# Patient Record
Sex: FEMALE | Race: BLACK OR AFRICAN AMERICAN | NOT HISPANIC OR LATINO | Employment: OTHER | URBAN - METROPOLITAN AREA
[De-identification: names, ages, dates, MRNs, and addresses within clinical notes are randomized per-mention and may not be internally consistent; named-entity substitution may affect disease eponyms.]

---

## 2017-07-10 ENCOUNTER — OFFICE VISIT (OUTPATIENT)
Dept: OBSTETRICS AND GYNECOLOGY | Facility: CLINIC | Age: 77
End: 2017-07-10
Payer: MEDICARE

## 2017-07-10 ENCOUNTER — TELEPHONE (OUTPATIENT)
Dept: OBSTETRICS AND GYNECOLOGY | Facility: CLINIC | Age: 77
End: 2017-07-10

## 2017-07-10 VITALS
DIASTOLIC BLOOD PRESSURE: 66 MMHG | BODY MASS INDEX: 23.91 KG/M2 | WEIGHT: 134.94 LBS | SYSTOLIC BLOOD PRESSURE: 120 MMHG | HEIGHT: 63 IN

## 2017-07-10 DIAGNOSIS — Z01.419 WELL WOMAN EXAM WITH ROUTINE GYNECOLOGICAL EXAM: Primary | ICD-10-CM

## 2017-07-10 DIAGNOSIS — Z78.0 MENOPAUSE: ICD-10-CM

## 2017-07-10 PROCEDURE — G0101 CA SCREEN;PELVIC/BREAST EXAM: HCPCS | Mod: S$GLB,,, | Performed by: OBSTETRICS & GYNECOLOGY

## 2017-07-10 PROCEDURE — 99999 PR PBB SHADOW E&M-EST. PATIENT-LVL III: CPT | Mod: PBBFAC,,, | Performed by: OBSTETRICS & GYNECOLOGY

## 2017-07-10 PROCEDURE — 88175 CYTOPATH C/V AUTO FLUID REDO: CPT

## 2017-07-10 RX ORDER — NAPROXEN SODIUM 220 MG/1
81 TABLET, FILM COATED ORAL DAILY
COMMUNITY

## 2017-07-10 RX ORDER — BENAZEPRIL HYDROCHLORIDE 5 MG/1
5 TABLET ORAL DAILY
COMMUNITY
End: 2019-12-30

## 2017-07-10 NOTE — TELEPHONE ENCOUNTER
----- Message from Aubree Lange sent at 7/7/2017  4:17 PM CDT -----  Contact: Self [phone]  Patient is calling to get Mammo orders sent to [phone]

## 2017-07-10 NOTE — PROGRESS NOTES
Subjective:       Patient ID: Alana Beth is a 76 y.o. female.    Chief Complaint: Well Woman (no complaints)    No HRT; having cardiology work-up. May have blockage and need shunt.  Mammo at [facility]    HPI  Review of Systems   Gastrointestinal: Negative for abdominal distention, abdominal pain, constipation and nausea.   Genitourinary: Negative for dyspareunia, dysuria, genital sores, pelvic pain, vaginal bleeding and vaginal discharge.       Objective:      Physical Exam   Constitutional: She appears well-developed and well-nourished.   Pulmonary/Chest: Right breast exhibits no mass, no nipple discharge, no skin change and no tenderness. Left breast exhibits no mass, no nipple discharge, no skin change and no tenderness.   Abdominal: Soft. Bowel sounds are normal. She exhibits no distension and no mass. There is no tenderness. There is no rebound and no guarding. Hernia confirmed negative in the right inguinal area and confirmed negative in the left inguinal area.   Genitourinary: Rectum normal, vagina normal and uterus normal. No breast tenderness or discharge. There is no lesion on the right labia. There is no lesion on the left labia. Uterus is not fixed and not tender. Cervix exhibits no motion tenderness, no discharge and no friability. Right adnexum displays no mass, no tenderness and no fullness. Left adnexum displays no mass, no tenderness and no fullness. No tenderness in the vagina. No vaginal discharge found.   Lymphadenopathy:        Right: No inguinal adenopathy present.        Left: No inguinal adenopathy present.       Assessment:       1. Well woman exam with routine gynecological exam    2. Menopause        Plan:         annual gyn visit; no HRT; pap and mammogram.

## 2017-07-19 ENCOUNTER — TELEPHONE (OUTPATIENT)
Dept: OBSTETRICS AND GYNECOLOGY | Facility: CLINIC | Age: 77
End: 2017-07-19

## 2017-07-26 ENCOUNTER — TELEPHONE (OUTPATIENT)
Dept: OBSTETRICS AND GYNECOLOGY | Facility: CLINIC | Age: 77
End: 2017-07-26

## 2017-07-26 NOTE — TELEPHONE ENCOUNTER
----- Message from Radha Nhi sent at 7/26/2017 12:17 PM CDT -----  Contact: self, [phone]  Patient called in requesting to speak with you regarding her test results done on 7/10/17.  Please advise.

## 2017-07-27 NOTE — TELEPHONE ENCOUNTER
Notified patient of results. States that she had something similar a few times before years ago. Patient is aware of follow up recommendations. Order will be faxed so that patient can schedule appointment. All questions answered and patient verbalized understanding.

## 2017-07-27 NOTE — TELEPHONE ENCOUNTER
"Had a small area that was called "indeterminate"---needs to go back for U/S---ok to send order to [facility]  "

## 2018-07-27 ENCOUNTER — TELEPHONE (OUTPATIENT)
Dept: OBSTETRICS AND GYNECOLOGY | Facility: CLINIC | Age: 78
End: 2018-07-27

## 2018-07-27 NOTE — TELEPHONE ENCOUNTER
----- Message from Klever Wong sent at 7/27/2018 10:10 AM CDT -----  Contact: self/[phone]  Patient would like orders put in the system for her Mammogram.      Please call and advise when done.

## 2018-08-06 ENCOUNTER — OFFICE VISIT (OUTPATIENT)
Dept: OBSTETRICS AND GYNECOLOGY | Facility: CLINIC | Age: 78
End: 2018-08-06
Payer: MEDICARE

## 2018-08-06 VITALS
BODY MASS INDEX: 23.63 KG/M2 | SYSTOLIC BLOOD PRESSURE: 126 MMHG | WEIGHT: 133.38 LBS | HEIGHT: 63 IN | DIASTOLIC BLOOD PRESSURE: 60 MMHG

## 2018-08-06 DIAGNOSIS — Z78.0 MENOPAUSE: ICD-10-CM

## 2018-08-06 DIAGNOSIS — Z01.419 WELL WOMAN EXAM WITH ROUTINE GYNECOLOGICAL EXAM: Primary | ICD-10-CM

## 2018-08-06 PROCEDURE — G0101 CA SCREEN;PELVIC/BREAST EXAM: HCPCS | Mod: S$GLB,,, | Performed by: OBSTETRICS & GYNECOLOGY

## 2018-08-06 PROCEDURE — 99999 PR PBB SHADOW E&M-EST. PATIENT-LVL III: CPT | Mod: PBBFAC,,, | Performed by: OBSTETRICS & GYNECOLOGY

## 2018-08-06 PROCEDURE — 88175 CYTOPATH C/V AUTO FLUID REDO: CPT

## 2018-08-06 RX ORDER — IRBESARTAN 75 MG/1
150 TABLET ORAL NIGHTLY
COMMUNITY
End: 2020-01-06

## 2018-08-06 RX ORDER — FERROUS SULFATE 325(65) MG
325 TABLET, DELAYED RELEASE (ENTERIC COATED) ORAL
COMMUNITY
End: 2020-02-13

## 2018-08-06 RX ORDER — CHLORTHALIDONE 25 MG/1
25 TABLET ORAL DAILY
COMMUNITY
End: 2019-12-30

## 2018-08-06 RX ORDER — CARVEDILOL 6.25 MG/1
6.25 TABLET ORAL 2 TIMES DAILY WITH MEALS
COMMUNITY
End: 2020-11-11 | Stop reason: SDUPTHER

## 2018-08-06 NOTE — PROGRESS NOTES
Subjective:       Patient ID: Alana Beth is a 77 y.o. female.    Chief Complaint: Well Woman (no complaints)    No HRT; getting mammo at [facility] today---will try to add BMD  Doing well generally    HPI  Review of Systems   Gastrointestinal: Negative for abdominal distention, abdominal pain, constipation and nausea.   Genitourinary: Negative for dyspareunia, dysuria, genital sores, pelvic pain, vaginal bleeding and vaginal discharge.       Objective:      Physical Exam   Constitutional: She appears well-developed and well-nourished.   Pulmonary/Chest: Right breast exhibits no mass, no nipple discharge, no skin change and no tenderness. Left breast exhibits no mass, no nipple discharge, no skin change and no tenderness.   Abdominal: Soft. Bowel sounds are normal. She exhibits no distension and no mass. There is no tenderness. There is no rebound and no guarding. Hernia confirmed negative in the right inguinal area and confirmed negative in the left inguinal area.   Genitourinary: Rectum normal, vagina normal and uterus normal. No breast tenderness or discharge. There is no lesion on the right labia. There is no lesion on the left labia. Uterus is not fixed and not tender. Cervix exhibits no motion tenderness, no discharge and no friability. Right adnexum displays no mass, no tenderness and no fullness. Left adnexum displays no mass, no tenderness and no fullness. No tenderness in the vagina. No vaginal discharge found.   Lymphadenopathy:        Right: No inguinal adenopathy present.        Left: No inguinal adenopathy present.     low E2 effects  Assessment:       1. Well woman exam with routine gynecological exam    2. Menopause        Plan:         annual gyn visit; pap; mammogram and BMD; no HRT

## 2018-08-10 ENCOUNTER — TELEPHONE (OUTPATIENT)
Dept: OBSTETRICS AND GYNECOLOGY | Facility: CLINIC | Age: 78
End: 2018-08-10

## 2018-09-28 RX ORDER — AMOXICILLIN AND CLAVULANATE POTASSIUM 875; 125 MG/1; MG/1
1 TABLET, FILM COATED ORAL 2 TIMES DAILY
Qty: 20 TABLET | Refills: 0 | Status: SHIPPED | OUTPATIENT
Start: 2018-09-28 | End: 2019-12-30

## 2018-12-24 ENCOUNTER — TELEPHONE (OUTPATIENT)
Dept: OBSTETRICS AND GYNECOLOGY | Facility: CLINIC | Age: 78
End: 2018-12-24

## 2018-12-24 NOTE — TELEPHONE ENCOUNTER
----- Message from Radha Hankins sent at 12/24/2018  9:36 AM CST -----  Contact: self, [phone] (H)  Patient states she's experiencing numbness inside of her right leg. States she's already seen her cardiologist but wants to know if this could be Gyn related. Please advise.

## 2018-12-24 NOTE — TELEPHONE ENCOUNTER
Patient has complaints of numbness on inside of right leg. States that she noticed it on 12/06. She spoke with internist and was told that it could be nerve damage from poor circulation. Patient had questions in regards to if this could be gyn related. No complaints of pelvic or abdominal pain. No shortness of breathe or chest pain. Patient is scheduled for MRI on 01/07 with her internist. Notified patient that from symptoms described it does not sound gyn related. Informed patient to keep scheduled appt with internist for further testing and instruction. All questions answered and patient verbalized understanding.

## 2019-07-03 ENCOUNTER — TELEPHONE (OUTPATIENT)
Dept: OBSTETRICS AND GYNECOLOGY | Facility: CLINIC | Age: 79
End: 2019-07-03

## 2019-07-03 NOTE — TELEPHONE ENCOUNTER
----- Message from Corinne Shell sent at 7/3/2019  2:27 PM CDT -----  No. [phone]     Patient needs a mammo order for [facility] in [place].

## 2019-09-06 ENCOUNTER — TELEPHONE (OUTPATIENT)
Dept: OBSTETRICS AND GYNECOLOGY | Facility: CLINIC | Age: 79
End: 2019-09-06

## 2019-09-06 NOTE — TELEPHONE ENCOUNTER
----- Message from Madeleine Meyer MA sent at 9/6/2019 10:53 AM CDT -----  Contact: self / [phone]      ----- Message -----  From: Amira Ybarra  Sent: 9/6/2019  10:49 AM  To: Jose TSANG Staff    Patient is requesting orders for a mammogram. Please advise.

## 2019-09-30 ENCOUNTER — TELEPHONE (OUTPATIENT)
Dept: OBSTETRICS AND GYNECOLOGY | Facility: CLINIC | Age: 79
End: 2019-09-30

## 2019-10-07 ENCOUNTER — OFFICE VISIT (OUTPATIENT)
Dept: OBSTETRICS AND GYNECOLOGY | Facility: CLINIC | Age: 79
End: 2019-10-07
Payer: MEDICARE

## 2019-10-07 VITALS
DIASTOLIC BLOOD PRESSURE: 68 MMHG | HEIGHT: 63 IN | SYSTOLIC BLOOD PRESSURE: 148 MMHG | WEIGHT: 141.38 LBS | BODY MASS INDEX: 25.05 KG/M2

## 2019-10-07 DIAGNOSIS — Z01.419 WELL WOMAN EXAM WITH ROUTINE GYNECOLOGICAL EXAM: ICD-10-CM

## 2019-10-07 DIAGNOSIS — Z12.4 ROUTINE PAPANICOLAOU SMEAR: Primary | ICD-10-CM

## 2019-10-07 PROCEDURE — 99999 PR PBB SHADOW E&M-EST. PATIENT-LVL III: CPT | Mod: PBBFAC,,, | Performed by: OBSTETRICS & GYNECOLOGY

## 2019-10-07 PROCEDURE — G0101 CA SCREEN;PELVIC/BREAST EXAM: HCPCS | Mod: S$GLB,,, | Performed by: OBSTETRICS & GYNECOLOGY

## 2019-10-07 PROCEDURE — 88175 CYTOPATH C/V AUTO FLUID REDO: CPT

## 2019-10-07 PROCEDURE — 99999 PR PBB SHADOW E&M-EST. PATIENT-LVL III: ICD-10-PCS | Mod: PBBFAC,,, | Performed by: OBSTETRICS & GYNECOLOGY

## 2019-10-07 PROCEDURE — G0101 PR CA SCREEN;PELVIC/BREAST EXAM: ICD-10-PCS | Mod: S$GLB,,, | Performed by: OBSTETRICS & GYNECOLOGY

## 2019-10-07 NOTE — PROGRESS NOTES
"HPI:   79 y.o.   OB History        2    Para        Term                AB   2    Living           SAB   2    TAB        Ectopic        Multiple        Live Births                  No LMP recorded. Patient is postmenopausal.    Patient is  here for her annual gynecologic exam.  She has no complaints.     ROS:  GENERAL: No fever, chills, fatigability or weight loss.  SKIN: No rashes, itching or changes in color or texture of skin.  HEAD: No headaches or recent head trauma.  EYES: Visual acuity fine. No photophobia, ocular pain or diplopia.  EARS: Denies ear pain, discharge or vertigo.  NOSE: No loss of smell, no epistaxis or postnasal drip.  MOUTH & THROAT: No hoarseness or change in voice. No excessive gum bleeding.  NODES: Denies swollen glands.  CHEST: Denies HUSTON, cyanosis, wheezing, cough and sputum production.  CARDIOVASCULAR: Denies chest pain, PND, orthopnea or reduced exercise tolerance.  ABDOMEN: Appetite fine. No weight loss. Denies diarrhea, abdominal pain, hematemesis or blood in stool.  URINARY: No flank pain, dysuria or hematuria.  PERIPHERAL VASCULAR: No claudication or cyanosis.  MUSCULOSKELETAL: No joint stiffness or swelling. Denies back pain.  NEUROLOGIC: No history of seizures, paralysis, alteration of gait or coordination.    PE:   BP (!) 148/68   Ht 5' 3" (1.6 m)   Wt 64.1 kg (141 lb 6.4 oz)   BMI 25.05 kg/m²   APPEARANCE: Well nourished, well developed, in no acute distress.  NECK: Neck symmetric without masses or thyromegaly.  BREASTS: Symmetrical, no skin changes or visible lesions. No palpable masses, nipple discharge or adenopathy bilaterally.  ABDOMEN: Flat. Soft. No tenderness or masses. No hepatosplenomegaly. No hernias. No CVA tenderness.  VULVA: No lesions. Normal female genitalia.  URETHRAL MEATUS: Normal size and location, no lesions, no prolapse.  URETHRA: No masses, tenderness, prolapse or scarring.  VAGINA: Moist and well rugated, no discharge, no significant " cystocele or rectocele.  CERVIX: No lesions and discharge. PAP done.  UTERUS: Normal size, regular shape, mobile, non-tender, bladder base nontender.  ADNEXA: No masses, tenderness or CDS nodularity.  ANUS PERINEUM: Normal.    PROCEDURES:  Pap smear    Assessment:  Normal Gynecologic Exam    Plan:  Mammogram and Colonoscopy if indicated by current recommendations.  Return to clinic in one year or for any problems or complaints.  No gyn co  Neg hx

## 2019-10-15 ENCOUNTER — TELEPHONE (OUTPATIENT)
Dept: OBSTETRICS AND GYNECOLOGY | Facility: CLINIC | Age: 79
End: 2019-10-15

## 2019-10-15 NOTE — TELEPHONE ENCOUNTER
----- Message from Michael A. Wiedemann, MD sent at 10/15/2019  8:05 AM CDT -----  Notify the patient that her pap was normal

## 2019-12-23 ENCOUNTER — TELEPHONE (OUTPATIENT)
Dept: OBSTETRICS AND GYNECOLOGY | Facility: CLINIC | Age: 79
End: 2019-12-23

## 2019-12-23 NOTE — TELEPHONE ENCOUNTER
----- Message from Em Oliveira sent at 12/23/2019  3:05 PM CST -----  Contact: Pt  Patient requesting a call back in regards to getting sooner appt    Patient states that she's having sticking and itching in the VAG...,  area    Please call and advise    Phone [phone]

## 2019-12-23 NOTE — TELEPHONE ENCOUNTER
Pt state she is having some itching and feel like something is sticking her. Pt state she would just wait until the 16th to see you about it. I offer pt medication but she said she will just wait because she do not know what it is.

## 2019-12-30 ENCOUNTER — OFFICE VISIT (OUTPATIENT)
Dept: PRIMARY CARE CLINIC | Facility: CLINIC | Age: 79
End: 2019-12-30
Payer: MEDICARE

## 2019-12-30 VITALS
RESPIRATION RATE: 16 BRPM | DIASTOLIC BLOOD PRESSURE: 84 MMHG | WEIGHT: 137 LBS | HEIGHT: 63 IN | BODY MASS INDEX: 24.27 KG/M2 | TEMPERATURE: 98 F | OXYGEN SATURATION: 100 % | SYSTOLIC BLOOD PRESSURE: 180 MMHG | HEART RATE: 84 BPM

## 2019-12-30 DIAGNOSIS — Z23 NEED FOR IMMUNIZATION AGAINST INFLUENZA: Primary | ICD-10-CM

## 2019-12-30 DIAGNOSIS — I73.9 PVD (PERIPHERAL VASCULAR DISEASE): ICD-10-CM

## 2019-12-30 DIAGNOSIS — I10 ESSENTIAL HYPERTENSION: ICD-10-CM

## 2019-12-30 DIAGNOSIS — Z23 INFLUENZA VACCINE NEEDED: ICD-10-CM

## 2019-12-30 DIAGNOSIS — Z86.718 HX OF BLOOD CLOTS: ICD-10-CM

## 2019-12-30 DIAGNOSIS — F41.9 ANXIETY: ICD-10-CM

## 2019-12-30 PROCEDURE — 1159F PR MEDICATION LIST DOCUMENTED IN MEDICAL RECORD: ICD-10-PCS | Mod: S$GLB,,, | Performed by: FAMILY MEDICINE

## 2019-12-30 PROCEDURE — 1126F AMNT PAIN NOTED NONE PRSNT: CPT | Mod: S$GLB,,, | Performed by: FAMILY MEDICINE

## 2019-12-30 PROCEDURE — 1101F PR PT FALLS ASSESS DOC 0-1 FALLS W/OUT INJ PAST YR: ICD-10-PCS | Mod: CPTII,S$GLB,, | Performed by: FAMILY MEDICINE

## 2019-12-30 PROCEDURE — 99204 PR OFFICE/OUTPT VISIT, NEW, LEVL IV, 45-59 MIN: ICD-10-PCS | Mod: 25,S$GLB,, | Performed by: FAMILY MEDICINE

## 2019-12-30 PROCEDURE — 99999 PR PBB SHADOW E&M-EST. PATIENT-LVL IV: CPT | Mod: PBBFAC,,, | Performed by: FAMILY MEDICINE

## 2019-12-30 PROCEDURE — G0008 FLU VACCINE - HIGH DOSE (65+) PRESERVATIVE FREE IM: ICD-10-PCS | Mod: S$GLB,,, | Performed by: FAMILY MEDICINE

## 2019-12-30 PROCEDURE — 1126F PR PAIN SEVERITY QUANTIFIED, NO PAIN PRESENT: ICD-10-PCS | Mod: S$GLB,,, | Performed by: FAMILY MEDICINE

## 2019-12-30 PROCEDURE — 90662 FLU VACCINE - HIGH DOSE (65+) PRESERVATIVE FREE IM: ICD-10-PCS | Mod: S$GLB,,, | Performed by: FAMILY MEDICINE

## 2019-12-30 PROCEDURE — G0008 ADMIN INFLUENZA VIRUS VAC: HCPCS | Mod: S$GLB,,, | Performed by: FAMILY MEDICINE

## 2019-12-30 PROCEDURE — 1101F PT FALLS ASSESS-DOCD LE1/YR: CPT | Mod: CPTII,S$GLB,, | Performed by: FAMILY MEDICINE

## 2019-12-30 PROCEDURE — 99204 OFFICE O/P NEW MOD 45 MIN: CPT | Mod: 25,S$GLB,, | Performed by: FAMILY MEDICINE

## 2019-12-30 PROCEDURE — 99999 PR PBB SHADOW E&M-EST. PATIENT-LVL IV: ICD-10-PCS | Mod: PBBFAC,,, | Performed by: FAMILY MEDICINE

## 2019-12-30 PROCEDURE — 1159F MED LIST DOCD IN RCRD: CPT | Mod: S$GLB,,, | Performed by: FAMILY MEDICINE

## 2019-12-30 PROCEDURE — 90662 IIV NO PRSV INCREASED AG IM: CPT | Mod: S$GLB,,, | Performed by: FAMILY MEDICINE

## 2019-12-30 RX ORDER — GUAIFENESIN 600 MG/1
600 TABLET, EXTENDED RELEASE ORAL 2 TIMES DAILY
COMMUNITY
End: 2020-01-13

## 2019-12-30 RX ORDER — AMLODIPINE BESYLATE 5 MG/1
5 TABLET ORAL DAILY
Qty: 30 TABLET | Refills: 1 | Status: SHIPPED | OUTPATIENT
Start: 2019-12-30 | End: 2020-01-06

## 2019-12-30 NOTE — Clinical Note
December 30, 2019      No Recipients           [facility]  [address], [facility] [address]  [facility] [address]  Phone: [phone]  Fax: [phone]          Patient: Alana Beth   MR Number: [id number]   YOB: 1940   Date of Visit: 12/30/2019       Dear :    Thank you for referring Alana Beth to me for evaluation. Attached you will find relevant portions of my assessment and plan of care.    If you have questions, please do not hesitate to call me. I look forward to following Alana Beth along with you.    Sincerely,    Derrick Salvador MD    Enclosure  CC:  No Recipients    If you would like to receive this communication electronically, please contact [email or web address] or [phone] to request more information on [employer] Link access.    For providers and/or their staff who would like to refer a patient to [facility], please contact us through our one-stop-shop provider referral line, [facility], at [phone].    If you feel you have received this communication in error or would no longer like to receive these types of communications, please e-mail [email or web address]

## 2019-12-30 NOTE — PROGRESS NOTES
Patient identified by name and date of birth. Allergies reviewed, immunization administered by aseptic technique, tolerated well by pt.

## 2019-12-30 NOTE — PROGRESS NOTES
"Subjective:       Patient ID: Alana Beth is a 79 y.o. female.    Chief Complaint: Establish Care (c/o elevated blood pressure and thinks it may be from her medication)    79 yr old with hx of HTN, palpitations, PVD, blood clot here to establish care. Previously seen by Dr. Tang at [facility] and frustrated by the bp meds she was given which did not make her feel well.    Not feeling a hundred percent lately. Complains of stomach gas and uncontrolled bp. Typically in the am her SBP is in 160s. Denies SOB, CP, HA, or vision changes.   States takes her bp med every evening. Takes the coreg in the am as well.       Review of Systems   Constitutional: Negative for activity change, chills and fever.   HENT: Negative for dental problem.    Eyes: Negative for visual disturbance.   Respiratory: Negative for cough, chest tightness, shortness of breath and wheezing.    Cardiovascular: Negative for chest pain, palpitations and leg swelling.   Gastrointestinal: Negative for abdominal distention, abdominal pain, constipation, diarrhea, nausea and vomiting.   Skin: Negative for rash.   Neurological: Negative for dizziness and weakness.   Hematological: Does not bruise/bleed easily.       Objective:      Vitals:    12/30/19 1304 12/30/19 1414   BP: (!) 142/82 (!) 180/84   BP Location: Left arm Left arm   Patient Position: Sitting    BP Method: Medium (Manual) Medium (Manual)   Pulse: 84    Resp: 16    Temp: 98.3 °F (36.8 °C)    SpO2: 100%    Weight: 62.1 kg (137 lb)    Height: 5' 3" (1.6 m)      Physical Exam   Constitutional: She appears well-developed and well-nourished.   HENT:   Head: Normocephalic and atraumatic.   Nose: Nose normal.   Mouth/Throat: Oropharynx is clear and moist.   Eyes: Conjunctivae and EOM are normal.   Cardiovascular: Normal rate, regular rhythm and normal heart sounds.   Pulmonary/Chest: Effort normal and breath sounds normal. No respiratory distress. She has no wheezes. She has no rales. "   Abdominal: Soft. She exhibits no distension. There is no tenderness.   Lymphadenopathy:     She has no cervical adenopathy.   Neurological: She is alert. No cranial nerve deficit.   Psychiatric: She has a normal mood and affect.   Nursing note and vitals reviewed.          No results found for: NA, K, CL, CO2, BUN, CREATININE, GLUCOSE, ANIONGAP  No results found for: HGBA1C  No results found for: BNP, BNPTRIAGEBLO    No results found for: WBC, HGB, HCT, PLT, GRAN  No results found for: CHOL, HDL, LDLCALC, TRIG       Current Outpatient Medications:     amLODIPine (NORVASC) 5 MG tablet, Take 1 tablet (5 mg total) by mouth once daily., Disp: 30 tablet, Rfl: 1    aspirin 81 MG Chew, Take 81 mg by mouth once daily., Disp: , Rfl:     carvedilol (COREG) 6.25 MG tablet, Take 6.25 mg by mouth 2 (two) times daily with meals., Disp: , Rfl:     clorazepate (TRANXENE) 7.5 MG Tab, Take 7.5 mg by mouth once daily.  , Disp: , Rfl:     ferrous sulfate 325 (65 FE) MG EC tablet, Take 325 mg by mouth 3 (three) times daily with meals., Disp: , Rfl:     irbesartan (AVAPRO) 75 MG tablet, Take 75 mg by mouth every evening., Disp: , Rfl:         Assessment:       1. Essential hypertension    2. PVD (peripheral vascular disease)    3. Anxiety    4. Hx of blood clots           Plan:       Essential hypertension  -     amLODIPine (NORVASC) 5 MG tablet; Take 1 tablet (5 mg total) by mouth once daily.  Dispense: 30 tablet; Refill: 1  Dangerously high today 180/84 on repeat both arms. Getting med records as poor historian but has tried chlorthalidone in the past with bad but unclear side effects. Continue irbesartan 150 mg, coreg 6.25 bid and trial of norvasc 5 mg. F/u in the next couple of days for nursing BP check. To document at home and keep log as well.     PVD (peripheral vascular disease)  Has seen cardiology annually. Getting med records.    Anxiety  Not a problem currently. Has been taking clorazepate 3 mg for decades. Goal to  ween off in time. Would replace with SSRI if warranted.    Hx of blood clots  -Has been prescribed aspirin years ago. Requesting med records.

## 2020-01-03 ENCOUNTER — CLINICAL SUPPORT (OUTPATIENT)
Dept: PRIMARY CARE CLINIC | Facility: CLINIC | Age: 80
End: 2020-01-03
Payer: MEDICARE

## 2020-01-03 VITALS — SYSTOLIC BLOOD PRESSURE: 126 MMHG | HEART RATE: 85 BPM | DIASTOLIC BLOOD PRESSURE: 70 MMHG

## 2020-01-03 PROCEDURE — 99999 PR PBB SHADOW E&M-EST. PATIENT-LVL III: ICD-10-PCS | Mod: PBBFAC,,,

## 2020-01-03 PROCEDURE — 99999 PR PBB SHADOW E&M-EST. PATIENT-LVL III: CPT | Mod: PBBFAC,,,

## 2020-01-03 NOTE — PROGRESS NOTES
Patient identified by name and date of birth. States here for a blood pressure check.Vitals obtained and charted, tolerated well by pt. Pt instructed to continue medications as ordered and to keep f/u appt. Pt verbalizes understanding.

## 2020-01-06 ENCOUNTER — OFFICE VISIT (OUTPATIENT)
Dept: PRIMARY CARE CLINIC | Facility: CLINIC | Age: 80
End: 2020-01-06
Payer: MEDICARE

## 2020-01-06 ENCOUNTER — CLINICAL SUPPORT (OUTPATIENT)
Dept: PRIMARY CARE CLINIC | Facility: CLINIC | Age: 80
End: 2020-01-06
Payer: MEDICARE

## 2020-01-06 VITALS
RESPIRATION RATE: 16 BRPM | TEMPERATURE: 99 F | DIASTOLIC BLOOD PRESSURE: 78 MMHG | HEART RATE: 85 BPM | OXYGEN SATURATION: 97 % | BODY MASS INDEX: 23.74 KG/M2 | HEIGHT: 63 IN | SYSTOLIC BLOOD PRESSURE: 148 MMHG | WEIGHT: 134 LBS

## 2020-01-06 DIAGNOSIS — R53.1 WEAKNESS: ICD-10-CM

## 2020-01-06 DIAGNOSIS — I10 ESSENTIAL HYPERTENSION: Primary | ICD-10-CM

## 2020-01-06 DIAGNOSIS — F41.9 ANXIETY: ICD-10-CM

## 2020-01-06 DIAGNOSIS — R35.89 POLYURIA: ICD-10-CM

## 2020-01-06 LAB
BILIRUB SERPL-MCNC: 0 MG/DL
BILIRUB UR QL STRIP: NEGATIVE
BLOOD URINE, POC: NORMAL
CLARITY UR: CLEAR
COLOR UR: YELLOW
COLOR, POC UA: YELLOW
GLUCOSE UR QL STRIP: NEGATIVE
GLUCOSE UR QL STRIP: NORMAL
HGB UR QL STRIP: ABNORMAL
KETONES UR QL STRIP: 0
KETONES UR QL STRIP: NEGATIVE
LEUKOCYTE ESTERASE UR QL STRIP: ABNORMAL
LEUKOCYTE ESTERASE URINE, POC: NORMAL
MICROSCOPIC COMMENT: NORMAL
NITRITE UR QL STRIP: NEGATIVE
NITRITE, POC UA: 0
PH UR STRIP: 7 [PH] (ref 5–8)
PH, POC UA: 7
PROT UR QL STRIP: NEGATIVE
PROTEIN, POC: 0
RBC #/AREA URNS HPF: 2 /HPF (ref 0–4)
SP GR UR STRIP: <=1.005 (ref 1–1.03)
SPECIFIC GRAVITY, POC UA: 1
SQUAMOUS #/AREA URNS HPF: 1 /HPF
URN SPEC COLLECT METH UR: ABNORMAL
UROBILINOGEN UR STRIP-ACNC: NEGATIVE EU/DL
UROBILINOGEN, POC UA: NORMAL
WBC #/AREA URNS HPF: 2 /HPF (ref 0–5)

## 2020-01-06 PROCEDURE — 99999 PR PBB SHADOW E&M-EST. PATIENT-LVL IV: CPT | Mod: PBBFAC,,, | Performed by: FAMILY MEDICINE

## 2020-01-06 PROCEDURE — 1159F MED LIST DOCD IN RCRD: CPT | Mod: S$GLB,,, | Performed by: FAMILY MEDICINE

## 2020-01-06 PROCEDURE — 81001 POCT URINALYSIS, DIPSTICK OR TABLET REAGENT, AUTOMATED, WITH MICROSCOP: ICD-10-PCS | Mod: S$GLB,,, | Performed by: FAMILY MEDICINE

## 2020-01-06 PROCEDURE — 81001 URINALYSIS AUTO W/SCOPE: CPT | Mod: S$GLB,,, | Performed by: FAMILY MEDICINE

## 2020-01-06 PROCEDURE — 3077F SYST BP >= 140 MM HG: CPT | Mod: CPTII,S$GLB,, | Performed by: FAMILY MEDICINE

## 2020-01-06 PROCEDURE — 1101F PR PT FALLS ASSESS DOC 0-1 FALLS W/OUT INJ PAST YR: ICD-10-PCS | Mod: CPTII,S$GLB,, | Performed by: FAMILY MEDICINE

## 2020-01-06 PROCEDURE — 1126F PR PAIN SEVERITY QUANTIFIED, NO PAIN PRESENT: ICD-10-PCS | Mod: S$GLB,,, | Performed by: FAMILY MEDICINE

## 2020-01-06 PROCEDURE — 1159F PR MEDICATION LIST DOCUMENTED IN MEDICAL RECORD: ICD-10-PCS | Mod: S$GLB,,, | Performed by: FAMILY MEDICINE

## 2020-01-06 PROCEDURE — 3078F PR MOST RECENT DIASTOLIC BLOOD PRESSURE < 80 MM HG: ICD-10-PCS | Mod: CPTII,S$GLB,, | Performed by: FAMILY MEDICINE

## 2020-01-06 PROCEDURE — 3077F PR MOST RECENT SYSTOLIC BLOOD PRESSURE >= 140 MM HG: ICD-10-PCS | Mod: CPTII,S$GLB,, | Performed by: FAMILY MEDICINE

## 2020-01-06 PROCEDURE — 3078F DIAST BP <80 MM HG: CPT | Mod: CPTII,S$GLB,, | Performed by: FAMILY MEDICINE

## 2020-01-06 PROCEDURE — 1101F PT FALLS ASSESS-DOCD LE1/YR: CPT | Mod: CPTII,S$GLB,, | Performed by: FAMILY MEDICINE

## 2020-01-06 PROCEDURE — 81001 URINALYSIS AUTO W/SCOPE: CPT

## 2020-01-06 PROCEDURE — 1126F AMNT PAIN NOTED NONE PRSNT: CPT | Mod: S$GLB,,, | Performed by: FAMILY MEDICINE

## 2020-01-06 PROCEDURE — 99214 OFFICE O/P EST MOD 30 MIN: CPT | Mod: 25,S$GLB,, | Performed by: FAMILY MEDICINE

## 2020-01-06 PROCEDURE — 99999 PR PBB SHADOW E&M-EST. PATIENT-LVL IV: ICD-10-PCS | Mod: PBBFAC,,, | Performed by: FAMILY MEDICINE

## 2020-01-06 PROCEDURE — 99214 PR OFFICE/OUTPT VISIT, EST, LEVL IV, 30-39 MIN: ICD-10-PCS | Mod: 25,S$GLB,, | Performed by: FAMILY MEDICINE

## 2020-01-06 RX ORDER — SERTRALINE HYDROCHLORIDE 25 MG/1
25 TABLET, FILM COATED ORAL DAILY
Qty: 30 TABLET | Refills: 11 | Status: SHIPPED | OUTPATIENT
Start: 2020-01-06 | End: 2020-01-07

## 2020-01-06 RX ORDER — AMLODIPINE BESYLATE 10 MG/1
10 TABLET ORAL DAILY
Qty: 30 TABLET | Refills: 2 | Status: SHIPPED | OUTPATIENT
Start: 2020-01-06 | End: 2020-06-05 | Stop reason: SDUPTHER

## 2020-01-06 NOTE — PROGRESS NOTES
"Subjective:       Patient ID: Alana Beth is a 79 y.o. female.    Chief Complaint: Follow-up (stopped irbesartan, could not sleep on med) and Hypertension (still having elevated BP, went to ER Saturday, was feeling weak)    Here for follow up from ER evaluated for nausea on 1/4/2019. Attributed her symptoms to irbesartan and stopped taking it. Continues to take norvasc 5mg and coreg 6.25 bid.  Feeling better now but still feeing her heart race and some stomach discomfort with poor appetite.   Stopped taking her tranzene for anxiety abruptly last week.     Review of Systems   Constitutional: Negative for activity change, chills and fever.   Respiratory: Positive for shortness of breath. Negative for chest tightness and wheezing.    Cardiovascular: Negative for chest pain, palpitations and leg swelling.   Gastrointestinal: Negative for abdominal distention, abdominal pain, blood in stool, constipation, diarrhea, nausea and vomiting.   Endocrine: Positive for polyuria.   Genitourinary: Negative for difficulty urinating and dysuria.   Neurological: Negative for weakness.   Psychiatric/Behavioral: Positive for agitation. The patient is nervous/anxious.        Objective:      Vitals:    01/06/20 1130   BP: (!) 148/78   BP Location: Right arm   Patient Position: Sitting   BP Method: Medium (Manual)   Pulse: 85   Resp: 16   Temp: 99.1 °F (37.3 °C)   TempSrc: Oral   SpO2: 97%   Weight: 60.8 kg (134 lb)   Height: 5' 3" (1.6 m)     Physical Exam   Constitutional: She appears well-developed and well-nourished.   HENT:   Head: Normocephalic and atraumatic.   Nose: Nose normal.   Eyes: Conjunctivae and EOM are normal.   Cardiovascular: Normal rate, regular rhythm and normal heart sounds.   Pulmonary/Chest: Effort normal and breath sounds normal. No respiratory distress. She has no wheezes. She has no rales.   Abdominal: Soft. She exhibits no distension. There is no tenderness.   Lymphadenopathy:     She has no cervical " adenopathy.   Neurological: She is alert. No cranial nerve deficit.   Psychiatric: She has a normal mood and affect.   Nursing note and vitals reviewed.          Lab Results   Component Value Date     01/04/2020    K 3.5 01/04/2020     (L) 01/04/2020    CO2 26 01/04/2020    BUN 14 01/04/2020    CREATININE 0.9 01/04/2020    ANIONGAP 12 01/04/2020     No results found for: HGBA1C  No results found for: BNP, BNPTRIAGEBLO    Lab Results   Component Value Date    WBC 3.70 (L) 01/04/2020    HGB 12.2 01/04/2020    HCT 37.3 01/04/2020     01/04/2020    GRAN 2.0 01/04/2020    GRAN 55.6 01/04/2020     No results found for: CHOL, HDL, LDLCALC, TRIG       Current Outpatient Medications:     aspirin 81 MG Chew, Take 81 mg by mouth once daily., Disp: , Rfl:     carvedilol (COREG) 6.25 MG tablet, Take 6.25 mg by mouth 2 (two) times daily with meals., Disp: , Rfl:     amLODIPine (NORVASC) 10 MG tablet, Take 1 tablet (10 mg total) by mouth once daily., Disp: 30 tablet, Rfl: 2    ferrous sulfate 325 (65 FE) MG EC tablet, Take 325 mg by mouth 3 (three) times daily with meals., Disp: , Rfl:     guaiFENesin (MUCINEX) 600 mg 12 hr tablet, Take 600 mg by mouth 2 (two) times daily., Disp: , Rfl:     ondansetron (ZOFRAN-ODT) 4 MG TbDL, Take 1 tablet (4 mg total) by mouth every 6 (six) hours as needed. (Patient not taking: Reported on 1/6/2020), Disp: 15 tablet, Rfl: 0    sertraline (ZOLOFT) 25 MG tablet, Take 1 tablet (25 mg total) by mouth once daily., Disp: 30 tablet, Rfl: 11        Assessment:       1. Essential hypertension    2. Anxiety    3. Weakness           Plan:       Essential hypertension  -     amLODIPine (NORVASC) 10 MG tablet; Take 1 tablet (10 mg total) by mouth once daily.  Dispense: 30 tablet; Refill: 2  Elevated bp on repeat. 186/88. Denies SOB, CP, HA. Stopped taking irbesartan 150 mg due to feeling it caused her nausea. Increasing norvasc from 5 mg to 10 mg.    Continue coreg 6.25 mg bid.    Nursing visit in 1-2 days.     Anxiety  -     sertraline (ZOLOFT) 25 MG tablet; Take 1 tablet (25 mg total) by mouth once daily.  Dispense: 30 tablet; Refill: 11  Stopped taking tranxene abruptly which may be causing her new onset of nausea, dyspepsia symptoms. Trial of setraline 25 mg.f/u in 2 weeks     Weakness  -     POCT URINE DIPSTICK WITH MICROSCOPE, AUTOMATED  Also some polyuria. Pt is poor historian. Urine dip positive for leukocytes only. Formal urinalysis ordered.

## 2020-01-07 ENCOUNTER — CLINICAL SUPPORT (OUTPATIENT)
Dept: PRIMARY CARE CLINIC | Facility: CLINIC | Age: 80
End: 2020-01-07
Payer: MEDICARE

## 2020-01-07 ENCOUNTER — TELEPHONE (OUTPATIENT)
Dept: PRIMARY CARE CLINIC | Facility: CLINIC | Age: 80
End: 2020-01-07

## 2020-01-07 VITALS — OXYGEN SATURATION: 100 % | HEART RATE: 105 BPM | DIASTOLIC BLOOD PRESSURE: 82 MMHG | SYSTOLIC BLOOD PRESSURE: 170 MMHG

## 2020-01-07 DIAGNOSIS — I10 ESSENTIAL HYPERTENSION: Primary | ICD-10-CM

## 2020-01-07 PROCEDURE — 99999 PR PBB SHADOW E&M-EST. PATIENT-LVL II: ICD-10-PCS | Mod: PBBFAC,,,

## 2020-01-07 PROCEDURE — 99999 PR PBB SHADOW E&M-EST. PATIENT-LVL II: CPT | Mod: PBBFAC,,,

## 2020-01-07 NOTE — PROGRESS NOTES
Patient identified by name and date of birth. States here for blood pressure reading but is having difficulty ambulating due to weakness. Complaints of weakness, feeling faint and nausea; feeling really bad. MD in to evaluate pt. Pt to be sent to ED.

## 2020-01-07 NOTE — TELEPHONE ENCOUNTER
----- Message from Raul Parikh sent at 1/7/2020  7:30 AM CST -----  Contact: Pt  Pt would like to be called back asap regarding nausea and discomfort from medication sertraline (ZOLOFT) 25 MG tablet.    Pt can be reached at [phone].    Thanks

## 2020-01-07 NOTE — TELEPHONE ENCOUNTER
Per Dr Salvador, pt needs to take zofrzn and continue medications as prescribed, if still nauseated after a few days pt can stop zoloft. Phoned pt to instruct, recording states call cannot be completed at this time, to try again later.Unable to speak with pt or leave msg.

## 2020-01-08 ENCOUNTER — TELEPHONE (OUTPATIENT)
Dept: PRIMARY CARE CLINIC | Facility: CLINIC | Age: 80
End: 2020-01-08

## 2020-01-08 NOTE — TELEPHONE ENCOUNTER
Called pt to see how she is doing since ER discharge. Feeling much better and denying problems with her nerves off tranzene. Nausea much improved. She has stopped taking her coreg for reasons that are unclear. She is to continue taking this as her bp remains elevated. She is to RTC asap. Voices understanding.

## 2020-01-13 ENCOUNTER — TELEPHONE (OUTPATIENT)
Dept: PRIMARY CARE CLINIC | Facility: CLINIC | Age: 80
End: 2020-01-13

## 2020-01-13 ENCOUNTER — OFFICE VISIT (OUTPATIENT)
Dept: PRIMARY CARE CLINIC | Facility: CLINIC | Age: 80
End: 2020-01-13
Payer: MEDICARE

## 2020-01-13 VITALS
OXYGEN SATURATION: 96 % | BODY MASS INDEX: 23.48 KG/M2 | TEMPERATURE: 98 F | WEIGHT: 132.5 LBS | HEART RATE: 94 BPM | DIASTOLIC BLOOD PRESSURE: 76 MMHG | SYSTOLIC BLOOD PRESSURE: 126 MMHG | RESPIRATION RATE: 16 BRPM | HEIGHT: 63 IN

## 2020-01-13 DIAGNOSIS — R00.2 PALPITATIONS: Primary | ICD-10-CM

## 2020-01-13 DIAGNOSIS — I73.9 PVD (PERIPHERAL VASCULAR DISEASE): ICD-10-CM

## 2020-01-13 DIAGNOSIS — T78.40XD SENSITIVITY TO MEDICATION, SUBSEQUENT ENCOUNTER: ICD-10-CM

## 2020-01-13 DIAGNOSIS — I10 ESSENTIAL HYPERTENSION: ICD-10-CM

## 2020-01-13 PROBLEM — T78.40XA SENSITIVITY TO MEDICATION: Status: ACTIVE | Noted: 2020-01-13

## 2020-01-13 PROCEDURE — 99999 PR PBB SHADOW E&M-EST. PATIENT-LVL III: ICD-10-PCS | Mod: PBBFAC,,, | Performed by: FAMILY MEDICINE

## 2020-01-13 PROCEDURE — 99214 PR OFFICE/OUTPT VISIT, EST, LEVL IV, 30-39 MIN: ICD-10-PCS | Mod: S$GLB,,, | Performed by: FAMILY MEDICINE

## 2020-01-13 PROCEDURE — 1101F PR PT FALLS ASSESS DOC 0-1 FALLS W/OUT INJ PAST YR: ICD-10-PCS | Mod: CPTII,S$GLB,, | Performed by: FAMILY MEDICINE

## 2020-01-13 PROCEDURE — 1126F AMNT PAIN NOTED NONE PRSNT: CPT | Mod: S$GLB,,, | Performed by: FAMILY MEDICINE

## 2020-01-13 PROCEDURE — 3074F SYST BP LT 130 MM HG: CPT | Mod: CPTII,S$GLB,, | Performed by: FAMILY MEDICINE

## 2020-01-13 PROCEDURE — 3078F DIAST BP <80 MM HG: CPT | Mod: CPTII,S$GLB,, | Performed by: FAMILY MEDICINE

## 2020-01-13 PROCEDURE — 3074F PR MOST RECENT SYSTOLIC BLOOD PRESSURE < 130 MM HG: ICD-10-PCS | Mod: CPTII,S$GLB,, | Performed by: FAMILY MEDICINE

## 2020-01-13 PROCEDURE — 99999 PR PBB SHADOW E&M-EST. PATIENT-LVL III: CPT | Mod: PBBFAC,,, | Performed by: FAMILY MEDICINE

## 2020-01-13 PROCEDURE — 1159F PR MEDICATION LIST DOCUMENTED IN MEDICAL RECORD: ICD-10-PCS | Mod: S$GLB,,, | Performed by: FAMILY MEDICINE

## 2020-01-13 PROCEDURE — 99214 OFFICE O/P EST MOD 30 MIN: CPT | Mod: S$GLB,,, | Performed by: FAMILY MEDICINE

## 2020-01-13 PROCEDURE — 1101F PT FALLS ASSESS-DOCD LE1/YR: CPT | Mod: CPTII,S$GLB,, | Performed by: FAMILY MEDICINE

## 2020-01-13 PROCEDURE — 3078F PR MOST RECENT DIASTOLIC BLOOD PRESSURE < 80 MM HG: ICD-10-PCS | Mod: CPTII,S$GLB,, | Performed by: FAMILY MEDICINE

## 2020-01-13 PROCEDURE — 1159F MED LIST DOCD IN RCRD: CPT | Mod: S$GLB,,, | Performed by: FAMILY MEDICINE

## 2020-01-13 PROCEDURE — 1126F PR PAIN SEVERITY QUANTIFIED, NO PAIN PRESENT: ICD-10-PCS | Mod: S$GLB,,, | Performed by: FAMILY MEDICINE

## 2020-01-13 NOTE — TELEPHONE ENCOUNTER
Spoke with pt, had question on release of record. Pt states she called dr Tang' ofc to request records get sent to  Dr Salvador, instructed pt, her ROR was faxed and hopefully we will receive her chart soon.

## 2020-01-13 NOTE — TELEPHONE ENCOUNTER
----- Message from Jennyfer Woo sent at 1/13/2020  1:12 PM CST -----  Contact: Patient [phone]  Patient is requesting a call about her medical records.    Please call and advise.    Thank You

## 2020-01-13 NOTE — PROGRESS NOTES
"Subjective:       Patient ID: Alana Beth is a 79 y.o. female.    Chief Complaint: Follow-up (used to be on Inderal and had no problems with it and was changed to tenoretic states ysed to take these for palpitations)    Here today for follow up from ER. Nausea is resolved which she blames on sertraline. She has since stopped taking this. She is taking Tranzene again due to nerves and palpitations. Still not feeling "quite right" which she blames for her palpitations. Has been on propranolol and atenolol which she found helpful but changed to carvedilol by old PCP Dr. Tang. States this was helpful for a while but problem has continued. Scheduled to see cardiology this week at [facility].           Review of Systems   Constitutional: Negative for activity change, chills and fever.   Respiratory: Negative for cough, chest tightness, shortness of breath and wheezing.    Cardiovascular: Negative for chest pain, palpitations and leg swelling.   Gastrointestinal: Negative for abdominal distention, abdominal pain, constipation, diarrhea, nausea and vomiting.   Skin: Negative for rash.   Psychiatric/Behavioral: Negative for agitation.       Objective:      Vitals:    01/13/20 1128   BP: 126/76   BP Location: Left arm   Patient Position: Sitting   BP Method: Medium (Manual)   Pulse: 94   Resp: 16   Temp: 98 °F (36.7 °C)   TempSrc: Oral   SpO2: 96%   Weight: 60.1 kg (132 lb 7.9 oz)   Height: 5' 3" (1.6 m)     Physical Exam   Constitutional: She appears well-developed and well-nourished.   HENT:   Head: Normocephalic and atraumatic.   Nose: Nose normal.   Eyes: Conjunctivae and EOM are normal.   Cardiovascular: Normal rate, regular rhythm and normal heart sounds.   Pulmonary/Chest: Effort normal and breath sounds normal. No respiratory distress. She has no wheezes. She has no rales.   Abdominal: Soft. She exhibits no distension. There is no tenderness.   Neurological: She is alert. No cranial nerve deficit.   Psychiatric: " She has a normal mood and affect.   Nursing note and vitals reviewed.          Lab Results   Component Value Date     (L) 01/07/2020    K 3.2 (L) 01/07/2020    CL 97 (L) 01/07/2020    CO2 24 01/07/2020    BUN 9 01/07/2020    CREATININE 0.9 01/07/2020    ANIONGAP 14 01/07/2020     No results found for: HGBA1C  No results found for: BNP, BNPTRIAGEBLO    Lab Results   Component Value Date    WBC 4.40 01/07/2020    HGB 12.1 01/07/2020    HCT 37.9 01/07/2020     01/07/2020    GRAN 3.0 01/07/2020    GRAN 68.3 01/07/2020     No results found for: CHOL, HDL, LDLCALC, TRIG       Current Outpatient Medications:     amLODIPine (NORVASC) 10 MG tablet, Take 1 tablet (10 mg total) by mouth once daily., Disp: 30 tablet, Rfl: 2    aspirin 81 MG Chew, Take 81 mg by mouth once daily., Disp: , Rfl:     carvedilol (COREG) 6.25 MG tablet, Take 6.25 mg by mouth 2 (two) times daily with meals., Disp: , Rfl:     clorazepate (TRANXENE T-TAB) 7.5 MG Tab, Take 1 tablet (7.5 mg total) by mouth 2 (two) times daily., Disp: 60 tablet, Rfl: 0    ferrous sulfate 325 (65 FE) MG EC tablet, Take 325 mg by mouth 3 (three) times daily with meals., Disp: , Rfl:         Assessment:       1. Palpitations    2. PVD (peripheral vascular disease)    3. Sensitivity to medication, subsequent encounter    4. Essential hypertension           Plan:       Palpitations  - Chronic off and on. No SOB, syncope/presyncope. Sees cardiology at [facility] with whom she is seeing later this week. No med changes today.Unclear palpitations related to structural heart problems vs nerves. She has since resumed there clorazepate which she finds helpful after abruptly dced a few weeks ago.    PVD (peripheral vascular disease)  Asymptomatic. Per records     Sensitivity to medication, subsequent encounter  -Multiple adverse reactions to medications. Most recently zoloft. Unclear if reaction due to zoloft or abrupt withdrawal of benzo creating sig nausea.     Essential  hypertension  Well controlled today. Continue coreg 6.25 bid and norvasc 10 mg.     No records from prior PCP. Requesting again today.

## 2020-01-14 ENCOUNTER — PATIENT OUTREACH (OUTPATIENT)
Dept: ADMINISTRATIVE | Facility: OTHER | Age: 80
End: 2020-01-14

## 2020-01-16 ENCOUNTER — OFFICE VISIT (OUTPATIENT)
Dept: OBSTETRICS AND GYNECOLOGY | Facility: CLINIC | Age: 80
End: 2020-01-16
Payer: MEDICARE

## 2020-01-16 VITALS
DIASTOLIC BLOOD PRESSURE: 68 MMHG | HEIGHT: 63 IN | BODY MASS INDEX: 51.91 KG/M2 | SYSTOLIC BLOOD PRESSURE: 110 MMHG | WEIGHT: 293 LBS

## 2020-01-16 DIAGNOSIS — N76.2 ACUTE VULVITIS: Primary | ICD-10-CM

## 2020-01-16 PROCEDURE — 99999 PR PBB SHADOW E&M-EST. PATIENT-LVL II: ICD-10-PCS | Mod: PBBFAC,,, | Performed by: OBSTETRICS & GYNECOLOGY

## 2020-01-16 PROCEDURE — 3078F PR MOST RECENT DIASTOLIC BLOOD PRESSURE < 80 MM HG: ICD-10-PCS | Mod: CPTII,S$GLB,, | Performed by: OBSTETRICS & GYNECOLOGY

## 2020-01-16 PROCEDURE — 99213 OFFICE O/P EST LOW 20 MIN: CPT | Mod: S$GLB,,, | Performed by: OBSTETRICS & GYNECOLOGY

## 2020-01-16 PROCEDURE — 99999 PR PBB SHADOW E&M-EST. PATIENT-LVL II: CPT | Mod: PBBFAC,,, | Performed by: OBSTETRICS & GYNECOLOGY

## 2020-01-16 PROCEDURE — 3074F PR MOST RECENT SYSTOLIC BLOOD PRESSURE < 130 MM HG: ICD-10-PCS | Mod: CPTII,S$GLB,, | Performed by: OBSTETRICS & GYNECOLOGY

## 2020-01-16 PROCEDURE — 99213 PR OFFICE/OUTPT VISIT, EST, LEVL III, 20-29 MIN: ICD-10-PCS | Mod: S$GLB,,, | Performed by: OBSTETRICS & GYNECOLOGY

## 2020-01-16 PROCEDURE — 1101F PT FALLS ASSESS-DOCD LE1/YR: CPT | Mod: CPTII,S$GLB,, | Performed by: OBSTETRICS & GYNECOLOGY

## 2020-01-16 PROCEDURE — 3074F SYST BP LT 130 MM HG: CPT | Mod: CPTII,S$GLB,, | Performed by: OBSTETRICS & GYNECOLOGY

## 2020-01-16 PROCEDURE — 3078F DIAST BP <80 MM HG: CPT | Mod: CPTII,S$GLB,, | Performed by: OBSTETRICS & GYNECOLOGY

## 2020-01-16 PROCEDURE — 1159F MED LIST DOCD IN RCRD: CPT | Mod: S$GLB,,, | Performed by: OBSTETRICS & GYNECOLOGY

## 2020-01-16 PROCEDURE — 1159F PR MEDICATION LIST DOCUMENTED IN MEDICAL RECORD: ICD-10-PCS | Mod: S$GLB,,, | Performed by: OBSTETRICS & GYNECOLOGY

## 2020-01-16 PROCEDURE — 1101F PR PT FALLS ASSESS DOC 0-1 FALLS W/OUT INJ PAST YR: ICD-10-PCS | Mod: CPTII,S$GLB,, | Performed by: OBSTETRICS & GYNECOLOGY

## 2020-01-16 RX ORDER — CLOTRIMAZOLE AND BETAMETHASONE DIPROPIONATE 10; .64 MG/G; MG/G
CREAM TOPICAL 2 TIMES DAILY
Qty: 15 G | Refills: 2 | Status: SHIPPED | OUTPATIENT
Start: 2020-01-16 | End: 2020-02-06

## 2020-01-16 NOTE — PROGRESS NOTES
"79 y.o.   OB History        2    Para        Term                AB   2    Living           SAB   2    TAB        Ectopic        Multiple        Live Births                   Comlaining of:vuvlar irritatoin  fels like needle stick  No blood        ROS:  GENERAL: No fever, chills, fatigability or weight loss.  SKIN: No rashes, itching or changes in color or texture of skin.  HEAD: No headaches or recent head trauma.  EYES: Visual acuity fine. No photophobia, ocular pain or diplopia.  EARS: Denies ear pain, discharge or vertigo.  NOSE: No loss of smell, no epistaxis or postnasal drip.  MOUTH & THROAT: No hoarseness or change in voice. No excessive gum bleeding.  NODES: Denies swollen glands.  CHEST: Denies HUSTON, cyanosis, wheezing, cough and sputum production.  CARDIOVASCULAR: Denies chest pain, PND, orthopnea or reduced exercise tolerance.  ABDOMEN: Appetite fine. No weight loss. Denies diarrhea, abdominal pain, hematemesis or blood in stool.  URINARY: No flank pain, dysuria or hematuria.  PERIPHERAL VASCULAR: No claudication or cyanosis.  MUSCULOSKELETAL: No joint stiffness or swelling. Denies back pain.  NEUROLOGIC: No history of seizures, paralysis, alteration of gait or coordination      PE: /68   Ht 5' 3" (1.6 m)   Wt 133.4 kg (294 lb 1.5 oz)   BMI 52.10 kg/m²    abd soft  vuvla neg  No lesions    A ? Yeast, took ab's  Plan, emprici lotrisone  Bid          "

## 2020-01-22 NOTE — PROGRESS NOTES
Patient, Alana Beth (MRN #[id number]), presented with a recorded BMI of 52.1 kg/m^2 consistent with the definition of morbid obesity (ICD-10 E66.01). The patient's morbid obesity was monitored, evaluated, addressed and/or treated. This addendum to the medical record is made on 01/22/2020.

## 2020-02-06 ENCOUNTER — OFFICE VISIT (OUTPATIENT)
Dept: PRIMARY CARE CLINIC | Facility: CLINIC | Age: 80
End: 2020-02-06
Payer: MEDICARE

## 2020-02-06 VITALS
DIASTOLIC BLOOD PRESSURE: 62 MMHG | HEART RATE: 100 BPM | RESPIRATION RATE: 16 BRPM | WEIGHT: 131.38 LBS | TEMPERATURE: 98 F | OXYGEN SATURATION: 97 % | SYSTOLIC BLOOD PRESSURE: 124 MMHG | BODY MASS INDEX: 23.28 KG/M2 | HEIGHT: 63 IN

## 2020-02-06 DIAGNOSIS — G47.00 INSOMNIA, UNSPECIFIED TYPE: ICD-10-CM

## 2020-02-06 DIAGNOSIS — R00.2 PALPITATIONS: ICD-10-CM

## 2020-02-06 DIAGNOSIS — R06.02 SOB (SHORTNESS OF BREATH): ICD-10-CM

## 2020-02-06 DIAGNOSIS — J06.9 UPPER RESPIRATORY TRACT INFECTION, UNSPECIFIED TYPE: ICD-10-CM

## 2020-02-06 DIAGNOSIS — Z09 HOSPITAL DISCHARGE FOLLOW-UP: Primary | ICD-10-CM

## 2020-02-06 PROCEDURE — 3074F SYST BP LT 130 MM HG: CPT | Mod: CPTII,S$GLB,, | Performed by: FAMILY MEDICINE

## 2020-02-06 PROCEDURE — 99214 PR OFFICE/OUTPT VISIT, EST, LEVL IV, 30-39 MIN: ICD-10-PCS | Mod: S$GLB,,, | Performed by: FAMILY MEDICINE

## 2020-02-06 PROCEDURE — 3074F PR MOST RECENT SYSTOLIC BLOOD PRESSURE < 130 MM HG: ICD-10-PCS | Mod: CPTII,S$GLB,, | Performed by: FAMILY MEDICINE

## 2020-02-06 PROCEDURE — 1126F AMNT PAIN NOTED NONE PRSNT: CPT | Mod: S$GLB,,, | Performed by: FAMILY MEDICINE

## 2020-02-06 PROCEDURE — 1101F PT FALLS ASSESS-DOCD LE1/YR: CPT | Mod: CPTII,S$GLB,, | Performed by: FAMILY MEDICINE

## 2020-02-06 PROCEDURE — 1101F PR PT FALLS ASSESS DOC 0-1 FALLS W/OUT INJ PAST YR: ICD-10-PCS | Mod: CPTII,S$GLB,, | Performed by: FAMILY MEDICINE

## 2020-02-06 PROCEDURE — 99999 PR PBB SHADOW E&M-EST. PATIENT-LVL IV: CPT | Mod: PBBFAC,,, | Performed by: FAMILY MEDICINE

## 2020-02-06 PROCEDURE — 99999 PR PBB SHADOW E&M-EST. PATIENT-LVL IV: ICD-10-PCS | Mod: PBBFAC,,, | Performed by: FAMILY MEDICINE

## 2020-02-06 PROCEDURE — 1159F MED LIST DOCD IN RCRD: CPT | Mod: S$GLB,,, | Performed by: FAMILY MEDICINE

## 2020-02-06 PROCEDURE — 1126F PR PAIN SEVERITY QUANTIFIED, NO PAIN PRESENT: ICD-10-PCS | Mod: S$GLB,,, | Performed by: FAMILY MEDICINE

## 2020-02-06 PROCEDURE — 3078F PR MOST RECENT DIASTOLIC BLOOD PRESSURE < 80 MM HG: ICD-10-PCS | Mod: CPTII,S$GLB,, | Performed by: FAMILY MEDICINE

## 2020-02-06 PROCEDURE — 99214 OFFICE O/P EST MOD 30 MIN: CPT | Mod: S$GLB,,, | Performed by: FAMILY MEDICINE

## 2020-02-06 PROCEDURE — 1159F PR MEDICATION LIST DOCUMENTED IN MEDICAL RECORD: ICD-10-PCS | Mod: S$GLB,,, | Performed by: FAMILY MEDICINE

## 2020-02-06 PROCEDURE — 3078F DIAST BP <80 MM HG: CPT | Mod: CPTII,S$GLB,, | Performed by: FAMILY MEDICINE

## 2020-02-06 RX ORDER — LEVOCETIRIZINE DIHYDROCHLORIDE 5 MG/1
5 TABLET, FILM COATED ORAL NIGHTLY
COMMUNITY
End: 2020-02-13

## 2020-02-06 RX ORDER — MONTELUKAST SODIUM 10 MG/1
10 TABLET ORAL NIGHTLY
COMMUNITY
End: 2020-02-13

## 2020-02-06 RX ORDER — CEFDINIR 300 MG/1
300 CAPSULE ORAL 2 TIMES DAILY
COMMUNITY
End: 2020-02-13

## 2020-02-06 RX ORDER — CLINDAMYCIN HYDROCHLORIDE 300 MG/1
300 CAPSULE ORAL 3 TIMES DAILY
COMMUNITY
End: 2020-02-13

## 2020-02-06 RX ORDER — METHYLPREDNISOLONE 4 MG/1
4 TABLET ORAL
COMMUNITY
End: 2020-02-13

## 2020-02-06 RX ORDER — FLUTICASONE PROPIONATE 50 MCG
1 SPRAY, SUSPENSION (ML) NASAL DAILY
COMMUNITY
End: 2020-10-22

## 2020-02-06 NOTE — PROGRESS NOTES
Subjective:       Patient ID: Alana Beth is a 79 y.o. female.    Chief Complaint: Insomnia (states not sleeping since Saturday night)    Pt here with ER follow up and confusing story of symptoms. Pt with low medical literacy and poor historian. Writer with difficulty getting onset of symptoms.   1. Went to ER 3 times and an urgent care center between 2/1/2020 and now. Feels that her sinus problems are getting better since prescribed cefdiner, clindamycin, and medrol dose pack by urgent care center.   2. Had been having symptoms of congestion and post nasal drip for at least a week now which is keeping her up at night mostly for the past week and denies this was a problem beforehand.   3. Complains of periodic SOB. No wheezing or coughing off and on. Initially calling this palpitations but then stating her palpitations had resolved since continuing her coreg which she stopped taking. This SOB lasts about a minute and goes away. Briefly she thought about going to the hospital. Feeling well now.         Review of Systems   Constitutional: Negative for activity change, chills and fever.   HENT: Positive for congestion, ear pain and postnasal drip. Negative for ear discharge and sore throat.    Respiratory: Negative for cough, chest tightness, shortness of breath and wheezing.    Cardiovascular: Negative for chest pain, palpitations and leg swelling.   Gastrointestinal: Negative for abdominal distention, abdominal pain, constipation, diarrhea, nausea and vomiting.   Genitourinary: Negative for difficulty urinating and dysuria.   Skin: Negative for rash.   Neurological: Negative for weakness.   Hematological: Does not bruise/bleed easily.   Psychiatric/Behavioral: Negative for agitation. The patient is not nervous/anxious.        Objective:      Vitals:    02/06/20 1516   BP: 124/62   BP Location: Right arm   Patient Position: Sitting   BP Method: Medium (Manual)   Pulse: 100   Resp: 16   Temp: 97.9 °F (36.6 °C)  "  TempSrc: Oral   SpO2: 97%   Weight: 59.6 kg (131 lb 6.3 oz)   Height: 5' 3" (1.6 m)     Physical Exam   Constitutional: She appears well-developed and well-nourished.   HENT:   Head: Normocephalic and atraumatic.   Nose: Nose normal.   Mouth/Throat: Oropharynx is clear and moist.   Eyes: Conjunctivae and EOM are normal.   Cardiovascular: Normal rate, regular rhythm and normal heart sounds.   Pulmonary/Chest: Effort normal and breath sounds normal. No respiratory distress. She has no wheezes. She has no rales.   Abdominal: Soft. She exhibits no distension. There is no tenderness.   Lymphadenopathy:     She has no cervical adenopathy.   Neurological: She is alert. No cranial nerve deficit.   Psychiatric: She has a normal mood and affect.   Nursing note and vitals reviewed.          Lab Results   Component Value Date     (L) 01/07/2020    K 3.2 (L) 01/07/2020    CL 97 (L) 01/07/2020    CO2 24 01/07/2020    BUN 9 01/07/2020    CREATININE 0.9 01/07/2020    ANIONGAP 14 01/07/2020     No results found for: HGBA1C  No results found for: BNP, BNPTRIAGEBLO    Lab Results   Component Value Date    WBC 4.40 01/07/2020    HGB 12.1 01/07/2020    HCT 37.9 01/07/2020     01/07/2020    GRAN 3.0 01/07/2020    GRAN 68.3 01/07/2020     No results found for: CHOL, HDL, LDLCALC, TRIG       Current Outpatient Medications:     amLODIPine (NORVASC) 10 MG tablet, Take 1 tablet (10 mg total) by mouth once daily., Disp: 30 tablet, Rfl: 2    aspirin 81 MG Chew, Take 81 mg by mouth once daily., Disp: , Rfl:     carvedilol (COREG) 6.25 MG tablet, Take 6.25 mg by mouth 2 (two) times daily with meals., Disp: , Rfl:     cefdinir (OMNICEF) 300 MG capsule, Take 300 mg by mouth 2 (two) times daily., Disp: , Rfl:     clindamycin (CLEOCIN) 300 MG capsule, Take 300 mg by mouth 3 (three) times daily., Disp: , Rfl:     ferrous sulfate 325 (65 FE) MG EC tablet, Take 325 mg by mouth 3 (three) times daily with meals., Disp: , Rfl:     " fluticasone propionate (FLONASE) 50 mcg/actuation nasal spray, 1 spray by Each Nostril route once daily., Disp: , Rfl:     levocetirizine (XYZAL) 5 MG tablet, Take 5 mg by mouth every evening., Disp: , Rfl:     methylPREDNISolone (MEDROL DOSEPACK) 4 mg tablet, Take 4 mg by mouth. use as directed, Disp: , Rfl:     montelukast (SINGULAIR) 10 mg tablet, Take 10 mg by mouth every evening., Disp: , Rfl:     sodium bicarb-sodium chloride (NEILMED PEDIAT SINUS RINSE REF) Pack, by sinus irrigation route., Disp: , Rfl:         Assessment:       1. Hospital discharge follow-up    2. Upper respiratory tract infection, unspecified type    3. Palpitations    4. SOB (shortness of breath)    5. Insomnia, unspecified type           Plan:       Post hospital follow up:  Multiple ER visits for palpitations and sinus issues. Palpitations resolved post continuation of coreg. She was confused and thought she was told to stop taking this. Her history remains and problem and seeing multiple doctors prescribing different meds. Asked that she reach out to me first for non life threatening issues for better care.    Upper respiratory tract infection, unspecified type  Congestion, and sore throat suspect viral URI with normal exam. She is continuing a script for cefdinir and clindamycin however from urgent care which she should finish. Unclear why these meds were started. Encouraged warm fluids, rest. RTC if does not improve in the next week or worsens.      Palpitations  Resolved post coreg continuation. Long medication review and those meds she should and should not take. Problem with multiple providers and confusing hx.    SOB (shortness of breath)  Periodic. Unchanged EKG. Suspect Anxiety as these short episodes were not present when she used to take Tranzene- not taking presently.    Insomnia:  New over the past week. Noted that she has also been prescribed a medrol dose pack which she should stop taking. She attributes this problem  to her sinus problems which are improving. Aside from melatonin nightly not recommending any further sleep aids at the present with her constellation of symptoms and new polypharmacy. She is in agreement.

## 2020-02-13 ENCOUNTER — TELEPHONE (OUTPATIENT)
Dept: OTOLARYNGOLOGY | Facility: CLINIC | Age: 80
End: 2020-02-13

## 2020-02-13 ENCOUNTER — OFFICE VISIT (OUTPATIENT)
Dept: PRIMARY CARE CLINIC | Facility: CLINIC | Age: 80
End: 2020-02-13
Payer: MEDICARE

## 2020-02-13 VITALS
HEIGHT: 63 IN | WEIGHT: 128.88 LBS | TEMPERATURE: 98 F | OXYGEN SATURATION: 99 % | DIASTOLIC BLOOD PRESSURE: 74 MMHG | SYSTOLIC BLOOD PRESSURE: 118 MMHG | BODY MASS INDEX: 22.84 KG/M2 | RESPIRATION RATE: 16 BRPM | HEART RATE: 102 BPM

## 2020-02-13 DIAGNOSIS — J34.89 SINUS DRAINAGE: Primary | ICD-10-CM

## 2020-02-13 PROCEDURE — 1101F PR PT FALLS ASSESS DOC 0-1 FALLS W/OUT INJ PAST YR: ICD-10-PCS | Mod: CPTII,S$GLB,, | Performed by: FAMILY MEDICINE

## 2020-02-13 PROCEDURE — 3078F PR MOST RECENT DIASTOLIC BLOOD PRESSURE < 80 MM HG: ICD-10-PCS | Mod: CPTII,S$GLB,, | Performed by: FAMILY MEDICINE

## 2020-02-13 PROCEDURE — 3074F PR MOST RECENT SYSTOLIC BLOOD PRESSURE < 130 MM HG: ICD-10-PCS | Mod: CPTII,S$GLB,, | Performed by: FAMILY MEDICINE

## 2020-02-13 PROCEDURE — 1126F PR PAIN SEVERITY QUANTIFIED, NO PAIN PRESENT: ICD-10-PCS | Mod: S$GLB,,, | Performed by: FAMILY MEDICINE

## 2020-02-13 PROCEDURE — 1126F AMNT PAIN NOTED NONE PRSNT: CPT | Mod: S$GLB,,, | Performed by: FAMILY MEDICINE

## 2020-02-13 PROCEDURE — 3078F DIAST BP <80 MM HG: CPT | Mod: CPTII,S$GLB,, | Performed by: FAMILY MEDICINE

## 2020-02-13 PROCEDURE — 99999 PR PBB SHADOW E&M-EST. PATIENT-LVL IV: CPT | Mod: PBBFAC,,, | Performed by: FAMILY MEDICINE

## 2020-02-13 PROCEDURE — 1101F PT FALLS ASSESS-DOCD LE1/YR: CPT | Mod: CPTII,S$GLB,, | Performed by: FAMILY MEDICINE

## 2020-02-13 PROCEDURE — 3074F SYST BP LT 130 MM HG: CPT | Mod: CPTII,S$GLB,, | Performed by: FAMILY MEDICINE

## 2020-02-13 PROCEDURE — 99213 OFFICE O/P EST LOW 20 MIN: CPT | Mod: S$GLB,,, | Performed by: FAMILY MEDICINE

## 2020-02-13 PROCEDURE — 99999 PR PBB SHADOW E&M-EST. PATIENT-LVL IV: ICD-10-PCS | Mod: PBBFAC,,, | Performed by: FAMILY MEDICINE

## 2020-02-13 PROCEDURE — 1159F MED LIST DOCD IN RCRD: CPT | Mod: S$GLB,,, | Performed by: FAMILY MEDICINE

## 2020-02-13 PROCEDURE — 99213 PR OFFICE/OUTPT VISIT, EST, LEVL III, 20-29 MIN: ICD-10-PCS | Mod: S$GLB,,, | Performed by: FAMILY MEDICINE

## 2020-02-13 PROCEDURE — 1159F PR MEDICATION LIST DOCUMENTED IN MEDICAL RECORD: ICD-10-PCS | Mod: S$GLB,,, | Performed by: FAMILY MEDICINE

## 2020-02-13 RX ORDER — HYDROXYZINE HYDROCHLORIDE 25 MG/1
25 TABLET, FILM COATED ORAL NIGHTLY
Qty: 30 TABLET | Refills: 0 | Status: SHIPPED | OUTPATIENT
Start: 2020-02-13 | End: 2020-03-14

## 2020-02-13 RX ORDER — AZELASTINE 1 MG/ML
SPRAY, METERED NASAL
COMMUNITY
Start: 2020-01-29

## 2020-02-13 RX ORDER — FERROUS SULFATE 325(65) MG
1 TABLET ORAL DAILY
COMMUNITY
Start: 2020-01-10 | End: 2021-01-27

## 2020-02-13 RX ORDER — LEVOCETIRIZINE DIHYDROCHLORIDE 5 MG/1
5 TABLET, FILM COATED ORAL
COMMUNITY
End: 2020-02-13

## 2020-02-13 RX ORDER — MONTELUKAST SODIUM 10 MG/1
10 TABLET ORAL NIGHTLY
Start: 2020-02-13 | End: 2020-09-04

## 2020-02-13 RX ORDER — CLORAZEPATE DIPOTASSIUM 7.5 MG/1
TABLET ORAL
COMMUNITY
Start: 2020-02-10 | End: 2020-02-13

## 2020-02-13 NOTE — TELEPHONE ENCOUNTER
Spoke with pt in regards to a sooner appointment, was able to korey her an appointment with Dr. Graves on 2/18/2020. I informed pt that this was the earliest appointment.       ----- Message from Marylin Ayon sent at 2/13/2020  9:42 AM CST -----  Contact: self  Type:  Sooner Appointment Request    Name of Caller:  patient request ENT.   Patient appointment scheduled for 3/4/2020.   patient states has sinus infection need appointment for today or tomorrow  When is the first available appointment?  Symptoms:  Would the patient rather a call back or a response via [facility]? call  Best Call Back Number:[phone]  Additional Information: I was not sure of appointment

## 2020-02-13 NOTE — PROGRESS NOTES
"Subjective:       Patient ID: Alana Beth is a 79 y.o. female.    Chief Complaint: Sinusitis (feels she still has a sinus infection)    Complains of ongoing sinus drip down the back of her throat for the past month. Since finishing her antibiotics she has felt increasingly better but still with ongoing distressing symptoms of sinus issues.  No sinus pain fevers or chills.  Distressed that she still does not "feel like herself" and has already made an appointment with an ENT   however needs a referral.  Although her medications are always unclear she states she is taking both a nasal antihistamine and oral without relief. History of allergic rhinitis as well for which she takes montelukast. This experience celis not represent her usual allergies.     Review of Systems   Constitutional: Negative for activity change, chills and fever.   HENT: Positive for congestion, postnasal drip and rhinorrhea. Negative for ear pain, sinus pressure, sinus pain, sneezing and sore throat.    Respiratory: Positive for cough. Negative for chest tightness, shortness of breath and wheezing.    Cardiovascular: Negative for chest pain, palpitations and leg swelling.   Gastrointestinal: Negative for abdominal distention, abdominal pain, constipation, diarrhea, nausea and vomiting.   Genitourinary: Negative for difficulty urinating and dysuria.   Skin: Negative for rash.   Neurological: Negative for weakness.   Hematological: Does not bruise/bleed easily.   Psychiatric/Behavioral: Negative for agitation. The patient is not nervous/anxious.        Objective:      Vitals:    02/13/20 1124   BP: 118/74   BP Location: Right arm   Patient Position: Sitting   BP Method: Medium (Manual)   Pulse: 102   Resp: 16   Temp: 97.8 °F (36.6 °C)   TempSrc: Oral   SpO2: 99%   Weight: 58.4 kg (128 lb 13.7 oz)   Height: 5' 3" (1.6 m)     Physical Exam   Constitutional: She appears well-developed and well-nourished.   HENT:   Head: Normocephalic and " atraumatic.   Nose: Nose normal.   Mouth/Throat: Oropharynx is clear and moist.   Right ear impaction with cerumen. Cannot visualize TM   Eyes: Conjunctivae and EOM are normal.   Cardiovascular: Normal rate, regular rhythm and normal heart sounds.   Pulmonary/Chest: Effort normal and breath sounds normal. No respiratory distress. She has no wheezes. She has no rales.   Abdominal: Soft. She exhibits no distension. There is no tenderness.   Lymphadenopathy:     She has no cervical adenopathy.   Neurological: She is alert. No cranial nerve deficit.   Psychiatric: She has a normal mood and affect.   Nursing note and vitals reviewed.          Lab Results   Component Value Date     (L) 01/07/2020    K 3.2 (L) 01/07/2020    CL 97 (L) 01/07/2020    CO2 24 01/07/2020    BUN 9 01/07/2020    CREATININE 0.9 01/07/2020    ANIONGAP 14 01/07/2020     No results found for: HGBA1C  No results found for: BNP, BNPTRIAGEBLO    Lab Results   Component Value Date    WBC 4.40 01/07/2020    HGB 12.1 01/07/2020    HCT 37.9 01/07/2020     01/07/2020    GRAN 3.0 01/07/2020    GRAN 68.3 01/07/2020     No results found for: CHOL, HDL, LDLCALC, TRIG       Current Outpatient Medications:     amLODIPine (NORVASC) 10 MG tablet, Take 1 tablet (10 mg total) by mouth once daily., Disp: 30 tablet, Rfl: 2    aspirin 81 MG Chew, Take 81 mg by mouth once daily., Disp: , Rfl:     azelastine (ASTELIN) 137 mcg (0.1 %) nasal spray, USE 1 TO 2 SPRAY(S) IN EACH NOSTRIL TWICE DAILY, Disp: , Rfl:     carvedilol (COREG) 6.25 MG tablet, Take 6.25 mg by mouth 2 (two) times daily with meals., Disp: , Rfl:     ferrous sulfate (FEOSOL) 325 mg (65 mg iron) Tab tablet, Take 1 tablet by mouth once daily., Disp: , Rfl:     fluticasone propionate (FLONASE) 50 mcg/actuation nasal spray, 1 spray by Each Nostril route once daily., Disp: , Rfl:     sodium bicarb-sodium chloride (NEILMED PEDIAT SINUS RINSE REF) Pack, by sinus irrigation route., Disp: , Rfl:      hydrOXYzine HCl (ATARAX) 25 MG tablet, Take 1 tablet (25 mg total) by mouth every evening., Disp: 30 tablet, Rfl: 0    montelukast (SINGULAIR) 10 mg tablet, Take 1 tablet (10 mg total) by mouth every evening., Disp: , Rfl:         Assessment:       1. Sinus drainage           Plan:       Sinus drainage  -     Ambulatory referral/consult to ENT; Future; Expected date: 02/20/2020  -     hydrOXYzine HCl (ATARAX) 25 MG tablet; Take 1 tablet (25 mg total) by mouth every evening.  Dispense: 30 tablet; Refill: 0  Ongoing for about the past 3-4 weeks with minimal improvement. Post antibiotic tx. No signs of bacterial infection. Adding hydroxyzine qhs and referral to ENT per request.

## 2020-02-17 ENCOUNTER — PATIENT OUTREACH (OUTPATIENT)
Dept: ADMINISTRATIVE | Facility: OTHER | Age: 80
End: 2020-02-17

## 2020-02-18 ENCOUNTER — TELEPHONE (OUTPATIENT)
Dept: PRIMARY CARE CLINIC | Facility: CLINIC | Age: 80
End: 2020-02-18

## 2020-02-18 ENCOUNTER — OFFICE VISIT (OUTPATIENT)
Dept: OTOLARYNGOLOGY | Facility: CLINIC | Age: 80
End: 2020-02-18
Payer: MEDICARE

## 2020-02-18 VITALS
DIASTOLIC BLOOD PRESSURE: 69 MMHG | BODY MASS INDEX: 22.86 KG/M2 | TEMPERATURE: 99 F | SYSTOLIC BLOOD PRESSURE: 113 MMHG | HEIGHT: 63 IN | WEIGHT: 129 LBS | HEART RATE: 106 BPM

## 2020-02-18 DIAGNOSIS — R53.83 FATIGUE, UNSPECIFIED TYPE: Primary | ICD-10-CM

## 2020-02-18 DIAGNOSIS — J34.89 SINUS DRAINAGE: ICD-10-CM

## 2020-02-18 PROCEDURE — 1101F PR PT FALLS ASSESS DOC 0-1 FALLS W/OUT INJ PAST YR: ICD-10-PCS | Mod: CPTII,S$GLB,, | Performed by: OTOLARYNGOLOGY

## 2020-02-18 PROCEDURE — 99203 PR OFFICE/OUTPT VISIT, NEW, LEVL III, 30-44 MIN: ICD-10-PCS | Mod: S$GLB,,, | Performed by: OTOLARYNGOLOGY

## 2020-02-18 PROCEDURE — 3074F PR MOST RECENT SYSTOLIC BLOOD PRESSURE < 130 MM HG: ICD-10-PCS | Mod: CPTII,S$GLB,, | Performed by: OTOLARYNGOLOGY

## 2020-02-18 PROCEDURE — 3074F SYST BP LT 130 MM HG: CPT | Mod: CPTII,S$GLB,, | Performed by: OTOLARYNGOLOGY

## 2020-02-18 PROCEDURE — 3078F DIAST BP <80 MM HG: CPT | Mod: CPTII,S$GLB,, | Performed by: OTOLARYNGOLOGY

## 2020-02-18 PROCEDURE — 1101F PT FALLS ASSESS-DOCD LE1/YR: CPT | Mod: CPTII,S$GLB,, | Performed by: OTOLARYNGOLOGY

## 2020-02-18 PROCEDURE — 1159F PR MEDICATION LIST DOCUMENTED IN MEDICAL RECORD: ICD-10-PCS | Mod: S$GLB,,, | Performed by: OTOLARYNGOLOGY

## 2020-02-18 PROCEDURE — 1159F MED LIST DOCD IN RCRD: CPT | Mod: S$GLB,,, | Performed by: OTOLARYNGOLOGY

## 2020-02-18 PROCEDURE — 99203 OFFICE O/P NEW LOW 30 MIN: CPT | Mod: S$GLB,,, | Performed by: OTOLARYNGOLOGY

## 2020-02-18 PROCEDURE — 3078F PR MOST RECENT DIASTOLIC BLOOD PRESSURE < 80 MM HG: ICD-10-PCS | Mod: CPTII,S$GLB,, | Performed by: OTOLARYNGOLOGY

## 2020-02-18 PROCEDURE — 1126F AMNT PAIN NOTED NONE PRSNT: CPT | Mod: S$GLB,,, | Performed by: OTOLARYNGOLOGY

## 2020-02-18 PROCEDURE — 1126F PR PAIN SEVERITY QUANTIFIED, NO PAIN PRESENT: ICD-10-PCS | Mod: S$GLB,,, | Performed by: OTOLARYNGOLOGY

## 2020-02-18 NOTE — TELEPHONE ENCOUNTER
----- Message from Moira Atwood sent at 2/18/2020 11:38 AM CST -----  Contact: self   Pt states she is calling to follow up to see if Dr Salvador received her medical records from her previous physician. Please call and advise.

## 2020-02-18 NOTE — PROGRESS NOTES
Chart reviewed.   Immunizations: Triggered Imm Registry     Orders placed: n/a  Upcoming appts to satisfy CHAP[facility] topics: n/a

## 2020-02-18 NOTE — PROGRESS NOTES
Ms. Beth     Vitals:    20 1359   BP: 113/69   Pulse: 106   Temp: 99.4 °F (37.4 °C)       Chief Complaint:  Sinus Problem       HPI:   is a 79-year-old black female who had a sinus infection which began in January.  She was treated with several antibiotics as well as nasal steroids and she feels this has resolved.  Her issue now is that she still feels very tired though she is having difficulty sleeping.    Review of Systems:  Constitutional:   weight loss or weight gain: Negative  Allergy/Immunologic:   Negative  Nasal Congestion/Obstruction:   Negative  Nosebleeds:   Negative  Sinus infections:   Positive for 1 previous infection.  Headache/Facial Pain:   Negative  Snoring/MIRANDA:   Negative  Throat: Infections/Pain:   Negative  Hoarseness/Speech Disturbance:   Negative  Trauma Hx:  Negative    Cardiovascular:  M/I Angina: Negative  Hypertension: Negative  Endocrine:    DM/Steroids: Negative  GI:   Dysphagia/Reflux: Negative  :   GYN Pregnancy: Negative  Renal:   Dialysis: Negative  Lymphatic:   Neck Mass/Lymphadenopathy: Negative  Muscoloskeletal:   Negative  Hematologic:   Bleeding Disorders/Anemia: Negative  Neurologic:    Cranial/Neuralgia: Negative  Pulmonary:   Asthma/SOB/Cough: Negative  Skin Disorders: Negative    Past Medical/Surgical/Family/Social History:    ENT Surgery:  Status post tonsillectomy  Occupational Exposure: Negative   Problems: Negative  Cancer: Negative    Past Family History:   Family history of Cancer: Negative    Past Social History:   Tobacco: Nonsmoker   Alcohol: Social Drinker      Allergies and medications: Reviewed per med card.    Physical Examination:  Ears:   External auditory canals:  Clear   Hearing: Grossly intact   Tympanic Membranes: Clear  Nose:   External: Normal   Intranasal:  Septum straight turbinates 1 to 2+ nasal airway clear.  Mouth:   Intraorally: Lips, teeth, and gums: Normal   Oropharynx: Normal   Mucosa: Normal   Tongue:  Normal  Throat:      Palate: Normal palate with elevation   Tonsils:  Absent   Posterior Pharynx: Normal  Fiberoptic exam: Not performed  Head/Face:     Inspection: Normal and atraumatic   Palpation/Percussion: Non tender   Facial strength: Normal and symmetric   Salivary glands: Normal  Neck: Supple  Thyroid: No masses  Lymphatics: No nodes  Respiratory:   Effort: Normal  Eyes:   Ocular Mobility: Normal   Vision: Grossly intact  Neuro/Psych:   Cranial Nerves: Grossly Intact   Orientation: Normal   Mood/Affect: Normal      Assessment/Plan:  I have discussed my findings with her in detail as well as my recommendations for treatment.  I have assured her that she is recovering from her sinus infection and could still have some fatigue issues.  I have encouraged her to continue with her sinus rinses though decreased to once or twice daily.  She will also continue with her Flonase.  I have suggested that she discontinue her prescribed antihistamines and anti-inflammatories and try Benadryl at night.  If after the next few weeks she is not feeling significantly better I have recommended that she see her primary care physician for evaluation for fatigue.

## 2020-02-19 ENCOUNTER — TELEPHONE (OUTPATIENT)
Dept: PRIMARY CARE CLINIC | Facility: CLINIC | Age: 80
End: 2020-02-19

## 2020-02-19 NOTE — TELEPHONE ENCOUNTER
Spoke with pt just wanted to know what date the records were received, informed pt I could only give the date they were scanned, pt states understanding

## 2020-02-19 NOTE — TELEPHONE ENCOUNTER
----- Message from Krystyna Washburn sent at 2/19/2020  3:29 PM CST -----  Contact: self  Pt would like a call back regarding some information from her previous doctor.       Contact Info

## 2020-03-13 ENCOUNTER — TELEPHONE (OUTPATIENT)
Dept: OTOLARYNGOLOGY | Facility: CLINIC | Age: 80
End: 2020-03-13

## 2020-03-13 NOTE — TELEPHONE ENCOUNTER
Called and spoke with patient.  Asked questions regarding the Montelukast.  Advised per Dr. Graves's clinic note stop the Montelukast and use Benadryl at night only.  Advised to continue the sinus rinses and Flonase.

## 2020-03-13 NOTE — TELEPHONE ENCOUNTER
----- Message from Yanique Cabrera sent at 3/12/2020 12:28 PM CDT -----  Contact: JUDY LANGFORD [[id number]]   Name of Who is Calling:     What is the request in detail:  Patient request call back in reference to questions/concerns about medication  Please contact to further discuss and advise      Can the clinic reply by [facility]: no     What Number to Call Back if not in [facility]:  [phone]

## 2020-03-19 ENCOUNTER — TELEPHONE (OUTPATIENT)
Dept: OTOLARYNGOLOGY | Facility: CLINIC | Age: 80
End: 2020-03-19

## 2020-03-19 NOTE — TELEPHONE ENCOUNTER
Called and spoke with patient.  Said that she gets drainage in her throat even with the benadryl.  Wants to know if there is something she can take for that in addition to the Benadryl.  She also had some drainage in her ear and wanted to know what would cause it.   Advised that she could have had her eardrum rupture.  Patient was on antibiotics.  She has Flonase so she keep using that.

## 2020-03-19 NOTE — TELEPHONE ENCOUNTER
----- Message from Neftaly Ramos sent at 3/19/2020 12:20 PM CDT -----  Contact: Pt   Name of Who is Calling: JUDY LANGFORD [[id number]]    What is the request in detail: Patient is requesting a call back regards to medication Please contact to further discuss and advise      Can the clinic reply by [facility]: No   What Number [facility] Back [facility]:  [phone] (home)

## 2020-03-20 NOTE — TELEPHONE ENCOUNTER
"Per Dr. Graves Look at my summary in my last note. She should be doing rinses and Flonase. If it is a sleep issue or fatigue she should contact her PCP. She COULD be having reflux syptoms however if she is feeling something "caught" in her throat. Give her reflux precaution sheet info.   Thanks,          Called and spoke with patient. Gave above info.  Patient verbalized understanding.  Went over reflux precautions such as not going to bed 2-3 after eating.  Whenever laying down to raise her head and upper body a little.  To avoid spicy and acidic foods.  Patient verbalized understanding      "

## 2020-03-26 ENCOUNTER — TELEPHONE (OUTPATIENT)
Dept: OTOLARYNGOLOGY | Facility: CLINIC | Age: 80
End: 2020-03-26

## 2020-03-26 NOTE — TELEPHONE ENCOUNTER
----- Message from Kanika Champion sent at 3/26/2020  1:34 PM CDT -----  Contact: Self      Name of Who is Calling: JUDY LANGFORD [[id number]]      What is the request in detail: pt said she feel a drainage in her throat and would like to speak to staff.Please contact to further discuss and advise.        Can the clinic reply by [facility]: N      What Number to Call Back if not in [facility]: [phone]

## 2020-03-26 NOTE — TELEPHONE ENCOUNTER
Called and spoke with patient.  She is still having drainage with mucous. She has also tried Mucinex and it doesn't really work for her. She is also staying well hydrated.  Wants to know if there is anything else she can do.  She just feels like there is mucous stuck in her throat and she can't get it out.

## 2020-04-01 ENCOUNTER — TELEPHONE (OUTPATIENT)
Dept: OTOLARYNGOLOGY | Facility: CLINIC | Age: 80
End: 2020-04-01

## 2020-04-01 NOTE — TELEPHONE ENCOUNTER
Per Dr. Graves That sounds very much like globus sensation.   Reflux precautions and take omeprazole.   Will take 6 weeks to heal once start treatment.          Called and no answer.

## 2020-04-06 NOTE — TELEPHONE ENCOUNTER
Tried calling patient again, got a recording again saying she is not available to call again later.

## 2020-04-07 NOTE — TELEPHONE ENCOUNTER
Called and spoke with patient gave info from Dr. Graves.  Patient will have someone  some Omeprazole and she will try it for 6 weeks and let us know how she did.

## 2020-04-20 ENCOUNTER — TELEPHONE (OUTPATIENT)
Dept: FAMILY MEDICINE | Facility: CLINIC | Age: 80
End: 2020-04-20

## 2020-04-20 ENCOUNTER — TELEPHONE (OUTPATIENT)
Dept: OTOLARYNGOLOGY | Facility: CLINIC | Age: 80
End: 2020-04-20

## 2020-04-20 NOTE — TELEPHONE ENCOUNTER
Called and spoke with patient to inform her that we are unable to see patient's face to face in clinic right now. I offered the patient the option of virtual visit with Dr. Graves. Pt stated that she would have to get someone to help her download the my chart jasmina on her phone and that she would call back when she was successful.     ----- Message from Adriel Stroud sent at 4/20/2020  1:06 PM CDT -----  Contact: pt  Pt is following up from a call Friday with Shaina. Please give pt a call back at [phone] .

## 2020-04-20 NOTE — TELEPHONE ENCOUNTER
Patient currently no established with Dr Walter.  Scheduled to establish on 7/7/2020.  Would not be able to place an ENT referral until patient is seen in clinic.

## 2020-04-20 NOTE — TELEPHONE ENCOUNTER
----- Message from Greta Allison sent at 4/18/2020 11:32 AM CDT -----  Contact: Pt  Pt would like to be scheduled as a NP for continuous drainage , causing stuffiness pt can be reached at [phone]    thanks

## 2020-04-20 NOTE — TELEPHONE ENCOUNTER
----- Message from Lesli Butler sent at 4/20/2020  1:15 PM CDT -----  Contact: Odalys (niece)  Time Sensitive     Name of Caller: Odalys  Reason for Visit/Symptoms: referral to ENT  Best Contact Number or Confirm if Mychart Preferred: [phone]  Preferred Date/Time of Appointment:  Around 10am, asap  Interested in Virtual Visit (yes/no): no  Additional Information: none

## 2020-06-05 DIAGNOSIS — I10 ESSENTIAL HYPERTENSION: ICD-10-CM

## 2020-06-05 RX ORDER — AMLODIPINE BESYLATE 10 MG/1
10 TABLET ORAL DAILY
Qty: 90 TABLET | Refills: 2 | Status: SHIPPED | OUTPATIENT
Start: 2020-06-05 | End: 2020-08-18 | Stop reason: SDUPTHER

## 2020-06-18 ENCOUNTER — TELEPHONE (OUTPATIENT)
Dept: OTOLARYNGOLOGY | Facility: CLINIC | Age: 80
End: 2020-06-18

## 2020-06-18 NOTE — TELEPHONE ENCOUNTER
----- Message from Magda Parikh sent at 6/18/2020 11:53 AM CDT -----  Name of Who is Calling: JUDY LANGFORD [[id number]]    What is the request in detail: UJDY LANGFORD [[id number]] is calling in regards to follow up appointment ... Please contact to further discuss and advise      Can the clinic reply by [facility]: no     [facility]:  [phone] (home)

## 2020-06-18 NOTE — TELEPHONE ENCOUNTER
Called and spoke with patient.  She has had congestion.  Said that she is still using Flonase and the saline spray. She is also taking the Singulair.  She thinks she should have a test done.  Like a CT scan or something.  Reports congestion and nasal drainage.  No headaches.    Advised will see if Dr. Graves wants to order a CT scan or other test and then get back to her.

## 2020-06-23 DIAGNOSIS — J32.9 CHRONIC SINUSITIS, UNSPECIFIED LOCATION: Primary | ICD-10-CM

## 2020-06-29 ENCOUNTER — TELEPHONE (OUTPATIENT)
Dept: OTOLARYNGOLOGY | Facility: CLINIC | Age: 80
End: 2020-06-29

## 2020-06-30 NOTE — TELEPHONE ENCOUNTER
Called and spoke with patient said she had the scan done on the 23rd.    Advised will have Dr. Graves review it and give recommendations.

## 2020-07-30 ENCOUNTER — CLINICAL SUPPORT (OUTPATIENT)
Dept: PRIMARY CARE CLINIC | Facility: CLINIC | Age: 80
End: 2020-07-30
Payer: MEDICARE

## 2020-07-30 VITALS — SYSTOLIC BLOOD PRESSURE: 168 MMHG | DIASTOLIC BLOOD PRESSURE: 62 MMHG

## 2020-07-30 PROCEDURE — 99999 PR PBB SHADOW E&M-EST. PATIENT-LVL II: ICD-10-PCS | Mod: PBBFAC,,,

## 2020-07-30 PROCEDURE — 99999 PR PBB SHADOW E&M-EST. PATIENT-LVL II: CPT | Mod: PBBFAC,,,

## 2020-07-30 NOTE — PROGRESS NOTES
Verified pt ID using name and . Obtained bp:164/86. Notified physician of results. Instructed pt to continue taking Amlodipine 10 mg every morning, and Carvedilol 6.25 mg twice daily as prescribed. Pt. States she had been holding her Carvedilol some nights due to Hypotension - BP 93/54. Pt. Has not seen Dr. Salvador since February, states he was unable to obtain her records from [facility]. Pt. States she called Dr. Tang office to get an apt. But is unsure when that apt. Is. Pt. Is to follow up in 1 week for BP check. Pt verbalized understanding

## 2020-07-31 ENCOUNTER — TELEPHONE (OUTPATIENT)
Dept: CARDIOLOGY | Facility: CLINIC | Age: 80
End: 2020-07-31

## 2020-07-31 ENCOUNTER — OFFICE VISIT (OUTPATIENT)
Dept: CARDIOLOGY | Facility: CLINIC | Age: 80
End: 2020-07-31
Payer: MEDICARE

## 2020-07-31 VITALS
SYSTOLIC BLOOD PRESSURE: 168 MMHG | DIASTOLIC BLOOD PRESSURE: 92 MMHG | HEIGHT: 63 IN | BODY MASS INDEX: 21.09 KG/M2 | WEIGHT: 119.06 LBS | HEART RATE: 84 BPM

## 2020-07-31 DIAGNOSIS — I73.9 PVD (PERIPHERAL VASCULAR DISEASE): ICD-10-CM

## 2020-07-31 DIAGNOSIS — I10 ESSENTIAL HYPERTENSION: Primary | ICD-10-CM

## 2020-07-31 PROCEDURE — 1101F PT FALLS ASSESS-DOCD LE1/YR: CPT | Mod: CPTII,S$GLB,, | Performed by: INTERNAL MEDICINE

## 2020-07-31 PROCEDURE — 1159F PR MEDICATION LIST DOCUMENTED IN MEDICAL RECORD: ICD-10-PCS | Mod: S$GLB,,, | Performed by: INTERNAL MEDICINE

## 2020-07-31 PROCEDURE — 3080F DIAST BP >= 90 MM HG: CPT | Mod: CPTII,S$GLB,, | Performed by: INTERNAL MEDICINE

## 2020-07-31 PROCEDURE — 99999 PR PBB SHADOW E&M-EST. PATIENT-LVL III: ICD-10-PCS | Mod: PBBFAC,,, | Performed by: INTERNAL MEDICINE

## 2020-07-31 PROCEDURE — 3077F SYST BP >= 140 MM HG: CPT | Mod: CPTII,S$GLB,, | Performed by: INTERNAL MEDICINE

## 2020-07-31 PROCEDURE — 3080F PR MOST RECENT DIASTOLIC BLOOD PRESSURE >= 90 MM HG: ICD-10-PCS | Mod: CPTII,S$GLB,, | Performed by: INTERNAL MEDICINE

## 2020-07-31 PROCEDURE — 1101F PR PT FALLS ASSESS DOC 0-1 FALLS W/OUT INJ PAST YR: ICD-10-PCS | Mod: CPTII,S$GLB,, | Performed by: INTERNAL MEDICINE

## 2020-07-31 PROCEDURE — 1159F MED LIST DOCD IN RCRD: CPT | Mod: S$GLB,,, | Performed by: INTERNAL MEDICINE

## 2020-07-31 PROCEDURE — 99202 OFFICE O/P NEW SF 15 MIN: CPT | Mod: S$GLB,,, | Performed by: INTERNAL MEDICINE

## 2020-07-31 PROCEDURE — 99999 PR PBB SHADOW E&M-EST. PATIENT-LVL III: CPT | Mod: PBBFAC,,, | Performed by: INTERNAL MEDICINE

## 2020-07-31 PROCEDURE — 99202 PR OFFICE/OUTPT VISIT, NEW, LEVL II, 15-29 MIN: ICD-10-PCS | Mod: S$GLB,,, | Performed by: INTERNAL MEDICINE

## 2020-07-31 PROCEDURE — 3077F PR MOST RECENT SYSTOLIC BLOOD PRESSURE >= 140 MM HG: ICD-10-PCS | Mod: CPTII,S$GLB,, | Performed by: INTERNAL MEDICINE

## 2020-07-31 RX ORDER — MULTIVITAMIN
1 TABLET ORAL DAILY
COMMUNITY

## 2020-07-31 RX ORDER — IBUPROFEN 100 MG/5ML
1000 SUSPENSION, ORAL (FINAL DOSE FORM) ORAL DAILY
COMMUNITY

## 2020-07-31 NOTE — PROGRESS NOTES
Cardiology    7/31/2020  4:28 PM    Problem list  Patient Active Problem List   Diagnosis    Essential hypertension    PVD (peripheral vascular disease)    Anxiety    Hx of blood clots    Palpitations    Sensitivity to medication       CC:  HTN    HPI:  Patient of Dr Michelle who requested same-day appointment to address her blood pressure and her anxiety because she cannot get in touch with her primary care physician Dr. Tang.  She states that she has a sensation around both her eyes, ringing in her ears so she was worried about blood pressure.  She has been taking amlodipine 5 mg (1/2 tablet of the 10mg) on her own and then decided to take 1 tablet when she saw her BP was 160's.  She denies any chest pain or shortness of breath.  She denies any changes in her claudication.    Medications  Current Outpatient Medications   Medication Sig Dispense Refill    amLODIPine (NORVASC) 10 MG tablet Take 1 tablet (10 mg total) by mouth once daily. 90 tablet 2    ascorbic acid, vitamin C, (VITAMIN C) 1000 MG tablet Take 1,000 mg by mouth once daily.      aspirin 81 MG Chew Take 81 mg by mouth once daily.      azelastine (ASTELIN) 137 mcg (0.1 %) nasal spray USE 1 TO 2 SPRAY(S) IN EACH NOSTRIL TWICE DAILY      carvedilol (COREG) 6.25 MG tablet Take 6.25 mg by mouth 2 (two) times daily with meals.      multivitamin (ONE DAILY MULTIVITAMIN) per tablet Take 1 tablet by mouth once daily.      sodium bicarb-sodium chloride (NEILMED PEDIAT SINUS RINSE REF) Pack by sinus irrigation route.      ferrous sulfate (FEOSOL) 325 mg (65 mg iron) Tab tablet Take 1 tablet by mouth once daily.      fluticasone propionate (FLONASE) 50 mcg/actuation nasal spray 1 spray by Each Nostril route once daily.      montelukast (SINGULAIR) 10 mg tablet Take 1 tablet (10 mg total) by mouth every evening. (Patient not taking: Reported on 7/31/2020)       No current facility-administered medications for this visit.       Prior to  Admission medications    Medication Sig Start Date End Date Taking? Authorizing Provider   amLODIPine (NORVASC) 10 MG tablet Take 1 tablet (10 mg total) by mouth once daily. 6/5/20 6/5/21 Yes Derrick Salvador MD   ascorbic acid, vitamin C, (VITAMIN C) 1000 MG tablet Take 1,000 mg by mouth once daily.   Yes Historical Provider, MD   aspirin 81 MG Chew Take 81 mg by mouth once daily.   Yes Historical Provider, MD   azelastine (ASTELIN) 137 mcg (0.1 %) nasal spray USE 1 TO 2 SPRAY(S) IN EACH NOSTRIL TWICE DAILY 1/29/20  Yes Historical Provider, MD   carvedilol (COREG) 6.25 MG tablet Take 6.25 mg by mouth 2 (two) times daily with meals.   Yes Historical Provider, MD   multivitamin (ONE DAILY MULTIVITAMIN) per tablet Take 1 tablet by mouth once daily.   Yes Historical Provider, MD   sodium bicarb-sodium chloride (NEILMED PEDIAT SINUS RINSE REF) Pack by sinus irrigation route.   Yes Historical Provider, MD   ferrous sulfate (FEOSOL) 325 mg (65 mg iron) Tab tablet Take 1 tablet by mouth once daily. 1/10/20   Historical Provider, MD   fluticasone propionate (FLONASE) 50 mcg/actuation nasal spray 1 spray by Each Nostril route once daily.    Historical Provider, MD   montelukast (SINGULAIR) 10 mg tablet Take 1 tablet (10 mg total) by mouth every evening.  Patient not taking: Reported on 7/31/2020 2/13/20   Derrick Salvador MD         History  Past Medical History:   Diagnosis Date    Chronic cystic mastitis     Heart palpitations     History of shingles     Hyperlipidemia     Osteopenia      Past Surgical History:   Procedure Laterality Date    BREAST BIOPSY      biopsies benign     Social History     Socioeconomic History    Marital status: Single     Spouse name: Not on file    Number of children: Not on file    Years of education: Not on file    Highest education level: Not on file   Occupational History    Occupation: retired student loan assistance    Social Needs    Financial resource strain: Not on  file    Food insecurity     Worry: Not on file     Inability: Not on file    Transportation needs     Medical: Not on file     Non-medical: Not on file   Tobacco Use    Smoking status: Never Smoker    Smokeless tobacco: Never Used   Substance and Sexual Activity    Alcohol use: No    Drug use: No    Sexual activity: Not Currently   Lifestyle    Physical activity     Days per week: Not on file     Minutes per session: Not on file    Stress: Not at all   Relationships    Social connections     Talks on phone: Not on file     Gets together: Not on file     Attends [employer] service: Not on file     Active member of club or organization: Not on file     Attends meetings of clubs or organizations: Not on file     Relationship status: Not on file   Other Topics Concern    Not on file   Social History Narrative    Not on file         Allergies  Review of patient's allergies indicates:   Allergen Reactions    Benazepril Other (See Comments)     cough    Chlorthalidone     Iodinated contrast media     Iodine and iodide containing products     Lipitor [atorvastatin]     Spironolactone          Review of Systems   Review of Systems   Constitution: Negative for weight gain and weight loss.   HENT: Positive for tinnitus.    Cardiovascular: Negative.    Respiratory: Negative.    Neurological: Positive for headaches.         Physical Exam  Wt Readings from Last 1 Encounters:   07/31/20 54 kg (119 lb 0.8 oz)     BP Readings from Last 3 Encounters:   07/31/20 (!) 168/92   07/30/20 (!) 168/62   02/18/20 113/69     Pulse Readings from Last 1 Encounters:   07/31/20 84     Body mass index is 21.09 kg/m².    Physical Exam   Constitutional: She is oriented to person, place, and time. She appears well-developed and well-nourished.   Cardiovascular: Normal rate, regular rhythm, S1 normal, S2 normal and normal heart sounds.   Pulses:       Carotid pulses are 2+ on the right side and 2+ on the left side.       Radial pulses  are 2+ on the right side and 2+ on the left side.        Dorsalis pedis pulses are 2+ on the right side and 2+ on the left side.   Pulmonary/Chest: Breath sounds normal.   Musculoskeletal:         General: No edema.   Neurological: She is alert and oriented to person, place, and time.   Vitals reviewed.                Assessment  1. Essential hypertension  stable    2. PVD (peripheral vascular disease)  stable        Plan and Discussion  Contact her primary care physician to address her sinus, and headaches.  Blood pressure is well controlled.    Follow Up  1 month with Dr Chen    Time spent evaluating and treating patient 15 minutes with >50% of this time being face-to-face.     Louie Child MD, F.A.C.C, F.S.C.A.I.

## 2020-07-31 NOTE — TELEPHONE ENCOUNTER
Spoke with patient appt scheduled with Dr. Child.    ----- Message from Paul Andrea, Patient Care Assistant sent at 7/31/2020  8:42 AM CDT -----  Name of Who is Calling: JUDY LANGFORD [[id number]]    What is the request in detail: Requesting a call back in in regards of not feeling well stating her blood pressures has been elevated but today its normal after taking medication. Patient requesting a sooner appointment stating she needs to be check. Patient was seeing Dr. Michelle at [facility]. Please contact to further discuss and advise      Can the clinic reply by [facility]: No    What Number to Call Back if not in [facility]:   [phone]

## 2020-08-06 ENCOUNTER — CLINICAL SUPPORT (OUTPATIENT)
Dept: PRIMARY CARE CLINIC | Facility: CLINIC | Age: 80
End: 2020-08-06
Payer: MEDICARE

## 2020-08-06 VITALS — SYSTOLIC BLOOD PRESSURE: 118 MMHG | DIASTOLIC BLOOD PRESSURE: 64 MMHG | HEART RATE: 90 BPM | OXYGEN SATURATION: 99 %

## 2020-08-06 DIAGNOSIS — I10 ESSENTIAL HYPERTENSION: Primary | ICD-10-CM

## 2020-08-06 PROCEDURE — 99999 PR PBB SHADOW E&M-EST. PATIENT-LVL III: CPT | Mod: PBBFAC,,,

## 2020-08-06 PROCEDURE — 99999 PR PBB SHADOW E&M-EST. PATIENT-LVL III: ICD-10-PCS | Mod: PBBFAC,,,

## 2020-08-06 NOTE — PROGRESS NOTES
Patient identified by name and date of birth. States here for blood pressure check, medications/allergies reviewed. States she took her BP med at 0800 this am. Vitals obtained tolerated well by pt.

## 2020-08-07 ENCOUNTER — TELEPHONE (OUTPATIENT)
Dept: PRIMARY CARE CLINIC | Facility: CLINIC | Age: 80
End: 2020-08-07

## 2020-08-07 NOTE — TELEPHONE ENCOUNTER
States BP was 105/58 only took half a carvedilol last. /69 this am is going to take the other half of her carvedilol and the regular dose of amlodipine and will monitor pressure

## 2020-08-07 NOTE — TELEPHONE ENCOUNTER
----- Message from Agatha Melodie sent at 8/7/2020  8:31 AM CDT -----  Contact: Patient  Type: Needs Medical Advice    Who Called:  Alana, patient  Symptoms (please be specific):  Blood pressure issues  How long has patient had these symptoms:  ?  Pharmacy name and phone #:    [id number] - [place] - [address]  [address] [place] [address]  Phone: [phone] Fax: [phone]  Best Call Back Number: [phone]  Additional Information: Needs to speak to the nurse regarding he blood pressure. Please call her. Thanks.

## 2020-08-18 ENCOUNTER — OFFICE VISIT (OUTPATIENT)
Dept: PULMONOLOGY | Facility: CLINIC | Age: 80
End: 2020-08-18
Payer: MEDICARE

## 2020-08-18 VITALS
OXYGEN SATURATION: 98 % | DIASTOLIC BLOOD PRESSURE: 79 MMHG | HEIGHT: 63 IN | HEART RATE: 102 BPM | WEIGHT: 119.69 LBS | BODY MASS INDEX: 21.21 KG/M2 | SYSTOLIC BLOOD PRESSURE: 130 MMHG

## 2020-08-18 DIAGNOSIS — R63.4 WEIGHT LOSS: ICD-10-CM

## 2020-08-18 DIAGNOSIS — R91.1 SOLITARY PULMONARY NODULE: Primary | ICD-10-CM

## 2020-08-18 DIAGNOSIS — R05.9 COUGH: ICD-10-CM

## 2020-08-18 DIAGNOSIS — R93.89 ABNORMAL CHEST X-RAY: ICD-10-CM

## 2020-08-18 DIAGNOSIS — I70.0 ATHEROSCLEROSIS OF AORTIC ARCH: ICD-10-CM

## 2020-08-18 PROCEDURE — 99204 OFFICE O/P NEW MOD 45 MIN: CPT | Mod: S$GLB,,, | Performed by: NURSE PRACTITIONER

## 2020-08-18 PROCEDURE — 99999 PR PBB SHADOW E&M-EST. PATIENT-LVL IV: CPT | Mod: PBBFAC,,, | Performed by: NURSE PRACTITIONER

## 2020-08-18 PROCEDURE — 99204 PR OFFICE/OUTPT VISIT, NEW, LEVL IV, 45-59 MIN: ICD-10-PCS | Mod: S$GLB,,, | Performed by: NURSE PRACTITIONER

## 2020-08-18 PROCEDURE — 99999 PR PBB SHADOW E&M-EST. PATIENT-LVL IV: ICD-10-PCS | Mod: PBBFAC,,, | Performed by: NURSE PRACTITIONER

## 2020-08-18 RX ORDER — AMLODIPINE BESYLATE 5 MG/1
5 TABLET ORAL DAILY
COMMUNITY
Start: 2020-06-04 | End: 2020-09-25

## 2020-08-18 NOTE — PROGRESS NOTES
Subjective:       Patient ID: Alana Beth is a 79 y.o. female.    Chief Complaint: Pulmonary Nodules    HPI   Ms. Beth is a 80 y/o F presenting to pulmonary clinic as self referral for abnormal Chest xray and pulmonary nodule.     On 8/15/2020, she reports going to the ER for fatigue. CXR was obtained showing findings consistent with COPD/emphysema, medial right lung base for mm indeterminate nodule and calcific atherosclerosis of the aortic arch .  She was instructed to follow-up with pulmonary as per ER.    Ms. Beth discloses no smoking history.  Denies secondhand smoke exposure.  Denies history of lung disease including asthma or COPD.  She denies cough, shortness of breath, wheezing, fever or chills.  She denies night sweats.  She does report weight loss, fatigue and sleeping disturbances.  In November, states she weighed 142 lb now weighing 119 lb.  Reviewed patient's labs.  Last TSH noted 0.98 (4/2017).  Denies hemoptysis.    Reports closely following with ENT for sinus.   Reports follows with cardiology.     Additional Pulmonary History:   Exposure to Animals/Pets: Denies  Travel History: Denies  History of exposures to TB: Denies  Family History of Lung Cancer: Denies  Childhood history of Lung Disease:Denies    Social History     Tobacco Use   Smoking Status Never Smoker   Smokeless Tobacco Never Used    Reviewed allergies and medications.  Reviewed medical and surgical history.  Reviewed social and family history.    Review of Systems   Constitutional: Positive for weight loss (See HPI). Negative for fever, chills and night sweats.   HENT: Positive for postnasal drip and congestion. Negative for rhinorrhea and trouble swallowing.    Respiratory: Negative for cough (Reports occassional cough in relation to upper airway disease. ), sputum production, choking, chest tightness, wheezing, dyspnea on extertion and use of rescue inhaler.    Cardiovascular: Negative for chest pain and leg swelling.  "  Musculoskeletal: Negative for joint swelling.   Skin: Negative for rash.   Gastrointestinal: Negative for acid reflux.   Neurological: Negative for dizziness and light-headedness.   Hematological: Negative for adenopathy.   Psychiatric/Behavioral: Positive for sleep disturbance (See HPI).         Objective:      Vitals:    08/18/20 1325   BP: 130/79   BP Location: Left arm   Patient Position: Sitting   Pulse: 102   SpO2: 98%   Weight: 54.3 kg (119 lb 11.4 oz)   Height: 5' 3" (1.6 m)      Physical Exam   Constitutional: She is oriented to person, place, and time. She appears well-developed and well-nourished. No distress.   Patient thin   HENT:   Head: Normocephalic.   Neck: Normal range of motion. Neck supple.   Cardiovascular: Normal rate and regular rhythm.   Murmur heard.  Pulmonary/Chest: Normal expansion, effort normal and breath sounds normal. No respiratory distress. She has no wheezes. She has no rhonchi.   Abdominal: Soft. Bowel sounds are normal. There is no abdominal tenderness.   Musculoskeletal: Normal range of motion.         General: No edema.   Lymphadenopathy: No supraclavicular adenopathy is present.     She has no cervical adenopathy.   Neurological: She is alert and oriented to person, place, and time. Gait normal.   Skin: Skin is warm and dry. Nails show no clubbing.   Psychiatric: She has a normal mood and affect. Her behavior is normal.   Vitals reviewed.    Personal Diagnostic Review   X-Ray Chest AP Portable  Narrative: EXAMINATION:  XR CHEST AP PORTABLE    CLINICAL HISTORY:  Weakness;    TECHNIQUE:  Single frontal view of the chest was performed.    COMPARISON:  None    FINDINGS:  Heart is not enlarged.  Calcific atherosclerosis of the arch with slight tortuosity of the thoracic aorta.  Mediastinal structures are otherwise midline.  Trachea is midline and patent.The lungs are symmetrically hyperexpanded with increased lucency of the upper zones which could reflect sequela of " COPD/emphysema.  4 mm nodule projected over the medial right lung base which could represent a vessel on end but remains indeterminate.  No large consolidation, pleural effusion or pneumothorax.    Pulmonary vasculature and hilar contours are otherwise within normal limits.    Bones are intact.  PA and lateral views can be obtained.  Impression: Findings suggesting sequela of underlying COPD/emphysema without radiographic acute intrathoracic process seen.  Correlate with smoking/asthma history.    Medial right lung base 4 mm indeterminate nodule.  Further evaluation with elective/nonemergent CT thorax can be obtained, if 2 year stability cannot be confirmed.    This report was flagged in Epic as abnormal.    Electronically signed by: Jono Seals MD  Date:    08/15/2020  Time:    12:32         Assessment:       1. Solitary pulmonary nodule    2. Weight loss    3. Abnormal chest x-ray    4. Cough    5. Atherosclerosis of aortic arch        Outpatient Encounter Medications as of 8/18/2020   Medication Sig Dispense Refill    amLODIPine (NORVASC) 5 MG tablet Take 5 mg by mouth once daily.      ascorbic acid, vitamin C, (VITAMIN C) 1000 MG tablet Take 1,000 mg by mouth once daily.      aspirin 81 MG Chew Take 81 mg by mouth once daily.      azelastine (ASTELIN) 137 mcg (0.1 %) nasal spray USE 1 TO 2 SPRAY(S) IN EACH NOSTRIL TWICE DAILY      carvedilol (COREG) 6.25 MG tablet Take 6.25 mg by mouth 2 (two) times daily with meals.      ferrous sulfate (FEOSOL) 325 mg (65 mg iron) Tab tablet Take 1 tablet by mouth once daily.      fluticasone propionate (FLONASE) 50 mcg/actuation nasal spray 1 spray by Each Nostril route once daily.      montelukast (SINGULAIR) 10 mg tablet Take 1 tablet (10 mg total) by mouth every evening.      multivitamin (ONE DAILY MULTIVITAMIN) per tablet Take 1 tablet by mouth once daily.      sodium bicarb-sodium chloride (NEILMED PEDIAT SINUS RINSE REF) Pack by sinus irrigation route.       [DISCONTINUED] amLODIPine (NORVASC) 10 MG tablet Take 1 tablet (10 mg total) by mouth once daily. 90 tablet 2     No facility-administered encounter medications on file as of 8/18/2020.      Orders Placed This Encounter   Procedures    CT Chest Without Contrast     Standing Status:   Future     Standing Expiration Date:   8/18/2021     Order Specific Question:   May the Radiologist modify the order per protocol to meet the clinical needs of the patient?     Answer:   Yes    TSH     Standing Status:   Future     Standing Expiration Date:   10/17/2021    COVID-19 Routine Screening     Pre procedure     Standing Status:   Future     Standing Expiration Date:   10/17/2021     Order Specific Question:   Is the patient symptomatic?     Answer:   No     Order Specific Question:   Is this needed for pre-procedure or pre-op testing?     Answer:   Yes     Order Specific Question:   Diagnosis:     Answer:   Shortness of breath [786.05.ICD-9-CM]    Spirometry without Bronchodilator     Standing Status:   Future     Standing Expiration Date:   8/18/2021       Plan:         Problem List Items Addressed This Visit        Pulmonary    Solitary pulmonary nodule - Primary    Overview     Patient had emergency room visit on 08/15/2020, chest x-ray revealed medial right lung base 4 mm indeterminate nodule.  Further evaluation with CT was recommended.  Ms. Beth denies smoking history.  She does report having weight loss since November.    Plan:  -obtain designated CT of chest to further assess lung nodule.         Relevant Orders    CT Chest Without Contrast    Spirometry without Bronchodilator    Cough    Overview     Cough is likely from upper airway disease.   She is following with ENT.   On Flonase and sinus regimen.     Plan:  -Obtain spirometry.          Relevant Orders    COVID-19 Routine Screening       Cardiac/Vascular    Atherosclerosis of aortic arch    Overview     Noted on CXR 8/15/2020  Discussed with patient to  follow up with PCP or Cardiology.             Endocrine    Weight loss    Current Assessment & Plan     Patient reports weighing 142 lb in November now weighs 119 lb.  Chest x-ray showed 4 mm indeterminate nodule.  Last TSH noted was for 2017 at 0.98.     Plan:  -obtain designated CT of chest  -obtain TSH  -Obtain spirometry         Relevant Orders    TSH    CT Chest Without Contrast       Other    Abnormal chest x-ray    Overview     Patient had chest x-ray on 08/15/2020, findings were consistent with underlying COPD/emphysema.  Also, noted to have right medial lung base 4 mm indeterminate nodule.  She denies smoking history.  Denies secondhand smoke.  Denies history of lung disease.  Does endorse having weight loss since November.    Plan:  Obtain designated CT of chest  Obtain spirometry  Obtain TSH         Relevant Orders    CT Chest Without Contrast    Spirometry without Bronchodilator      Will have follow up after testing/imaging.      This note is dictated on M*Modal word recognition program.  There are word recognition mistakes that are occasionally missed on review.

## 2020-08-18 NOTE — ASSESSMENT & PLAN NOTE
Patient reports weighing 142 lb in November now weighs 119 lb.  Chest x-ray showed 4 mm indeterminate nodule.  Last TSH noted was for 2017 at 0.98.     Plan:  -obtain designated CT of chest  -obtain TSH  -Obtain spirometry

## 2020-08-19 ENCOUNTER — TELEPHONE (OUTPATIENT)
Dept: PULMONOLOGY | Facility: CLINIC | Age: 80
End: 2020-08-19

## 2020-08-19 ENCOUNTER — OFFICE VISIT (OUTPATIENT)
Dept: CARDIOLOGY | Facility: CLINIC | Age: 80
End: 2020-08-19
Payer: MEDICARE

## 2020-08-19 VITALS — HEART RATE: 98 BPM | OXYGEN SATURATION: 98 % | SYSTOLIC BLOOD PRESSURE: 126 MMHG | DIASTOLIC BLOOD PRESSURE: 66 MMHG

## 2020-08-19 DIAGNOSIS — I70.0 ATHEROSCLEROSIS OF AORTIC ARCH: ICD-10-CM

## 2020-08-19 DIAGNOSIS — I10 ESSENTIAL HYPERTENSION: ICD-10-CM

## 2020-08-19 DIAGNOSIS — I70.219 ATHEROSCLEROTIC PERIPHERAL VASCULAR DISEASE WITH INTERMITTENT CLAUDICATION: Primary | ICD-10-CM

## 2020-08-19 DIAGNOSIS — E78.00 HYPERCHOLESTEREMIA: ICD-10-CM

## 2020-08-19 DIAGNOSIS — R00.2 PALPITATIONS: ICD-10-CM

## 2020-08-19 PROCEDURE — 1101F PR PT FALLS ASSESS DOC 0-1 FALLS W/OUT INJ PAST YR: ICD-10-PCS | Mod: CPTII,S$GLB,, | Performed by: INTERNAL MEDICINE

## 2020-08-19 PROCEDURE — 1159F MED LIST DOCD IN RCRD: CPT | Mod: S$GLB,,, | Performed by: INTERNAL MEDICINE

## 2020-08-19 PROCEDURE — 1126F PR PAIN SEVERITY QUANTIFIED, NO PAIN PRESENT: ICD-10-PCS | Mod: S$GLB,,, | Performed by: INTERNAL MEDICINE

## 2020-08-19 PROCEDURE — 99999 PR PBB SHADOW E&M-EST. PATIENT-LVL III: CPT | Mod: PBBFAC,,, | Performed by: INTERNAL MEDICINE

## 2020-08-19 PROCEDURE — 99999 PR PBB SHADOW E&M-EST. PATIENT-LVL III: ICD-10-PCS | Mod: PBBFAC,,, | Performed by: INTERNAL MEDICINE

## 2020-08-19 PROCEDURE — 99214 PR OFFICE/OUTPT VISIT, EST, LEVL IV, 30-39 MIN: ICD-10-PCS | Mod: S$GLB,,, | Performed by: INTERNAL MEDICINE

## 2020-08-19 PROCEDURE — 99214 OFFICE O/P EST MOD 30 MIN: CPT | Mod: S$GLB,,, | Performed by: INTERNAL MEDICINE

## 2020-08-19 PROCEDURE — 3074F PR MOST RECENT SYSTOLIC BLOOD PRESSURE < 130 MM HG: ICD-10-PCS | Mod: CPTII,S$GLB,, | Performed by: INTERNAL MEDICINE

## 2020-08-19 PROCEDURE — 1126F AMNT PAIN NOTED NONE PRSNT: CPT | Mod: S$GLB,,, | Performed by: INTERNAL MEDICINE

## 2020-08-19 PROCEDURE — 1101F PT FALLS ASSESS-DOCD LE1/YR: CPT | Mod: CPTII,S$GLB,, | Performed by: INTERNAL MEDICINE

## 2020-08-19 PROCEDURE — 3078F PR MOST RECENT DIASTOLIC BLOOD PRESSURE < 80 MM HG: ICD-10-PCS | Mod: CPTII,S$GLB,, | Performed by: INTERNAL MEDICINE

## 2020-08-19 PROCEDURE — 3078F DIAST BP <80 MM HG: CPT | Mod: CPTII,S$GLB,, | Performed by: INTERNAL MEDICINE

## 2020-08-19 PROCEDURE — 1159F PR MEDICATION LIST DOCUMENTED IN MEDICAL RECORD: ICD-10-PCS | Mod: S$GLB,,, | Performed by: INTERNAL MEDICINE

## 2020-08-19 PROCEDURE — 3074F SYST BP LT 130 MM HG: CPT | Mod: CPTII,S$GLB,, | Performed by: INTERNAL MEDICINE

## 2020-08-19 NOTE — PROGRESS NOTES
[facility]    Chief Complaint  Chief Complaint   Patient presents with    Palpitations    Hypertension       HPI:    Follows up with multiple complaints.  Palpitations continue but seem reasonably well controlled.  Her primary complaint is not feeling right since January-jittery and anxious.  Also complains of weight loss.  Has seen Dr. Tang.  Scheduled to have EGD soon at [facility].  Undergoing pulmonary workup for nodule.  Continues to complain of right leg pain.  (atherosclerotic disease is in her left leg.)    Medications  Current Outpatient Medications   Medication Sig Dispense Refill    amLODIPine (NORVASC) 5 MG tablet Take 5 mg by mouth once daily.      ascorbic acid, vitamin C, (VITAMIN C) 1000 MG tablet Take 1,000 mg by mouth once daily.      aspirin 81 MG Chew Take 81 mg by mouth once daily.      azelastine (ASTELIN) 137 mcg (0.1 %) nasal spray USE 1 TO 2 SPRAY(S) IN EACH NOSTRIL TWICE DAILY      carvedilol (COREG) 6.25 MG tablet Take 6.25 mg by mouth 2 (two) times daily with meals.      ferrous sulfate (FEOSOL) 325 mg (65 mg iron) Tab tablet Take 1 tablet by mouth once daily.      fluticasone propionate (FLONASE) 50 mcg/actuation nasal spray 1 spray by Each Nostril route once daily.      multivitamin (ONE DAILY MULTIVITAMIN) per tablet Take 1 tablet by mouth once daily.      montelukast (SINGULAIR) 10 mg tablet Take 1 tablet (10 mg total) by mouth every evening. (Patient not taking: Reported on 8/19/2020)       No current facility-administered medications for this visit.       Prior to Admission medications    Medication Sig Start Date End Date Taking? Authorizing Provider   amLODIPine (NORVASC) 5 MG tablet Take 5 mg by mouth once daily. 6/4/20  Yes Historical Provider, MD   ascorbic acid, vitamin C, (VITAMIN C) 1000 MG tablet Take 1,000 mg by mouth once daily.   Yes Historical Provider, MD   aspirin 81 MG Chew Take 81 mg by mouth once daily.   Yes Historical Provider, MD    azelastine (ASTELIN) 137 mcg (0.1 %) nasal spray USE 1 TO 2 SPRAY(S) IN EACH NOSTRIL TWICE DAILY 1/29/20  Yes Historical Provider, MD   carvedilol (COREG) 6.25 MG tablet Take 6.25 mg by mouth 2 (two) times daily with meals.   Yes Historical Provider, MD   ferrous sulfate (FEOSOL) 325 mg (65 mg iron) Tab tablet Take 1 tablet by mouth once daily. 1/10/20  Yes Historical Provider, MD   fluticasone propionate (FLONASE) 50 mcg/actuation nasal spray 1 spray by Each Nostril route once daily.   Yes Historical Provider, MD   multivitamin (ONE DAILY MULTIVITAMIN) per tablet Take 1 tablet by mouth once daily.   Yes Historical Provider, MD   montelukast (SINGULAIR) 10 mg tablet Take 1 tablet (10 mg total) by mouth every evening.  Patient not taking: Reported on 8/19/2020 2/13/20   Derrick Salvador MD   sodium bicarb-sodium chloride (NEILMED PEDIAT SINUS RINSE REF) Pack by sinus irrigation route.  8/19/20  Historical Provider, MD       History  Past Medical History:   Diagnosis Date    Atherosclerotic peripheral vascular disease     Chronic cystic mastitis     Essential hypertension     Hypercholesterolemia     Osteopenia     Shingles      Past Surgical History:   Procedure Laterality Date    BREAST BIOPSY      biopsies benign     Social History     Socioeconomic History    Marital status: Single     Spouse name: Not on file    Number of children: Not on file    Years of education: Not on file    Highest education level: Not on file   Occupational History    Occupation: retired student loan assistance    Social Needs    Financial resource strain: Not on file    Food insecurity     Worry: Not on file     Inability: Not on file    Transportation needs     Medical: Not on file     Non-medical: Not on file   Tobacco Use    Smoking status: Never Smoker    Smokeless tobacco: Never Used   Substance and Sexual Activity    Alcohol use: No    Drug use: No    Sexual activity: Not Currently   Lifestyle    Physical  activity     Days per week: Not on file     Minutes per session: Not on file    Stress: Not at all   Relationships    Social connections     Talks on phone: Not on file     Gets together: Not on file     Attends Restorationist service: Not on file     Active member of club or organization: Not on file     Attends meetings of clubs or organizations: Not on file     Relationship status: Not on file   Other Topics Concern    Not on file   Social History Narrative    Not on file       Allergies  Review of patient's allergies indicates:   Allergen Reactions    Benazepril Other (See Comments)     cough    Chlorthalidone     Iodinated contrast media     Iodine and iodide containing products     Lipitor [atorvastatin]     Sertraline      Medication caused patient to get sick.    Spironolactone        Review of Systems   Review of Systems   Constitution: Positive for weight loss. Negative for malaise/fatigue and weight gain.   Eyes: Negative for visual disturbance.   Cardiovascular: Positive for palpitations. Negative for chest pain, claudication, cyanosis, dyspnea on exertion, irregular heartbeat, leg swelling, near-syncope, orthopnea, paroxysmal nocturnal dyspnea and syncope.   Respiratory: Negative for cough, hemoptysis, shortness of breath, sleep disturbances due to breathing and wheezing.    Hematologic/Lymphatic: Negative for bleeding problem. Does not bruise/bleed easily.   Skin: Negative for poor wound healing.   Musculoskeletal: Positive for myalgias. Negative for muscle cramps.   Gastrointestinal: Negative for abdominal pain, anorexia, diarrhea, heartburn, hematemesis, hematochezia, melena, nausea and vomiting.   Genitourinary: Negative for hematuria and nocturia.   Neurological: Negative for excessive daytime sleepiness, dizziness, focal weakness, light-headedness and weakness.   Psychiatric/Behavioral: The patient is nervous/anxious.        Physical Exam  Vitals:    08/19/20 1404   BP: 126/66   Pulse: 98      Wt Readings from Last 1 Encounters:   08/18/20 54.3 kg (119 lb 11.4 oz)     Physical Exam   Constitutional: She is oriented to person, place, and time. She is cooperative.  Non-toxic appearance. No distress.   HENT:   Head: Normocephalic and atraumatic.   Eyes: Conjunctivae are normal. No scleral icterus.   Neck: Neck supple. No hepatojugular reflux and no JVD present. Carotid bruit is not present. No tracheal deviation present. No thyromegaly present.   Cardiovascular: Normal rate, regular rhythm, S1 normal and S2 normal.  No extrasystoles are present. PMI is not displaced. Exam reveals no S3 and no S4.   Murmur heard.   Systolic murmur is present at the upper right sternal border.  Pulses:       Carotid pulses are 2+ on the right side and 2+ on the left side.       Radial pulses are 2+ on the right side and 2+ on the left side.        Dorsalis pedis pulses are 2+ on the right side and 1+ on the left side.        Posterior tibial pulses are 2+ on the right side and 1+ on the left side.   Pulmonary/Chest: No accessory muscle usage. No respiratory distress. She has no decreased breath sounds. She has no wheezes. She has no rhonchi. She has no rales.   Abdominal: Soft. Bowel sounds are normal. She exhibits no pulsatile liver, no abdominal bruit and no pulsatile midline mass. There is no splenomegaly or hepatomegaly. There is no abdominal tenderness.   Musculoskeletal:         General: No tenderness, deformity or edema.   Neurological: She is alert and oriented to person, place, and time. She has normal strength. No cranial nerve deficit or sensory deficit.   Skin: Skin is warm, dry and intact. She is not diaphoretic. No cyanosis. No pallor. Nails show no clubbing.   Psychiatric: She has a normal mood and affect. Her speech is normal and behavior is normal.       Labs  Admission on 08/15/2020, Discharged on 08/15/2020   Component Date Value Ref Range Status    WBC 08/15/2020 4.50  3.90 - 12.70 K/uL Final    RBC  08/15/2020 4.76  4.00 - 5.40 M/uL Final    Hemoglobin 08/15/2020 12.9  12.0 - 16.0 g/dL Final    Hematocrit 08/15/2020 39.5  37.0 - 48.5 % Final    Mean Corpuscular Volume 08/15/2020 83  82 - 98 fL Final    Mean Corpuscular Hemoglobin 08/15/2020 27.1  27.0 - 31.0 pg Final    Mean Corpuscular Hemoglobin Conc 08/15/2020 32.6  32.0 - 36.0 g/dL Final    RDW 08/15/2020 15.5* 11.5 - 14.5 % Final    Platelets 08/15/2020 193  150 - 350 K/uL Final    MPV 08/15/2020 7.4* 9.2 - 12.9 fL Final    Gran # (ANC) 08/15/2020 2.9  1.8 - 7.7 K/uL Final    Lymph # 08/15/2020 1.3  1.0 - 4.8 K/uL Final    Mono # 08/15/2020 0.3  0.3 - 1.0 K/uL Final    Eos # 08/15/2020 0.0  0.0 - 0.5 K/uL Final    Baso # 08/15/2020 0.00  0.00 - 0.20 K/uL Final    Gran% 08/15/2020 64.1  38.0 - 73.0 % Final    Lymph% 08/15/2020 28.4  18.0 - 48.0 % Final    Mono% 08/15/2020 6.2  4.0 - 15.0 % Final    Eosinophil% 08/15/2020 0.3  0.0 - 8.0 % Final    Basophil% 08/15/2020 1.0  0.0 - 1.9 % Final    Differential Method 08/15/2020 Automated   Final    Sodium 08/15/2020 138  136 - 145 mmol/L Final    Potassium 08/15/2020 3.8  3.5 - 5.1 mmol/L Final    Chloride 08/15/2020 100* 101 - 111 mmol/L Final    CO2 08/15/2020 26  23 - 29 mmol/L Final    Glucose 08/15/2020 126* 74 - 118 mg/dL Final    BUN, Bld 08/15/2020 12  8 - 23 mg/dL Final    Creatinine 08/15/2020 1.0  0.5 - 1.4 mg/dL Final    Calcium 08/15/2020 9.8  8.6 - 10.0 mg/dL Final    Total Protein 08/15/2020 8.0  6.0 - 8.4 g/dL Final    Albumin 08/15/2020 4.3  3.5 - 5.2 g/dL Final    Total Bilirubin 08/15/2020 0.6  0.3 - 1.2 mg/dL Final    Comment: For infants and newborns, interpretation of results should be based  on gestational age, weight and in agreement with clinical  observations.  Premature Infant recommended reference ranges:  Up to 24 hours.............<8.0 mg/dL  Up to 48 hours............<12.0 mg/dL  3-5 days..................<15.0 mg/dL  6-29 days.................<15.0  mg/dL      Alkaline Phosphatase 08/15/2020 43  38 - 126 U/L Final    AST 08/15/2020 39  15 - 41 U/L Final    ALT 08/15/2020 34  14 - 54 U/L Final    Anion Gap 08/15/2020 12  8 - 16 mmol/L Final    eGFR if African American 08/15/2020 >60.0  >60 mL/min/1.73 m^2 Final    eGFR if non African American 08/15/2020 54.5* >60 mL/min/1.73 m^2 Final    Comment: Calculation used to obtain the estimated glomerular filtration  rate (eGFR) is the CKD-EPI equation.       BNP 08/15/2020 <10  0 - 99 pg/mL Final    Values of less than 100 pg/ml are consistent with non-CHF populations.    Troponin I 08/15/2020 <0.01  0.01 - 0.05 ng/mL Final    Specimen UA 08/15/2020 Urine, Clean Catch   Final    Color, UA 08/15/2020 Yellow  Yellow, Straw, Ava Final    Appearance, UA 08/15/2020 Clear  Clear Final    pH, UA 08/15/2020 6.0  5.0 - 8.0 Final    Specific Gravity, UA 08/15/2020 <=1.005  1.005 - 1.030 Final    Protein, UA 08/15/2020 Negative  Negative Final    Comment: Recommend a 24 hour urine protein or a urine   protein/creatinine ratio if globulin induced proteinuria is  clinically suspected.      Glucose, UA 08/15/2020 Negative  Negative Final    Ketones, UA 08/15/2020 Negative  Negative Final    Bilirubin (UA) 08/15/2020 Negative  Negative Final    Occult Blood UA 08/15/2020 Negative  Negative Final    Nitrite, UA 08/15/2020 Negative  Negative Final    Urobilinogen, UA 08/15/2020 Negative  Negative EU/dL Final    Leukocytes, UA 08/15/2020 Negative  Negative Final       Imaging  X-ray Chest Ap Portable    Result Date: 8/15/2020  EXAMINATION: XR CHEST AP PORTABLE CLINICAL HISTORY: Weakness; TECHNIQUE: Single frontal view of the chest was performed. COMPARISON: None FINDINGS: Heart is not enlarged.  Calcific atherosclerosis of the arch with slight tortuosity of the thoracic aorta.  Mediastinal structures are otherwise midline.  Trachea is midline and patent.The lungs are symmetrically hyperexpanded with increased  lucency of the upper zones which could reflect sequela of COPD/emphysema.  4 mm nodule projected over the medial right lung base which could represent a vessel on end but remains indeterminate.  No large consolidation, pleural effusion or pneumothorax. Pulmonary vasculature and hilar contours are otherwise within normal limits. Bones are intact.  PA and lateral views can be obtained.     Findings suggesting sequela of underlying COPD/emphysema without radiographic acute intrathoracic process seen.  Correlate with smoking/asthma history. Medial right lung base 4 mm indeterminate nodule.  Further evaluation with elective/nonemergent CT thorax can be obtained, if 2 year stability cannot be confirmed. This report was flagged in Epic as abnormal. Electronically signed by: Jono Seals MD Date:    08/15/2020 Time:    12:32      Assessment  1. Atherosclerotic peripheral vascular disease with intermittent claudication  Stable.  Recent Doppler [place] was unchanged from 2 years ago.    2. Essential hypertension  Controlled    3. Palpitations  Benign ectopy is controlled    4. Hypercholesteremia  Reports good control per Dr. Tang    5. Atherosclerosis of aortic arch  Stable      Plan and Discussion    Continue current guideline directed medical therapy.  No change in her cardiovascular management.    Follow Up  Follow up in about 6 months (around 2/19/2021).      Lavon Michelle MD

## 2020-08-19 NOTE — TELEPHONE ENCOUNTER
----- Message from Curtis Licona sent at 8/19/2020  3:43 PM CDT -----  Regarding: COVID results  Contact: Pt. [phone]  The patient would like to speak to someone regarding her COVID results. They refused to fax them to the office. They said you'll be able to pull them up via Epic. Please contact the patient to discuss further.

## 2020-08-19 NOTE — TELEPHONE ENCOUNTER
Spoke with patient  in regards to Covid-19 Test. I informed patient her results are visible in Epic.  I also informed patient she's clear to come in on Friday for Pulmonary Function Test.  Patient verbalized she understands.   No

## 2020-08-21 ENCOUNTER — HOSPITAL ENCOUNTER (OUTPATIENT)
Dept: PULMONOLOGY | Facility: CLINIC | Age: 80
Discharge: HOME | End: 2020-08-21
Payer: MEDICARE

## 2020-08-21 ENCOUNTER — TELEPHONE (OUTPATIENT)
Dept: PULMONOLOGY | Facility: CLINIC | Age: 80
End: 2020-08-21

## 2020-08-21 ENCOUNTER — HOSPITAL ENCOUNTER (OUTPATIENT)
Dept: RADIOLOGY | Facility: HOSPITAL | Age: 80
Discharge: HOME | End: 2020-08-21
Attending: NURSE PRACTITIONER
Payer: MEDICARE

## 2020-08-21 DIAGNOSIS — J84.10 GRANULOMATOUS LUNG DISEASE: Primary | ICD-10-CM

## 2020-08-21 DIAGNOSIS — R93.89 ABNORMAL CHEST X-RAY: ICD-10-CM

## 2020-08-21 DIAGNOSIS — R91.1 SOLITARY PULMONARY NODULE: ICD-10-CM

## 2020-08-21 DIAGNOSIS — I70.0 AORTIC ATHEROSCLEROSIS: ICD-10-CM

## 2020-08-21 DIAGNOSIS — R63.4 WEIGHT LOSS: ICD-10-CM

## 2020-08-21 LAB
FEF 25 75 LLN: 0.46
FEF 25 75 PRE REF: 103.1 %
FEF 25 75 REF: 1.36
FEV05 LLN: 0.64
FEV05 REF: 1.5
FEV1 FVC LLN: 64
FEV1 FVC PRE REF: 97.2 %
FEV1 FVC REF: 78
FEV1 LLN: 1.07
FEV1 PRE REF: 105.7 %
FEV1 REF: 1.59
FVC LLN: 1.41
FVC PRE REF: 107.8 %
FVC REF: 2.06
PEF LLN: 2
PEF PRE REF: 128.5 %
PEF REF: 3.87
PHYSICIAN COMMENT: NORMAL
PRE FEF 25 75: 1.4 L/S (ref 0.46–2.25)
PRE FET 100: 7.22 SEC
PRE FEV05 REF: 88.9 %
PRE FEV1 FVC: 75.73 % (ref 63.92–91.95)
PRE FEV1: 1.68 L (ref 1.07–2.11)
PRE FEV5: 1.33 L (ref 0.64–2.35)
PRE FVC: 2.22 L (ref 1.41–2.71)
PRE PEF: 4.97 L/S (ref 2–5.74)

## 2020-08-21 PROCEDURE — 71250 CT THORAX DX C-: CPT | Mod: 26,,, | Performed by: RADIOLOGY

## 2020-08-21 PROCEDURE — 94010 BREATHING CAPACITY TEST: CPT | Mod: S$GLB,,, | Performed by: INTERNAL MEDICINE

## 2020-08-21 PROCEDURE — 94010 BREATHING CAPACITY TEST: ICD-10-PCS | Mod: S$GLB,,, | Performed by: INTERNAL MEDICINE

## 2020-08-21 PROCEDURE — 71250 CT THORAX DX C-: CPT | Mod: TC

## 2020-08-21 PROCEDURE — 71250 CT CHEST WITHOUT CONTRAST: ICD-10-PCS | Mod: 26,,, | Performed by: RADIOLOGY

## 2020-08-21 NOTE — TELEPHONE ENCOUNTER
Spoke with patient and discussed thyroid levels normal.  Pulmonary function test was suggestive of normal.  Discussed Ct of chest :  Impression:     0.5 cm calcified nodule in the medial segment of the right middle lobe, which likely accounts for the nodule seen on recent chest radiograph 08/15/2020 reflecting remote granulomatous infection.  No dedicated follow-up required.     There are two subcentimeter thin-walled cysts in the right lower lobe (axial series 4, images 296 and 349), a finding of dubious clinical significance in this never smoker.     Aortic and coronary artery calcific atherosclerosis.    Discussed 0.5 cm calcified nodule is likely granulomatous disease.  No further follow-up is required.  She is a nonsmoker.  No follow-up needed for sub cm thin-walled cysts.  Inform patient to follow-up with PCP for aortic and coronary atherosclerosis.    Can follow up with pulmonary as needed.

## 2020-08-25 ENCOUNTER — TELEPHONE (OUTPATIENT)
Dept: CARDIOLOGY | Facility: CLINIC | Age: 80
End: 2020-08-25

## 2020-08-25 NOTE — TELEPHONE ENCOUNTER
Reached out to patient,C/O of hurting under her left foot. Told her to call her PCP. Patient verbalize understanding.    ----- Message from Zandra Parikh sent at 8/24/2020  4:55 PM CDT -----  Regarding: FW: feeling under left foot    ----- Message -----  From: Mei Wong  Sent: 8/24/2020   4:46 PM CDT  To: [facility] Staff  Subject: feeling under left foot                          Name of Who is Calling: JUDY LANGFORD [[id number]]      What is the request in detail:  patient is requesting a call back concerning a hurting feeling under her left foot she states she spoke with him concerning her right foot but now its her left foot       Can the clinic reply by [facility]: no      What Number to Call Back if not in [facility]: [phone]

## 2020-09-02 ENCOUNTER — TELEPHONE (OUTPATIENT)
Dept: CARDIOLOGY | Facility: CLINIC | Age: 80
End: 2020-09-02

## 2020-09-02 ENCOUNTER — TELEPHONE (OUTPATIENT)
Dept: PRIMARY CARE CLINIC | Facility: CLINIC | Age: 80
End: 2020-09-02

## 2020-09-02 DIAGNOSIS — I10 ESSENTIAL HYPERTENSION: Primary | ICD-10-CM

## 2020-09-02 DIAGNOSIS — R69 DIAGNOSIS DEFERRED: ICD-10-CM

## 2020-09-02 NOTE — TELEPHONE ENCOUNTER
----- Message from Agatha Sewell sent at 9/2/2020  8:32 AM CDT -----  Contact: Patient  Type: Needs Medical Advice    Who Called:  christa Warner  Symptoms (please be specific):  N/A  How long has patient had these symptoms:  N/A  Pharmacy name and phone #:  N/  Best Call Back Number: [phone]  Additional Information: Calling to inquire if we received the medical records from Dr Kennedy. Please call her. Thanks.

## 2020-09-02 NOTE — TELEPHONE ENCOUNTER
----- Message from Martha Nicholas sent at 9/2/2020  1:08 PM CDT -----  Regarding: ekg  New Pt appt on 9/4 and needs order for ekg and pls schedule    Thank you

## 2020-09-02 NOTE — TELEPHONE ENCOUNTER
Spoke with pt, pt wanted to be sure we received records from Dr Tang from Dec. Informed pt records received and in chart.

## 2020-09-03 ENCOUNTER — PATIENT OUTREACH (OUTPATIENT)
Dept: ADMINISTRATIVE | Facility: OTHER | Age: 80
End: 2020-09-03

## 2020-09-04 ENCOUNTER — OFFICE VISIT (OUTPATIENT)
Dept: CARDIOLOGY | Facility: CLINIC | Age: 80
End: 2020-09-04
Payer: MEDICARE

## 2020-09-04 VITALS
BODY MASS INDEX: 20.94 KG/M2 | WEIGHT: 118.19 LBS | SYSTOLIC BLOOD PRESSURE: 152 MMHG | HEART RATE: 92 BPM | HEIGHT: 63 IN | DIASTOLIC BLOOD PRESSURE: 86 MMHG

## 2020-09-04 DIAGNOSIS — R94.31 ABNORMAL ELECTROCARDIOGRAM (ECG) (EKG): ICD-10-CM

## 2020-09-04 DIAGNOSIS — R00.2 PALPITATIONS: ICD-10-CM

## 2020-09-04 DIAGNOSIS — I77.1 CELIAC ARTERY STENOSIS: ICD-10-CM

## 2020-09-04 DIAGNOSIS — I70.0 ATHEROSCLEROSIS OF AORTIC ARCH: Primary | ICD-10-CM

## 2020-09-04 DIAGNOSIS — I70.0 AORTIC ATHEROSCLEROSIS: ICD-10-CM

## 2020-09-04 DIAGNOSIS — I70.219 ATHEROSCLEROTIC PERIPHERAL VASCULAR DISEASE WITH INTERMITTENT CLAUDICATION: ICD-10-CM

## 2020-09-04 DIAGNOSIS — E78.00 HYPERCHOLESTEREMIA: ICD-10-CM

## 2020-09-04 DIAGNOSIS — R63.4 WEIGHT LOSS: ICD-10-CM

## 2020-09-04 DIAGNOSIS — I10 ESSENTIAL HYPERTENSION: ICD-10-CM

## 2020-09-04 PROBLEM — I77.4 CELIAC ARTERY STENOSIS: Status: ACTIVE | Noted: 2020-09-04

## 2020-09-04 PROCEDURE — 3077F SYST BP >= 140 MM HG: CPT | Mod: CPTII,S$GLB,, | Performed by: INTERNAL MEDICINE

## 2020-09-04 PROCEDURE — 99215 OFFICE O/P EST HI 40 MIN: CPT | Mod: S$GLB,,, | Performed by: INTERNAL MEDICINE

## 2020-09-04 PROCEDURE — 3077F PR MOST RECENT SYSTOLIC BLOOD PRESSURE >= 140 MM HG: ICD-10-PCS | Mod: CPTII,S$GLB,, | Performed by: INTERNAL MEDICINE

## 2020-09-04 PROCEDURE — 1126F AMNT PAIN NOTED NONE PRSNT: CPT | Mod: S$GLB,,, | Performed by: INTERNAL MEDICINE

## 2020-09-04 PROCEDURE — 1101F PT FALLS ASSESS-DOCD LE1/YR: CPT | Mod: CPTII,S$GLB,, | Performed by: INTERNAL MEDICINE

## 2020-09-04 PROCEDURE — 99999 PR PBB SHADOW E&M-EST. PATIENT-LVL IV: ICD-10-PCS | Mod: PBBFAC,,, | Performed by: INTERNAL MEDICINE

## 2020-09-04 PROCEDURE — 99999 PR PBB SHADOW E&M-EST. PATIENT-LVL IV: CPT | Mod: PBBFAC,,, | Performed by: INTERNAL MEDICINE

## 2020-09-04 PROCEDURE — 1159F MED LIST DOCD IN RCRD: CPT | Mod: S$GLB,,, | Performed by: INTERNAL MEDICINE

## 2020-09-04 PROCEDURE — 1159F PR MEDICATION LIST DOCUMENTED IN MEDICAL RECORD: ICD-10-PCS | Mod: S$GLB,,, | Performed by: INTERNAL MEDICINE

## 2020-09-04 PROCEDURE — 3079F DIAST BP 80-89 MM HG: CPT | Mod: CPTII,S$GLB,, | Performed by: INTERNAL MEDICINE

## 2020-09-04 PROCEDURE — 1101F PR PT FALLS ASSESS DOC 0-1 FALLS W/OUT INJ PAST YR: ICD-10-PCS | Mod: CPTII,S$GLB,, | Performed by: INTERNAL MEDICINE

## 2020-09-04 PROCEDURE — 3079F PR MOST RECENT DIASTOLIC BLOOD PRESSURE 80-89 MM HG: ICD-10-PCS | Mod: CPTII,S$GLB,, | Performed by: INTERNAL MEDICINE

## 2020-09-04 PROCEDURE — 99215 PR OFFICE/OUTPT VISIT, EST, LEVL V, 40-54 MIN: ICD-10-PCS | Mod: S$GLB,,, | Performed by: INTERNAL MEDICINE

## 2020-09-04 PROCEDURE — 1126F PR PAIN SEVERITY QUANTIFIED, NO PAIN PRESENT: ICD-10-PCS | Mod: S$GLB,,, | Performed by: INTERNAL MEDICINE

## 2020-09-04 NOTE — PATIENT INSTRUCTIONS
Assessment/Plan:  Alana Beth is a 79 y.o. female with a past medical history of PAD, HTN, HLD, palpitations, who presents for an initial appointment.    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  Pertinent findings include 25 pound weight loss and abdominal bruit.  No definite abdominal pain, although mild abdominal tenderness noted on exam.  Chronic mesenteric ischemia is also a possibility in this pt with known PAD.  Given these findings, will refer to Vascular Surgery for evaluation.  Pt has several risk factors for CAD, hence, will check nuclear stress test and echo to evaluate further.    2. PAD- Pt with known BLE PAD.  Continue ASA.  Pt states she's not taking a statin due to issues with liver disease in the past.  Will check outside records before starting statin.  Check updated lipid panel.      Follow up in 2 weeks

## 2020-09-04 NOTE — PROGRESS NOTES
"[facility]      Chief Complaint   Patient presents with    Peripheral Vascular Disease       Patient ID: Alana Beth is a 79 y.o. female with a past medical history of PAD, HTN, HLD, palpitations, who presents for an initial appointment.  Pertinent history/events are as follows:     -Pt presents for evaluation of     HPI:  Mrs. Beth reports  "not feeling well since 11/2019".  Main complaint is weakness, fatigue and lack of appetite.  She has lost 25 pounds since 11/2020.  No definite abdominal pain.  No chest pain, SOB, or LE edema.  Exam with audible abdominal bruit.  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      Past Medical History:   Diagnosis Date    Atherosclerotic peripheral vascular disease     Chronic cystic mastitis     Essential hypertension     Hypercholesterolemia     Osteopenia     Shingles      Past Surgical History:   Procedure Laterality Date    BREAST BIOPSY      biopsies benign     Social History     Socioeconomic History    Marital status: Single     Spouse name: Not on file    Number of children: Not on file    Years of education: Not on file    Highest education level: Not on file   Occupational History    Occupation: retired student loan assistance    Social Needs    Financial resource strain: Not on file    Food insecurity     Worry: Not on file     Inability: Not on file    Transportation needs     Medical: Not on file     Non-medical: Not on file   Tobacco Use    Smoking status: Never Smoker    Smokeless tobacco: Never Used   Substance and Sexual Activity    Alcohol use: No    Drug use: No    Sexual activity: Not Currently   Lifestyle    Physical activity     Days per week: Not on file     Minutes per session: Not on file    Stress: Not at all   Relationships    Social connections     Talks on phone: Not on file     Gets together: Not on file     Attends " [employer] service: Not on file     Active member of club or organization: Not on file     Attends meetings of clubs or organizations: Not on file     Relationship status: Not on file   Other Topics Concern    Not on file   Social History Narrative    Not on file     Family History   Problem Relation Age of Onset    Hypertension Mother     Diabetes Brother     Diabetes Sister        Review of patient's allergies indicates:   Allergen Reactions    Benazepril Other (See Comments)     cough    Chlorthalidone     Iodinated contrast media     Iodine and iodide containing products     Lipitor [atorvastatin]     Sertraline      Medication caused patient to get sick.    Spironolactone        Medication List with Changes/Refills   Current Medications    AMLODIPINE (NORVASC) 5 MG TABLET    Take 5 mg by mouth once daily.    ASCORBIC ACID, VITAMIN C, (VITAMIN C) 1000 MG TABLET    Take 1,000 mg by mouth once daily.    ASPIRIN 81 MG CHEW    Take 81 mg by mouth once daily.    AZELASTINE (ASTELIN) 137 MCG (0.1 %) NASAL SPRAY    USE 1 TO 2 SPRAY(S) IN EACH NOSTRIL TWICE DAILY    CARVEDILOL (COREG) 6.25 MG TABLET    Take 6.25 mg by mouth 2 (two) times daily with meals.    FERROUS SULFATE (FEOSOL) 325 MG (65 MG IRON) TAB TABLET    Take 1 tablet by mouth once daily.    FLUTICASONE PROPIONATE (FLONASE) 50 MCG/ACTUATION NASAL SPRAY    1 spray by Each Nostril route once daily.    MULTIVITAMIN (ONE DAILY MULTIVITAMIN) PER TABLET    Take 1 tablet by mouth once daily.   Discontinued Medications    MONTELUKAST (SINGULAIR) 10 MG TABLET    Take 1 tablet (10 mg total) by mouth every evening.       Review of Systems  Constitution: Denies chills, fever, and sweats.  HENT: Denies headaches or blurry vision.  Cardiovascular: Denies chest pain or irregular heart beat.  Respiratory: Denies cough or shortness of breath.  Gastrointestinal: Positive for diffuse abdominal tenderness and unintentional 25 pound weight loss.  Musculoskeletal:  "Denies muscle cramps.  Neurological: Denies dizziness or focal weakness.  Psychiatric/Behavioral: Normal mental status.  Hematologic/Lymphatic: Denies bleeding problem or easy bruising/bleeding.  Skin: Denies rash or suspicious lesions    Physical Examination  BP (!) 152/86   Pulse 92   Ht 5' 2.5" (1.588 m)   Wt 53.6 kg (118 lb 2.7 oz)   BMI 21.27 kg/m²     Constitutional: No acute distress, conversant  HEENT: Sclera anicteric, Pupils equal, round and reactive to light, extraocular motions intact, Oropharynx clear  Neck: No JVD, no carotid bruits  Cardiovascular: regular rate and rhythm, no murmur, rubs or gallops, normal S1/S2  Pulmonary: Clear to auscultation bilaterally  Abdominal: Abdomen soft with mild diffuse abdominal tenderness, audible abdominal bruit noted, positive bowel sounds  Extremities: No lower extremity edema,   Pulses:  Carotid pulses are 2+ on the right side, and 2+ on the left side.  Radial pulses are 2+ on the right side, and 2+ on the left side.   Femoral pulses are 2+ on the right side, and 2+ on the left side.  Popliteal pulses are 2+ on the right side, and 2+ on the left side.   Dorsalis pedis pulses are 2+ on the right side, and 2+ on the left side.   Posterior tibial pulses are 2+ on the right side, and 2+ on the left side.    Skin: No ecchymosis, erythema, or ulcers  Psych: Alert and oriented x 3, appropriate affect  Neuro: CNII-XII intact, no focal deficits    Labs:  Most Recent Data  CBC:   Lab Results   Component Value Date    WBC 4.50 08/15/2020    HGB 12.9 08/15/2020    HCT 39.5 08/15/2020     08/15/2020    MCV 83 08/15/2020    RDW 15.5 (H) 08/15/2020     BMP:   Lab Results   Component Value Date     08/15/2020    K 3.8 08/15/2020     (L) 08/15/2020    CO2 26 08/15/2020    BUN 12 08/15/2020    CREATININE 1.0 08/15/2020     (H) 08/15/2020    CALCIUM 9.8 08/15/2020    MG 2.1 01/04/2020     LFTS;   Lab Results   Component Value Date    PROT 8.0 08/15/2020 "    ALBUMIN 4.3 08/15/2020    BILITOT 0.6 08/15/2020    AST 39 08/15/2020    ALKPHOS 43 08/15/2020    ALT 34 08/15/2020     COAGS: No results found for: INR, PROTIME, PTT  FLP: No results found for: CHOL, HDL, LDLCALC, TRIG, CHOLHDL  CARDIAC:   Lab Results   Component Value Date    TROPONINI <0.01 08/15/2020    BNP <10 08/15/2020       EKG 8/15/2020:  Normal sinus rhythm  Low voltage QRS  Anteroseptal infarct (cited on or before 15-AUG-2020)    CTA abd/pelvis with contrast (outside study from [facility]) 8/28/2020:  1. No evidence for gastrointestinal bleeding.    2. Scattered colonic diverticula without adjacent inflammatory changes.    3. Myomatous changes of the uterus with degenerating fibroids.     4. Hemangioma in segment VII.    5. Stenosis of the celiac access with poststenotic dilatation.    BLE Arterial Ultrasound 7/31/2020:  Multilevel disease in the left lower extremity. Moderate stenosis in the left superficial femoral artery. Severe stenosis in the distal popliteal artery.    Left lower extremity:  Ankle-brachial index is 0.73. Triphasic signals are seen in the common femoral and profunda arteries. There are biphasic superficial femoral, popliteal, posterior tibial, and anterior tibial arteries. There is a velocity increase from the mid SFA to the distal SFA from 1 /sec 2 m/s. Consistent with a moderate stenosis. There is another focal velocity increase in the distal popliteal from 2.5 m/sec to 5.0 m/s. Consistent with a severe stenosis. Distal to this there is monophasic waveforms.         Right lower extremity:  Ankle-brachial index is 0.93. Triphasic signals are seen in the common femoral and profunda artery. There are biphasic signals in the superficial femoral, popliteal, posterior tibial, and anterior tibial arteries. No focal areas of elevated velocity are seen.      Assessment/Plan:  Alana Beth is a 79 y.o. female with a past medical history of PAD, HTN, HLD, palpitations, who presents for an  initial appointment.    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  Pertinent findings include 25 pound weight loss and abdominal bruit.  No definite abdominal pain, although mild abdominal tenderness noted on exam.  Chronic mesenteric ischemia is also a possibility in this pt with known PAD.  Given these findings, will refer to Vascular Surgery for evaluation.  Pt has several risk factors for CAD, hence, will check nuclear stress test and echo to evaluate further.    2. PAD- Pt with known BLE PAD.  Continue ASA.  Pt states she's not taking a statin due to issues with liver disease in the past.  Will check outside records before starting statin.  Check updated lipid panel.      Follow up in 2 weeks    Total duration of face to face visit time 60 minutes.  Total time spent counseling greater than fifty percent of total visit time.  Counseling included discussion regarding imaging findings, diagnosis, possibilities, treatment options, risks and benefits.  The patient had many questions regarding the options and long-term effects.    Ramon Pink MD, PhD  Interventional Cardiology

## 2020-09-09 ENCOUNTER — TELEPHONE (OUTPATIENT)
Dept: VASCULAR SURGERY | Facility: CLINIC | Age: 80
End: 2020-09-09

## 2020-09-09 DIAGNOSIS — I77.1 CELIAC ARTERY STENOSIS: Primary | ICD-10-CM

## 2020-09-09 NOTE — TELEPHONE ENCOUNTER
----- Message from Winter Luna MA sent at 9/9/2020  8:30 AM CDT -----  Dr Pink placed a referral for Celiac artery stenosis. Please contact the patient to schedule.   Thanks!

## 2020-09-10 ENCOUNTER — HOSPITAL ENCOUNTER (OUTPATIENT)
Dept: VASCULAR SURGERY | Facility: CLINIC | Age: 80
Discharge: HOME | End: 2020-09-10
Attending: SURGERY
Payer: MEDICARE

## 2020-09-10 ENCOUNTER — INITIAL CONSULT (OUTPATIENT)
Dept: VASCULAR SURGERY | Facility: CLINIC | Age: 80
End: 2020-09-10
Attending: SURGERY
Payer: MEDICARE

## 2020-09-10 ENCOUNTER — HOSPITAL ENCOUNTER (OUTPATIENT)
Dept: CARDIOLOGY | Facility: HOSPITAL | Age: 80
Discharge: HOME | End: 2020-09-10
Attending: INTERNAL MEDICINE
Payer: MEDICARE

## 2020-09-10 VITALS
SYSTOLIC BLOOD PRESSURE: 150 MMHG | BODY MASS INDEX: 20.91 KG/M2 | DIASTOLIC BLOOD PRESSURE: 65 MMHG | DIASTOLIC BLOOD PRESSURE: 80 MMHG | SYSTOLIC BLOOD PRESSURE: 164 MMHG | HEIGHT: 63 IN | BODY MASS INDEX: 21.51 KG/M2 | TEMPERATURE: 98 F | WEIGHT: 118 LBS | WEIGHT: 116.88 LBS | HEIGHT: 62 IN | HEART RATE: 80 BPM | HEART RATE: 80 BPM | RESPIRATION RATE: 18 BRPM

## 2020-09-10 DIAGNOSIS — I70.219 ATHEROSCLEROTIC PERIPHERAL VASCULAR DISEASE WITH INTERMITTENT CLAUDICATION: ICD-10-CM

## 2020-09-10 DIAGNOSIS — I77.1 CELIAC ARTERY STENOSIS: ICD-10-CM

## 2020-09-10 LAB
ASCENDING AORTA: 2.94 CM
AV INDEX (PROSTH): 0.74
AV MEAN GRADIENT: 3 MMHG
AV PEAK GRADIENT: 5 MMHG
AV VALVE AREA: 2.4 CM2
AV VELOCITY RATIO: 0.77
BSA FOR ECHO PROCEDURE: 1.54 M2
CV ECHO LV RWT: 0.54 CM
DOP CALC AO PEAK VEL: 1.12 M/S
DOP CALC AO VTI: 26.4 CM
DOP CALC LVOT AREA: 3.3 CM2
DOP CALC LVOT DIAMETER: 2.04 CM
DOP CALC LVOT PEAK VEL: 0.86 M/S
DOP CALC LVOT STROKE VOLUME: 63.44 CM3
DOP CALCLVOT PEAK VEL VTI: 19.42 CM
E WAVE DECELERATION TIME: 161.05 MSEC
E/A RATIO: 0.96
E/E' RATIO: 8 M/S
ECHO LV POSTERIOR WALL: 1 CM (ref 0.6–1.1)
FRACTIONAL SHORTENING: 41 % (ref 28–44)
INTERVENTRICULAR SEPTUM: 1.1 CM (ref 0.6–1.1)
IVRT: 91.34 MSEC
LA MAJOR: 4.8 CM
LA MINOR: 4.8 CM
LA WIDTH: 3.8 CM
LEFT ATRIUM SIZE: 3.5 CM
LEFT ATRIUM VOLUME INDEX: 35.1 ML/M2
LEFT ATRIUM VOLUME: 54.26 CM3
LEFT INTERNAL DIMENSION IN SYSTOLE: 2.19 CM (ref 2.1–4)
LEFT VENTRICLE DIASTOLIC VOLUME INDEX: 27.24 ML/M2
LEFT VENTRICLE DIASTOLIC VOLUME: 42.1 ML
LEFT VENTRICLE MASS INDEX: 78 G/M2
LEFT VENTRICLE SYSTOLIC VOLUME INDEX: 10.4 ML/M2
LEFT VENTRICLE SYSTOLIC VOLUME: 16.03 ML
LEFT VENTRICULAR INTERNAL DIMENSION IN DIASTOLE: 3.7 CM (ref 3.5–6)
LEFT VENTRICULAR MASS: 120.79 G
LV LATERAL E/E' RATIO: 7.2 M/S
LV SEPTAL E/E' RATIO: 9 M/S
MV PEAK A VEL: 0.75 M/S
MV PEAK E VEL: 0.72 M/S
MV STENOSIS PRESSURE HALF TIME: 46.7 MS
MV VALVE AREA P 1/2 METHOD: 4.71 CM2
PISA TR MAX VEL: 2.62 M/S
PULM VEIN S/D RATIO: 1.54
PV PEAK D VEL: 0.37 M/S
PV PEAK S VEL: 0.57 M/S
RA MAJOR: 4.3 CM
RA PRESSURE: 3 MMHG
RA WIDTH: 2.94 CM
RIGHT VENTRICULAR END-DIASTOLIC DIMENSION: 3.49 CM
RV TISSUE DOPPLER FREE WALL SYSTOLIC VELOCITY 1 (APICAL 4 CHAMBER VIEW): 13.98 CM/S
SINUS: 3.07 CM
STJ: 2.61 CM
TDI LATERAL: 0.1 M/S
TDI SEPTAL: 0.08 M/S
TDI: 0.09 M/S
TR MAX PG: 27 MMHG
TRICUSPID ANNULAR PLANE SYSTOLIC EXCURSION: 2.27 CM
TV REST PULMONARY ARTERY PRESSURE: 30 MMHG

## 2020-09-10 PROCEDURE — 93975 PR DUPLEX ABD/PEL VASC STUDY,COMPLETE: ICD-10-PCS | Mod: S$GLB,,, | Performed by: SURGERY

## 2020-09-10 PROCEDURE — 93975 VASCULAR STUDY: CPT | Mod: S$GLB,,, | Performed by: SURGERY

## 2020-09-10 PROCEDURE — 93306 TTE W/DOPPLER COMPLETE: CPT

## 2020-09-10 PROCEDURE — 99204 PR OFFICE/OUTPT VISIT, NEW, LEVL IV, 45-59 MIN: ICD-10-PCS | Mod: S$GLB,,, | Performed by: SURGERY

## 2020-09-10 PROCEDURE — 99204 OFFICE O/P NEW MOD 45 MIN: CPT | Mod: S$GLB,,, | Performed by: SURGERY

## 2020-09-10 PROCEDURE — 1159F MED LIST DOCD IN RCRD: CPT | Mod: S$GLB,,, | Performed by: SURGERY

## 2020-09-10 PROCEDURE — 93306 ECHO (CUPID ONLY): ICD-10-PCS | Mod: 26,,, | Performed by: INTERNAL MEDICINE

## 2020-09-10 PROCEDURE — 3077F SYST BP >= 140 MM HG: CPT | Mod: CPTII,S$GLB,, | Performed by: SURGERY

## 2020-09-10 PROCEDURE — 3078F PR MOST RECENT DIASTOLIC BLOOD PRESSURE < 80 MM HG: ICD-10-PCS | Mod: CPTII,S$GLB,, | Performed by: SURGERY

## 2020-09-10 PROCEDURE — 3077F PR MOST RECENT SYSTOLIC BLOOD PRESSURE >= 140 MM HG: ICD-10-PCS | Mod: CPTII,S$GLB,, | Performed by: SURGERY

## 2020-09-10 PROCEDURE — 99999 PR PBB SHADOW E&M-EST. PATIENT-LVL IV: CPT | Mod: PBBFAC,,, | Performed by: SURGERY

## 2020-09-10 PROCEDURE — 93306 TTE W/DOPPLER COMPLETE: CPT | Mod: 26,,, | Performed by: INTERNAL MEDICINE

## 2020-09-10 PROCEDURE — 99999 PR PBB SHADOW E&M-EST. PATIENT-LVL IV: ICD-10-PCS | Mod: PBBFAC,,, | Performed by: SURGERY

## 2020-09-10 PROCEDURE — 3078F DIAST BP <80 MM HG: CPT | Mod: CPTII,S$GLB,, | Performed by: SURGERY

## 2020-09-10 PROCEDURE — 1159F PR MEDICATION LIST DOCUMENTED IN MEDICAL RECORD: ICD-10-PCS | Mod: S$GLB,,, | Performed by: SURGERY

## 2020-09-10 RX ORDER — CETIRIZINE HYDROCHLORIDE 10 MG/1
10 TABLET ORAL DAILY PRN
COMMUNITY
End: 2020-10-22

## 2020-09-10 NOTE — LETTER
September 16, 2020      Ramon Pink MD PhD  [address]  [address]  [facility] [address]           [facility] 5th Fl  [address]  [facility] [address]  Phone: [phone]  Fax: [phone]          Patient: Alana Beth   MR Number: [id number]   YOB: 1940   Date of Visit: 9/10/2020       Dear Dr. Ramon Pink:    Thank you for referring Alana Beth to me for evaluation. Attached you will find relevant portions of my assessment and plan of care.    If you have questions, please do not hesitate to call me. I look forward to following Alana Beth along with you.    Sincerely,    Adair Osuna MD    Enclosure  CC:  No Recipients    If you would like to receive this communication electronically, please contact [email or web address] or [phone] to request more information on [employer] Link access.    For providers and/or their staff who would like to refer a patient to [facility], please contact us through our one-stop-shop provider referral line, [facility], at [phone].    If you feel you have received this communication in error or would no longer like to receive these types of communications, please e-mail [email or web address]

## 2020-09-10 NOTE — PROGRESS NOTES
Alana Beth  09/10/2020    HPI:  Patient is a 79 y.o. female who is relatively healthy is here for evaluation of celiac artery stenosis (seen on recent CT, do not have access to imaging).  She reports a 25 pound weight loss hx since 11/2020 that was unintentional.  She states that she feels like she eats somewhat less than normal, and after eating will have some discomfort and sometimes palpitations.  She does not report overt pain after eating.  She also reports sometimes having palpitations and being nervous after eating.  Says sometimes she just doesn't feel very hungry.    Never MI/stroke  Tobacco use: Never    Past Medical History:   Diagnosis Date    Atherosclerotic peripheral vascular disease     Chronic cystic mastitis     Essential hypertension     Hypercholesterolemia     Osteopenia     Shingles      Past Surgical History:   Procedure Laterality Date    BREAST BIOPSY      biopsies benign     Family History   Problem Relation Age of Onset    Hypertension Mother     Diabetes Brother     Diabetes Sister      Social History     Socioeconomic History    Marital status: Single     Spouse name: Not on file    Number of children: Not on file    Years of education: Not on file    Highest education level: Not on file   Occupational History    Occupation: retired student loan assistance    Social Needs    Financial resource strain: Not on file    Food insecurity     Worry: Not on file     Inability: Not on file    Transportation needs     Medical: Not on file     Non-medical: Not on file   Tobacco Use    Smoking status: Never Smoker    Smokeless tobacco: Never Used   Substance and Sexual Activity    Alcohol use: No    Drug use: No    Sexual activity: Not Currently   Lifestyle    Physical activity     Days per week: Not on file     Minutes per session: Not on file    Stress: Not at all   Relationships    Social connections     Talks on phone: Not on file     Gets together: Not on file      Attends [employer] service: Not on file     Active member of club or organization: Not on file     Attends meetings of clubs or organizations: Not on file     Relationship status: Not on file   Other Topics Concern    Not on file   Social History Narrative    Not on file       Current Outpatient Medications:     amLODIPine (NORVASC) 5 MG tablet, Take 5 mg by mouth once daily., Disp: , Rfl:     ascorbic acid, vitamin C, (VITAMIN C) 1000 MG tablet, Take 1,000 mg by mouth once daily., Disp: , Rfl:     aspirin 81 MG Chew, Take 81 mg by mouth once daily., Disp: , Rfl:     azelastine (ASTELIN) 137 mcg (0.1 %) nasal spray, USE 1 TO 2 SPRAY(S) IN EACH NOSTRIL TWICE DAILY, Disp: , Rfl:     carvedilol (COREG) 6.25 MG tablet, Take 6.25 mg by mouth 2 (two) times daily with meals., Disp: , Rfl:     cetirizine (ZYRTEC) 10 MG tablet, Take 10 mg by mouth once daily., Disp: , Rfl:     ferrous sulfate (FEOSOL) 325 mg (65 mg iron) Tab tablet, Take 1 tablet by mouth once daily., Disp: , Rfl:     fluticasone propionate (FLONASE) 50 mcg/actuation nasal spray, 1 spray by Each Nostril route once daily., Disp: , Rfl:     multivitamin (ONE DAILY MULTIVITAMIN) per tablet, Take 1 tablet by mouth once daily., Disp: , Rfl:     REVIEW OF SYSTEMS:  General: negative; ENT: negative; Allergy and Immunology: negative; Hematological and Lymphatic: Negative; Endocrine: negative; Respiratory: no cough, shortness of breath, or wheezing; Cardiovascular: no chest pain or dyspnea on exertion; Gastrointestinal: no abdominal pain/back, change in bowel habits, or bloody stools; Genito-Urinary: no dysuria, trouble voiding, or hematuria; Musculoskeletal: negative  Neurological: no TIA or stroke symptoms; Psychiatric: no nervousness, anxiety or depression.    PHYSICAL EXAM:      Pulse: 80  Temp: 98.1 °F (36.7 °C)      General appearance:  Alert, well-appearing, and in no distress.  Oriented to person, place, and time   Neurological: Normal speech,  no focal findings noted; CN II - XII grossly intact           Musculoskeletal: Digits/nail without cyanosis/clubbing.  Normal muscle strength/tone.                 Neck: Supple, no significant adenopathy; thyroid is not enlarged                  No carotid bruit can be auscultated                Chest:  Clear to auscultation, no wheezes, rales or rhonchi, symmetric air entry     No use of accessory muscles             Cardiac: Normal rate and regular rhythm, S1 and S2 normal; PMI non-displaced          Abdomen: Soft, nontender, nondistended, no masses or organomegaly     No rebound tenderness noted; bowel sounds normal     Pulsatile aortic mass is  Palpable, however she is a very skinny woman     No groin adenopathy      Extremities:   2+ femoral pulses bilaterally     No pedal pulses palpable.     No pre-tibial edema     No ulcerations    LAB RESULTS:  Lab Results   Component Value Date    K 3.8 08/15/2020    K 3.2 (L) 01/07/2020    K 3.5 01/04/2020    CREATININE 1.0 08/15/2020    CREATININE 0.9 01/07/2020    CREATININE 0.9 01/04/2020     Lab Results   Component Value Date    WBC 4.50 08/15/2020    WBC 4.40 01/07/2020    WBC 3.70 (L) 01/04/2020    HCT 39.5 08/15/2020    HCT 37.9 01/07/2020    HCT 37.3 01/04/2020     08/15/2020     01/07/2020     01/04/2020     No results found for: HGBA1C  IMAGING:  CT: Unable to view  U/S 9/10: Moderate stenosis of celiac and SMA  Mesenteric Arteries   ===============     Celiac axis prox PSV   378 cm/s   Celiac axis prox EDV   44 cm/s   Celiac axis prox RI    0.88   Ratio (Celiac axis prox PSV / Aorta abd. PSV)  6.2   SMA prox PSV   232 cm/s   SMA prox EDV   27 cm/s   SMA prox RI    0.88   Ratio (SMA prox PSV / Aorta abd. PSV)  3.8   SMA mid PSV    203 cm/s   SMA mid EDV    15 cm/s   SMA mid RI 0.93   Ratio (SMA mid PSV / Aorta abd. PSV)   3.3   SMA distal  cm/s   SMA distal EDV 12 cm/s   SMA distal RI  0.93   Ratio (SMA distal PSV / Aorta abd. PSV)     2.6   CEDRICK prox PSV   152 cm/s   CEDRICK prox EDV   7 cm/s   CEDRICK prox RI    0.95   Ratio (CEDRICK prox PSV / Aorta abd. PSV)  2.5     Impression   =========     Color flow duplex exam reveals a patent abdominal aorta, celiac artery, superior mesenteric artery and inferior mesenteric artery. A   velocity elevation of 378 cm/s is visualized near the Celiac artery origin within a region of focal narrowing, suggestive of a greater than   70% stenosis.     IMP/PLAN:  79 y.o. 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  Her affect is distant and somewhat strange.  She seems depressed although she denies feelings of sadness or anxiety.  She attests to normal patterns of hunger and po intake and denies post prandial pain.  She has lost a small amount of weight over a period of time that is difficult to quantify based on her responses. Each time I tried to leave the after our visit, she would attest to abdominal pain that was, again, hard to qualify or quantify. Will obtain her outside CT for review.     - Elevated velocities of celiac > SMA  - Will obtain CT from [facility]  - Recommend psych and continued GI workup    Adair Osuna MD  Vascular & Endovascular Surgery

## 2020-09-21 ENCOUNTER — TELEPHONE (OUTPATIENT)
Dept: CARDIOLOGY | Facility: CLINIC | Age: 80
End: 2020-09-21

## 2020-09-23 ENCOUNTER — HOSPITAL ENCOUNTER (OUTPATIENT)
Dept: CARDIOLOGY | Facility: HOSPITAL | Age: 80
Discharge: HOME | End: 2020-09-23
Attending: INTERNAL MEDICINE
Payer: MEDICARE

## 2020-09-23 DIAGNOSIS — I70.219 ATHEROSCLEROTIC PERIPHERAL VASCULAR DISEASE WITH INTERMITTENT CLAUDICATION: ICD-10-CM

## 2020-09-23 DIAGNOSIS — R94.31 ABNORMAL ELECTROCARDIOGRAM (ECG) (EKG): ICD-10-CM

## 2020-09-23 LAB
CV STRESS BASE HR: 80 BPM
DIASTOLIC BLOOD PRESSURE: 78 MMHG
END DIASTOLIC INDEX-HIGH: 170 ML/M2
END SYSTOLIC INDEX-HIGH: 70 ML/M2
OHS CV CPX 1 MINUTE RECOVERY HEART RATE: 108 BPM
OHS CV CPX 85 PERCENT MAX PREDICTED HEART RATE MALE: 117
OHS CV CPX ESTIMATED METS: 8
OHS CV CPX MAX PREDICTED HEART RATE: 137
OHS CV CPX PATIENT IS FEMALE: 1
OHS CV CPX PATIENT IS MALE: 0
OHS CV CPX PEAK DIASTOLIC BLOOD PRESSURE: 83 MMHG
OHS CV CPX PEAK HEAR RATE: 127 BPM
OHS CV CPX PEAK RATE PRESSURE PRODUCT: NORMAL
OHS CV CPX PEAK SYSTOLIC BLOOD PRESSURE: 209 MMHG
OHS CV CPX PERCENT MAX PREDICTED HEART RATE ACHIEVED: 92
OHS CV CPX RATE PRESSURE PRODUCT PRESENTING: NORMAL
RETIRED EF AND QEF - SEE NOTES: 51 %
STRESS ECHO POST EXERCISE DUR MIN: 5 MINUTES
STRESS ECHO POST EXERCISE DUR SEC: 25 SECONDS
STRESS ST DEPRESSION: 0.5 MM
SYSTOLIC BLOOD PRESSURE: 155 MMHG

## 2020-09-23 PROCEDURE — 78452 HT MUSCLE IMAGE SPECT MULT: CPT | Mod: 26,,, | Performed by: INTERNAL MEDICINE

## 2020-09-23 PROCEDURE — A9502 TC99M TETROFOSMIN: HCPCS

## 2020-09-23 PROCEDURE — 93018 STRESS TEST WITH MYOCARDIAL PERFUSION (CUPID ONLY): ICD-10-PCS | Mod: ,,, | Performed by: INTERNAL MEDICINE

## 2020-09-23 PROCEDURE — 78452 STRESS TEST WITH MYOCARDIAL PERFUSION (CUPID ONLY): ICD-10-PCS | Mod: 26,,, | Performed by: INTERNAL MEDICINE

## 2020-09-23 PROCEDURE — 93018 CV STRESS TEST I&R ONLY: CPT | Mod: ,,, | Performed by: INTERNAL MEDICINE

## 2020-09-23 PROCEDURE — 93016 STRESS TEST WITH MYOCARDIAL PERFUSION (CUPID ONLY): ICD-10-PCS | Mod: ,,, | Performed by: INTERNAL MEDICINE

## 2020-09-23 PROCEDURE — 93016 CV STRESS TEST SUPVJ ONLY: CPT | Mod: ,,, | Performed by: INTERNAL MEDICINE

## 2020-09-23 PROCEDURE — 93017 CV STRESS TEST TRACING ONLY: CPT

## 2020-09-25 ENCOUNTER — TELEPHONE (OUTPATIENT)
Dept: CARDIOLOGY | Facility: CLINIC | Age: 80
End: 2020-09-25

## 2020-09-25 ENCOUNTER — OFFICE VISIT (OUTPATIENT)
Dept: CARDIOLOGY | Facility: CLINIC | Age: 80
End: 2020-09-25
Payer: MEDICARE

## 2020-09-25 VITALS
HEART RATE: 77 BPM | WEIGHT: 118.19 LBS | BODY MASS INDEX: 21.75 KG/M2 | SYSTOLIC BLOOD PRESSURE: 143 MMHG | HEIGHT: 62 IN | DIASTOLIC BLOOD PRESSURE: 68 MMHG

## 2020-09-25 DIAGNOSIS — F41.9 ANXIETY: ICD-10-CM

## 2020-09-25 DIAGNOSIS — I73.9 PAD (PERIPHERAL ARTERY DISEASE): Primary | ICD-10-CM

## 2020-09-25 DIAGNOSIS — J84.10 GRANULOMATOUS LUNG DISEASE: ICD-10-CM

## 2020-09-25 DIAGNOSIS — R63.4 WEIGHT LOSS: ICD-10-CM

## 2020-09-25 DIAGNOSIS — R91.1 SOLITARY PULMONARY NODULE: ICD-10-CM

## 2020-09-25 DIAGNOSIS — I70.0 ATHEROSCLEROSIS OF AORTIC ARCH: ICD-10-CM

## 2020-09-25 DIAGNOSIS — I70.0 AORTIC ATHEROSCLEROSIS: ICD-10-CM

## 2020-09-25 DIAGNOSIS — I77.1 CELIAC ARTERY STENOSIS: ICD-10-CM

## 2020-09-25 DIAGNOSIS — R09.89 OTHER SPECIFIED SYMPTOMS AND SIGNS INVOLVING THE CIRCULATORY AND RESPIRATORY SYSTEMS: ICD-10-CM

## 2020-09-25 DIAGNOSIS — R10.9 ABDOMINAL DISCOMFORT: ICD-10-CM

## 2020-09-25 DIAGNOSIS — E78.00 HYPERCHOLESTEREMIA: ICD-10-CM

## 2020-09-25 DIAGNOSIS — I10 ESSENTIAL HYPERTENSION: ICD-10-CM

## 2020-09-25 PROCEDURE — 1126F PR PAIN SEVERITY QUANTIFIED, NO PAIN PRESENT: ICD-10-PCS | Mod: S$GLB,,, | Performed by: INTERNAL MEDICINE

## 2020-09-25 PROCEDURE — 1126F AMNT PAIN NOTED NONE PRSNT: CPT | Mod: S$GLB,,, | Performed by: INTERNAL MEDICINE

## 2020-09-25 PROCEDURE — 99215 PR OFFICE/OUTPT VISIT, EST, LEVL V, 40-54 MIN: ICD-10-PCS | Mod: S$GLB,,, | Performed by: INTERNAL MEDICINE

## 2020-09-25 PROCEDURE — 1101F PT FALLS ASSESS-DOCD LE1/YR: CPT | Mod: CPTII,S$GLB,, | Performed by: INTERNAL MEDICINE

## 2020-09-25 PROCEDURE — 99215 OFFICE O/P EST HI 40 MIN: CPT | Mod: S$GLB,,, | Performed by: INTERNAL MEDICINE

## 2020-09-25 PROCEDURE — 3078F PR MOST RECENT DIASTOLIC BLOOD PRESSURE < 80 MM HG: ICD-10-PCS | Mod: CPTII,S$GLB,, | Performed by: INTERNAL MEDICINE

## 2020-09-25 PROCEDURE — 3077F PR MOST RECENT SYSTOLIC BLOOD PRESSURE >= 140 MM HG: ICD-10-PCS | Mod: CPTII,S$GLB,, | Performed by: INTERNAL MEDICINE

## 2020-09-25 PROCEDURE — 1159F MED LIST DOCD IN RCRD: CPT | Mod: S$GLB,,, | Performed by: INTERNAL MEDICINE

## 2020-09-25 PROCEDURE — 99999 PR PBB SHADOW E&M-EST. PATIENT-LVL IV: ICD-10-PCS | Mod: PBBFAC,,, | Performed by: INTERNAL MEDICINE

## 2020-09-25 PROCEDURE — 1101F PR PT FALLS ASSESS DOC 0-1 FALLS W/OUT INJ PAST YR: ICD-10-PCS | Mod: CPTII,S$GLB,, | Performed by: INTERNAL MEDICINE

## 2020-09-25 PROCEDURE — 1159F PR MEDICATION LIST DOCUMENTED IN MEDICAL RECORD: ICD-10-PCS | Mod: S$GLB,,, | Performed by: INTERNAL MEDICINE

## 2020-09-25 PROCEDURE — 3078F DIAST BP <80 MM HG: CPT | Mod: CPTII,S$GLB,, | Performed by: INTERNAL MEDICINE

## 2020-09-25 PROCEDURE — 3077F SYST BP >= 140 MM HG: CPT | Mod: CPTII,S$GLB,, | Performed by: INTERNAL MEDICINE

## 2020-09-25 PROCEDURE — 99999 PR PBB SHADOW E&M-EST. PATIENT-LVL IV: CPT | Mod: PBBFAC,,, | Performed by: INTERNAL MEDICINE

## 2020-09-25 RX ORDER — ROSUVASTATIN CALCIUM 20 MG/1
40 TABLET, COATED ORAL DAILY
Qty: 180 TABLET | Refills: 3 | Status: SHIPPED | OUTPATIENT
Start: 2020-09-25 | End: 2020-09-29 | Stop reason: ALTCHOICE

## 2020-09-25 RX ORDER — AMLODIPINE BESYLATE 10 MG/1
10 TABLET ORAL DAILY
COMMUNITY
Start: 2020-09-07 | End: 2021-05-28 | Stop reason: SDUPTHER

## 2020-09-25 NOTE — TELEPHONE ENCOUNTER
I reached out to patient today.    Mrs. Beth,   has referred you to ,    Please call us back so that we can schedule you an appointment to see Him.          NW                            ----- Message from Winter Luna MA sent at 9/25/2020 11:28 AM CDT -----  Dr Joesph Caraballo placed a referral for this patient to see Dr Soto. [facility] I tried to schedule her myself but couldn't  Thanks!

## 2020-09-25 NOTE — TELEPHONE ENCOUNTER
Talked with Mrs. Beth in detail.  All questions answered.     ----- Message from Winter Luna MA sent at 9/25/2020 12:22 PM CDT -----  Regarding: FW: left leg soreness    ----- Message -----  From: Martha Nicholas  Sent: 9/25/2020  11:59 AM CDT  To: Joesph WILLIAM Staff  Subject: left leg soreness                                Pt wants to let Dr. Pink know that when she got home from her appt she noticed left leg soreness and can be reached at [phone].    Thank you

## 2020-09-25 NOTE — PROGRESS NOTES
"[facility]      Chief Complaint   Patient presents with    Atherosclerosis of aortic arch       Patient ID: Alana Beth is a 79 y.o. female with a past medical history of PAD, HTN, HLD, palpitations, who presents for a follow up appointment.  Pertinent history/events are as follows:     -Pt presents for evaluation of PAD/weight loss.    -At our initial clinic visit on 9/4/2020, Mrs. Beth reported  "not feeling well since 11/2019".  Main complaint is weakness, fatigue and lack of appetite.  She has lost 25 pounds since 11/2019.  No definite abdominal pain.  No chest pain, SOB, or LE edema.  Exam with audible abdominal bruit.  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    Plan:   Celiac Axis Stenosis- Mrs. 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  Pertinent findings include 25 pound weight loss and abdominal bruit.  No definite abdominal pain, although mild abdominal tenderness noted on exam.  Chronic mesenteric ischemia is also a possibility in this pt with known PAD.  Given these findings, will refer to Vascular Surgery for evaluation.  Pt has several risk factors for CAD, hence, will check nuclear stress test and echo to evaluate further.  PAD- Pt with known BLE PAD.  Continue ASA.  Pt states she's not taking a statin due to issues with liver disease in the past.  Will check outside records before starting statin.  Check updated lipid panel.      HPI:  Mrs. Beth reports no new symptoms.  States she still does not feel well.  Pt evaluated by Vascular Surgery (Enrrique Preciado) on Recommend psych and continued GI workup  Mesenteric Utrasound on 9/10/2020 revealed a velocity elevation of 378 cm/s is visualized near the Celiac artery origin within a region of focal narrowing, suggestive of a greater than 70% stenosis.  Nuclear Stress Test on 9/23/2020 revealed no " evidence from myocardial ischemia or injury.  The EKG portion of this study is abnormal but not diagnostic.  Echo on 9/10/2020 revealed normal left ventricular systolic function with EF of 60%; concentric left ventricular remodeling; normal LV diastolic function; normal right ventricular systolic function; mild left atrial enlargement; mild tricuspid regurgitation; estimated PA systolic pressure is 30 mmHg; normal central venous pressure (3 mmHg).  Labs from 9/10/2020 show total cholesterol of 348 with LDL of 234.     Past Medical History:   Diagnosis Date    Atherosclerotic peripheral vascular disease     Chronic cystic mastitis     Essential hypertension     Hypercholesterolemia     Osteopenia     Shingles      Past Surgical History:   Procedure Laterality Date    BREAST BIOPSY      biopsies benign     Social History     Socioeconomic History    Marital status: Single     Spouse name: Not on file    Number of children: Not on file    Years of education: Not on file    Highest education level: Not on file   Occupational History    Occupation: retired student loan assistance    Social Needs    Financial resource strain: Not on file    Food insecurity     Worry: Not on file     Inability: Not on file    Transportation needs     Medical: Not on file     Non-medical: Not on file   Tobacco Use    Smoking status: Never Smoker    Smokeless tobacco: Never Used   Substance and Sexual Activity    Alcohol use: No    Drug use: No    Sexual activity: Not Currently   Lifestyle    Physical activity     Days per week: Not on file     Minutes per session: Not on file    Stress: Not at all   Relationships    Social connections     Talks on phone: Not on file     Gets together: Not on file     Attends [employer] service: Not on file     Active member of club or organization: Not on file     Attends meetings of clubs or organizations: Not on file     Relationship status: Not on file   Other Topics Concern    Not  on file   Social History Narrative    Not on file     Family History   Problem Relation Age of Onset    Hypertension Mother     Diabetes Brother     Diabetes Sister        Review of patient's allergies indicates:   Allergen Reactions    Benazepril Other (See Comments)     cough    Chlorthalidone     Iodinated contrast media     Iodine and iodide containing products     Lipitor [atorvastatin]      States she is allergic to all statin medications    Sertraline      Medication caused patient to get sick.    Spironolactone        Medication List with Changes/Refills   Current Medications    AMLODIPINE (NORVASC) 10 MG TABLET    Take 10 mg by mouth once daily.    ASCORBIC ACID, VITAMIN C, (VITAMIN C) 1000 MG TABLET    Take 1,000 mg by mouth once daily.    ASPIRIN 81 MG CHEW    Take 81 mg by mouth once daily.    AZELASTINE (ASTELIN) 137 MCG (0.1 %) NASAL SPRAY    USE 1 TO 2 SPRAY(S) IN EACH NOSTRIL TWICE DAILY    CARVEDILOL (COREG) 6.25 MG TABLET    Take 6.25 mg by mouth 2 (two) times daily with meals.    CETIRIZINE (ZYRTEC) 10 MG TABLET    Take 10 mg by mouth once daily.    FERROUS SULFATE (FEOSOL) 325 MG (65 MG IRON) TAB TABLET    Take 1 tablet by mouth once daily.    FLUTICASONE PROPIONATE (FLONASE) 50 MCG/ACTUATION NASAL SPRAY    1 spray by Each Nostril route once daily.    MULTIVITAMIN (ONE DAILY MULTIVITAMIN) PER TABLET    Take 1 tablet by mouth once daily.   Discontinued Medications    AMLODIPINE (NORVASC) 5 MG TABLET    Take 5 mg by mouth once daily.       Review of Systems  Constitution: Denies chills, fever, and sweats.  HENT: Denies headaches or blurry vision.  Cardiovascular: Denies chest pain or irregular heart beat.  Respiratory: Denies cough or shortness of breath.  Gastrointestinal: Positive for diffuse abdominal tenderness and unintentional 25 pound weight loss.  Musculoskeletal: Denies muscle cramps.  Neurological: Denies dizziness or focal weakness.  Psychiatric/Behavioral: Normal mental  "status.  Hematologic/Lymphatic: Denies bleeding problem or easy bruising/bleeding.  Skin: Denies rash or suspicious lesions    Physical Examination  BP (!) 143/68   Pulse 77   Ht 5' 2" (1.575 m)   Wt 53.6 kg (118 lb 2.7 oz)   BMI 21.61 kg/m²     Constitutional: No acute distress, conversant  HEENT: Sclera anicteric, Pupils equal, round and reactive to light, extraocular motions intact, Oropharynx clear  Neck: No JVD, no carotid bruits  Cardiovascular: regular rate and rhythm, no murmur, rubs or gallops, normal S1/S2  Pulmonary: Clear to auscultation bilaterally  Abdominal: Abdomen soft with mild diffuse abdominal tenderness, audible abdominal bruit noted, positive bowel sounds  Extremities: No lower extremity edema,   Pulses:  Carotid pulses are 2+ on the right side, and 2+ on the left side.  Radial pulses are 2+ on the right side, and 2+ on the left side.   Femoral pulses are 2+ on the right side, and 2+ on the left side.  Popliteal pulses are 2+ on the right side, and 2+ on the left side.   Dorsalis pedis pulses are 2+ on the right side, and 2+ on the left side.   Posterior tibial pulses are 2+ on the right side, and 2+ on the left side.    Skin: No ecchymosis, erythema, or ulcers  Psych: Alert and oriented x 3, appropriate affect  Neuro: CNII-XII intact, no focal deficits    Labs:  Most Recent Data  CBC:   Lab Results   Component Value Date    WBC 4.50 08/15/2020    HGB 12.9 08/15/2020    HCT 39.5 08/15/2020     08/15/2020    MCV 83 08/15/2020    RDW 15.5 (H) 08/15/2020     BMP:   Lab Results   Component Value Date     08/15/2020    K 3.8 08/15/2020     (L) 08/15/2020    CO2 26 08/15/2020    BUN 12 08/15/2020    CREATININE 1.0 08/15/2020     (H) 08/15/2020    CALCIUM 9.8 08/15/2020    MG 2.1 01/04/2020     LFTS;   Lab Results   Component Value Date    PROT 8.0 08/15/2020    ALBUMIN 4.3 08/15/2020    BILITOT 0.6 08/15/2020    AST 39 08/15/2020    ALKPHOS 43 08/15/2020    ALT 34 " 08/15/2020     COAGS: No results found for: INR, PROTIME, PTT  FLP:   Lab Results   Component Value Date    CHOL 348 (H) 09/10/2020    HDL 97 (H) 09/10/2020    LDLCALC 233.6 (H) 09/10/2020    TRIG 87 09/10/2020    CHOLHDL 27.9 09/10/2020     CARDIAC:   Lab Results   Component Value Date    TROPONINI <0.01 08/15/2020    BNP <10 08/15/2020       EKG 8/15/2020:  Normal sinus rhythm  Low voltage QRS  Anteroseptal infarct (cited on or before 15-AUG-2020)    Nuclear Stress Test 9/23/2020:  Normal myocardial perfusion scan.    The perfusion scan is free of evidence from myocardial ischemia or injury.    Visually estimated ejection fraction is normal at rest and normal at stress.    There is normal wall motion at rest and post stress.    LV cavity size is normal at rest and normal at stress.    The EKG portion of this study is abnormal but not diagnostic.    The patient reported no chest pain during the stress test.    There are no prior studies for comparison.       Echo 9/10/2020:  · Normal left ventricular systolic function. The estimated ejection fraction is 60%.  · Concentric left ventricular remodeling.  · Normal LV diastolic function.  · No wall motion abnormalities.  · Normal right ventricular systolic function.  · Mild left atrial enlargement.  · Mild tricuspid regurgitation.  · The estimated PA systolic pressure is 30 mmHg.  · Normal central venous pressure (3 mmHg).    Mesenteric Utrasound 9/10/2020:  Color flow duplex exam reveals a patent abdominal aorta, celiac artery, superior mesenteric artery and inferior mesenteric artery. A   velocity elevation of 378 cm/s is visualized near the Celiac artery origin within a region of focal narrowing, suggestive of a greater than   70% stenosis.     CTA abd/pelvis with contrast (outside [place] from [place] 8/28/2020:  1. No evidence for gastrointestinal bleeding.    2. Scattered colonic diverticula without adjacent inflammatory changes.    3. Myomatous changes of the  uterus with degenerating fibroids.     4. Hemangioma in segment VII.    5. Stenosis of the celiac access with poststenotic dilatation.    BLE Arterial Ultrasound 7/31/2020:  Multilevel disease in the left lower extremity. Moderate stenosis in the left superficial femoral artery. Severe stenosis in the distal popliteal artery.    Left lower extremity:  Ankle-brachial index is 0.73. Triphasic signals are seen in the common femoral and profunda arteries. There are biphasic superficial femoral, popliteal, posterior tibial, and anterior tibial arteries. There is a velocity increase from the mid SFA to the distal SFA from 1 /sec 2 m/s. Consistent with a moderate stenosis. There is another focal velocity increase in the distal popliteal from 2.5 m/sec to 5.0 m/s. Consistent with a severe stenosis. Distal to this there is monophasic waveforms.         Right lower extremity:  Ankle-brachial index is 0.93. Triphasic signals are seen in the common femoral and profunda artery. There are biphasic signals in the superficial femoral, popliteal, posterior tibial, and anterior tibial arteries. No focal areas of elevated velocity are seen.      Assessment/Plan:  Alana Beth is a 79 y.o. female with a past medical history of PAD, HTN, HLD, palpitations, who presents for a follow upappointment.    1. Celiac [place] Stenosis- Mrs. 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  Pertinent findings include 25 pound weight loss and abdominal bruit.  No definite abdominal pain, although mild abdominal tenderness noted on exam.  Chronic mesenteric ischemia is also a possibility in this pt with known PAD.  Pt evaluated by Vascular Surgery (Enrrique Preciado) on Recommend psych and continued GI workup.  Mesenteric Utrasound on 9/10/2020 revealed a velocity elevation of 378 cm/s is visualized near the Celiac artery origin within a region of focal narrowing, suggestive of a greater than 70% stenosis.   Nuclear Stress Test on 9/23/2020 revealed no evidence from myocardial ischemia or injury.  The EKG portion of this study is abnormal but not diagnostic.  Echo on 9/10/2020 revealed normal left ventricular systolic function with EF of 60%; concentric left ventricular remodeling; normal LV diastolic function; normal right ventricular systolic function; mild left atrial enlargement; mild tricuspid regurgitation; estimated PA systolic pressure is 30 mmHg; normal central venous pressure (3 mmHg).  Given these findings, will refer to Dr. Junior and LINDSAY for evaluation.         2. PAD- Pt with known BLE PAD.  She has no claudication, rest pain or tissue loss to suggest CLI.  Start rosuvastatin 40 mg daily.  Continue ASA 81 mg daily.  Check carotid ultrasound prior to next visit.     3. HLD-  Labs from 9/10/2020 show total cholesterol of 348 with LDL of 234.  Start rosuvastatin 40 mg daily. Monitor LFT's.      Follow up in 1 month    Total duration of face to face visit time 60 minutes.  Total time spent counseling greater than fifty percent of total visit time.  Counseling included discussion regarding imaging findings, diagnosis, possibilities, treatment options, risks and benefits.  The patient had many questions regarding the options and long-term effects.    Ramon Pink MD, PhD  Interventional Cardiology

## 2020-09-28 ENCOUNTER — TELEPHONE (OUTPATIENT)
Dept: GASTROENTEROLOGY | Facility: CLINIC | Age: 80
End: 2020-09-28

## 2020-09-28 ENCOUNTER — OFFICE VISIT (OUTPATIENT)
Dept: GASTROENTEROLOGY | Facility: CLINIC | Age: 80
End: 2020-09-28
Payer: MEDICARE

## 2020-09-28 VITALS
DIASTOLIC BLOOD PRESSURE: 76 MMHG | BODY MASS INDEX: 20.54 KG/M2 | HEIGHT: 63 IN | SYSTOLIC BLOOD PRESSURE: 135 MMHG | HEART RATE: 85 BPM | WEIGHT: 115.94 LBS

## 2020-09-28 DIAGNOSIS — R93.5 ABNORMAL CT OF THE ABDOMEN: ICD-10-CM

## 2020-09-28 DIAGNOSIS — R63.0 LACK OF APPETITE: ICD-10-CM

## 2020-09-28 DIAGNOSIS — R09.3 EXCESSIVE SPUTUM: Primary | ICD-10-CM

## 2020-09-28 DIAGNOSIS — R10.9 ABDOMINAL DISCOMFORT: ICD-10-CM

## 2020-09-28 PROCEDURE — 1159F MED LIST DOCD IN RCRD: CPT | Mod: S$GLB,,, | Performed by: STUDENT IN AN ORGANIZED HEALTH CARE EDUCATION/TRAINING PROGRAM

## 2020-09-28 PROCEDURE — 3078F PR MOST RECENT DIASTOLIC BLOOD PRESSURE < 80 MM HG: ICD-10-PCS | Mod: CPTII,S$GLB,, | Performed by: STUDENT IN AN ORGANIZED HEALTH CARE EDUCATION/TRAINING PROGRAM

## 2020-09-28 PROCEDURE — 3075F PR MOST RECENT SYSTOLIC BLOOD PRESS GE 130-139MM HG: ICD-10-PCS | Mod: CPTII,S$GLB,, | Performed by: STUDENT IN AN ORGANIZED HEALTH CARE EDUCATION/TRAINING PROGRAM

## 2020-09-28 PROCEDURE — 1101F PT FALLS ASSESS-DOCD LE1/YR: CPT | Mod: CPTII,S$GLB,, | Performed by: STUDENT IN AN ORGANIZED HEALTH CARE EDUCATION/TRAINING PROGRAM

## 2020-09-28 PROCEDURE — 99204 PR OFFICE/OUTPT VISIT, NEW, LEVL IV, 45-59 MIN: ICD-10-PCS | Mod: S$GLB,,, | Performed by: STUDENT IN AN ORGANIZED HEALTH CARE EDUCATION/TRAINING PROGRAM

## 2020-09-28 PROCEDURE — 1101F PR PT FALLS ASSESS DOC 0-1 FALLS W/OUT INJ PAST YR: ICD-10-PCS | Mod: CPTII,S$GLB,, | Performed by: STUDENT IN AN ORGANIZED HEALTH CARE EDUCATION/TRAINING PROGRAM

## 2020-09-28 PROCEDURE — 1159F PR MEDICATION LIST DOCUMENTED IN MEDICAL RECORD: ICD-10-PCS | Mod: S$GLB,,, | Performed by: STUDENT IN AN ORGANIZED HEALTH CARE EDUCATION/TRAINING PROGRAM

## 2020-09-28 PROCEDURE — 3075F SYST BP GE 130 - 139MM HG: CPT | Mod: CPTII,S$GLB,, | Performed by: STUDENT IN AN ORGANIZED HEALTH CARE EDUCATION/TRAINING PROGRAM

## 2020-09-28 PROCEDURE — 99204 OFFICE O/P NEW MOD 45 MIN: CPT | Mod: S$GLB,,, | Performed by: STUDENT IN AN ORGANIZED HEALTH CARE EDUCATION/TRAINING PROGRAM

## 2020-09-28 PROCEDURE — 3078F DIAST BP <80 MM HG: CPT | Mod: CPTII,S$GLB,, | Performed by: STUDENT IN AN ORGANIZED HEALTH CARE EDUCATION/TRAINING PROGRAM

## 2020-09-28 PROCEDURE — 99999 PR PBB SHADOW E&M-EST. PATIENT-LVL IV: CPT | Mod: PBBFAC,,, | Performed by: STUDENT IN AN ORGANIZED HEALTH CARE EDUCATION/TRAINING PROGRAM

## 2020-09-28 PROCEDURE — 99999 PR PBB SHADOW E&M-EST. PATIENT-LVL IV: ICD-10-PCS | Mod: PBBFAC,,, | Performed by: STUDENT IN AN ORGANIZED HEALTH CARE EDUCATION/TRAINING PROGRAM

## 2020-09-28 RX ORDER — OMEPRAZOLE 20 MG/1
20 CAPSULE, DELAYED RELEASE ORAL
Qty: 60 CAPSULE | Refills: 5 | Status: SHIPPED | OUTPATIENT
Start: 2020-09-28 | End: 2021-04-26

## 2020-09-28 RX ORDER — DIPHENHYDRAMINE HCL 25 MG
25 CAPSULE ORAL EVERY 6 HOURS PRN
COMMUNITY

## 2020-09-28 NOTE — LETTER
September 28, 2020      Ramon Pink MD PhD  [address]  [address]  [place] [address]           [facility] [address]  [address]  [place] [address]  Phone: [phone]  Fax: [phone]          Patient: Alana Beth   MR Number: [id number]   YOB: 1940   Date of Visit: 9/28/2020       Dear Dr. Ramon Pink:    Thank you for referring Alana Bteh to me for evaluation. Attached you will find relevant portions of my assessment and plan of care.    If you have questions, please do not hesitate to call me. I look forward to following Alana Beth along with you.    Sincerely,    Haja Arias MD    Enclosure  CC:  No Recipients    If you would like to receive this communication electronically, please contact [email or web address] or [phone] to request more information on [employer] access.    For providers and/or their staff who would like to refer a patient to [facility], please contact us through our one-stop-shop provider referral line, [facility], at [phone].    If you feel you have received this communication in error or would no longer like to receive these types of communications, please e-mail [email or web address]

## 2020-09-28 NOTE — PATIENT INSTRUCTIONS
-Begin taking omeprazole 20mg twice daily.  Take the medication 30 minutes prior to breakfast and dinner.    - You will be scheduled for a barium swallow

## 2020-09-28 NOTE — PROGRESS NOTES
[facility] Consultation Note    Reason for Consult:  The primary encounter diagnosis was Excessive sputum. Diagnoses of Abdominal discomfort and Abnormal CT of the abdomen were also pertinent to this visit.    PCP:   Derrick Salvador   [address] / [place] [address]    Referring MD:  Ramon Pink Md Phd  [address]  [address]  [place] [address]    HPI:  This is a 80 y.o. female here for evaluation of abdominal pain.    The patient notes that she was seen by Dr. Pink of cardiology last week for an evaluation of weight loss and peripheral artery disease.  Her evaluation notes symptoms concerning for celiax axis compression given esenteric Utrasound on 9/10/2020 revealed a velocity elevation of 378 cm/s is visualized near the Celiac artery origin within a region of focal narrowing, suggestive of a greater than 70% stenosis.  However, she was previously seen by Dr. Osuna of vascular surgery who felt her symptoms were inconsistent with mesenteric ischemia.      She recalls first being told of having an abdominal bruit in 2017.      The patient denies any abdominal pain, cramping, or discomfort.  She has a warm feeling in her abdomen, but no pain.  She denies post-prandial abdominal pain, but reports her appetite is decreased.  She has lost roughly 25lbs over the course of 2020.  She denies any diarrhea or constipation.  Denies any nausea or vomiting.      Pertaining to her GI complaints, she informs me that she is producing increased amounts of sputum.  She finds that she is spitting up over the course of the day and evening.  Her sputum is clear without blood.  She also reports a strange sensation in her esophagus with swallowing.      Of note, EGD done in 8/2020 she was noted to have a submucosal esophageal nodule that path revealed chronic esophagitis.  Her study was otherwise endoscopically normal.      She was taking omeprazole as directed by her ENT  provider for a few weeks.      Abdominal pain - no  Reflux - no  Dysphagia - no   Bowel habits - normal  GI bleeding - none  NSAID usage - none    ROS:  Constitutional: No fevers, chills, No weight loss  ENT: No allergies  CV: No chest pain  Pulm: No cough, No shortness of breath  Ophtho: No vision changes  GI: see HPI  Derm: No rash  Heme: No lymphadenopathy, No bruising  MSK: No arthritis  : No dysuria, No hematuria  Endo: No hot or cold intolerance  Neuro: No syncope, No seizure  Psych: No anxiety, No depression    Medical History:  has a past medical history of Atherosclerotic peripheral vascular disease, Chronic cystic mastitis, Essential hypertension, Hypercholesterolemia, Osteopenia, and Shingles.    Surgical History:  has a past surgical history that includes Breast biopsy and Esophagogastroduodenoscopy (08/20/2020).    Family History: family history includes Diabetes in her brother and sister; Hypertension in her mother..     Social History:  reports that she has never smoked. She has never used smokeless tobacco. She reports that she does not drink alcohol or use drugs.    Review of patient's allergies indicates:   Allergen Reactions    Benazepril Other (See Comments)     cough    Chlorthalidone     Iodinated contrast media     Iodine and iodide containing products     Lipitor [atorvastatin]      States she is allergic to all statin medications    Sertraline      Medication caused patient to get sick.    Spironolactone        Current Outpatient Rx   Medication Sig Dispense Refill    amLODIPine (NORVASC) 10 MG tablet Take 10 mg by mouth once daily.      ascorbic acid, vitamin C, (VITAMIN C) 1000 MG tablet Take 1,000 mg by mouth once daily.      aspirin 81 MG Chew Take 81 mg by mouth once daily.      azelastine (ASTELIN) 137 mcg (0.1 %) nasal spray USE 1 TO 2 SPRAY(S) IN EACH NOSTRIL TWICE DAILY      carvedilol (COREG) 6.25 MG tablet Take 6.25 mg by mouth 2 (two) times daily with meals.       "diphenhydrAMINE (BENADRYL) 25 mg capsule Take 25 mg by mouth every 6 (six) hours as needed for Itching.      ferrous sulfate (FEOSOL) 325 mg (65 mg iron) Tab tablet Take 1 tablet by mouth once daily.      fluticasone propionate (FLONASE) 50 mcg/actuation nasal spray 1 spray by Each Nostril route once daily.      multivitamin (ONE DAILY MULTIVITAMIN) per tablet Take 1 tablet by mouth once daily.      rosuvastatin (CRESTOR) 20 MG tablet Take 2 tablets (40 mg total) by mouth once daily. 180 tablet 3    cetirizine (ZYRTEC) 10 MG tablet Take 10 mg by mouth once daily.      omeprazole (PRILOSEC) 20 MG capsule Take 1 capsule (20 mg total) by mouth 2 (two) times daily before meals. 60 capsule 5       Objective Findings:    Vital Signs:  /76 (BP Location: Left arm, Patient Position: Sitting)   Pulse 85   Ht 5' 2.5" (1.588 m)   Wt 52.6 kg (115 lb 15.4 oz)   BMI 20.87 kg/m²   Body mass index is 20.87 kg/m².    Physical Exam:  General Appearance: Well appearing in no acute distress  Head:   Normocephalic, without obvious abnormality  Eyes:    No scleral icterus, EOMI  ENT: Neck supple, Lips, mucosa, and tongue normal; teeth and gums normal  Lungs: CTA bilaterally in anterior and posterior fields, no wheezes, no crackles.  Heart:  Regular rate and rhythm, S1, S2 normal, no murmurs heard  Abdomen: Soft, non tender, non distended with positive bowel sounds in all four quadrants. No hepatosplenomegaly, ascites, or mass  Extremities: 2+ pulses, no clubbing, cyanosis or edema  Skin: No rash  Neurologic: CN II-XII intact      Labs:  Lab Results   Component Value Date    WBC 4.50 08/15/2020    HGB 12.9 08/15/2020    HCT 39.5 08/15/2020     08/15/2020    CHOL 348 (H) 09/10/2020    TRIG 87 09/10/2020    HDL 97 (H) 09/10/2020    ALT 34 08/15/2020    AST 39 08/15/2020     08/15/2020    K 3.8 08/15/2020     (L) 08/15/2020    CREATININE 1.0 08/15/2020    BUN 12 08/15/2020    CO2 26 08/15/2020    TSH 0.494 " 08/21/2020       Imaging:  CTA A/P 8/28/20  1. No evidence for gastrointestinal bleeding.  2. Scattered colonic diverticula without adjacent inflammatory changes.  3. Myomatous changes of the uterus with degenerating fibroids.   4. Hemangioma in segment VII.  5. Stenosis of the celiac access with poststenotic dilatation. Other secondary findings as above.    MRI 1/7/2019  FINDINGS:  There are few small cystic hepatic lesions measuring up to 1.5 cm in size are scattered in the liver, stable in size from recent abdominal CT. These lesions are incompletely characterized on this noncontrast exam. The gallbladder, pancreas, spleen, adrenal glands, and kidneys are unremarkable for noncontrast technique with the exception of a couple of small cystic left renal lesions which likely represent cysts but are incompletely characterized on this noncontrast exam. No abdominal adenopathy. Visualized bowel loops demonstrate no gross MR evidence of high-grade bowel obstruction.    IMPRESSION:   A few small hepatic lesions are incompletely characterized on this noncontrast MR, stable in size from recent abdominal CT. Again, abdominal MRI without and with contrast is recommended for further assessment.    CT A/P 12/12/2018  Impression:  Approximately 3 small hepatic lesions. A couple of the lesions demonstrate discontinuous small peripheral nodular enhancement suggesting hemangiomas, however these lesions are incompletely characterized on CT. Abdominal MRI without and with contrast is recommended for further assessment.    Endoscopy:    EGD 8/20/2020  Normal duodenum  Normal stomach  6mm submucosal esophageal nodule, otherwise normal esophagus      1.  DUODENAL BIOPSY:   - NO PATHOLOGIC DIAGNOSIS.                                  2.  STOMACH BIOPSY:   - MINIMAL FOCAL CHRONIC GASTRITIS.   - NO EVIDENCE OF HELICOBACTER ORGANISMS.                                  3.  ESOPHAGEAL BIOPSY:   - MILD CHRONIC ESOPHAGITIS.    Colonoscopy 1/2013  -  Reportedly normal    Assessment:  1. Excessive sputum    2. Abdominal discomfort    3. Abnormal CT of the abdomen      The patient is a 79yo woman with numerous medical comorbidities who presents with excess sputum production.     Ms. Beth notes that she has ongoing troubles with increased sputum production.  This occurs during the day and night which raises concern for reflux.  As well, esophageal pathology was notable for chronic esophagitis.  She tells me that symptoms were not improved with OTC PPI therapy, but I have recommended she take omeprazole 20mg once twice daily.  As well, I will assess for cricopharyngeal bar with barium swallow.      Should her symptoms persist, we could consider formal acid reflux testing with pH/impedence off of PPI therapy.    Regarding her celiac stenosis, while imaging clearly demonstrates this, she is without the typical post-prandial symptoms.  I do worry that her lack of appetite and weight loss are manifestations of this.  She is established in the vascular surgery clinic and will continue to follow.      Recommendations:  1. Omeprazole 20mg twice daily, 30m prior to Breakfast and dinner  2. Barium swallow to assess for cp bar      Follow up in about 3 months (around 12/28/2020).      Order summary:  Orders Placed This Encounter    FL Esophagram pharynx and/or cervical    omeprazole (PRILOSEC) 20 MG capsule       Thank you so much for allowing me to participate in the care of Alana Beth    Haja Arias MD

## 2020-09-29 ENCOUNTER — TELEPHONE (OUTPATIENT)
Dept: CARDIOLOGY | Facility: CLINIC | Age: 80
End: 2020-09-29

## 2020-09-29 DIAGNOSIS — Z78.9 STATIN INTOLERANCE: ICD-10-CM

## 2020-09-29 DIAGNOSIS — E78.00 HYPERCHOLESTEREMIA: Primary | ICD-10-CM

## 2020-09-29 NOTE — TELEPHONE ENCOUNTER
Insurance didn't approve her Crestor. Wants to know if there's something else that can be sent in that's similar.

## 2020-09-29 NOTE — TELEPHONE ENCOUNTER
----- Message from Lien Bustillo MA sent at 9/29/2020 12:25 PM CDT -----  Please call the patient at [phone] she need to talk to you about her medication she do not know the name. Thank you.

## 2020-09-29 NOTE — TELEPHONE ENCOUNTER
Pt rx was sent to [facility]     ----- Message from Daphnie Beth sent at 9/29/2020  4:46 PM CDT -----  Regarding: Advice  Contact: patient  Advice    Patient called in stated that Dr Brown was suppose send over and injection to her pharmacy for her to pick her, but pharmacy does not have prescription. Please call to advise.    Call back [phone]    [facility] [place] - [address]  [address] [place] [address]  Phone: [phone] Fax: [phone]

## 2020-10-01 ENCOUNTER — TELEPHONE (OUTPATIENT)
Dept: INTERNAL MEDICINE | Facility: CLINIC | Age: 80
End: 2020-10-01

## 2020-10-01 ENCOUNTER — OFFICE VISIT (OUTPATIENT)
Dept: INTERNAL MEDICINE | Facility: CLINIC | Age: 80
End: 2020-10-01
Payer: MEDICARE

## 2020-10-01 VITALS
WEIGHT: 119.25 LBS | TEMPERATURE: 98 F | DIASTOLIC BLOOD PRESSURE: 64 MMHG | SYSTOLIC BLOOD PRESSURE: 120 MMHG | OXYGEN SATURATION: 99 % | HEART RATE: 77 BPM | BODY MASS INDEX: 21.13 KG/M2 | HEIGHT: 63 IN | RESPIRATION RATE: 18 BRPM

## 2020-10-01 DIAGNOSIS — Z23 NEED FOR VACCINATION WITH 13-POLYVALENT PNEUMOCOCCAL CONJUGATE VACCINE: ICD-10-CM

## 2020-10-01 DIAGNOSIS — E78.00 HYPERCHOLESTEREMIA: ICD-10-CM

## 2020-10-01 DIAGNOSIS — R91.1 SOLITARY PULMONARY NODULE: ICD-10-CM

## 2020-10-01 DIAGNOSIS — I10 ESSENTIAL HYPERTENSION: ICD-10-CM

## 2020-10-01 DIAGNOSIS — I70.0 ATHEROSCLEROSIS OF AORTIC ARCH: ICD-10-CM

## 2020-10-01 DIAGNOSIS — Z76.89 ESTABLISHING CARE WITH NEW DOCTOR, ENCOUNTER FOR: Primary | ICD-10-CM

## 2020-10-01 DIAGNOSIS — J84.10 GRANULOMATOUS LUNG DISEASE: ICD-10-CM

## 2020-10-01 DIAGNOSIS — R63.4 WEIGHT LOSS: ICD-10-CM

## 2020-10-01 DIAGNOSIS — Z23 NEED FOR TETANUS BOOSTER: ICD-10-CM

## 2020-10-01 DIAGNOSIS — Z23 INFLUENZA VACCINATION ADMINISTERED AT CURRENT VISIT: ICD-10-CM

## 2020-10-01 DIAGNOSIS — I73.9 PAD (PERIPHERAL ARTERY DISEASE): ICD-10-CM

## 2020-10-01 DIAGNOSIS — I70.0 AORTIC ATHEROSCLEROSIS: ICD-10-CM

## 2020-10-01 DIAGNOSIS — Z23 NEED FOR SHINGLES VACCINE: ICD-10-CM

## 2020-10-01 PROCEDURE — 1126F PR PAIN SEVERITY QUANTIFIED, NO PAIN PRESENT: ICD-10-PCS | Mod: S$GLB,,, | Performed by: STUDENT IN AN ORGANIZED HEALTH CARE EDUCATION/TRAINING PROGRAM

## 2020-10-01 PROCEDURE — 1101F PT FALLS ASSESS-DOCD LE1/YR: CPT | Mod: CPTII,S$GLB,, | Performed by: STUDENT IN AN ORGANIZED HEALTH CARE EDUCATION/TRAINING PROGRAM

## 2020-10-01 PROCEDURE — 90670 PCV13 VACCINE IM: CPT | Mod: S$GLB,,, | Performed by: STUDENT IN AN ORGANIZED HEALTH CARE EDUCATION/TRAINING PROGRAM

## 2020-10-01 PROCEDURE — 1159F PR MEDICATION LIST DOCUMENTED IN MEDICAL RECORD: ICD-10-PCS | Mod: S$GLB,,, | Performed by: STUDENT IN AN ORGANIZED HEALTH CARE EDUCATION/TRAINING PROGRAM

## 2020-10-01 PROCEDURE — G0009 ADMIN PNEUMOCOCCAL VACCINE: HCPCS | Mod: S$GLB,,, | Performed by: STUDENT IN AN ORGANIZED HEALTH CARE EDUCATION/TRAINING PROGRAM

## 2020-10-01 PROCEDURE — 99999 PR PBB SHADOW E&M-EST. PATIENT-LVL V: ICD-10-PCS | Mod: PBBFAC,,, | Performed by: STUDENT IN AN ORGANIZED HEALTH CARE EDUCATION/TRAINING PROGRAM

## 2020-10-01 PROCEDURE — 90662 FLU VACCINE - HIGH DOSE (65+) PRESERVATIVE FREE IM: ICD-10-PCS | Mod: S$GLB,,, | Performed by: STUDENT IN AN ORGANIZED HEALTH CARE EDUCATION/TRAINING PROGRAM

## 2020-10-01 PROCEDURE — 3074F PR MOST RECENT SYSTOLIC BLOOD PRESSURE < 130 MM HG: ICD-10-PCS | Mod: CPTII,S$GLB,, | Performed by: STUDENT IN AN ORGANIZED HEALTH CARE EDUCATION/TRAINING PROGRAM

## 2020-10-01 PROCEDURE — 90670 PNEUMOCOCCAL CONJUGATE VACCINE 13-VALENT LESS THAN 5YO & GREATER THAN: ICD-10-PCS | Mod: S$GLB,,, | Performed by: STUDENT IN AN ORGANIZED HEALTH CARE EDUCATION/TRAINING PROGRAM

## 2020-10-01 PROCEDURE — 1126F AMNT PAIN NOTED NONE PRSNT: CPT | Mod: S$GLB,,, | Performed by: STUDENT IN AN ORGANIZED HEALTH CARE EDUCATION/TRAINING PROGRAM

## 2020-10-01 PROCEDURE — 1159F MED LIST DOCD IN RCRD: CPT | Mod: S$GLB,,, | Performed by: STUDENT IN AN ORGANIZED HEALTH CARE EDUCATION/TRAINING PROGRAM

## 2020-10-01 PROCEDURE — G0009 PNEUMOCOCCAL CONJUGATE VACCINE 13-VALENT LESS THAN 5YO & GREATER THAN: ICD-10-PCS | Mod: S$GLB,,, | Performed by: STUDENT IN AN ORGANIZED HEALTH CARE EDUCATION/TRAINING PROGRAM

## 2020-10-01 PROCEDURE — 1101F PR PT FALLS ASSESS DOC 0-1 FALLS W/OUT INJ PAST YR: ICD-10-PCS | Mod: CPTII,S$GLB,, | Performed by: STUDENT IN AN ORGANIZED HEALTH CARE EDUCATION/TRAINING PROGRAM

## 2020-10-01 PROCEDURE — 3078F DIAST BP <80 MM HG: CPT | Mod: CPTII,S$GLB,, | Performed by: STUDENT IN AN ORGANIZED HEALTH CARE EDUCATION/TRAINING PROGRAM

## 2020-10-01 PROCEDURE — G0008 ADMIN INFLUENZA VIRUS VAC: HCPCS | Mod: S$GLB,,, | Performed by: STUDENT IN AN ORGANIZED HEALTH CARE EDUCATION/TRAINING PROGRAM

## 2020-10-01 PROCEDURE — G0008 FLU VACCINE - HIGH DOSE (65+) PRESERVATIVE FREE IM: ICD-10-PCS | Mod: S$GLB,,, | Performed by: STUDENT IN AN ORGANIZED HEALTH CARE EDUCATION/TRAINING PROGRAM

## 2020-10-01 PROCEDURE — 99214 OFFICE O/P EST MOD 30 MIN: CPT | Mod: 25,S$GLB,, | Performed by: STUDENT IN AN ORGANIZED HEALTH CARE EDUCATION/TRAINING PROGRAM

## 2020-10-01 PROCEDURE — 99999 PR PBB SHADOW E&M-EST. PATIENT-LVL V: CPT | Mod: PBBFAC,,, | Performed by: STUDENT IN AN ORGANIZED HEALTH CARE EDUCATION/TRAINING PROGRAM

## 2020-10-01 PROCEDURE — 3078F PR MOST RECENT DIASTOLIC BLOOD PRESSURE < 80 MM HG: ICD-10-PCS | Mod: CPTII,S$GLB,, | Performed by: STUDENT IN AN ORGANIZED HEALTH CARE EDUCATION/TRAINING PROGRAM

## 2020-10-01 PROCEDURE — 90662 IIV NO PRSV INCREASED AG IM: CPT | Mod: S$GLB,,, | Performed by: STUDENT IN AN ORGANIZED HEALTH CARE EDUCATION/TRAINING PROGRAM

## 2020-10-01 PROCEDURE — 3074F SYST BP LT 130 MM HG: CPT | Mod: CPTII,S$GLB,, | Performed by: STUDENT IN AN ORGANIZED HEALTH CARE EDUCATION/TRAINING PROGRAM

## 2020-10-01 PROCEDURE — 99214 PR OFFICE/OUTPT VISIT, EST, LEVL IV, 30-39 MIN: ICD-10-PCS | Mod: 25,S$GLB,, | Performed by: STUDENT IN AN ORGANIZED HEALTH CARE EDUCATION/TRAINING PROGRAM

## 2020-10-01 RX ORDER — ZOSTER VACCINE RECOMBINANT, ADJUVANTED 50 MCG/0.5
0.5 KIT INTRAMUSCULAR ONCE
Qty: 1 EACH | Refills: 1 | Status: SHIPPED | OUTPATIENT
Start: 2020-10-01 | End: 2020-10-01

## 2020-10-01 NOTE — PROGRESS NOTES
Subjective:       Patient ID: Alana Beth is a 80 y.o. female presenting to discuss:    Chief Complaint: Establish Care, Weight Loss (poor appetite), and Fatigue    HPI   Ms. Beth is a 79 yo F, kindly referred by Dr. Ramon Pink, with Anxiety, benign recently identified solitary pulmonary nodule, severe PAD (most prominently an approximately 70% occulusion of her celiac trunk leading to suspected gastroparesis and weight loss (which has stabilized per flow sheet values) presenting as a new Pt to establish primary care.      Family, social, surgical Hx reviewed     Health Maintenance:  Mammogram: discontinued; no abnormal   Gyn exam: deferred   Colorectal CA screening: Completed for life   Cholesterol: elevated; insurance issue with [employer]; Cardiology is currently in a PA process   Vaccines: Influenza (Due); Tetanus (every 10 yrs - 1st tdap); Pneumovax (duer to start)  DEXA: Defers   Exercise: Modestly; limited by fatigue   Diet: Poor as above     Past Medical History:   Diagnosis Date    Atherosclerotic peripheral vascular disease     Chronic cystic mastitis     Essential hypertension     Hypercholesterolemia     Osteopenia     Shingles      Past Surgical History:   Procedure Laterality Date    BREAST BIOPSY      biopsies benign    ESOPHAGOGASTRODUODENOSCOPY  08/20/2020     Family History   Problem Relation Age of Onset    Hypertension Mother     Diabetes Brother     Diabetes Sister      Social History     Socioeconomic History    Marital status: Single     Spouse name: Not on file    Number of children: Not on file    Years of education: Not on file    Highest education level: Not on file   Occupational History    Occupation: retired student loan assistance    Social Needs    Financial resource strain: Not on file    Food insecurity     Worry: Not on file     Inability: Not on file    Transportation needs     Medical: Not on file     Non-medical: Not on file   Tobacco Use     Smoking status: Never Smoker    Smokeless tobacco: Never Used   Substance and Sexual Activity    Alcohol use: No    Drug use: No    Sexual activity: Not Currently   Lifestyle    Physical activity     Days per week: Not on file     Minutes per session: Not on file    Stress: Not at all   Relationships    Social connections     Talks on phone: Not on file     Gets together: Not on file     Attends Christian service: Not on file     Active member of club or organization: Not on file     Attends meetings of clubs or organizations: Not on file     Relationship status: Not on file   Other Topics Concern    Not on file   Social History Narrative    Not on file     Review of patient's allergies indicates:   Allergen Reactions    Benazepril Other (See Comments)     cough    Chlorthalidone     Iodinated contrast media     Iodine and iodide containing products     Lipitor [atorvastatin]      States she is allergic to all statin medications    Sertraline      Medication caused patient to get sick.    Spironolactone      Alana Beth had no medications administered during this visit.    Review of Systems   Constitutional: Positive for fatigue. Negative for appetite change, chills and fever.   HENT: Negative.    Respiratory: Negative for cough, chest tightness and shortness of breath.    Cardiovascular: Negative for chest pain, palpitations and leg swelling.   Gastrointestinal: Negative for abdominal distention, abdominal pain, blood in stool, constipation, diarrhea, nausea and vomiting.   Endocrine: Negative.    Genitourinary: Negative for difficulty urinating, dysuria, frequency and hematuria.   Musculoskeletal: Negative.    Integumentary:  Negative.   Neurological: Negative.    Psychiatric/Behavioral: Negative.          Objective:      Vitals:    10/01/20 0847   BP: 120/64   Pulse: 77   Resp: 18   Temp: 97.5 °F (36.4 °C)      Physical Exam  Vitals signs reviewed.   Constitutional:       General: She is not  in acute distress.     Appearance: Normal appearance.   HENT:      Head: Normocephalic and atraumatic.      Comments: Facial features are symmetric      Nose: Nose normal. No congestion or rhinorrhea.      Mouth/Throat:      Mouth: Mucous membranes are moist.      Pharynx: Oropharynx is clear. No oropharyngeal exudate or posterior oropharyngeal erythema.   Eyes:      General: No scleral icterus.     Extraocular Movements: Extraocular movements intact.      Conjunctiva/sclera: Conjunctivae normal.   Neck:      Musculoskeletal: Normal range of motion.   Cardiovascular:      Rate and Rhythm: Normal rate and regular rhythm.      Pulses: Normal pulses.      Heart sounds: Normal heart sounds.   Pulmonary:      Effort: Pulmonary effort is normal. No respiratory distress.      Breath sounds: Normal breath sounds.   Abdominal:      General: Bowel sounds are normal. There is no distension.      Palpations: Abdomen is soft. There is no mass.      Tenderness: There is no abdominal tenderness. There is no guarding.      Hernia: No hernia is present.   Musculoskeletal: Normal range of motion.         General: No deformity or signs of injury.      Comments: Gait normal    Skin:     General: Skin is warm and dry.      Findings: No rash.   Neurological:      General: No focal deficit present.      Mental Status: She is alert and oriented to person, place, and time. Mental status is at baseline.   Psychiatric:         Mood and Affect: Mood normal.         Behavior: Behavior normal.         Thought Content: Thought content normal.       Current Outpatient Medications on File Prior to Visit   Medication Sig Dispense Refill    amLODIPine (NORVASC) 10 MG tablet Take 10 mg by mouth once daily.      ascorbic acid, vitamin C, (VITAMIN C) 1000 MG tablet Take 1,000 mg by mouth once daily.      aspirin 81 MG Chew Take 81 mg by mouth once daily.      azelastine (ASTELIN) 137 mcg (0.1 %) nasal spray USE 1 TO 2 SPRAY(S) IN EACH NOSTRIL TWICE  DAILY      carvedilol (COREG) 6.25 MG tablet Take 6.25 mg by mouth 2 (two) times daily with meals.      cetirizine (ZYRTEC) 10 MG tablet Take 10 mg by mouth daily as needed.       diphenhydrAMINE (BENADRYL) 25 mg capsule Take 25 mg by mouth every 6 (six) hours as needed for Itching.      ferrous sulfate (FEOSOL) 325 mg (65 mg iron) Tab tablet Take 1 tablet by mouth once daily.      fluticasone propionate (FLONASE) 50 mcg/actuation nasal spray 1 spray by Each Nostril route once daily.      multivitamin (ONE DAILY MULTIVITAMIN) per tablet Take 1 tablet by mouth once daily.      omeprazole (PRILOSEC) 20 MG capsule Take 1 capsule (20 mg total) by mouth 2 (two) times daily before meals. 60 capsule 5    alirocumab (PRALUENT PEN) 75 mg/mL PnIj Inject 1 mL (75 mg total) into the skin every 14 (fourteen) days. (Patient not taking: Reported on 10/1/2020) 4 Syringe 10     No current facility-administered medications on file prior to visit.          Assessment:       1. Establishing care with new doctor, encounter for    2. Weight loss    3. Need for tetanus booster    4. Need for shingles vaccine    5. Need for vaccination with 13-polyvalent pneumococcal conjugate vaccine    6. Influenza vaccination administered at current visit    7. Granulomatous lung disease    8. Solitary pulmonary nodule    9. PAD (peripheral artery disease)    10. Aortic atherosclerosis    11. Hypercholesteremia    12. Atherosclerosis of aortic arch    13. Essential hypertension        Plan:       Establishing care with new doctor, encounter for  Need for shingles vaccine  Need for vaccination with 13-polyvalent pneumococcal conjugate vaccine  Need for tetanus booster  Influenza vaccination administered at current visit   - Yearly and one-time/lifetime screening labs nearly UTD; will order screening Hep panel and HIV at time of next routine lab draw    - Vaccinations brought UTD via one time Prevnar (PPSV23 in one year) and flu in-office today     - TDaP and Shingrix sent to Pharmacy    - Following mutual decision making discussion, the Pt has opted out of subsequent DEXA scans, Mammograms, and CRC screening.     Granulomatous lung disease  Solitary pulmonary nodule   - Of no clinical significance    - results reviewed to Pt today and all pertinent questions answered     PAD (peripheral artery disease)  Aortic atherosclerosis  Hypercholesteremia  Atherosclerosis of aortic arch   - Currently under the car of Cardiology who she reports is attempting to give her a cholesterol modifying injection    - Statin intolerance noted    - Decreased enteric blood flow is most likely  of chronic fatigue and weight loss give all other normal screening labs; will continue to follow with GI      Essential hypertension   - Controled   - Will continue to follow with Cardiology      Weight loss  -     Ambulatory referral/consult to Internal Medicine        -     As above

## 2020-10-01 NOTE — TELEPHONE ENCOUNTER
----- Message from Cydney Wong sent at 10/1/2020 12:48 PM CDT -----  Contact: Sade [phone]  Would like to receive medical advice.    Would they like a call back or a response via [facility]:  Call back     Additional information:  Calling to give the name of the medication she is on the pt was seen today and states she was told to call back with the medication name.. The name of the medication is Clorazdipot 7.5 mg.

## 2020-10-01 NOTE — LETTER
October 1, 2020      Ramon Pink MD PhD  [address]  [address]  [place] [address]           [facility]  [address]  [place] [address]  Phone: [phone]  Fax: [phone]          Patient: Alana Beth   MR Number: [id number]   YOB: 1940   Date of Visit: 10/1/2020       Dear Dr. Ramon Pink:    Thank you for referring Alana Beth to me for evaluation. Attached you will find relevant portions of my assessment and plan of care.    If you have questions, please do not hesitate to call me. I look forward to following Alana Beth along with you.    Sincerely,    Nurys Bearden MD    Enclosure  CC:  No Recipients    If you would like to receive this communication electronically, please contact [email or web address] or [phone] to request more information on [employer] access.    For providers and/or their staff who would like to refer a patient to [facility], please contact us through our one-stop-shop provider referral line, [facility], at [phone].    If you feel you have received this communication in error or would no longer like to receive these types of communications, please e-mail [email or web address]

## 2020-10-01 NOTE — TELEPHONE ENCOUNTER
Pt states she is taking this medication (clorazepate) and requesting a refill.  This medication was not on her current med list    Please advise    thanks

## 2020-10-02 ENCOUNTER — TELEPHONE (OUTPATIENT)
Dept: RADIOLOGY | Facility: HOSPITAL | Age: 80
End: 2020-10-02

## 2020-10-05 ENCOUNTER — OFFICE VISIT (OUTPATIENT)
Dept: INTERNAL MEDICINE | Facility: CLINIC | Age: 80
End: 2020-10-05
Payer: MEDICARE

## 2020-10-05 ENCOUNTER — HOSPITAL ENCOUNTER (OUTPATIENT)
Dept: RADIOLOGY | Facility: HOSPITAL | Age: 80
Discharge: HOME | End: 2020-10-05
Attending: STUDENT IN AN ORGANIZED HEALTH CARE EDUCATION/TRAINING PROGRAM
Payer: MEDICARE

## 2020-10-05 VITALS
SYSTOLIC BLOOD PRESSURE: 124 MMHG | WEIGHT: 119.06 LBS | HEART RATE: 89 BPM | RESPIRATION RATE: 16 BRPM | TEMPERATURE: 97 F | HEIGHT: 61 IN | DIASTOLIC BLOOD PRESSURE: 62 MMHG | OXYGEN SATURATION: 100 % | BODY MASS INDEX: 22.48 KG/M2

## 2020-10-05 DIAGNOSIS — R09.3 EXCESSIVE SPUTUM: ICD-10-CM

## 2020-10-05 DIAGNOSIS — F41.9 ANXIETY: Primary | ICD-10-CM

## 2020-10-05 PROCEDURE — 74210 FL ESOPHAGRAM PHARYNX AND/OR CERVICAL: ICD-10-PCS | Mod: 26,,, | Performed by: RADIOLOGY

## 2020-10-05 PROCEDURE — 99213 OFFICE O/P EST LOW 20 MIN: CPT | Mod: S$GLB,,, | Performed by: STUDENT IN AN ORGANIZED HEALTH CARE EDUCATION/TRAINING PROGRAM

## 2020-10-05 PROCEDURE — 3074F PR MOST RECENT SYSTOLIC BLOOD PRESSURE < 130 MM HG: ICD-10-PCS | Mod: CPTII,S$GLB,, | Performed by: STUDENT IN AN ORGANIZED HEALTH CARE EDUCATION/TRAINING PROGRAM

## 2020-10-05 PROCEDURE — 1159F PR MEDICATION LIST DOCUMENTED IN MEDICAL RECORD: ICD-10-PCS | Mod: S$GLB,,, | Performed by: STUDENT IN AN ORGANIZED HEALTH CARE EDUCATION/TRAINING PROGRAM

## 2020-10-05 PROCEDURE — 1101F PT FALLS ASSESS-DOCD LE1/YR: CPT | Mod: CPTII,S$GLB,, | Performed by: STUDENT IN AN ORGANIZED HEALTH CARE EDUCATION/TRAINING PROGRAM

## 2020-10-05 PROCEDURE — 74210 X-RAY XM PHRNX&/CRV ESOPH C+: CPT | Mod: TC

## 2020-10-05 PROCEDURE — 1126F PR PAIN SEVERITY QUANTIFIED, NO PAIN PRESENT: ICD-10-PCS | Mod: S$GLB,,, | Performed by: STUDENT IN AN ORGANIZED HEALTH CARE EDUCATION/TRAINING PROGRAM

## 2020-10-05 PROCEDURE — 3074F SYST BP LT 130 MM HG: CPT | Mod: CPTII,S$GLB,, | Performed by: STUDENT IN AN ORGANIZED HEALTH CARE EDUCATION/TRAINING PROGRAM

## 2020-10-05 PROCEDURE — 99213 PR OFFICE/OUTPT VISIT, EST, LEVL III, 20-29 MIN: ICD-10-PCS | Mod: S$GLB,,, | Performed by: STUDENT IN AN ORGANIZED HEALTH CARE EDUCATION/TRAINING PROGRAM

## 2020-10-05 PROCEDURE — A9698 NON-RAD CONTRAST MATERIALNOC: HCPCS | Performed by: STUDENT IN AN ORGANIZED HEALTH CARE EDUCATION/TRAINING PROGRAM

## 2020-10-05 PROCEDURE — 3078F PR MOST RECENT DIASTOLIC BLOOD PRESSURE < 80 MM HG: ICD-10-PCS | Mod: CPTII,S$GLB,, | Performed by: STUDENT IN AN ORGANIZED HEALTH CARE EDUCATION/TRAINING PROGRAM

## 2020-10-05 PROCEDURE — 74210 X-RAY XM PHRNX&/CRV ESOPH C+: CPT | Mod: 26,,, | Performed by: RADIOLOGY

## 2020-10-05 PROCEDURE — 1101F PR PT FALLS ASSESS DOC 0-1 FALLS W/OUT INJ PAST YR: ICD-10-PCS | Mod: CPTII,S$GLB,, | Performed by: STUDENT IN AN ORGANIZED HEALTH CARE EDUCATION/TRAINING PROGRAM

## 2020-10-05 PROCEDURE — 1126F AMNT PAIN NOTED NONE PRSNT: CPT | Mod: S$GLB,,, | Performed by: STUDENT IN AN ORGANIZED HEALTH CARE EDUCATION/TRAINING PROGRAM

## 2020-10-05 PROCEDURE — 3078F DIAST BP <80 MM HG: CPT | Mod: CPTII,S$GLB,, | Performed by: STUDENT IN AN ORGANIZED HEALTH CARE EDUCATION/TRAINING PROGRAM

## 2020-10-05 PROCEDURE — 99999 PR PBB SHADOW E&M-EST. PATIENT-LVL IV: CPT | Mod: PBBFAC,,, | Performed by: STUDENT IN AN ORGANIZED HEALTH CARE EDUCATION/TRAINING PROGRAM

## 2020-10-05 PROCEDURE — 99999 PR PBB SHADOW E&M-EST. PATIENT-LVL IV: ICD-10-PCS | Mod: PBBFAC,,, | Performed by: STUDENT IN AN ORGANIZED HEALTH CARE EDUCATION/TRAINING PROGRAM

## 2020-10-05 PROCEDURE — 25500020 PHARM REV CODE 255: Performed by: STUDENT IN AN ORGANIZED HEALTH CARE EDUCATION/TRAINING PROGRAM

## 2020-10-05 PROCEDURE — 1159F MED LIST DOCD IN RCRD: CPT | Mod: S$GLB,,, | Performed by: STUDENT IN AN ORGANIZED HEALTH CARE EDUCATION/TRAINING PROGRAM

## 2020-10-05 RX ORDER — CLORAZEPATE DIPOTASSIUM 7.5 MG/1
7.5 TABLET ORAL 3 TIMES DAILY
Qty: 90 TABLET | Refills: 0 | Status: SHIPPED | OUTPATIENT
Start: 2020-10-05 | End: 2021-05-25

## 2020-10-05 RX ORDER — CLORAZEPATE DIPOTASSIUM 7.5 MG/1
7.5 TABLET ORAL 3 TIMES DAILY
COMMUNITY
End: 2020-10-05 | Stop reason: SDUPTHER

## 2020-10-05 RX ADMIN — BARIUM SULFATE 330 ML: 0.6 SUSPENSION ORAL at 08:10

## 2020-10-05 NOTE — PROGRESS NOTES
Subjective:       Patient ID: Alana Beth is a 80 y.o. female presenting to discuss:    Chief Complaint: Medication Refill and Fatigue    HPI     Ms. Beth is a 81 yo F with Anxiety, benign recently identified solitary pulmonary nodule, severe PAD ( leading to gastroparesis) presenting to discuss chronic EFRAÍN and previous clorazepate use.    Anxiety:  - Originally prescribed clorazepate following a negative ACS evaluation   - Per , was last prescribed a 30 day supply of clorazepate 7.5mg BID PRN (modest dose) 8 months ago  - Has been taking it on exceptionally rare     Little concern for underlying depression as below:  SIGECAPS:  -Sleep: Poor   -Interest: Not lacking   -Guilt: None   -Energy: Lacking 2/2 to chronic insomnia    -Concentration:  No issues   -Appetite: Poor 2/2 to mesenteric ischemia   -Psychomotor retardation: None   -Suicidality/homicidally: None     Past Medical History:   Diagnosis Date    Atherosclerotic peripheral vascular disease     Chronic cystic mastitis     Essential hypertension     Hypercholesterolemia     Osteopenia     Shingles      Past Surgical History:   Procedure Laterality Date    BREAST BIOPSY      biopsies benign    ESOPHAGOGASTRODUODENOSCOPY  08/20/2020     Family History   Problem Relation Age of Onset    Hypertension Mother     Diabetes Brother     Diabetes Sister      Social History     Socioeconomic History    Marital status: Single     Spouse name: Not on file    Number of children: Not on file    Years of education: Not on file    Highest education level: Not on file   Occupational History    Occupation: retired student loan assistance    Social Needs    Financial resource strain: Not on file    Food insecurity     Worry: Not on file     Inability: Not on file    Transportation needs     Medical: Not on file     Non-medical: Not on file   Tobacco Use    Smoking status: Never Smoker    Smokeless tobacco: Never Used   Substance and Sexual  Activity    Alcohol use: No    Drug use: No    Sexual activity: Not Currently   Lifestyle    Physical activity     Days per week: Not on file     Minutes per session: Not on file    Stress: Not at all   Relationships    Social connections     Talks on phone: Not on file     Gets together: Not on file     Attends Sikhism service: Not on file     Active member of club or organization: Not on file     Attends meetings of clubs or organizations: Not on file     Relationship status: Not on file   Other Topics Concern    Not on file   Social History Narrative    Not on file     Review of patient's allergies indicates:   Allergen Reactions    Benazepril Other (See Comments)     cough    Chlorthalidone     Iodinated contrast media     Iodine and iodide containing products     Lipitor [atorvastatin]      States she is allergic to all statin medications    Sertraline      Medication caused patient to get sick.    Spironolactone      Alana Beth had no medications administered during this visit.    Review of Systems   Constitutional: Positive for fatigue. Negative for appetite change, chills and fever.   HENT: Negative.    Respiratory: Negative for cough, chest tightness and shortness of breath.    Cardiovascular: Negative for chest pain, palpitations and leg swelling.   Gastrointestinal: Negative for abdominal distention, abdominal pain, blood in stool, constipation, diarrhea, nausea and vomiting.   Endocrine: Negative.    Genitourinary: Negative for difficulty urinating, dysuria, frequency and hematuria.   Musculoskeletal: Negative.    Integumentary:  Negative.   Neurological: Negative.    Psychiatric/Behavioral: Negative.          Objective:      Vitals:    10/05/20 1446   BP: 124/62   Pulse: 89   Resp: 16   Temp: 97.3 °F (36.3 °C)      Physical Exam  Vitals signs reviewed.   Constitutional:       General: She is not in acute distress.     Appearance: Normal appearance.   HENT:      Head:  Normocephalic and atraumatic.      Comments: Facial features are symmetric      Nose: Nose normal. No congestion or rhinorrhea.      Mouth/Throat:      Mouth: Mucous membranes are moist.      Pharynx: Oropharynx is clear. No oropharyngeal exudate or posterior oropharyngeal erythema.   Eyes:      General: No scleral icterus.     Extraocular Movements: Extraocular movements intact.      Conjunctiva/sclera: Conjunctivae normal.   Neck:      Musculoskeletal: Normal range of motion.   Cardiovascular:      Rate and Rhythm: Normal rate and regular rhythm.      Pulses: Normal pulses.      Heart sounds: Normal heart sounds.   Pulmonary:      Effort: Pulmonary effort is normal. No respiratory distress.      Breath sounds: Normal breath sounds.   Abdominal:      General: Bowel sounds are normal. There is no distension.      Palpations: Abdomen is soft. There is no mass.      Tenderness: There is no abdominal tenderness. There is no guarding.      Hernia: No hernia is present.   Musculoskeletal: Normal range of motion.         General: No deformity or signs of injury.      Comments: Gait normal    Skin:     General: Skin is warm and dry.      Findings: No rash.   Neurological:      General: No focal deficit present.      Mental Status: She is alert and oriented to person, place, and time. Mental status is at baseline.   Psychiatric:         Mood and Affect: Mood normal.         Behavior: Behavior normal.         Thought Content: Thought content normal.       Current Outpatient Medications on File Prior to Visit   Medication Sig Dispense Refill    amLODIPine (NORVASC) 10 MG tablet Take 10 mg by mouth once daily.      ascorbic acid, vitamin C, (VITAMIN C) 1000 MG tablet Take 1,000 mg by mouth once daily.      aspirin 81 MG Chew Take 81 mg by mouth once daily.      azelastine (ASTELIN) 137 mcg (0.1 %) nasal spray USE 1 TO 2 SPRAY(S) IN EACH NOSTRIL TWICE DAILY      carvedilol (COREG) 6.25 MG tablet Take 6.25 mg by mouth 2  (two) times daily with meals.      ferrous sulfate (FEOSOL) 325 mg (65 mg iron) Tab tablet Take 1 tablet by mouth once daily.      fluticasone propionate (FLONASE) 50 mcg/actuation nasal spray 1 spray by Each Nostril route once daily.      multivitamin (ONE DAILY MULTIVITAMIN) per tablet Take 1 tablet by mouth once daily.      omeprazole (PRILOSEC) 20 MG capsule Take 1 capsule (20 mg total) by mouth 2 (two) times daily before meals. 60 capsule 5    alirocumab (PRALUENT PEN) 75 mg/mL PnIj Inject 1 mL (75 mg total) into the skin every 14 (fourteen) days. (Patient not taking: Reported on 10/1/2020) 4 Syringe 10    cetirizine (ZYRTEC) 10 MG tablet Take 10 mg by mouth daily as needed.       diphenhydrAMINE (BENADRYL) 25 mg capsule Take 25 mg by mouth every 6 (six) hours as needed for Itching.      [DISCONTINUED] clorazepate (TRANXENE) 7.5 MG Tab Take 7.5 mg by mouth 3 (three) times daily.       Current Facility-Administered Medications on File Prior to Visit   Medication Dose Route Frequency Provider Last Rate Last Dose    [COMPLETED] ez delon ba sulfate 330 mL  330 mL Oral ONCE PRN Haja Arias MD   330 mL at 10/05/20 0810         Assessment:       1. Anxiety        Plan:         Anxiety:  - Refill of clorazepate provided   - Pt encouraged to continue modest PRN use as per prior   - Pt encouraged to return PRN for discussion of refills, especially if her need for use increases

## 2020-10-05 NOTE — TELEPHONE ENCOUNTER
I spoke to pt and notified her that appt will be needed if she want a refill for Clorazepate.  I scheduled pt for an appt today at 3pm

## 2020-10-06 ENCOUNTER — TELEPHONE (OUTPATIENT)
Dept: PHARMACY | Facility: CLINIC | Age: 80
End: 2020-10-06

## 2020-10-06 ENCOUNTER — TELEPHONE (OUTPATIENT)
Dept: CARDIOLOGY | Facility: CLINIC | Age: 80
End: 2020-10-06

## 2020-10-06 NOTE — TELEPHONE ENCOUNTER
----- Message from Lein Bustillo MA sent at 10/6/2020 11:45 AM CDT -----  Please call the patient at [phone] she need  to talk to you about her medication Praluent pen . Thank you.

## 2020-10-08 ENCOUNTER — TELEPHONE (OUTPATIENT)
Dept: CARDIOLOGY | Facility: CLINIC | Age: 80
End: 2020-10-08

## 2020-10-08 DIAGNOSIS — Z78.9 STATIN INTOLERANCE: Primary | ICD-10-CM

## 2020-10-08 DIAGNOSIS — E78.00 HYPERCHOLESTEREMIA: ICD-10-CM

## 2020-10-08 NOTE — TELEPHONE ENCOUNTER
Pt states Dr. Pink called her late yesterday afternoon and talked to her about the statin.       ----- Message from Ramon Pink MD PhD sent at 10/7/2020  5:09 PM CDT -----  Regarding: RE: medication  No.    Thank you  ----- Message -----  From: Diamond Real  Sent: 10/7/2020   4:34 PM CDT  To: Ramon Pink MD PhD  Subject: FW: medication                                   Good afternoon Dr. Pink,    Would you like that for this patient to have Zetia?    Thanks,  Diamond  ----- Message -----  From: Martha Nicholas  Sent: 10/7/2020   4:09 PM CDT  To: Joesph WILLIAM Staff  Subject: medication                                       Pt would like for DR. Pink  to call in a rx for Ezetimide to her pharmacy [facility] [id number] - [address] Because she has never heard back anything about a rx that was supposed to called in and she is leaving today to go out of town.  She can be reached at [phone].  Thank you

## 2020-10-09 NOTE — TELEPHONE ENCOUNTER
DOCUMENTATION ONLY:  Prior authorization for Praluent approved from 10/09/20 to 04/08/21    Case Id: [id number]    Co-pay: $20    Assistance not needed

## 2020-10-13 NOTE — TELEPHONE ENCOUNTER
[facility]. Pt reports that she will not administer any injection to herself, even after in-person consultation with [facility]. Pt does not have any one to administer the medication to her, and refuses to use any injection. Informed pt about an alternate option- Repatha Pushtronex- a monthly device that will infuse the medication for her. Referred pt to look up the medication on the  website and view the administration video to see if she my be will to consider this device instead. Pt voiced understanding. [facility] will follow up later today to assess her willingness. [facility] will message Dr. Pikn accordingly.

## 2020-10-19 ENCOUNTER — HOSPITAL ENCOUNTER (OUTPATIENT)
Dept: CARDIOLOGY | Facility: HOSPITAL | Age: 80
Discharge: HOME | End: 2020-10-19
Attending: INTERNAL MEDICINE
Payer: MEDICARE

## 2020-10-19 DIAGNOSIS — R09.89 OTHER SPECIFIED SYMPTOMS AND SIGNS INVOLVING THE CIRCULATORY AND RESPIRATORY SYSTEMS: ICD-10-CM

## 2020-10-19 DIAGNOSIS — I73.9 PAD (PERIPHERAL ARTERY DISEASE): ICD-10-CM

## 2020-10-19 LAB
LEFT ARM DIASTOLIC BLOOD PRESSURE: 80 MMHG
LEFT ARM SYSTOLIC BLOOD PRESSURE: 140 MMHG
LEFT CBA DIAS: 12 CM/S
LEFT CBA SYS: 55 CM/S
LEFT CCA DIST DIAS: 19 CM/S
LEFT CCA DIST SYS: 97 CM/S
LEFT CCA MID DIAS: 18 CM/S
LEFT CCA MID SYS: 97 CM/S
LEFT CCA PROX DIAS: 15 CM/S
LEFT CCA PROX SYS: 121 CM/S
LEFT ECA DIAS: 12 CM/S
LEFT ECA SYS: 84 CM/S
LEFT ICA DIST DIAS: 17 CM/S
LEFT ICA DIST SYS: 87 CM/S
LEFT ICA MID DIAS: 22 CM/S
LEFT ICA MID SYS: 102 CM/S
LEFT ICA PROX DIAS: 12 CM/S
LEFT ICA PROX SYS: 75 CM/S
LEFT VERTEBRAL DIAS: 9 CM/S
LEFT VERTEBRAL SYS: 100 CM/S
OHS CV CAROTID RIGHT ICA EDV HIGHEST: 24
OHS CV CAROTID ULTRASOUND LEFT ICA/CCA RATIO: 1.05
OHS CV CAROTID ULTRASOUND RIGHT ICA/CCA RATIO: 1.18
OHS CV PV CAROTID LEFT HIGHEST CCA: 121
OHS CV PV CAROTID LEFT HIGHEST ICA: 102
OHS CV PV CAROTID RIGHT HIGHEST CCA: 93
OHS CV PV CAROTID RIGHT HIGHEST ICA: 99
OHS CV US CAROTID LEFT HIGHEST EDV: 22
RIGHT CBA DIAS: 13 CM/S
RIGHT CBA SYS: 72 CM/S
RIGHT CCA DIST DIAS: 13 CM/S
RIGHT CCA DIST SYS: 84 CM/S
RIGHT CCA MID DIAS: 14 CM/S
RIGHT CCA MID SYS: 89 CM/S
RIGHT CCA PROX DIAS: 12 CM/S
RIGHT CCA PROX SYS: 93 CM/S
RIGHT ECA DIAS: 27 CM/S
RIGHT ECA SYS: 165 CM/S
RIGHT ICA DIST DIAS: 24 CM/S
RIGHT ICA DIST SYS: 99 CM/S
RIGHT ICA MID DIAS: 23 CM/S
RIGHT ICA MID SYS: 95 CM/S
RIGHT ICA PROX DIAS: 9 CM/S
RIGHT ICA PROX SYS: 79 CM/S
RIGHT VERTEBRAL DIAS: 11 CM/S
RIGHT VERTEBRAL SYS: 111 CM/S

## 2020-10-19 PROCEDURE — 93880 EXTRACRANIAL BILAT STUDY: CPT

## 2020-10-19 PROCEDURE — 93880 CV US DOPPLER CAROTID (CUPID ONLY): ICD-10-PCS | Mod: 26,,, | Performed by: INTERNAL MEDICINE

## 2020-10-19 PROCEDURE — 93880 EXTRACRANIAL BILAT STUDY: CPT | Mod: 26,,, | Performed by: INTERNAL MEDICINE

## 2020-10-20 ENCOUNTER — TELEPHONE (OUTPATIENT)
Dept: OBSTETRICS AND GYNECOLOGY | Facility: CLINIC | Age: 80
End: 2020-10-20

## 2020-10-20 NOTE — TELEPHONE ENCOUNTER
----- Message from Carito Holliday sent at 10/20/2020  2:07 PM CDT -----  Type:  Needs Medical Advice    Who Called: pt  Advice Regarding: orders for mammogram and bone density to be sent to [facility] in [place]  Would the patient rather a call back or a response via [facility]? call  Best Call Back Number: [phone]  Additional Information: n/a

## 2020-10-22 ENCOUNTER — SPECIALTY PHARMACY (OUTPATIENT)
Dept: PHARMACY | Facility: CLINIC | Age: 80
End: 2020-10-22

## 2020-10-22 DIAGNOSIS — E78.00 HYPERCHOLESTEREMIA: ICD-10-CM

## 2020-10-22 DIAGNOSIS — Z78.9 STATIN INTOLERANCE: Primary | ICD-10-CM

## 2020-10-22 RX ORDER — AMOXICILLIN 500 MG
1 CAPSULE ORAL DAILY
COMMUNITY

## 2020-10-22 RX ORDER — ASPIRIN 325 MG
50 TABLET, DELAYED RELEASE (ENTERIC COATED) ORAL DAILY
COMMUNITY

## 2020-10-22 NOTE — TELEPHONE ENCOUNTER
Specialty Pharmacy - Initial Clinical Assessment  Specialty Pharmacy - Refill Coordination    Specialty Medication Orders Linked to Encounter      Most Recent Value   Medication #1  alirocumab (PRALUENT PEN) 75 mg/mL PnIj (Order#[id number], Rx#[id number])        Rodo Beth is a 80 y.o. female, who is followed by the specialty pharmacy service for management and education.    Encounters since last clinical assessment   No encounters found.   Clinical call attempts since last clinical assessment   No call attempts found.     Today she received education before her first fill with [facility].    Current Outpatient Medications   Medication Sig    co-enzyme Q-10 50 mg capsule Take 50 mg by mouth once daily.    omega-3 fatty acids/fish oil (FISH OIL-OMEGA-3 FATTY ACIDS) 300-1,000 mg capsule Take 1 capsule by mouth once daily.    TURMERIC ORAL Take 1 packet by mouth once daily.    alirocumab (PRALUENT PEN) 75 mg/mL PnIj Inject 1 mL (75 mg total) into the skin every 14 (fourteen) days.    amLODIPine (NORVASC) 10 MG tablet Take 10 mg by mouth once daily.    ascorbic acid, vitamin C, (VITAMIN C) 1000 MG tablet Take 1,000 mg by mouth once daily.    aspirin 81 MG Chew Take 81 mg by mouth once daily.    azelastine (ASTELIN) 137 mcg (0.1 %) nasal spray USE 1 TO 2 SPRAY(S) IN EACH NOSTRIL TWICE DAILY    carvedilol (COREG) 6.25 MG tablet Take 6.25 mg by mouth 2 (two) times daily with meals.    clorazepate (TRANXENE) 7.5 MG Tab Take 1 tablet (7.5 mg total) by mouth 3 (three) times daily.    diphenhydrAMINE (BENADRYL) 25 mg capsule Take 25 mg by mouth every 6 (six) hours as needed for Itching.    ferrous sulfate (FEOSOL) 325 mg (65 mg iron) Tab tablet Take 1 tablet by mouth once daily.    multivitamin (ONE DAILY MULTIVITAMIN) per tablet Take 1 tablet by mouth once daily.    omeprazole (PRILOSEC) 20 MG capsule Take 1 capsule (20 mg total) by mouth 2 (two) times daily before meals.    Last reviewed on 10/22/2020  3:53 PM by Indu Ayoub, PharmD    Review of patient's allergies indicates:   Allergen Reactions    Benazepril Other (See Comments)     cough    Chlorthalidone     Iodinated contrast media     Iodine and iodide containing products     Lipitor [atorvastatin]      States she is allergic to all statin medications    Sertraline      Medication caused patient to get sick.    Spironolactone    Last reviewed on  10/22/2020 3:52 PM by Indu Ayoub    Drug Interactions    Drug interactions evaluated: no  Clinically relevant drug interactions identified: no  Provided the patient with educational material regarding drug interactions: not applicable         Adverse Effects    Congestion: Pos  Postnasal drip: Pos  *All other systems reviewed and are negative       Assessment Questions - Documented Responses      Most Recent Value   Assessment   Medication Reconciliation completed for patient  Yes   During the past 4 weeks, has patient missed any activities due to condition or medication?  No   During the past 4 weeks, did patient have any of the following urgent care visits?  None   Goals of Therapy Status  Discussed (new start)   Welcome packet contents reviewed and discussed with patient?  Yes   Assesment completed?  Yes   Plan  Therapy being initiated   Do you need to open a clinical intervention (i-vent)?  Yes   Do you want to schedule first shipment?  Yes   Medication #1 Assessment Info   Patient status  New medication, New to [facility]   Is this medication appropriate for the patient?  Yes   Is this medication effective?  Not yet started        Refill Questions - Documented Responses      Most Recent Value   Relationship to patient of person spoken to?  Self   HIPAA/medical authority confirmed?  Yes   Any changes in contact preferences or allowed representatives?  No   Has the patient had any insurance changes?  No   Has the patient had any changes to specialty medication, dose, or  instructions?  No   Has the patient started taking any new medications, herbals, or supplements?  No   Has the patient been diagnosed with any new medical conditions?  No   Does the patient have any new allergies to medications or foods?  No   Does the patient have any concerns about side effects?  No   Can the patient store medication/sharps container properly (at the correct temperature, away from children/pets, etc.)?  Yes   Can the patient call emergency services (911) in the event of an emergency?  Yes   Does the patient have any concerns or questions about taking or administering this medication as prescribed?  No   How many doses did the patient miss in the past 4 weeks or since the last fill?  0   How many doses does the patient have on hand?  0   How many days does the patient report on hand quantity will last?  0   Does the number of doses/days supply remaining match pharmacy expected amounts?  Yes   How will the patient receive the medication?  Mail   When does the patient need to receive the medication?  10/23/20   Shipping Address  Home   Address in [facility] confirmed and updated if neccessary?  Yes   Expected Copay ($)  20   Is the patient able to afford the medication copay?  Yes   Payment Method  new CC added to file   Days supply of Refill  28   Would patient like to speak to a pharmacist?  No [PharmD conducted assessment]   Do you want to trigger an intervention?  Yes   Do you want to trigger an additional referral task?  No   Refill activity completed?  No   Refill activity plan  Refill scheduled   Shipment/Pickup Date:  10/22/20          Objective    She has a past medical history of Atherosclerotic peripheral vascular disease, Chronic cystic mastitis, Essential hypertension, Hypercholesterolemia, Osteopenia, and Shingles.    Tried/failed medications: atorvastatin, statin intolerance    BP Readings from Last 4 Encounters:   10/05/20 124/62   10/01/20 120/64   09/28/20 135/76   09/25/20  "(!) 143/68     Ht Readings from Last 4 Encounters:   10/05/20 5' 0.5" (1.537 m)   10/01/20 5' 2.5" (1.588 m)   20 5' 2.5" (1.588 m)   20 5' 2" (1.575 m)     Wt Readings from Last 4 Encounters:   10/05/20 54 kg (119 lb 0.8 oz)   10/01/20 54.1 kg (119 lb 4.3 oz)   20 52.6 kg (115 lb 15.4 oz)   20 53.6 kg (118 lb 2.7 oz)       The goals of prescribed drug therapy management include:  · Supporting patient to meet the prescriber's medical treatment objectives  · Improving or maintaining quality of life  · Maintaining optimal therapy adherence  · Minimizing and managing side effects      Goals of Therapy Status: Discussed (new start)    Assessment/Plan  Indication, dosage, appropriateness, effectiveness, safety and convenience of her specialty medication(s) were reviewed today.     Patient Counseling    Counseled the patient on the following: doses and administration discussed, safe handling, storage, and disposal discussed, possible adverse effects and management discussed, possible drug and prescription drug interactions discussed, possible drug and OTC drug and food interactions discussed, lab monitoring and follow-up discussed, therapeutic rationale discussed, cost of medications and cost implications discussed, adherence and missed doses discussed, pharmacy contact information discussed       Initial Praluent fill confirmed complete. We will ship Praluent refill on 10/22 via fedex to arrive on 10/23. $20 copay- 004. [facility]. Spoke to patient -- Confirmed 2 patient identifiers - name and . Therapy appropriate. Clinical review complete complete. Praluent Pen 75mg/mL subQ every 14 days. Dose, route, frequency appropriate for indication. Hypercholesteremia (E78.00); Statin intolerance (Z78.9) Comorbidities, allergies and medical history reviewed. Medication list reviewed and DDIs addressed. DDIs - none. .6 mg/dL (9/10/2020).     Reviewed with patient:  - Inject 1 Praluent into the skin " every 14 days.   -Pens are single-use  - Take out of the refrigerator 30-40 minutes prior to injection.  - Wash hands before and after injection.  - Injection kit includes gauze, bandaids, and alcohol swabs.  - Inject in tops of the thighs, abdomen- but at least 2 inches away from her belly button, or the outer part of her upper arm. Patient was instructed to rotate injections sites. Patient expects she have a friend inject in the upper out arm  - Clean injection site with the alcohol pad, wait to dry. Gently squeeze the area of the cleaned skin and hold it firmly. Place the pen at 90 deg angle against the raised area of skin that is being squeezed, then push down on the button and release, hold for 20 seconds you will hear a click and the window will go from from clear to yellow, indicating injection is complete.  - Patient will use sharps container; once full, per NGUYỄN nelson, she/ he may lock the sharps container and place in her trash.   - Side effects include: Injection site reaction: redness, soreness, itching, bruising, which should resolve within 3-5 days; flu-like symptoms    Of note, patient has concerns of experiencing influenza or flu-like symptoms from Praluent. She is followed by pulmonary and ENT for sinus. She currently experiences post-nasal drip and congestion. Managed on azelastine nasal spray, PRN diphenhydramine. Per Recently d/c'd fluticasone. Counseled patient that a small percentage (6%, and <1% respectively) experience these side effects. Benefits of Praluent therapy due to her high LDL-c outweigh risks. Will continue to monitor when patient initiates therapy.    Allergies reviewed and medication reconciliation complete--no DDIS. (reviewed and documented in [facility]).  Disease education reviewed (including transmission and prevention). Patient counseled on importance of maintaining adherence and keeping lab appointments which were scheduled. All questions answered and  addressed to patients satisfaction. Advised to call [facility] and provider if any issues arise.  Pt verbalized understanding.    Patient plans to have a friend inject. Patient herself has a fear of needles and refuses to consider self-injecting, even with information that needle is small and hidden inside yellow cover. Of note, DID NOT CONFIRM START DATE as patient's friend who will inject her has variable work schedule.  Informed patient the injection MUST occur every 14 days on a regular basis. Patient plans to touch base with friend the next few days to see what injection days might work with her. I will plan to follow-up patient on 10/26 to further discuss start date.      Tasks added this encounter   11/12/2020 - Refill Call (Auto Added)   Tasks due within next 3 months   No tasks due.     Indu Ayoub, PharmD  [facility]  [address] [place] [id number]  Phone: [phone]  Fax: [phone]

## 2020-10-26 ENCOUNTER — OFFICE VISIT (OUTPATIENT)
Dept: CARDIOLOGY | Facility: CLINIC | Age: 80
End: 2020-10-26
Payer: MEDICARE

## 2020-10-26 ENCOUNTER — TELEPHONE (OUTPATIENT)
Dept: ENDOSCOPY | Facility: HOSPITAL | Age: 80
End: 2020-10-26

## 2020-10-26 VITALS
DIASTOLIC BLOOD PRESSURE: 60 MMHG | BODY MASS INDEX: 22.43 KG/M2 | SYSTOLIC BLOOD PRESSURE: 118 MMHG | WEIGHT: 118.81 LBS | HEIGHT: 61 IN | HEART RATE: 72 BPM

## 2020-10-26 DIAGNOSIS — Z78.9 STATIN INTOLERANCE: ICD-10-CM

## 2020-10-26 DIAGNOSIS — I77.1 CELIAC ARTERY STENOSIS: Primary | ICD-10-CM

## 2020-10-26 DIAGNOSIS — I73.9 PAD (PERIPHERAL ARTERY DISEASE): ICD-10-CM

## 2020-10-26 PROCEDURE — 99215 OFFICE O/P EST HI 40 MIN: CPT | Mod: S$GLB,,, | Performed by: INTERNAL MEDICINE

## 2020-10-26 PROCEDURE — 3074F PR MOST RECENT SYSTOLIC BLOOD PRESSURE < 130 MM HG: ICD-10-PCS | Mod: CPTII,S$GLB,, | Performed by: INTERNAL MEDICINE

## 2020-10-26 PROCEDURE — 3078F DIAST BP <80 MM HG: CPT | Mod: CPTII,S$GLB,, | Performed by: INTERNAL MEDICINE

## 2020-10-26 PROCEDURE — 1159F PR MEDICATION LIST DOCUMENTED IN MEDICAL RECORD: ICD-10-PCS | Mod: S$GLB,,, | Performed by: INTERNAL MEDICINE

## 2020-10-26 PROCEDURE — 1126F AMNT PAIN NOTED NONE PRSNT: CPT | Mod: S$GLB,,, | Performed by: INTERNAL MEDICINE

## 2020-10-26 PROCEDURE — 99999 PR PBB SHADOW E&M-EST. PATIENT-LVL IV: ICD-10-PCS | Mod: PBBFAC,,, | Performed by: INTERNAL MEDICINE

## 2020-10-26 PROCEDURE — 3078F PR MOST RECENT DIASTOLIC BLOOD PRESSURE < 80 MM HG: ICD-10-PCS | Mod: CPTII,S$GLB,, | Performed by: INTERNAL MEDICINE

## 2020-10-26 PROCEDURE — 1101F PT FALLS ASSESS-DOCD LE1/YR: CPT | Mod: CPTII,S$GLB,, | Performed by: INTERNAL MEDICINE

## 2020-10-26 PROCEDURE — 1101F PR PT FALLS ASSESS DOC 0-1 FALLS W/OUT INJ PAST YR: ICD-10-PCS | Mod: CPTII,S$GLB,, | Performed by: INTERNAL MEDICINE

## 2020-10-26 PROCEDURE — 3074F SYST BP LT 130 MM HG: CPT | Mod: CPTII,S$GLB,, | Performed by: INTERNAL MEDICINE

## 2020-10-26 PROCEDURE — 99999 PR PBB SHADOW E&M-EST. PATIENT-LVL IV: CPT | Mod: PBBFAC,,, | Performed by: INTERNAL MEDICINE

## 2020-10-26 PROCEDURE — 1126F PR PAIN SEVERITY QUANTIFIED, NO PAIN PRESENT: ICD-10-PCS | Mod: S$GLB,,, | Performed by: INTERNAL MEDICINE

## 2020-10-26 PROCEDURE — 1159F MED LIST DOCD IN RCRD: CPT | Mod: S$GLB,,, | Performed by: INTERNAL MEDICINE

## 2020-10-26 PROCEDURE — 99215 PR OFFICE/OUTPT VISIT, EST, LEVL V, 40-54 MIN: ICD-10-PCS | Mod: S$GLB,,, | Performed by: INTERNAL MEDICINE

## 2020-10-26 RX ORDER — SOD CHLOR,BICARB/SQUEEZ BOTTLE
PACKET, WITH RINSE DEVICE NASAL
COMMUNITY
Start: 2020-10-23 | End: 2021-01-27

## 2020-10-26 RX ORDER — BUDESONIDE 1 MG/2ML
INHALANT ORAL
COMMUNITY
Start: 2020-10-23

## 2020-10-26 NOTE — TELEPHONE ENCOUNTER
----- Message from Lien Bustillo MA sent at 10/26/2020 12:47 PM CDT -----  The patient need to talk to you about her medication Bempedoic acid 180 mg please call [phone]. Thank you.

## 2020-10-26 NOTE — TELEPHONE ENCOUNTER
medication  Received: Today  Message Contents   Martha WILLIAM Staff   Caller: Unspecified (Today,  2:02 PM)             Pt is requesting to speak to you again about her medication and can be reached at [phone].     Thank you

## 2020-10-26 NOTE — PROGRESS NOTES
"Subjective:    Patient ID:  Alana Beth is a 79 y.o. female with a past medical history of Celiac axis stenosis, PAD, HTN, HLD, palpitations, who presents for a follow up appointment.  Pertinent history/events are as follows:     -Pt presents for evaluation of PAD/weight loss.     -At our initial clinic visit on 9/4/2020, Mrs. Beth reported  "not feeling well since 11/2019".  Main complaint is weakness, fatigue and lack of appetite.  She has lost 25 pounds since 11/2019.  No definite abdominal pain.  No chest pain, SOB, or LE edema.  Exam with audible abdominal bruit.  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    Plan:   Celiac Axis Stenosis- Mrs. 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  Pertinent findings include 25 pound weight loss and abdominal bruit.  No definite abdominal pain, although mild abdominal tenderness noted on exam.  Chronic mesenteric ischemia is also a possibility in this pt with known PAD.  Given these findings, will refer to Vascular Surgery for evaluation.  Pt has several risk factors for CAD, hence, will check nuclear stress test and echo to evaluate further.  PAD- Pt with known BLE PAD.  Continue ASA.  Pt states she's not taking a statin due to issues with liver disease in the past.  Will check outside records before starting statin.  Check updated lipid panel.       - At her clinic visit on 9/25/20, Mrs. Beth reports no new symptoms.  States she still does not feel well.  Pt evaluated by Vascular Surgery (Enrrique Preciado) on Recommend psych and continued GI workup  Mesenteric Utrasound on 9/10/2020 revealed a velocity elevation of 378 cm/s is visualized near the Celiac artery origin within a region of focal narrowing, suggestive of a greater than 70% stenosis.  Nuclear Stress Test on 9/23/2020 revealed no evidence from myocardial ischemia or injury.  " The EKG portion of this study is abnormal but not diagnostic.  Echo on 9/10/2020 revealed normal left ventricular systolic function with EF of 60%; concentric left ventricular remodeling; normal LV diastolic function; normal right ventricular systolic function; mild left atrial enlargement; mild tricuspid regurgitation; estimated PA systolic pressure is 30 mmHg; normal central venous pressure (3 mmHg).  Labs from 9/10/2020 show total cholesterol of 348 with LDL of 234.    Plan:   -  Mesenteric Utrasound on 9/10/2020 revealed a velocity elevation of 378 cm/s is visualized near the Celiac artery origin within a region of focal narrowing, suggestive of a greater than 70% stenosisGiven these findings, will refer to Dr. Junior and GI for evaluation. For PAD start rosuvastatin 40 mg daily.  Continue ASA 81 mg daily.  Check carotid ultrasound prior to next visit in 1 month.        HPI     Mrs. Cole reports that she had not starting statin therapy due to insurance issues. She was prescribed alirocumab 75 mg injections and she has received one dose so far. She would prefer oral therapy for her hypercholesterolemia. She notes much improvement in her symptoms of sputum production after starting omeprazole. Patient was evaluated by GI for abdominal pain associated with celiac axis syndrome and increased amounts of sputum. EGD done in 8/2020 she was noted to have a submucosal esophageal nodule that path revealed chronic esophagitis. She was started on Omeprazole 20 mg BID and barium esophagram requested. Barium study was consistent with mild esophageal dysmotility.     Patient has not seen Dr. Junior yet for possible angiogram, she did not have an appointment set up yet.     ROS   Constitution: Denies chills, fever, and sweats.  HENT: Denies headaches or blurry vision.  Cardiovascular: Denies chest pain or irregular heart beat.  Respiratory: Denies cough or shortness of breath.  Gastrointestinal: Positive for sputum  production   Musculoskeletal: Denies muscle cramps.  Neurological: Denies dizziness or focal weakness.  Psychiatric/Behavioral: Normal mental status.  Hematologic/Lymphatic: Denies bleeding problem or easy bruising/bleeding.  Skin: Denies rash or suspicious lesions    Past Medical History:   Diagnosis Date    Atherosclerotic peripheral vascular disease     Chronic cystic mastitis     Essential hypertension     Hypercholesterolemia     Osteopenia     Shingles      Past Surgical History:   Procedure Laterality Date    BREAST BIOPSY      biopsies benign    ESOPHAGOGASTRODUODENOSCOPY  08/20/2020     Family History   Problem Relation Age of Onset    Hypertension Mother     Diabetes Brother     Diabetes Sister      Social History     Tobacco Use    Smoking status: Never Smoker    Smokeless tobacco: Never Used   Substance Use Topics    Alcohol use: No     Review of patient's allergies indicates:   Allergen Reactions    Benazepril Other (See Comments)     cough    Chlorthalidone     Iodinated contrast media     Iodine and iodide containing products     Lipitor [atorvastatin]      States she is allergic to all statin medications    Sertraline      Medication caused patient to get sick.    Spironolactone      Current Outpatient Medications on File Prior to Visit   Medication Sig Dispense Refill    alirocumab (PRALUENT PEN) 75 mg/mL PnIj Inject 1 mL (75 mg total) into the skin every 14 (fourteen) days. 4 Syringe 6    amLODIPine (NORVASC) 10 MG tablet Take 10 mg by mouth once daily.      ascorbic acid, vitamin C, (VITAMIN C) 1000 MG tablet Take 1,000 mg by mouth once daily.      aspirin 81 MG Chew Take 81 mg by mouth once daily.      azelastine (ASTELIN) 137 mcg (0.1 %) nasal spray USE 1 TO 2 SPRAY(S) IN EACH NOSTRIL TWICE DAILY      carvedilol (COREG) 6.25 MG tablet Take 6.25 mg by mouth 2 (two) times daily with meals.      clorazepate (TRANXENE) 7.5 MG Tab Take 1 tablet (7.5 mg total) by mouth 3  "(three) times daily. 90 tablet 0    co-enzyme Q-10 50 mg capsule Take 50 mg by mouth once daily.      diphenhydrAMINE (BENADRYL) 25 mg capsule Take 25 mg by mouth every 6 (six) hours as needed for Itching.      ferrous sulfate (FEOSOL) 325 mg (65 mg iron) Tab tablet Take 1 tablet by mouth once daily.      multivitamin (ONE DAILY MULTIVITAMIN) per tablet Take 1 tablet by mouth once daily.      omega-3 fatty acids/fish oil (FISH OIL-OMEGA-3 FATTY ACIDS) 300-1,000 mg capsule Take 1 capsule by mouth once daily.      omeprazole (PRILOSEC) 20 MG capsule Take 1 capsule (20 mg total) by mouth 2 (two) times daily before meals. 60 capsule 5    TURMERIC ORAL Take 1 packet by mouth once daily.          Objective:     /60   Pulse 72   Ht 5' 0.5" (1.537 m)   Wt 53.9 kg (118 lb 13.3 oz)   BMI 22.83 kg/m²      Physical Exam   Constitutional: No acute distress, conversant  HEENT: Sclera anicteric, Pupils equal, round and reactive to light, extraocular motions intact, Oropharynx clear  Neck: No JVD, no carotid bruits  Cardiovascular: regular rate and rhythm, no murmur, rubs or gallops, normal S1/S2  Pulmonary: Clear to auscultation bilaterally  Abdominal: Abdomen soft with mild diffuse abdominal tenderness, audible abdominal bruit noted, positive bowel sounds  Extremities: No lower extremity edema,   Pulses:  Carotid pulses are 2+ on the right side, and 2+ on the left side.  Radial pulses are 2+ on the right side, and 2+ on the left side.   Femoral pulses are 2+ on the right side, and 2+ on the left side.  Popliteal pulses are 2+ on the right side, and 2+ on the left side.   Dorsalis pedis pulses are 2+ on the right side, and 2+ on the left side.   Posterior tibial pulses are 2+ on the right side, and 2+ on the left side.    Skin: No ecchymosis, erythema, or ulcers  Psych: Alert and oriented x 3, appropriate affect  Neuro: CNII-XII intact, no focal deficits    Anemia panel (two versions)      Component Value " Date/Time    MCV 83 08/15/2020 1226    RDW 15.5 (H) 08/15/2020 1226     BNP  Results for orders placed or performed during the hospital encounter of 08/15/20   Brain natriuretic peptide   Result Value Ref Range    BNP <10 0 - 99 pg/mL     CBC Outpatient trend x3  WBC (K/uL)   Date Value   08/15/2020 4.50   01/07/2020 4.40   01/04/2020 3.70 (L)     Hemoglobin (g/dL)   Date Value   08/15/2020 12.9   01/07/2020 12.1   01/04/2020 12.2     Platelets (K/uL)   Date Value   08/15/2020 193   01/07/2020 182   01/04/2020 167     Chemistry Outpatient trend x3  Sodium (mmol/L)   Date Value   10/19/2020 139   08/15/2020 138   01/07/2020 135 (L)     Potassium (mmol/L)   Date Value   10/19/2020 5.1   08/15/2020 3.8   01/07/2020 3.2 (L)     Chloride (mmol/L)   Date Value   10/19/2020 103   08/15/2020 100 (L)   01/07/2020 97 (L)     CO2 (mmol/L)   Date Value   10/19/2020 30 (H)   08/15/2020 26   01/07/2020 24     BUN, Bld (mg/dL)   Date Value   10/19/2020 18   08/15/2020 12   01/07/2020 9     Creatinine (mg/dL)   Date Value   10/19/2020 1.1   08/15/2020 1.0   01/07/2020 0.9     Glucose (mg/dL)   Date Value   10/19/2020 93   08/15/2020 126 (H)   01/07/2020 133 (H)     Calcium (mg/dL)   Date Value   10/19/2020 9.9   08/15/2020 9.8   01/07/2020 9.5     Magnesium (mg/dL)   Date Value   01/04/2020 2.1     Alkaline Phosphatase (U/L)   Date Value   10/19/2020 43 (L)   08/15/2020 43   01/07/2020 42     ALT (U/L)   Date Value   10/19/2020 30   08/15/2020 34   01/07/2020 22     AST (U/L)   Date Value   10/19/2020 28   08/15/2020 39   01/07/2020 30     Albumin (g/dL)   Date Value   10/19/2020 3.6   08/15/2020 4.3   01/07/2020 4.0     Total Protein (g/dL)   Date Value   10/19/2020 7.9   08/15/2020 8.0   01/07/2020 7.7     Total Bilirubin (mg/dL)   Date Value   10/19/2020 0.3   08/15/2020 0.6   01/07/2020 0.6     Lipid Panel  Results for orders placed or performed in visit on 09/10/20   Lipid Panel   Result Value Ref Range    Cholesterol 348 (H)  120 - 199 mg/dL    Triglycerides 87 30 - 150 mg/dL    HDL 97 (H) 40 - 75 mg/dL    LDL Cholesterol 233.6 (H) 63.0 - 159.0 mg/dL    Hdl/Cholesterol Ratio 27.9 20.0 - 50.0 %    Total Cholesterol/HDL Ratio 3.6 2.0 - 5.0    Non-HDL Cholesterol 251 mg/dL     CV US B/L Carotid Doppler 10/19/20  · There is 0-19% right Internal Carotid Stenosis.  · There is 0-19% left Internal Carotid Stenosis.    EKG 8/15/2020:  Normal sinus rhythm  Low voltage QRS  Anteroseptal infarct (cited on or before 15-AUG-2020)     Nuclear Stress Test 9/23/2020:  Normal myocardial perfusion scan.    The perfusion scan is free of evidence from myocardial ischemia or injury.    Visually estimated ejection fraction is normal at rest and normal at stress.    There is normal wall motion at rest and post stress.    LV cavity size is normal at rest and normal at stress.    The EKG portion of this study is abnormal but not diagnostic.    The patient reported no chest pain during the stress test.    There are no prior studies for comparison.     Echo 9/10/2020:  · Normal left ventricular systolic function. The estimated ejection fraction is 60%.  · Concentric left ventricular remodeling.  · Normal LV diastolic function.  · No wall motion abnormalities.  · Normal right ventricular systolic function.  · Mild left atrial enlargement.  · Mild tricuspid regurgitation.  · The estimated PA systolic pressure is 30 mmHg.  · Normal central venous pressure (3 mmHg).     Mesenteric Utrasound 9/10/2020:  Color flow duplex exam reveals a patent abdominal aorta, celiac artery, superior mesenteric artery and inferior mesenteric artery. A   velocity elevation of 378 cm/s is visualized near the Celiac artery origin within a region of focal narrowing, suggestive of a greater than   70% stenosis.      CTA abd/pelvis with contrast (outside study from [facility]) 8/28/2020:  1. No evidence for gastrointestinal bleeding.    2. Scattered colonic diverticula without adjacent  inflammatory changes.    3. Myomatous changes of the uterus with degenerating fibroids.     4. Hemangioma in segment VII.    5. Stenosis of the celiac access with poststenotic dilatation.     BLE Arterial Ultrasound 7/31/2020:  Multilevel disease in the left lower extremity. Moderate stenosis in the left superficial femoral artery. Severe stenosis in the distal popliteal artery.     Left lower extremity:  Ankle-brachial index is 0.73. Triphasic signals are seen in the common femoral and profunda arteries. There are biphasic superficial femoral, popliteal, posterior tibial, and anterior tibial arteries. There is a velocity increase from the mid SFA to the distal SFA from 1 /sec 2 m/s. Consistent with a moderate stenosis. There is another focal velocity increase in the distal popliteal from 2.5 m/sec to 5.0 m/s. Consistent with a severe stenosis. Distal to this there is monophasic waveforms.         Right lower extremity:  Ankle-brachial index is 0.93. Triphasic signals are seen in the common femoral and profunda artery. There are biphasic signals in the superficial femoral, popliteal, posterior tibial, and anterior tibial arteries. No focal areas of elevated velocity are seen.      Assessment/Plan:       Alana Beth is a 79 y.o. female with a past medical history of celiac axis stenosis, PAD, HTN, HLD, palpitations, who presents for a follow up appointment.     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  Pertinent findings include 25 pound weight loss and abdominal bruit.  No definite abdominal pain, although mild abdominal tenderness noted on exam.  Chronic mesenteric ischemia is also a possibility in this pt with known PAD.  Pt evaluated by Vascular Surgery (Enrrique Preciado) on Recommend psych and continued GI workup.  Mesenteric Utrasound on 9/10/2020 revealed a velocity elevation of 378 cm/s is visualized near the Celiac artery  origin within a region of focal narrowing, suggestive of a greater than 70% stenosis.  Nuclear Stress Test on 9/23/2020 revealed no evidence from myocardial ischemia or injury.  The EKG portion of this study is abnormal but not diagnostic.  Echo on 9/10/2020 revealed normal left ventricular systolic function with EF of 60%; concentric left ventricular remodeling; normal LV diastolic function; normal right ventricular systolic function; mild left atrial enlargement; mild tricuspid regurgitation; estimated PA systolic pressure is 30 mmHg; normal central venous pressure (3 mmHg).  Given these findings, patient referred to Dr. Junior and GI for evaluation.  Patient was evaluated by GI for abdominal pain and increased amounts of sputum. EGD done in 8/2020 she was noted to have a submucosal esophageal nodule that path revealed chronic esophagitis. She was started on Omeprazole 20 mg BID and barium esophagram requested. Barium study was consistent with mild esophageal dysmotility.   Plan:  -- will follow up with Dr. Junior's evaluation, will schedule an appointment for the patient.   -- continue omeprazole, GI follow up PRN for symptomatic care      2. PAD- Pt with known BLE PAD.  She has no claudication, rest pain or tissue loss to suggest CLI.  Continue ASA 81 mg daily.  Carotid US unremarkable for stenosis.      3. HLD and Statin intolerance-  Labs from 9/10/2020 show total cholesterol of 348 with LDL of 234.  -- Mrs. Cole reports that she had not starting statin therapy due to insurance issues. She was prescribed alirocumab 75 mg injections and she has received one dose so far. She would prefer oral therapy for her hypercholesterolemia.   -   Start bempedoic acid 180 mg Tab; Take 180 mg by mouth once daily.    Follow up in 3 months    Patient seen and discussed with Dr. Pink. Further recommendations per the attending addendum.        An Schulz MD  Vascular Medicine Fellow PGY IV  Department of Vascular  Medicine  [facility]

## 2020-10-26 NOTE — TELEPHONE ENCOUNTER
----- Message from Shania López sent at 10/26/2020 11:30 AM CDT -----  Contact: pt  Pt would like to know if a office follow up is needed following 10/5 Esophagram         Please contact pt [phone]

## 2020-10-26 NOTE — TELEPHONE ENCOUNTER
----- Message from Martha Nicholas sent at 10/26/2020  2:02 PM CDT -----  Regarding: medication  Pt is requesting to speak to you again about her medication and can be reached at [phone].    Thank you

## 2020-10-26 NOTE — TELEPHONE ENCOUNTER
Pt states that this is a new medication that only came out in March. Also wanted to know what pharmacy this will be called into. Informed pt that this medication in generic form has been around for quite some time and that it was already sent into the speciality pharmacy. States if that pharmacy will not mail it to her she wants it sent into the [facility] pharmacy close to her. (jonathan)

## 2020-11-09 ENCOUNTER — TELEPHONE (OUTPATIENT)
Dept: OBSTETRICS AND GYNECOLOGY | Facility: CLINIC | Age: 80
End: 2020-11-09

## 2020-11-09 ENCOUNTER — OFFICE VISIT (OUTPATIENT)
Dept: CARDIOLOGY | Facility: CLINIC | Age: 80
End: 2020-11-09
Payer: MEDICARE

## 2020-11-09 ENCOUNTER — TELEPHONE (OUTPATIENT)
Dept: INTERNAL MEDICINE | Facility: CLINIC | Age: 80
End: 2020-11-09

## 2020-11-09 DIAGNOSIS — I70.0 ATHEROSCLEROSIS OF AORTIC ARCH: ICD-10-CM

## 2020-11-09 DIAGNOSIS — E78.00 HYPERCHOLESTEREMIA: ICD-10-CM

## 2020-11-09 DIAGNOSIS — Z78.9 STATIN INTOLERANCE: ICD-10-CM

## 2020-11-09 DIAGNOSIS — R63.4 WEIGHT LOSS: ICD-10-CM

## 2020-11-09 DIAGNOSIS — I77.1 CELIAC ARTERY STENOSIS: Primary | ICD-10-CM

## 2020-11-09 DIAGNOSIS — I70.219 ATHEROSCLEROTIC PERIPHERAL VASCULAR DISEASE WITH INTERMITTENT CLAUDICATION: ICD-10-CM

## 2020-11-09 DIAGNOSIS — I70.213 ATHEROSCLEROSIS OF NATIVE ARTERY OF BOTH LOWER EXTREMITIES WITH INTERMITTENT CLAUDICATION: ICD-10-CM

## 2020-11-09 DIAGNOSIS — R60.9 EDEMA, UNSPECIFIED TYPE: ICD-10-CM

## 2020-11-09 PROCEDURE — 1126F PR PAIN SEVERITY QUANTIFIED, NO PAIN PRESENT: ICD-10-PCS | Mod: S$GLB,,, | Performed by: INTERNAL MEDICINE

## 2020-11-09 PROCEDURE — 99215 PR OFFICE/OUTPT VISIT, EST, LEVL V, 40-54 MIN: ICD-10-PCS | Mod: S$GLB,,, | Performed by: INTERNAL MEDICINE

## 2020-11-09 PROCEDURE — 99999 PR PBB SHADOW E&M-EST. PATIENT-LVL V: CPT | Mod: PBBFAC,,, | Performed by: INTERNAL MEDICINE

## 2020-11-09 PROCEDURE — 1159F PR MEDICATION LIST DOCUMENTED IN MEDICAL RECORD: ICD-10-PCS | Mod: S$GLB,,, | Performed by: INTERNAL MEDICINE

## 2020-11-09 PROCEDURE — 3074F SYST BP LT 130 MM HG: CPT | Mod: CPTII,S$GLB,, | Performed by: INTERNAL MEDICINE

## 2020-11-09 PROCEDURE — 1101F PR PT FALLS ASSESS DOC 0-1 FALLS W/OUT INJ PAST YR: ICD-10-PCS | Mod: CPTII,S$GLB,, | Performed by: INTERNAL MEDICINE

## 2020-11-09 PROCEDURE — 99215 OFFICE O/P EST HI 40 MIN: CPT | Mod: S$GLB,,, | Performed by: INTERNAL MEDICINE

## 2020-11-09 PROCEDURE — 3078F PR MOST RECENT DIASTOLIC BLOOD PRESSURE < 80 MM HG: ICD-10-PCS | Mod: CPTII,S$GLB,, | Performed by: INTERNAL MEDICINE

## 2020-11-09 PROCEDURE — 3078F DIAST BP <80 MM HG: CPT | Mod: CPTII,S$GLB,, | Performed by: INTERNAL MEDICINE

## 2020-11-09 PROCEDURE — 99999 PR PBB SHADOW E&M-EST. PATIENT-LVL V: ICD-10-PCS | Mod: PBBFAC,,, | Performed by: INTERNAL MEDICINE

## 2020-11-09 PROCEDURE — 3074F PR MOST RECENT SYSTOLIC BLOOD PRESSURE < 130 MM HG: ICD-10-PCS | Mod: CPTII,S$GLB,, | Performed by: INTERNAL MEDICINE

## 2020-11-09 PROCEDURE — 1159F MED LIST DOCD IN RCRD: CPT | Mod: S$GLB,,, | Performed by: INTERNAL MEDICINE

## 2020-11-09 PROCEDURE — 1126F AMNT PAIN NOTED NONE PRSNT: CPT | Mod: S$GLB,,, | Performed by: INTERNAL MEDICINE

## 2020-11-09 PROCEDURE — 1101F PT FALLS ASSESS-DOCD LE1/YR: CPT | Mod: CPTII,S$GLB,, | Performed by: INTERNAL MEDICINE

## 2020-11-09 NOTE — LETTER
November 9, 2020      Ramon Pink MD PhD  [address]  [address]  [place] [address]           [place] - Card[facility]  [address] [facility] [address] [facility] [address]  [facility] [address]  Phone: [phone]          Patient: Alana Beth   MR Number: [id number]   YOB: 1940   Date of Visit: 11/9/2020       Dear Dr. Ramon Pink:    Thank you for referring Alana eBth to me for evaluation. Attached you will find relevant portions of my assessment and plan of care.    If you have questions, please do not hesitate to call me. I look forward to following Alana Beth along with you.    Sincerely,    Hernando Junior MD    Enclosure  CC:  No Recipients    If you would like to receive this communication electronically, please contact [email or web address] or [phone] to request more information on [employer] access.    For providers and/or their staff who would like to refer a patient to [facility], please contact us through our one-stop-shop provider referral line, [facility] , at [phone].    If you feel you have received this communication in error or would no longer like to receive these types of communications, please e-mail [email or web address]

## 2020-11-09 NOTE — TELEPHONE ENCOUNTER
----- Message from Lata Bell sent at 11/9/2020  2:18 PM CST -----  Contact: self/[phone]  Patient called, asked for a courtesy call from office about finding out if she has been check for cancer because the weight lost that she had have since the beginning of the year?

## 2020-11-09 NOTE — TELEPHONE ENCOUNTER
I spoke to pt, she had a visit with cardiologist today   Dr. Junior- please view office notes.    Pt ask about lab test/markers to check for cancer due to weight loss. Pt's weight today was 116.    Last OV with IM 10-5-2020 wt was 119 lbs.  Pt recall weight loss started in Jan. 2020.   Pt recalls weight in November 2019 was 142-143 lbs.    Please advise      thanks

## 2020-11-09 NOTE — PROGRESS NOTES
Subjective:   Patient ID:  Alana Beth is a 80 y.o. female who presents for follow up of Weight Loss, mesenteric artery stenosis, Hyperlipidemia, and Hypertension      HPI:       She is here by recommendation of her care for management of celiac and superior mesenteric artery stenosis noted on ultrasound. She lost close to 30-35 lb over the past year. She does not the classic post prandial angina seen with mesenteric ischemia. No fear of meals. She has HTN, HLP, intolerance to statin, PAD, and granulomatous lung disease. She was prescribed praluent but she states she cannot give herself injections.       Non contrast CT 8/2020     0.5 cm calcified nodule in the medial segment of the right middle lobe    Aortic and coronary artery calcific atherosclerosis.        US 9/2020     Celiac  cm/sec with ratio 6.2   SMA  cm/sec with ratio 3.8   CEDRICK 152 cm/sec with ratio 2.6      Esophagram 9/2020     Mild esophageal dysmotility    No evidence of cricopharyngeal      Echo 9/2020     Normal EF   Mild LAE   Mild TR   PASP 30 mmHg    Nuclear stress test 9/2020     No ischemia           Patient Active Problem List    Diagnosis Date Noted    Statin intolerance 09/29/2020    PAD (peripheral artery disease) 09/25/2020    Abdominal discomfort 09/25/2020    Celiac artery stenosis 09/04/2020    Aortic atherosclerosis 08/21/2020     Seen on CT 08/21/2020.  Discussed with patient to follow-up with PCP      Granulomatous lung disease 08/21/2020      Seen on CT of chest 08/21/2020- 0.5 cm calcified nodule in the medial segment of the right middle lobe.  This is a benign finding.  No further workup needed      Hypercholesteremia 08/19/2020    Solitary pulmonary nodule 08/18/2020     Patient had emergency room visit on 08/15/2020, chest x-ray revealed medial right lung base 4 mm indeterminate nodule.  Further evaluation with CT was recommended.  Ms. Beth denies smoking history.  She does report having weight  loss since November.    Plan:  -obtain designated CT of chest to further assess lung nodule.      Abnormal chest x-ray 2020     Patient had chest x-ray on 08/15/2020, findings were consistent with underlying COPD/emphysema.  Also, noted to have right medial lung base 4 mm indeterminate nodule.  She denies smoking history.  Denies secondhand smoke.  Denies history of lung disease.  Does endorse having weight loss since November.    Plan:  Obtain designated CT of chest  Obtain spirometry  Obtain TSH      Weight loss 2020    Cough 2020     Cough is likely from upper airway disease.   She is following with ENT.   On Flonase and sinus regimen.     Plan:  -Obtain spirometry.       Atherosclerosis of aortic arch 2020     Noted on CXR 8/15/2020  Discussed with patient to follow up with PCP or Cardiology.       Palpitations 2020    Sensitivity to medication 2020    Essential hypertension 2019    Atherosclerotic peripheral vascular disease with intermittent claudication 2019    Anxiety 2019           Right Arm BP - Sittin/77  Left Arm BP - Sittin/79        LABS      Lipid panel  Lab Results   Component Value Date    CHOL 348 (H) 09/10/2020     Lab Results   Component Value Date    HDL 97 (H) 09/10/2020     Lab Results   Component Value Date    LDLCALC 233.6 (H) 09/10/2020     Lab Results   Component Value Date    TRIG 87 09/10/2020     Lab Results   Component Value Date    CHOLHDL 27.9 09/10/2020            Review of Systems   Constitution: Positive for weight loss (30 lbs). Negative for diaphoresis, night sweats and weight gain.   HENT: Negative for congestion.    Eyes: Negative for blurred vision, discharge and double vision.   Cardiovascular: Negative for chest pain, claudication, cyanosis, dyspnea on exertion, irregular heartbeat, leg swelling, near-syncope, orthopnea, palpitations, paroxysmal nocturnal dyspnea and syncope.   Respiratory: Negative  for cough, shortness of breath and wheezing.    Endocrine: Negative for cold intolerance, heat intolerance and polyphagia.   Hematologic/Lymphatic: Negative for adenopathy and bleeding problem. Does not bruise/bleed easily.   Skin: Negative for dry skin and nail changes.   Musculoskeletal: Negative for arthritis, back pain, falls, joint pain, myalgias and neck pain.   Gastrointestinal: Negative for bloating, abdominal pain, change in bowel habit and constipation.   Genitourinary: Negative for bladder incontinence, dysuria, flank pain, genital sores and missed menses.   Neurological: Negative for aphonia, brief paralysis, difficulty with concentration, dizziness and weakness.   Psychiatric/Behavioral: Negative for altered mental status and memory loss. The patient does not have insomnia.    Allergic/Immunologic: Negative for environmental allergies.       Objective:   Physical Exam   Constitutional: She is oriented to person, place, and time. She appears well-developed and well-nourished. She is not intubated.   HENT:   Head: Normocephalic and atraumatic.   Right Ear: External ear normal.   Left Ear: External ear normal.   Mouth/Throat: Oropharynx is clear and moist.   Eyes: Pupils are equal, round, and reactive to light. Conjunctivae and EOM are normal. Right eye exhibits no discharge. Left eye exhibits no discharge. No scleral icterus.   Neck: Normal range of motion. Neck supple. Normal carotid pulses, no hepatojugular reflux and no JVD present. Carotid bruit is not present. No tracheal deviation present. No thyromegaly present.   Cardiovascular: Normal rate, regular rhythm, S1 normal and S2 normal.  No extrasystoles are present. PMI is not displaced. Exam reveals no gallop, no S3, no distant heart sounds, no friction rub and no midsystolic click.   No murmur heard.  Pulses:       Carotid pulses are 2+ on the right side and 2+ on the left side.       Radial pulses are 2+ on the right side and 2+ on the left side.         Femoral pulses are 2+ on the right side and 2+ on the left side.       Popliteal pulses are 2+ on the right side and 2+ on the left side.        Dorsalis pedis pulses are 1+ on the right side and 1+ on the left side.        Posterior tibial pulses are 1+ on the right side and 1+ on the left side.   Pulmonary/Chest: Effort normal and breath sounds normal. No accessory muscle usage or stridor. No apnea, no tachypnea and no bradypnea. She is not intubated. No respiratory distress. She has no decreased breath sounds. She has no wheezes. She has no rales. She exhibits no tenderness and no bony tenderness.   Abdominal: She exhibits no distension, no pulsatile liver, no abdominal bruit, no ascites, no pulsatile midline mass and no mass. There is no abdominal tenderness. There is no rebound and no guarding.   Musculoskeletal: Normal range of motion.         General: No tenderness or edema.   Lymphadenopathy:     She has no cervical adenopathy.   Neurological: She is alert and oriented to person, place, and time. She has normal reflexes. No cranial nerve deficit. Coordination normal.   Skin: Skin is warm. No rash noted. No erythema. No pallor.   Psychiatric: She has a normal mood and affect. Her behavior is normal. Judgment and thought content normal.       Assessment:     1. Celiac artery stenosis    2. Atherosclerosis of native artery of both lower extremities with intermittent claudication    3. Edema, unspecified type    4. Atherosclerotic peripheral vascular disease with intermittent claudication    5. Atherosclerosis of aortic arch    6. Hypercholesteremia    7. Statin intolerance    8. Weight loss        Plan:         Obtain a CTA to assess for patency of visceral arteries.   Lipid lowering therapy with goal LDL <70  Aspirin 81 mg daily   BP goal < 130/80 mmHg   Substitute ARB for Norvasc in view of PAD         Continue with current medical plan and lifestyle changes.  Return sooner for concerns or questions.  If symptoms persist go to the ED  I have reviewed all pertinent data on this patient       I have reviewed the patient's medical history in detail and updated the computerized patient record.    Orders Placed This Encounter   Procedures    CTA Chest Abdomen Pelvis     Standing Status:   Future     Standing Expiration Date:   11/9/2021     Order Specific Question:   Is the patient on ANY Metformin drug such as Glucophage/Glucovance?           Should be off drug 48 hours after contrast. Check renal function before restart.     Answer:   No     Order Specific Question:   History of Kidney Disease - including: decreased kidney function, dialysis, kidney transplay, single kidney, kidney cancer, kidney surgery?     Answer:   Decreased Kidney Function     Order Specific Question:   Diabetes?     Answer:   No     Order Specific Question:   May the Radiologist modify the order per protocol to meet the clinical needs of the patient?     Answer:   Yes    US Lower Extremity Arteries Bilateral     Standing Status:   Future     Standing Expiration Date:   11/9/2021    US Lower Extremity Veins Bilateral Insuf     Standing Status:   Future     Standing Expiration Date:   11/9/2021     Order Specific Question:   May the Radiologist modify the order per protocol to meet the clinical needs of the patient?     Answer:   Yes       Follow up after CTA. Return sooner for concerns or questions            She expressed verbal understanding and agreed with the plan        Patient's Medications   New Prescriptions    No medications on file   Previous Medications    ALIROCUMAB (PRALUENT PEN) 75 MG/ML PNIJ    Inject 1 mL (75 mg total) into the skin every 14 (fourteen) days.    AMLODIPINE (NORVASC) 10 MG TABLET    Take 10 mg by mouth once daily.    ASCORBIC ACID, VITAMIN C, (VITAMIN C) 1000 MG TABLET    Take 1,000 mg by mouth once daily.    ASPIRIN 81 MG CHEW    Take 81 mg by mouth once daily.    AZELASTINE (ASTELIN) 137 MCG (0.1 %) NASAL  SPRAY    USE 1 TO 2 SPRAY(S) IN EACH NOSTRIL TWICE DAILY    BEMPEDOIC ACID 180 MG TAB    Take 180 mg by mouth once daily.    BUDESONIDE 1 MG/2 ML NBSP    EMPTY CONTENTS OF 1 RESPULE INTO NASAL IRRIGATION SYSTEM, ADD DISTILLED WATER, SALT PACK, MIX & IRRIGATE. PERFORM TWICE DAILY    CARVEDILOL (COREG) 6.25 MG TABLET    Take 6.25 mg by mouth 2 (two) times daily with meals.    CLORAZEPATE (TRANXENE) 7.5 MG TAB    Take 1 tablet (7.5 mg total) by mouth 3 (three) times daily.    CO-ENZYME Q-10 50 MG CAPSULE    Take 50 mg by mouth once daily.    DIPHENHYDRAMINE (BENADRYL) 25 MG CAPSULE    Take 25 mg by mouth every 6 (six) hours as needed for Itching.    FERROUS SULFATE (FEOSOL) 325 MG (65 MG IRON) TAB TABLET    Take 1 tablet by mouth once daily.    MULTIVITAMIN (ONE DAILY MULTIVITAMIN) PER TABLET    Take 1 tablet by mouth once daily.    NEILMED SINUS RINSE COMPLETE PKDV    use as directed    OMEGA-3 FATTY ACIDS/FISH OIL (FISH OIL-OMEGA-3 FATTY ACIDS) 300-1,000 MG CAPSULE    Take 1 capsule by mouth once daily.    OMEPRAZOLE (PRILOSEC) 20 MG CAPSULE    Take 1 capsule (20 mg total) by mouth 2 (two) times daily before meals.    TURMERIC ORAL    Take 1 packet by mouth once daily.   Modified Medications    No medications on file   Discontinued Medications    No medications on file

## 2020-11-10 ENCOUNTER — TELEPHONE (OUTPATIENT)
Dept: CARDIOLOGY | Facility: CLINIC | Age: 80
End: 2020-11-10

## 2020-11-10 NOTE — TELEPHONE ENCOUNTER
Talked with Mrs. Beth.  All questions answered.   ----- Message from Winter Luna MA sent at 11/10/2020  2:07 PM CST -----  Contact: Pt called  I would not know how to set this up. Please advise.   ----- Message -----  From: Talisha eRyes  Sent: 11/10/2020  12:46 PM CST  To: Joesph WILLIAM Staff    Pt calling to find out if she can have a nurse at the [facility] to give her alirocumab (PRALUENT PEN) 75 mg/mL PnIj. Please call pt @ [phone]. Thank you.

## 2020-11-11 ENCOUNTER — TELEPHONE (OUTPATIENT)
Dept: CARDIOLOGY | Facility: CLINIC | Age: 80
End: 2020-11-11

## 2020-11-11 VITALS
HEIGHT: 64 IN | DIASTOLIC BLOOD PRESSURE: 75 MMHG | BODY MASS INDEX: 19.81 KG/M2 | WEIGHT: 116 LBS | SYSTOLIC BLOOD PRESSURE: 125 MMHG | HEART RATE: 89 BPM

## 2020-11-11 RX ORDER — CARVEDILOL 6.25 MG/1
6.25 TABLET ORAL 2 TIMES DAILY WITH MEALS
Qty: 90 TABLET | Refills: 1 | Status: SHIPPED | OUTPATIENT
Start: 2020-11-11 | End: 2021-05-25

## 2020-11-11 NOTE — TELEPHONE ENCOUNTER
----- Message from Bridgett Dumont sent at 11/11/2020  9:35 AM CST -----  Regarding: Patient wants to know if she needs to get an eye stat  Contact: [phone]  Patient wants to know if she needs to get an eye stat done. She has 3 appointments to get multiple test done on Nov 18, 2020.

## 2020-11-11 NOTE — TELEPHONE ENCOUNTER
----- Message from Bridgett Dumont sent at 11/11/2020  9:41 AM CST -----  Regarding: Refills for Carvedilol (Coreg) 6.25 mg tab  Contact: [phone]  Requesting an RX refill or new RX. RX Refill   Is this a refill or new RX: Refill   RX name and strength: Carvedilol (Coreg) 6.25 mg tab   Is this a 30 day or 90 day RX: 90 day   Pharmacy name and phone # (copy/paste from chart):  [facility] [address] [place] [address] (N), [place] - [phone] [phone] (Phone)  [phone] (Fax)      Comments:

## 2020-11-11 NOTE — TELEPHONE ENCOUNTER
"I spoke to pt and notified her of the following response from Dr. Bearden "the study was ordered by her cardiologist, pt should reach out to his office with specific questions.  Pt wants Dr. Bearden to order labs, pt was notified again to reach out to her cardiologist.    Pt verbalized understanding  "

## 2020-11-11 NOTE — TELEPHONE ENCOUNTER
Pt was seen in the clinic on 11/9 and states that a blood marker test was discussed during the clinical visit     Pt states that this blood marker test is something she needs prior to moving forward with any procedures that would be done on her legs because you need to make sure that there are no cancer cells present     Pt states she reached out to her PCP to place an order for this but was advised to reach out to her cardiologist who is requesting the lab order     Please clarify

## 2020-11-11 NOTE — TELEPHONE ENCOUNTER
I spoke to pt, she is scheduled for CT chest. She's had problems in the pass with iodine.    Pt is requesting to check her kidney function prior to her test scheduled on 11-.    Please advise.  Pt also need refill-see below      thanks

## 2020-11-11 NOTE — TELEPHONE ENCOUNTER
----- Message from Dian Bowie sent at 11/11/2020  1:34 PM CST -----  Contact: [phone]  Pt is requesting a callback in regards to having questions about the upcoming test that is scheduled for the pt.      Please call

## 2020-11-12 NOTE — TELEPHONE ENCOUNTER
I called and sopke to Mrs. Beth,    And she was able to find a Ride a friend to     Bring her in Monday Morning  11-16-20.    To see .    NW

## 2020-11-16 ENCOUNTER — OFFICE VISIT (OUTPATIENT)
Dept: CARDIOLOGY | Facility: CLINIC | Age: 80
End: 2020-11-16
Payer: MEDICARE

## 2020-11-16 DIAGNOSIS — R10.9 ABDOMINAL DISCOMFORT: ICD-10-CM

## 2020-11-16 DIAGNOSIS — I10 ESSENTIAL HYPERTENSION: ICD-10-CM

## 2020-11-16 DIAGNOSIS — I70.8 CELIAC ARTERY ATHEROSCLEROSIS: Primary | ICD-10-CM

## 2020-11-16 DIAGNOSIS — R63.4 WEIGHT LOSS: ICD-10-CM

## 2020-11-16 DIAGNOSIS — I73.9 PAD (PERIPHERAL ARTERY DISEASE): ICD-10-CM

## 2020-11-16 DIAGNOSIS — I77.1 CELIAC ARTERY STENOSIS: ICD-10-CM

## 2020-11-16 DIAGNOSIS — I70.0 AORTIC ATHEROSCLEROSIS: ICD-10-CM

## 2020-11-16 DIAGNOSIS — Z78.9 STATIN INTOLERANCE: ICD-10-CM

## 2020-11-16 DIAGNOSIS — I70.0 ATHEROSCLEROSIS OF AORTIC ARCH: ICD-10-CM

## 2020-11-16 PROCEDURE — 1126F PR PAIN SEVERITY QUANTIFIED, NO PAIN PRESENT: ICD-10-PCS | Mod: S$GLB,,, | Performed by: INTERNAL MEDICINE

## 2020-11-16 PROCEDURE — 3078F PR MOST RECENT DIASTOLIC BLOOD PRESSURE < 80 MM HG: ICD-10-PCS | Mod: CPTII,S$GLB,, | Performed by: INTERNAL MEDICINE

## 2020-11-16 PROCEDURE — 1126F AMNT PAIN NOTED NONE PRSNT: CPT | Mod: S$GLB,,, | Performed by: INTERNAL MEDICINE

## 2020-11-16 PROCEDURE — 1159F PR MEDICATION LIST DOCUMENTED IN MEDICAL RECORD: ICD-10-PCS | Mod: S$GLB,,, | Performed by: INTERNAL MEDICINE

## 2020-11-16 PROCEDURE — 99215 OFFICE O/P EST HI 40 MIN: CPT | Mod: S$GLB,,, | Performed by: INTERNAL MEDICINE

## 2020-11-16 PROCEDURE — 1101F PT FALLS ASSESS-DOCD LE1/YR: CPT | Mod: CPTII,S$GLB,, | Performed by: INTERNAL MEDICINE

## 2020-11-16 PROCEDURE — 99999 PR PBB SHADOW E&M-EST. PATIENT-LVL IV: CPT | Mod: PBBFAC,,, | Performed by: INTERNAL MEDICINE

## 2020-11-16 PROCEDURE — 3074F SYST BP LT 130 MM HG: CPT | Mod: CPTII,S$GLB,, | Performed by: INTERNAL MEDICINE

## 2020-11-16 PROCEDURE — 3078F DIAST BP <80 MM HG: CPT | Mod: CPTII,S$GLB,, | Performed by: INTERNAL MEDICINE

## 2020-11-16 PROCEDURE — 99999 PR PBB SHADOW E&M-EST. PATIENT-LVL IV: ICD-10-PCS | Mod: PBBFAC,,, | Performed by: INTERNAL MEDICINE

## 2020-11-16 PROCEDURE — 1101F PR PT FALLS ASSESS DOC 0-1 FALLS W/OUT INJ PAST YR: ICD-10-PCS | Mod: CPTII,S$GLB,, | Performed by: INTERNAL MEDICINE

## 2020-11-16 PROCEDURE — 3288F FALL RISK ASSESSMENT DOCD: CPT | Mod: CPTII,S$GLB,, | Performed by: INTERNAL MEDICINE

## 2020-11-16 PROCEDURE — 3288F PR FALLS RISK ASSESSMENT DOCUMENTED: ICD-10-PCS | Mod: CPTII,S$GLB,, | Performed by: INTERNAL MEDICINE

## 2020-11-16 PROCEDURE — 1159F MED LIST DOCD IN RCRD: CPT | Mod: S$GLB,,, | Performed by: INTERNAL MEDICINE

## 2020-11-16 PROCEDURE — 99215 PR OFFICE/OUTPT VISIT, EST, LEVL V, 40-54 MIN: ICD-10-PCS | Mod: S$GLB,,, | Performed by: INTERNAL MEDICINE

## 2020-11-16 PROCEDURE — 3074F PR MOST RECENT SYSTOLIC BLOOD PRESSURE < 130 MM HG: ICD-10-PCS | Mod: CPTII,S$GLB,, | Performed by: INTERNAL MEDICINE

## 2020-11-16 RX ORDER — DIPHENHYDRAMINE HCL 25 MG
50 CAPSULE ORAL ONCE
Status: CANCELLED | OUTPATIENT
Start: 2020-11-16 | End: 2020-11-16

## 2020-11-16 RX ORDER — SODIUM CHLORIDE 9 MG/ML
INJECTION, SOLUTION INTRAVENOUS CONTINUOUS
Status: CANCELLED | OUTPATIENT
Start: 2020-11-16

## 2020-11-16 NOTE — H&P (VIEW-ONLY)
Subjective:   Patient ID:  Alana Beth is a 80 y.o. female who presents for follow up of celiac artery stenosis, Hyperlipidemia, Hypertension, and Weight Loss      HPI:       She is here by recommendation of her care for management of celiac and superior mesenteric artery stenosis noted on ultrasound. She lost close to 30-35 lb over the past year. She does not the classic post prandial angina seen with mesenteric ischemia. No fear of meals. She has HTN, HLP, intolerance to statin, PAD, and granulomatous lung disease. She was prescribed praluent but she states she cannot give herself injections.         CTA ([facility]) 8/2020     Severe stenosis of the celiac axis with poststenotic dilatation.        Non contrast CT 8/2020     0.5 cm calcified nodule in the medial segment of the right middle lobe    Aortic and coronary artery calcific atherosclerosis.        US 9/2020     Celiac  cm/sec with ratio 6.2   SMA  cm/sec with ratio 3.8   CEDRICK 152 cm/sec with ratio 2.6      Esophagram 9/2020     Mild esophageal dysmotility    No evidence of cricopharyngeal      Echo 9/2020     Normal EF   Mild LAE   Mild TR   PASP 30 mmHg    Nuclear stress test 9/2020     No ischemia           Patient Active Problem List    Diagnosis Date Noted    Statin intolerance 09/29/2020    PAD (peripheral artery disease) 09/25/2020    Abdominal discomfort 09/25/2020    Celiac artery atherosclerosis 09/04/2020    Aortic atherosclerosis 08/21/2020     Seen on CT 08/21/2020.  Discussed with patient to follow-up with PCP      Granulomatous lung disease 08/21/2020      Seen on CT of chest 08/21/2020- 0.5 cm calcified nodule in the medial segment of the right middle lobe.  This is a benign finding.  No further workup needed      Hypercholesteremia 08/19/2020    Solitary pulmonary nodule 08/18/2020     Patient had emergency room visit on 08/15/2020, chest x-ray revealed medial right lung base 4 mm indeterminate nodule.  Further  evaluation with CT was recommended.  Ms. Beth denies smoking history.  She does report having weight loss since November.    Plan:  -obtain designated CT of chest to further assess lung nodule.      Abnormal chest x-ray 2020     Patient had chest x-ray on 08/15/2020, findings were consistent with underlying COPD/emphysema.  Also, noted to have right medial lung base 4 mm indeterminate nodule.  She denies smoking history.  Denies secondhand smoke.  Denies history of lung disease.  Does endorse having weight loss since November.    Plan:  Obtain designated CT of chest  Obtain spirometry  Obtain TSH      Weight loss 2020    Cough 2020     Cough is likely from upper airway disease.   She is following with ENT.   On Flonase and sinus regimen.     Plan:  -Obtain spirometry.       Atherosclerosis of aortic arch 2020     Noted on CXR 8/15/2020  Discussed with patient to follow up with PCP or Cardiology.       Palpitations 2020    Sensitivity to medication 2020    Essential hypertension 2019    Atherosclerotic peripheral vascular disease with intermittent claudication 2019    Anxiety 2019           Right Arm BP - Sittin/66  Left Arm BP - Sittin/66        LABS      Lipid panel  Lab Results   Component Value Date    CHOL 348 (H) 09/10/2020     Lab Results   Component Value Date    HDL 97 (H) 09/10/2020     Lab Results   Component Value Date    LDLCALC 233.6 (H) 09/10/2020     Lab Results   Component Value Date    TRIG 87 09/10/2020     Lab Results   Component Value Date    CHOLHDL 27.9 09/10/2020            Review of Systems   Constitution: Positive for weight loss (30 lbs). Negative for diaphoresis, night sweats and weight gain.   HENT: Negative for congestion.    Eyes: Negative for blurred vision, discharge and double vision.   Cardiovascular: Negative for chest pain, claudication, cyanosis, dyspnea on exertion, irregular heartbeat, leg swelling,  near-syncope, orthopnea, palpitations, paroxysmal nocturnal dyspnea and syncope.   Respiratory: Negative for cough, shortness of breath and wheezing.    Endocrine: Negative for cold intolerance, heat intolerance and polyphagia.   Hematologic/Lymphatic: Negative for adenopathy and bleeding problem. Does not bruise/bleed easily.   Skin: Negative for dry skin and nail changes.   Musculoskeletal: Negative for arthritis, back pain, falls, joint pain, myalgias and neck pain.   Gastrointestinal: Negative for bloating, abdominal pain, change in bowel habit and constipation.   Genitourinary: Negative for bladder incontinence, dysuria, flank pain, genital sores and missed menses.   Neurological: Negative for aphonia, brief paralysis, difficulty with concentration, dizziness and weakness.   Psychiatric/Behavioral: Negative for altered mental status and memory loss. The patient does not have insomnia.    Allergic/Immunologic: Negative for environmental allergies.       Objective:   Physical Exam   Constitutional: She is oriented to person, place, and time. She appears well-developed and well-nourished. She is not intubated.   HENT:   Head: Normocephalic and atraumatic.   Right Ear: External ear normal.   Left Ear: External ear normal.   Mouth/Throat: Oropharynx is clear and moist.   Eyes: Pupils are equal, round, and reactive to light. Conjunctivae and EOM are normal. Right eye exhibits no discharge. Left eye exhibits no discharge. No scleral icterus.   Neck: Normal range of motion. Neck supple. Normal carotid pulses, no hepatojugular reflux and no JVD present. Carotid bruit is not present. No tracheal deviation present. No thyromegaly present.   Cardiovascular: Normal rate, regular rhythm, S1 normal and S2 normal.  No extrasystoles are present. PMI is not displaced. Exam reveals no gallop, no S3, no distant heart sounds, no friction rub and no midsystolic click.   No murmur heard.  Pulses:       Carotid pulses are 2+ on the  right side and 2+ on the left side.       Radial pulses are 2+ on the right side and 2+ on the left side.        Femoral pulses are 2+ on the right side and 2+ on the left side.       Popliteal pulses are 2+ on the right side and 2+ on the left side.        Dorsalis pedis pulses are 1+ on the right side and 1+ on the left side.        Posterior tibial pulses are 1+ on the right side and 1+ on the left side.   Pulmonary/Chest: Effort normal and breath sounds normal. No accessory muscle usage or stridor. No apnea, no tachypnea and no bradypnea. She is not intubated. No respiratory distress. She has no decreased breath sounds. She has no wheezes. She has no rales. She exhibits no tenderness and no bony tenderness.   Abdominal: She exhibits no distension, no pulsatile liver, no abdominal bruit, no ascites, no pulsatile midline mass and no mass. There is no abdominal tenderness. There is no rebound and no guarding.   Musculoskeletal: Normal range of motion.         General: No tenderness or edema.   Lymphadenopathy:     She has no cervical adenopathy.   Neurological: She is alert and oriented to person, place, and time. She has normal reflexes. No cranial nerve deficit. Coordination normal.   Skin: Skin is warm. No rash noted. No erythema. No pallor.   Psychiatric: She has a normal mood and affect. Her behavior is normal. Judgment and thought content normal.       Assessment:     1. Celiac artery atherosclerosis    2. Statin intolerance    3. Essential hypertension    4. Atherosclerosis of aortic arch    5. Aortic atherosclerosis    6. Celiac artery stenosis    7. PAD (peripheral artery disease)    8. Weight loss    9. Abdominal discomfort        Plan:       Abdominal angiogram   Evaluation of mesenteric arteries  +/- PTAS with IVUS guidance  R radial access       Risks, benefits and alternatives of peripheral catheterization and possible intervention were discussed with the patient. All questions were answered and  informed consent obtained.     I discussed the importance of compliance with dual antiplatelet therapy with the patient to prevent acute or late stent thrombosis with premature discontinuation of the therapy.        Lipid lowering therapy with goal LDL <70 with praluent or nexletol + zetia  Aspirin 81 mg daily   BP goal < 130/80 mmHg   Substitute ARB for Norvasc in view of PAD         Continue with current medical plan and lifestyle changes.  Return sooner for concerns or questions. If symptoms persist go to the ED  I have reviewed all pertinent data on this patient       I have reviewed the patient's medical history in detail and updated the computerized patient record.    Orders Placed This Encounter   Procedures    Case Request-Cath Lab: Angiogram, Abdominal Aorta     Standing Status:   Standing     Number of Occurrences:   1     Order Specific Question:   CPT Code:     Answer:   ID  ANGIO AORTOGRAM ABD SERIAL [[id number]]     Order Specific Question:   Medical Necessity:     Answer:   Medically Urgent [101]     Order Specific Question:   Is an on-site pathologist required for this procedure?     Answer:   N/A       Follow up after CTA. Return sooner for concerns or questions            She expressed verbal understanding and agreed with the plan        Patient's Medications   New Prescriptions    No medications on file   Previous Medications    ALIROCUMAB (PRALUENT PEN) 75 MG/ML PNIJ    Inject 1 mL (75 mg total) into the skin every 14 (fourteen) days.    AMLODIPINE (NORVASC) 10 MG TABLET    Take 10 mg by mouth once daily.    ASCORBIC ACID, VITAMIN C, (VITAMIN C) 1000 MG TABLET    Take 1,000 mg by mouth once daily.    ASPIRIN 81 MG CHEW    Take 81 mg by mouth once daily.    AZELASTINE (ASTELIN) 137 MCG (0.1 %) NASAL SPRAY    USE 1 TO 2 SPRAY(S) IN EACH NOSTRIL TWICE DAILY    BEMPEDOIC ACID 180 MG TAB    Take 180 mg by mouth once daily.    BUDESONIDE 1 MG/2 ML NBSP    EMPTY CONTENTS OF 1 RESPULE INTO NASAL IRRIGATION  SYSTEM, ADD DISTILLED WATER, SALT PACK, MIX & IRRIGATE. PERFORM TWICE DAILY    CARVEDILOL (COREG) 6.25 MG TABLET    Take 1 tablet (6.25 mg total) by mouth 2 (two) times daily with meals.    CLORAZEPATE (TRANXENE) 7.5 MG TAB    Take 1 tablet (7.5 mg total) by mouth 3 (three) times daily.    CO-ENZYME Q-10 50 MG CAPSULE    Take 50 mg by mouth once daily.    DIPHENHYDRAMINE (BENADRYL) 25 MG CAPSULE    Take 25 mg by mouth every 6 (six) hours as needed for Itching.    FERROUS SULFATE (FEOSOL) 325 MG (65 MG IRON) TAB TABLET    Take 1 tablet by mouth once daily.    MULTIVITAMIN (ONE DAILY MULTIVITAMIN) PER TABLET    Take 1 tablet by mouth once daily.    NEILMED SINUS RINSE COMPLETE PKDV    use as directed    OMEGA-3 FATTY ACIDS/FISH OIL (FISH OIL-OMEGA-3 FATTY ACIDS) 300-1,000 MG CAPSULE    Take 1 capsule by mouth once daily.    OMEPRAZOLE (PRILOSEC) 20 MG CAPSULE    Take 1 capsule (20 mg total) by mouth 2 (two) times daily before meals.    TURMERIC ORAL    Take 1 packet by mouth once daily.   Modified Medications    No medications on file   Discontinued Medications    No medications on file

## 2020-11-16 NOTE — PROGRESS NOTES
Subjective:   Patient ID:  Alana Beth is a 80 y.o. female who presents for follow up of celiac artery stenosis, Hyperlipidemia, Hypertension, and Weight Loss      HPI:       She is here by recommendation of her care for management of celiac and superior mesenteric artery stenosis noted on ultrasound. She lost close to 30-35 lb over the past year. She does not the classic post prandial angina seen with mesenteric ischemia. No fear of meals. She has HTN, HLP, intolerance to statin, PAD, and granulomatous lung disease. She was prescribed praluent but she states she cannot give herself injections.         CTA ([facility]) 8/2020     Severe stenosis of the celiac axis with poststenotic dilatation.        Non contrast CT 8/2020     0.5 cm calcified nodule in the medial segment of the right middle lobe    Aortic and coronary artery calcific atherosclerosis.        US 9/2020     Celiac  cm/sec with ratio 6.2   SMA  cm/sec with ratio 3.8   CEDRICK 152 cm/sec with ratio 2.6      Esophagram 9/2020     Mild esophageal dysmotility    No evidence of cricopharyngeal      Echo 9/2020     Normal EF   Mild LAE   Mild TR   PASP 30 mmHg    Nuclear stress test 9/2020     No ischemia           Patient Active Problem List    Diagnosis Date Noted    Statin intolerance 09/29/2020    PAD (peripheral artery disease) 09/25/2020    Abdominal discomfort 09/25/2020    Celiac artery atherosclerosis 09/04/2020    Aortic atherosclerosis 08/21/2020     Seen on CT 08/21/2020.  Discussed with patient to follow-up with PCP      Granulomatous lung disease 08/21/2020      Seen on CT of chest 08/21/2020- 0.5 cm calcified nodule in the medial segment of the right middle lobe.  This is a benign finding.  No further workup needed      Hypercholesteremia 08/19/2020    Solitary pulmonary nodule 08/18/2020     Patient had emergency room visit on 08/15/2020, chest x-ray revealed medial right lung base 4 mm indeterminate nodule.  Further  evaluation with CT was recommended.  Ms. Beth denies smoking history.  She does report having weight loss since November.    Plan:  -obtain designated CT of chest to further assess lung nodule.      Abnormal chest x-ray 2020     Patient had chest x-ray on 08/15/2020, findings were consistent with underlying COPD/emphysema.  Also, noted to have right medial lung base 4 mm indeterminate nodule.  She denies smoking history.  Denies secondhand smoke.  Denies history of lung disease.  Does endorse having weight loss since November.    Plan:  Obtain designated CT of chest  Obtain spirometry  Obtain TSH      Weight loss 2020    Cough 2020     Cough is likely from upper airway disease.   She is following with ENT.   On Flonase and sinus regimen.     Plan:  -Obtain spirometry.       Atherosclerosis of aortic arch 2020     Noted on CXR 8/15/2020  Discussed with patient to follow up with PCP or Cardiology.       Palpitations 2020    Sensitivity to medication 2020    Essential hypertension 2019    Atherosclerotic peripheral vascular disease with intermittent claudication 2019    Anxiety 2019           Right Arm BP - Sittin/66  Left Arm BP - Sittin/66        LABS      Lipid panel  Lab Results   Component Value Date    CHOL 348 (H) 09/10/2020     Lab Results   Component Value Date    HDL 97 (H) 09/10/2020     Lab Results   Component Value Date    LDLCALC 233.6 (H) 09/10/2020     Lab Results   Component Value Date    TRIG 87 09/10/2020     Lab Results   Component Value Date    CHOLHDL 27.9 09/10/2020            Review of Systems   Constitution: Positive for weight loss (30 lbs). Negative for diaphoresis, night sweats and weight gain.   HENT: Negative for congestion.    Eyes: Negative for blurred vision, discharge and double vision.   Cardiovascular: Negative for chest pain, claudication, cyanosis, dyspnea on exertion, irregular heartbeat, leg swelling,  near-syncope, orthopnea, palpitations, paroxysmal nocturnal dyspnea and syncope.   Respiratory: Negative for cough, shortness of breath and wheezing.    Endocrine: Negative for cold intolerance, heat intolerance and polyphagia.   Hematologic/Lymphatic: Negative for adenopathy and bleeding problem. Does not bruise/bleed easily.   Skin: Negative for dry skin and nail changes.   Musculoskeletal: Negative for arthritis, back pain, falls, joint pain, myalgias and neck pain.   Gastrointestinal: Negative for bloating, abdominal pain, change in bowel habit and constipation.   Genitourinary: Negative for bladder incontinence, dysuria, flank pain, genital sores and missed menses.   Neurological: Negative for aphonia, brief paralysis, difficulty with concentration, dizziness and weakness.   Psychiatric/Behavioral: Negative for altered mental status and memory loss. The patient does not have insomnia.    Allergic/Immunologic: Negative for environmental allergies.       Objective:   Physical Exam   Constitutional: She is oriented to person, place, and time. She appears well-developed and well-nourished. She is not intubated.   HENT:   Head: Normocephalic and atraumatic.   Right Ear: External ear normal.   Left Ear: External ear normal.   Mouth/Throat: Oropharynx is clear and moist.   Eyes: Pupils are equal, round, and reactive to light. Conjunctivae and EOM are normal. Right eye exhibits no discharge. Left eye exhibits no discharge. No scleral icterus.   Neck: Normal range of motion. Neck supple. Normal carotid pulses, no hepatojugular reflux and no JVD present. Carotid bruit is not present. No tracheal deviation present. No thyromegaly present.   Cardiovascular: Normal rate, regular rhythm, S1 normal and S2 normal.  No extrasystoles are present. PMI is not displaced. Exam reveals no gallop, no S3, no distant heart sounds, no friction rub and no midsystolic click.   No murmur heard.  Pulses:       Carotid pulses are 2+ on the  right side and 2+ on the left side.       Radial pulses are 2+ on the right side and 2+ on the left side.        Femoral pulses are 2+ on the right side and 2+ on the left side.       Popliteal pulses are 2+ on the right side and 2+ on the left side.        Dorsalis pedis pulses are 1+ on the right side and 1+ on the left side.        Posterior tibial pulses are 1+ on the right side and 1+ on the left side.   Pulmonary/Chest: Effort normal and breath sounds normal. No accessory muscle usage or stridor. No apnea, no tachypnea and no bradypnea. She is not intubated. No respiratory distress. She has no decreased breath sounds. She has no wheezes. She has no rales. She exhibits no tenderness and no bony tenderness.   Abdominal: She exhibits no distension, no pulsatile liver, no abdominal bruit, no ascites, no pulsatile midline mass and no mass. There is no abdominal tenderness. There is no rebound and no guarding.   Musculoskeletal: Normal range of motion.         General: No tenderness or edema.   Lymphadenopathy:     She has no cervical adenopathy.   Neurological: She is alert and oriented to person, place, and time. She has normal reflexes. No cranial nerve deficit. Coordination normal.   Skin: Skin is warm. No rash noted. No erythema. No pallor.   Psychiatric: She has a normal mood and affect. Her behavior is normal. Judgment and thought content normal.       Assessment:     1. Celiac artery atherosclerosis    2. Statin intolerance    3. Essential hypertension    4. Atherosclerosis of aortic arch    5. Aortic atherosclerosis    6. Celiac artery stenosis    7. PAD (peripheral artery disease)    8. Weight loss    9. Abdominal discomfort        Plan:       Abdominal angiogram   Evaluation of mesenteric arteries  +/- PTAS with IVUS guidance  R radial access       Risks, benefits and alternatives of peripheral catheterization and possible intervention were discussed with the patient. All questions were answered and  informed consent obtained.     I discussed the importance of compliance with dual antiplatelet therapy with the patient to prevent acute or late stent thrombosis with premature discontinuation of the therapy.        Lipid lowering therapy with goal LDL <70 with praluent or nexletol + zetia  Aspirin 81 mg daily   BP goal < 130/80 mmHg   Substitute ARB for Norvasc in view of PAD         Continue with current medical plan and lifestyle changes.  Return sooner for concerns or questions. If symptoms persist go to the ED  I have reviewed all pertinent data on this patient       I have reviewed the patient's medical history in detail and updated the computerized patient record.    Orders Placed This Encounter   Procedures    Case Request-Cath Lab: Angiogram, Abdominal Aorta     Standing Status:   Standing     Number of Occurrences:   1     Order Specific Question:   CPT Code:     Answer:   UT  ANGIO AORTOGRAM ABD SERIAL [[id number]]     Order Specific Question:   Medical Necessity:     Answer:   Medically Urgent [101]     Order Specific Question:   Is an on-site pathologist required for this procedure?     Answer:   N/A       Follow up after CTA. Return sooner for concerns or questions            She expressed verbal understanding and agreed with the plan        Patient's Medications   New Prescriptions    No medications on file   Previous Medications    ALIROCUMAB (PRALUENT PEN) 75 MG/ML PNIJ    Inject 1 mL (75 mg total) into the skin every 14 (fourteen) days.    AMLODIPINE (NORVASC) 10 MG TABLET    Take 10 mg by mouth once daily.    ASCORBIC ACID, VITAMIN C, (VITAMIN C) 1000 MG TABLET    Take 1,000 mg by mouth once daily.    ASPIRIN 81 MG CHEW    Take 81 mg by mouth once daily.    AZELASTINE (ASTELIN) 137 MCG (0.1 %) NASAL SPRAY    USE 1 TO 2 SPRAY(S) IN EACH NOSTRIL TWICE DAILY    BEMPEDOIC ACID 180 MG TAB    Take 180 mg by mouth once daily.    BUDESONIDE 1 MG/2 ML NBSP    EMPTY CONTENTS OF 1 RESPULE INTO NASAL IRRIGATION  SYSTEM, ADD DISTILLED WATER, SALT PACK, MIX & IRRIGATE. PERFORM TWICE DAILY    CARVEDILOL (COREG) 6.25 MG TABLET    Take 1 tablet (6.25 mg total) by mouth 2 (two) times daily with meals.    CLORAZEPATE (TRANXENE) 7.5 MG TAB    Take 1 tablet (7.5 mg total) by mouth 3 (three) times daily.    CO-ENZYME Q-10 50 MG CAPSULE    Take 50 mg by mouth once daily.    DIPHENHYDRAMINE (BENADRYL) 25 MG CAPSULE    Take 25 mg by mouth every 6 (six) hours as needed for Itching.    FERROUS SULFATE (FEOSOL) 325 MG (65 MG IRON) TAB TABLET    Take 1 tablet by mouth once daily.    MULTIVITAMIN (ONE DAILY MULTIVITAMIN) PER TABLET    Take 1 tablet by mouth once daily.    NEILMED SINUS RINSE COMPLETE PKDV    use as directed    OMEGA-3 FATTY ACIDS/FISH OIL (FISH OIL-OMEGA-3 FATTY ACIDS) 300-1,000 MG CAPSULE    Take 1 capsule by mouth once daily.    OMEPRAZOLE (PRILOSEC) 20 MG CAPSULE    Take 1 capsule (20 mg total) by mouth 2 (two) times daily before meals.    TURMERIC ORAL    Take 1 packet by mouth once daily.   Modified Medications    No medications on file   Discontinued Medications    No medications on file

## 2020-11-20 ENCOUNTER — OFFICE VISIT (OUTPATIENT)
Dept: OBSTETRICS AND GYNECOLOGY | Facility: CLINIC | Age: 80
End: 2020-11-20
Payer: MEDICARE

## 2020-11-20 VITALS
HEIGHT: 64 IN | BODY MASS INDEX: 20.26 KG/M2 | SYSTOLIC BLOOD PRESSURE: 126 MMHG | WEIGHT: 118.69 LBS | DIASTOLIC BLOOD PRESSURE: 72 MMHG

## 2020-11-20 VITALS
SYSTOLIC BLOOD PRESSURE: 126 MMHG | BODY MASS INDEX: 19.81 KG/M2 | DIASTOLIC BLOOD PRESSURE: 66 MMHG | WEIGHT: 116 LBS | HEIGHT: 64 IN | HEART RATE: 79 BPM

## 2020-11-20 DIAGNOSIS — Z12.4 ROUTINE PAPANICOLAOU SMEAR: Primary | ICD-10-CM

## 2020-11-20 PROBLEM — I70.8 CELIAC ARTERY ATHEROSCLEROSIS: Status: ACTIVE | Noted: 2020-09-04

## 2020-11-20 PROCEDURE — 99999 PR PBB SHADOW E&M-EST. PATIENT-LVL III: CPT | Mod: PBBFAC,,, | Performed by: OBSTETRICS & GYNECOLOGY

## 2020-11-20 PROCEDURE — G0101 PR CA SCREEN;PELVIC/BREAST EXAM: ICD-10-PCS | Mod: GZ,S$GLB,, | Performed by: OBSTETRICS & GYNECOLOGY

## 2020-11-20 PROCEDURE — 99999 PR PBB SHADOW E&M-EST. PATIENT-LVL III: ICD-10-PCS | Mod: PBBFAC,,, | Performed by: OBSTETRICS & GYNECOLOGY

## 2020-11-20 PROCEDURE — 1101F PT FALLS ASSESS-DOCD LE1/YR: CPT | Mod: CPTII,S$GLB,, | Performed by: OBSTETRICS & GYNECOLOGY

## 2020-11-20 PROCEDURE — 88142 CYTOPATH C/V THIN LAYER: CPT

## 2020-11-20 PROCEDURE — G0101 CA SCREEN;PELVIC/BREAST EXAM: HCPCS | Mod: GZ,S$GLB,, | Performed by: OBSTETRICS & GYNECOLOGY

## 2020-11-20 PROCEDURE — 1101F PR PT FALLS ASSESS DOC 0-1 FALLS W/OUT INJ PAST YR: ICD-10-PCS | Mod: CPTII,S$GLB,, | Performed by: OBSTETRICS & GYNECOLOGY

## 2020-11-20 PROCEDURE — 3288F FALL RISK ASSESSMENT DOCD: CPT | Mod: CPTII,S$GLB,, | Performed by: OBSTETRICS & GYNECOLOGY

## 2020-11-20 PROCEDURE — 3288F PR FALLS RISK ASSESSMENT DOCUMENTED: ICD-10-PCS | Mod: CPTII,S$GLB,, | Performed by: OBSTETRICS & GYNECOLOGY

## 2020-11-20 PROCEDURE — 1126F PR PAIN SEVERITY QUANTIFIED, NO PAIN PRESENT: ICD-10-PCS | Mod: S$GLB,,, | Performed by: OBSTETRICS & GYNECOLOGY

## 2020-11-20 PROCEDURE — 1126F AMNT PAIN NOTED NONE PRSNT: CPT | Mod: S$GLB,,, | Performed by: OBSTETRICS & GYNECOLOGY

## 2020-11-20 NOTE — PATIENT INSTRUCTIONS
Taking Aspirin for Atherosclerosis       Aspirin is a medicine often prescribed to treat atherosclerosis. This condition affects your arteries. These are the blood vessels that carry blood away from your heart. Having atherosclerosis means youre at greater risk of a heart attack or stroke. Aspirin can help prevent these from happening.  How atherosclerosis affects your arteries   A fatty material (plaque) can build up in your arteries. This makes it harder for blood to flow through them. A blood clot can then form on the plaque. This may block the artery, cutting off blood flow. This can cause conditions such as coronary artery disease (CAD) and peripheral arterial disease (PAD):  · CAD occurs when plaque builds up in the coronary artery. This artery supplies the heart with oxygen-rich blood.  · PAD occurs when plaque forms in leg arteries.  The same things that cause CAD and PAD can also cause plaque to form in other arteries in your body, such as those in the brain. When plaque occurs in any of these arteries, it raises your risk of heart attack or stroke.  What aspirin does  Aspirin is a blood-thinner (antiplatelet medicine). It helps keep blood clots from forming. This reduces the risk of blockage. Aspirin can be taken daily if you are at high risk of or have already had a heart attack or stroke. It is also used after a procedure called a stent placement. This is when a tiny wire mesh tube, or stent, is placed in an artery to keep it open. Aspirin helps prevent blood clots from forming on the stent.  Taking aspirin safely  Tell your healthcare provider about any medicines you are taking. This includes:  · All prescription medicines  · Over-the-counter medicines  · Herbs, vitamins, and other supplements  Also mention if you have a history of ulcers or bleeding problems. Ask whether you will need to stop taking aspirin before having surgery or dental work. Always take medicines as directed.  Tips for taking  aspirin  · Have a routine. For example, take aspirin with the same meal each day.  · Dont take more than prescribed. A low dose gives the same benefit as a higher one, with a lower risk of side effects.  · Dont skip doses. Aspirin needs to be taken each day to work well.  · Keep track of what you take. A pillbox with days of the week can help, especially if you take several medicines. Or use a list or chart to keep track.  When to call your healthcare provider  Side effects of aspirin are not usually serious. If you do have problems, changing your dose may help. Call your healthcare provider if you have any of the following:  · Excessive bruising (some bruising is normal)  · Nosebleeds, bleeding gums, or other excessive bleeding  · An upset stomach or stomach pain   Date Last Reviewed: 6/1/2016  © 1861-8211 [employer]. [address] [place] [address], [place] [address]. All rights reserved. This information is not intended as a substitute for professional medical care. Always follow your healthcare professional's instructions.

## 2020-11-20 NOTE — PROGRESS NOTES
"HPI:   80 y.o.   OB History        2    Para        Term                AB   2    Living           SAB   2    TAB        Ectopic        Multiple        Live Births                  No LMP recorded. Patient is postmenopausal.    Patient is  here for her annual gynecologic exam.  She has no complaints.     ROS:  GENERAL: No fever, chills, fatigability or weight loss.  SKIN: No rashes, itching or changes in color or texture of skin.  HEAD: No headaches or recent head trauma.  EYES: Visual acuity fine. No photophobia, ocular pain or diplopia.  EARS: Denies ear pain, discharge or vertigo.  NOSE: No loss of smell, no epistaxis or postnasal drip.  MOUTH & THROAT: No hoarseness or change in voice. No excessive gum bleeding.  NODES: Denies swollen glands.  CHEST: Denies HUSTON, cyanosis, wheezing, cough and sputum production.  CARDIOVASCULAR: Denies chest pain, PND, orthopnea or reduced exercise tolerance.  ABDOMEN: Appetite fine. No weight loss. Denies diarrhea, abdominal pain, hematemesis or blood in stool.  URINARY: No flank pain, dysuria or hematuria.  PERIPHERAL VASCULAR: No claudication or cyanosis.  MUSCULOSKELETAL: No joint stiffness or swelling. Denies back pain.  NEUROLOGIC: No history of seizures, paralysis, alteration of gait or coordination.    PE:   /72   Ht 5' 4" (1.626 m)   Wt 53.8 kg (118 lb 11.2 oz)   BMI 20.37 kg/m²   APPEARANCE: Well nourished, well developed, in no acute distress.  NECK: Neck symmetric without masses or thyromegaly.  BREASTS: Symmetrical, no skin changes or visible lesions. No palpable masses, nipple discharge or adenopathy bilaterally.  ABDOMEN: Flat. Soft. No tenderness or masses. No hepatosplenomegaly. No hernias. No CVA tenderness.  VULVA: No lesions. Normal female genitalia.  URETHRAL MEATUS: Normal size and location, no lesions, no prolapse.  URETHRA: No masses, tenderness, prolapse or scarring.  VAGINA: Moist and well rugated, no discharge, no significant " cystocele or rectocele.  CERVIX: No lesions and discharge. PAP done.  UTERUS: Normal size, regular shape, mobile, non-tender, bladder base nontender.  ADNEXA: No masses, tenderness or CDS nodularity.  ANUS PERINEUM: Normal.    PROCEDURES:  Pap smear    Assessment:  Normal Gynecologic Exam    Plan:  Mammogram and Colonoscopy if indicated by current recommendations.  Return to clinic in one year or for any problems or complaints.  Waiting for celiac vessel problems

## 2020-11-30 ENCOUNTER — TELEPHONE (OUTPATIENT)
Dept: CARDIOLOGY | Facility: CLINIC | Age: 80
End: 2020-11-30

## 2020-11-30 NOTE — TELEPHONE ENCOUNTER
Unable to get in touch with pt again, unable to leave VM due to VM box not being set up     If pt has questions about COVID testing and labs prior to scheduled upcoming procedure, pt can reach out to the cath lab

## 2020-11-30 NOTE — TELEPHONE ENCOUNTER
Returned call to Mrs. Beth.  No voicemail service on pt's phone, so unable to leave message.  Will try again later.     ----- Message from Winter Luna MA sent at 11/30/2020 11:59 AM CST -----  Contact: Pt called    ----- Message -----  From: Talisha Reyes  Sent: 11/30/2020  10:29 AM CST  To: Joesph WILLIAM Staff    Pt calling to find out if Dr. Pink would prescribed some antibiotics before her procedure. Please call pt @ [phone]. Thank you.

## 2020-11-30 NOTE — TELEPHONE ENCOUNTER
----- Message from Yajaira Cisse sent at 11/30/2020  1:10 PM CST -----  Pt called, returning a call from the office today.    Pt can be beached at [phone]    Thank you

## 2020-11-30 NOTE — TELEPHONE ENCOUNTER
Returned pt phone call     Pt was requesting to be prescribed antibiotics prior to her scheduled procedure with Dr. Junior on 12/8    Pt mentioned she was currently on the phone with the cath lab as well, informed pt she can address her request with the cath lab nurse and they would be able to further assist with her request

## 2020-11-30 NOTE — TELEPHONE ENCOUNTER
----- Message from Lesli Butler sent at 11/30/2020 11:35 AM CST -----  Contact: JUDY LANGFORD [[id number]]  Type:  Patient Requesting Call    Who Called: Judy Che Call Back Number: [phone]  Additional Information:  please call pt regarding lab work and COVID testing for procedure on 12/8

## 2020-11-30 NOTE — TELEPHONE ENCOUNTER
----- Message from Wendy Reyes sent at 11/30/2020 10:39 AM CST -----  Contact: self [phone]  Patient is calling to speak with you about being put on antibiotics before her procedure she is having. Please call

## 2020-12-04 RX ORDER — PREDNISONE 50 MG/1
50 TABLET ORAL 3 TIMES DAILY PRN
Qty: 3 TABLET | Refills: 0 | Status: ON HOLD | OUTPATIENT
Start: 2020-12-04 | End: 2020-12-08 | Stop reason: HOSPADM

## 2020-12-04 RX ORDER — DIPHENHYDRAMINE HCL 25 MG
25 CAPSULE ORAL 3 TIMES DAILY PRN
Status: DISCONTINUED | OUTPATIENT
Start: 2020-12-04 | End: 2020-12-04 | Stop reason: HOSPADM

## 2020-12-04 RX ORDER — FAMOTIDINE 20 MG/1
20 TABLET, FILM COATED ORAL 3 TIMES DAILY PRN
Status: DISCONTINUED | OUTPATIENT
Start: 2020-12-04 | End: 2020-12-04 | Stop reason: HOSPADM

## 2020-12-04 RX ORDER — PREDNISONE 50 MG/1
50 TABLET ORAL 3 TIMES DAILY PRN
Qty: 3 TABLET | Refills: 0 | Status: SHIPPED | OUTPATIENT
Start: 2020-12-04 | End: 2021-01-27

## 2020-12-05 ENCOUNTER — CLINICAL SUPPORT (OUTPATIENT)
Dept: URGENT CARE | Facility: CLINIC | Age: 80
End: 2020-12-05
Payer: MEDICARE

## 2020-12-05 DIAGNOSIS — Z01.810 PREOPERATIVE CARDIOVASCULAR EXAMINATION: ICD-10-CM

## 2020-12-05 PROCEDURE — U0003 INFECTIOUS AGENT DETECTION BY NUCLEIC ACID (DNA OR RNA); SEVERE ACUTE RESPIRATORY SYNDROME CORONAVIRUS 2 (SARS-COV-2) (CORONAVIRUS DISEASE [COVID-19]), AMPLIFIED PROBE TECHNIQUE, MAKING USE OF HIGH THROUGHPUT TECHNOLOGIES AS DESCRIBED BY CMS-2020-01-R: HCPCS

## 2020-12-05 PROCEDURE — 99211 OFF/OP EST MAY X REQ PHY/QHP: CPT | Mod: S$GLB,,, | Performed by: NURSE PRACTITIONER

## 2020-12-05 PROCEDURE — 99211 PR OFFICE/OUTPT VISIT, EST, LEVL I: ICD-10-PCS | Mod: S$GLB,,, | Performed by: NURSE PRACTITIONER

## 2020-12-07 ENCOUNTER — LAB VISIT (OUTPATIENT)
Dept: LAB | Facility: HOSPITAL | Age: 80
End: 2020-12-07
Attending: INTERNAL MEDICINE
Payer: MEDICARE

## 2020-12-07 DIAGNOSIS — Z01.810 PREOPERATIVE CARDIOVASCULAR EXAMINATION: ICD-10-CM

## 2020-12-07 LAB
ANION GAP SERPL CALC-SCNC: 9 MMOL/L (ref 8–16)
BASOPHILS # BLD AUTO: 0.03 K/UL (ref 0–0.2)
BASOPHILS NFR BLD: 0.7 % (ref 0–1.9)
BUN SERPL-MCNC: 17 MG/DL (ref 8–23)
CALCIUM SERPL-MCNC: 8.9 MG/DL (ref 8.7–10.5)
CHLORIDE SERPL-SCNC: 103 MMOL/L (ref 95–110)
CO2 SERPL-SCNC: 29 MMOL/L (ref 23–29)
CREAT SERPL-MCNC: 1.1 MG/DL (ref 0.5–1.4)
DIFFERENTIAL METHOD: ABNORMAL
EOSINOPHIL # BLD AUTO: 0 K/UL (ref 0–0.5)
EOSINOPHIL NFR BLD: 0.7 % (ref 0–8)
ERYTHROCYTE [DISTWIDTH] IN BLOOD BY AUTOMATED COUNT: 14.5 % (ref 11.5–14.5)
EST. GFR  (AFRICAN AMERICAN): 56 ML/MIN/1.73 M^2
EST. GFR  (NON AFRICAN AMERICAN): 48 ML/MIN/1.73 M^2
GLUCOSE SERPL-MCNC: 134 MG/DL (ref 70–110)
HCT VFR BLD AUTO: 35.1 % (ref 37–48.5)
HGB BLD-MCNC: 11.6 G/DL (ref 12–16)
IMM GRANULOCYTES # BLD AUTO: 0.01 K/UL (ref 0–0.04)
IMM GRANULOCYTES NFR BLD AUTO: 0.2 % (ref 0–0.5)
LYMPHOCYTES # BLD AUTO: 1.5 K/UL (ref 1–4.8)
LYMPHOCYTES NFR BLD: 35.6 % (ref 18–48)
MCH RBC QN AUTO: 27.1 PG (ref 27–31)
MCHC RBC AUTO-ENTMCNC: 33 G/DL (ref 32–36)
MCV RBC AUTO: 82 FL (ref 82–98)
MONOCYTES # BLD AUTO: 0.3 K/UL (ref 0.3–1)
MONOCYTES NFR BLD: 6.9 % (ref 4–15)
NEUTROPHILS # BLD AUTO: 2.3 K/UL (ref 1.8–7.7)
NEUTROPHILS NFR BLD: 55.9 % (ref 38–73)
NRBC BLD-RTO: 0 /100 WBC
PLATELET # BLD AUTO: 177 K/UL (ref 150–350)
PMV BLD AUTO: 9.5 FL (ref 9.2–12.9)
POTASSIUM SERPL-SCNC: 4.2 MMOL/L (ref 3.5–5.1)
RBC # BLD AUTO: 4.28 M/UL (ref 4–5.4)
SODIUM SERPL-SCNC: 141 MMOL/L (ref 136–145)
WBC # BLD AUTO: 4.19 K/UL (ref 3.9–12.7)

## 2020-12-07 PROCEDURE — 85025 COMPLETE CBC W/AUTO DIFF WBC: CPT

## 2020-12-07 PROCEDURE — 36415 COLL VENOUS BLD VENIPUNCTURE: CPT

## 2020-12-07 PROCEDURE — 80048 BASIC METABOLIC PNL TOTAL CA: CPT

## 2020-12-08 ENCOUNTER — HOSPITAL ENCOUNTER (OUTPATIENT)
Facility: HOSPITAL | Age: 80
Discharge: HOME | End: 2020-12-08
Attending: INTERNAL MEDICINE | Admitting: INTERNAL MEDICINE
Payer: MEDICARE

## 2020-12-08 VITALS
WEIGHT: 120 LBS | DIASTOLIC BLOOD PRESSURE: 92 MMHG | HEIGHT: 63 IN | OXYGEN SATURATION: 98 % | HEART RATE: 89 BPM | SYSTOLIC BLOOD PRESSURE: 144 MMHG | RESPIRATION RATE: 21 BRPM | TEMPERATURE: 98 F | BODY MASS INDEX: 21.26 KG/M2

## 2020-12-08 DIAGNOSIS — I70.8 CELIAC ARTERY ATHEROSCLEROSIS: ICD-10-CM

## 2020-12-08 DIAGNOSIS — Z01.810 PREOPERATIVE CARDIOVASCULAR EXAMINATION: Primary | ICD-10-CM

## 2020-12-08 LAB — SARS-COV-2 RDRP RESP QL NAA+PROBE: NEGATIVE

## 2020-12-08 PROCEDURE — 99152 MOD SED SAME PHYS/QHP 5/>YRS: CPT | Performed by: INTERNAL MEDICINE

## 2020-12-08 PROCEDURE — 37252 INTRVASC US NONCORONARY 1ST: CPT | Mod: RT | Performed by: INTERNAL MEDICINE

## 2020-12-08 PROCEDURE — 85347 COAGULATION TIME ACTIVATED: CPT | Performed by: INTERNAL MEDICINE

## 2020-12-08 PROCEDURE — 36200 PR PLACE CATH AORTA: ICD-10-PCS | Mod: 51,59,RT, | Performed by: INTERNAL MEDICINE

## 2020-12-08 PROCEDURE — 25500020 PHARM REV CODE 255: Performed by: INTERNAL MEDICINE

## 2020-12-08 PROCEDURE — 25000003 PHARM REV CODE 250: Performed by: INTERNAL MEDICINE

## 2020-12-08 PROCEDURE — 93010 ELECTROCARDIOGRAM REPORT: CPT | Mod: ,,, | Performed by: INTERNAL MEDICINE

## 2020-12-08 PROCEDURE — 99152 PR MOD CONSCIOUS SEDATION, SAME PHYS, 5+ YRS, FIRST 15 MIN: ICD-10-PCS | Mod: ,,, | Performed by: INTERNAL MEDICINE

## 2020-12-08 PROCEDURE — 37236 PR OPEN/PERQ TRANSCATH PLACE STENT; INITIAL ARTERY: ICD-10-PCS | Mod: ,,, | Performed by: INTERNAL MEDICINE

## 2020-12-08 PROCEDURE — 75726 CHG ANGIO VISCERAL SELECTV/SUBSELEC: ICD-10-PCS | Mod: 26,59,, | Performed by: INTERNAL MEDICINE

## 2020-12-08 PROCEDURE — C1876 STENT, NON-COA/NON-COV W/DEL: HCPCS | Performed by: INTERNAL MEDICINE

## 2020-12-08 PROCEDURE — 93005 ELECTROCARDIOGRAM TRACING: CPT | Mod: 59

## 2020-12-08 PROCEDURE — 37236 OPEN/PERQ PLACE STENT 1ST: CPT | Mod: RT | Performed by: INTERNAL MEDICINE

## 2020-12-08 PROCEDURE — 99153 MOD SED SAME PHYS/QHP EA: CPT | Performed by: INTERNAL MEDICINE

## 2020-12-08 PROCEDURE — 36245 PR INS CATH ABD/L-EXT ART 1ST ORDER: ICD-10-PCS | Mod: 51,LT,, | Performed by: INTERNAL MEDICINE

## 2020-12-08 PROCEDURE — 36200 PLACE CATHETER IN AORTA: CPT | Mod: 51,59,RT, | Performed by: INTERNAL MEDICINE

## 2020-12-08 PROCEDURE — 36245 INS CATH ABD/L-EXT ART 1ST: CPT | Mod: LT | Performed by: INTERNAL MEDICINE

## 2020-12-08 PROCEDURE — 63600175 PHARM REV CODE 636 W HCPCS: Performed by: INTERNAL MEDICINE

## 2020-12-08 PROCEDURE — 99152 MOD SED SAME PHYS/QHP 5/>YRS: CPT | Mod: ,,, | Performed by: INTERNAL MEDICINE

## 2020-12-08 PROCEDURE — C1887 CATHETER, GUIDING: HCPCS | Performed by: INTERNAL MEDICINE

## 2020-12-08 PROCEDURE — 37252 PR IVUS, NON-CORONARY, 1ST VESSEL: ICD-10-PCS | Mod: ,,, | Performed by: INTERNAL MEDICINE

## 2020-12-08 PROCEDURE — C1725 CATH, TRANSLUMIN NON-LASER: HCPCS | Performed by: INTERNAL MEDICINE

## 2020-12-08 PROCEDURE — 37252 INTRVASC US NONCORONARY 1ST: CPT | Mod: ,,, | Performed by: INTERNAL MEDICINE

## 2020-12-08 PROCEDURE — U0002 COVID-19 LAB TEST NON-CDC: HCPCS

## 2020-12-08 PROCEDURE — 93010 EKG 12-LEAD: ICD-10-PCS | Mod: ,,, | Performed by: INTERNAL MEDICINE

## 2020-12-08 PROCEDURE — C1769 GUIDE WIRE: HCPCS | Performed by: INTERNAL MEDICINE

## 2020-12-08 PROCEDURE — 36245 INS CATH ABD/L-EXT ART 1ST: CPT | Mod: 51,LT,, | Performed by: INTERNAL MEDICINE

## 2020-12-08 PROCEDURE — C1753 CATH, INTRAVAS ULTRASOUND: HCPCS | Performed by: INTERNAL MEDICINE

## 2020-12-08 PROCEDURE — 75726 ARTERY X-RAYS ABDOMEN: CPT | Mod: 59 | Performed by: INTERNAL MEDICINE

## 2020-12-08 PROCEDURE — 27201423 OPTIME MED/SURG SUP & DEVICES STERILE SUPPLY: Performed by: INTERNAL MEDICINE

## 2020-12-08 PROCEDURE — 75726 ARTERY X-RAYS ABDOMEN: CPT | Mod: 26,59,, | Performed by: INTERNAL MEDICINE

## 2020-12-08 PROCEDURE — 37236 OPEN/PERQ PLACE STENT 1ST: CPT | Mod: ,,, | Performed by: INTERNAL MEDICINE

## 2020-12-08 PROCEDURE — C1894 INTRO/SHEATH, NON-LASER: HCPCS | Performed by: INTERNAL MEDICINE

## 2020-12-08 PROCEDURE — 36200 PLACE CATHETER IN AORTA: CPT | Mod: 59,RT | Performed by: INTERNAL MEDICINE

## 2020-12-08 DEVICE — PREMOUNTED STENT SYSTEM
Type: IMPLANTABLE DEVICE | Site: ABDOMEN | Status: FUNCTIONAL
Brand: EXPRESS® SD RENAL/BILIARY

## 2020-12-08 RX ORDER — FENTANYL CITRATE 50 UG/ML
INJECTION, SOLUTION INTRAMUSCULAR; INTRAVENOUS
Status: DISCONTINUED | OUTPATIENT
Start: 2020-12-08 | End: 2020-12-08 | Stop reason: HOSPADM

## 2020-12-08 RX ORDER — MIDAZOLAM HYDROCHLORIDE 1 MG/ML
INJECTION INTRAMUSCULAR; INTRAVENOUS
Status: DISCONTINUED | OUTPATIENT
Start: 2020-12-08 | End: 2020-12-08 | Stop reason: HOSPADM

## 2020-12-08 RX ORDER — LIDOCAINE HYDROCHLORIDE 10 MG/ML
INJECTION INFILTRATION; PERINEURAL
Status: DISCONTINUED | OUTPATIENT
Start: 2020-12-08 | End: 2020-12-08 | Stop reason: HOSPADM

## 2020-12-08 RX ORDER — HEPARIN SODIUM 200 [USP'U]/100ML
INJECTION, SOLUTION INTRAVENOUS
Status: DISCONTINUED | OUTPATIENT
Start: 2020-12-08 | End: 2020-12-08 | Stop reason: HOSPADM

## 2020-12-08 RX ORDER — HEPARIN SODIUM 1000 [USP'U]/ML
INJECTION, SOLUTION INTRAVENOUS; SUBCUTANEOUS
Status: DISCONTINUED | OUTPATIENT
Start: 2020-12-08 | End: 2020-12-08 | Stop reason: HOSPADM

## 2020-12-08 RX ORDER — SODIUM CHLORIDE 9 MG/ML
INJECTION, SOLUTION INTRAVENOUS CONTINUOUS
Status: DISCONTINUED | OUTPATIENT
Start: 2020-12-08 | End: 2020-12-08 | Stop reason: HOSPADM

## 2020-12-08 RX ORDER — IODIXANOL 320 MG/ML
INJECTION, SOLUTION INTRAVASCULAR
Status: DISCONTINUED | OUTPATIENT
Start: 2020-12-08 | End: 2020-12-08 | Stop reason: HOSPADM

## 2020-12-08 RX ORDER — VERAPAMIL HYDROCHLORIDE 2.5 MG/ML
INJECTION, SOLUTION INTRAVENOUS
Status: DISCONTINUED | OUTPATIENT
Start: 2020-12-08 | End: 2020-12-08 | Stop reason: HOSPADM

## 2020-12-08 RX ORDER — CLOPIDOGREL BISULFATE 75 MG/1
75 TABLET ORAL DAILY
Qty: 90 TABLET | Refills: 3 | Status: SHIPPED | OUTPATIENT
Start: 2020-12-08 | End: 2021-12-13 | Stop reason: SDUPTHER

## 2020-12-08 RX ORDER — CLOPIDOGREL BISULFATE 75 MG/1
300 TABLET ORAL DAILY
Status: DISCONTINUED | OUTPATIENT
Start: 2020-12-08 | End: 2020-12-08 | Stop reason: HOSPADM

## 2020-12-08 RX ORDER — DIPHENHYDRAMINE HYDROCHLORIDE 50 MG/ML
INJECTION INTRAMUSCULAR; INTRAVENOUS
Status: DISCONTINUED | OUTPATIENT
Start: 2020-12-08 | End: 2020-12-08 | Stop reason: HOSPADM

## 2020-12-08 RX ADMIN — CLOPIDOGREL 300 MG: 75 TABLET, FILM COATED ORAL at 11:12

## 2020-12-08 NOTE — Clinical Note
Angiography performed of the middle suprarenal . via power injection. Injection was performed with 30 mL contrast at 15 mL/s. .

## 2020-12-08 NOTE — INTERVAL H&P NOTE
The patient has been examined and the H&P has been reviewed:        Risks, benefits and alternatives of peripheral catheterization and possible intervention were discussed with the patient. All questions were answered and informed consent obtained.     I discussed the importance of compliance with dual antiplatelet therapy with the patient to prevent acute or late stent thrombosis with premature discontinuation of the therapy.        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  This is a benign finding.  No further workup needed      Aortic atherosclerosis [I70.0]  Yes     Seen on CT 08/21/2020.  Discussed with patient to follow-up with PCP      Hypercholesteremia [E78.00]  Yes    Weight loss [R63.4]  Yes    Solitary pulmonary nodule [R91.1]  Yes     Patient had emergency room visit on 08/15/2020, chest x-ray revealed medial right lung base 4 mm indeterminate nodule.  Further evaluation with CT was recommended.  Ms. Beth denies smoking history.  She does report having weight loss since November.    Plan:  -obtain designated CT of chest to further assess lung nodule.     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       Essential hypertension [I10]  Yes    Atherosclerotic peripheral vascular disease with intermittent claudication [I70.219]  Yes      Resolved Hospital Problems   No resolved problems to display.

## 2020-12-08 NOTE — NURSING
2 cc of air removed from left radial vasc band. No hematoma noted. Will continue to monitor.pts niece called on phone and updated and will call whey ready for discharge to home; pt resting quietly w/o complaints; belongings at bedside; pt wearing blue surg mask for discharge    
2 cc of air removed from right and left radial radial vasc bands. No hematoma noted. Will continue to monitor.    
Patient ambulatory to bathroom; bilat radial drsgs cdi and no hematoma or bleeding; pt aao; pt called for her ride home  
Patient to cath recovery per stretcher and bedside report received from Delilah; pt awake oriented speech clear and appropriate; bilateral radial vasc bands in place w/ good circ check palpable pulses and no hematoma or bleeding; pt connected to pulse ox bp cuff left leg and monitor and sinus noted on monitor; skin wm dry color pk; pedal pulses aud w/ doppler bilat; rails up x 2; yellow fall socks in place and id band in place; NS infusing at 200 ml per hour in handoff report and placed on iv pump and iv patent left arm  
Pt aao and has no complaints; lunch tray served and sitting up in bed; pts niece in waiting room and updated  
Pt cleared for discharge to home; patient is able to ambulate; pt has belongings, red tote, instructions , stent card x 2; pt had some blood on her white t shirt and threw it away--she did not want to bring it home; pt wearing blue surgical mask for discharge on; pt escorted to pov to her niece per whch by nurse; bilat radial access site drsgs cdi and no hematoma or bleeding and palpable radial pulses bilt w/ good sensation and brisk cap refill  
Pt out of bed and getting dressed; left radial access site began to bleed---manual pressure held immediately to control the bleeding and radila vasc band applied and bleeding stopped; radial pulse palpable and good circ check and brisk cap refill; pts niece called and updated; pt placed back in gown and back on monitor and explained plan to attempt removal in 1 hour  
Remaining air removed from left radial vasc band. Gauze and tegaderm dressing applied c.d.i. No drainage or shadowing noted. No bleeding or hematoma noted around site. +2 maile radial pulses palpated. Skin normal in color, warm to touch, < 3 sec cap refill. Pt tolerated well.  Will continue to monitor pt. right radial drsg cdi and no hematoma or bleeding    
Remaining air removed from right and left  radial vasc bands. Gauze and tegaderm dressing applied c.d.i. No drainage or shadowing noted. No bleeding or hematoma noted around site. +2 maile radial pulses palpated. Skin normal in color, warm to touch, < 3 sec cap refill. Pt tolerated well.  Will continue to monitor pt.     
covid swab in lab--- other result was still pending and not resulted from 12-5-20  
instructions reviewed with patient and pts niece and verbalized understanding; pt has instructions and stent card x 2 and phone # to call for follow up appt; pt is aao; pt tolerating po intake and urinating w/o difficulty; skin wm dry color pk; sinus on monitor; pt belongings at bedside and has blue surg mask on for discharge; bilat radial drsgs cdi and no hematoma or bleeding noted   
plavix ordered and pending; awaiting recovery orders  
unable to determine

## 2020-12-08 NOTE — Clinical Note
130 ml injected throughout the case. 320 mL total wasted during the case. 450 mL total used in the case.

## 2020-12-08 NOTE — DISCHARGE INSTRUCTIONS
Peripheral Angioplasty  Peripheral angioplasty is a procedure that helps open blockages in peripheral arteries. These vessels carry blood to your lower body, legs, and arms.  Talk with your healthcare provider about the risks and complications of angioplasty.   Before the procedure  Recommendations of what to do include:   · Tell your healthcare provider about all medicines you take including over-the-counter medicines, herbal supplements, and any allergies you may have, especially to iodine.   · Follow any directions youre given for not eating or drinking before the procedure.  · Arrange for a family member or friend to drive you home.  During the procedure    · You may get medicine through an IV (intravenous) line to relax you. An injection will numb the site your healthcare provider will use to perform the procedure. The site used is generally an artery in the groin although the wrist or arm may be used. The healthcare provider makes a tiny skin cut (incision) near an artery in your groin.  · Your healthcare provider puts a thin, flexible tube (catheter) through the incision. He or she then threads the catheter into the affected artery while using X-ray monitoring.  · Contrast dye is injected into the catheter. X-rays (angiography) are taken.  · A tiny balloon is pushed through the catheter to the blockage. Your healthcare provider inflates and deflates the balloon a few times. This compresses the plaque. A small metal or mesh tube (stent) may be put in the artery to help keep it open. The balloon and catheter are then taken out.  After the procedure  Youll be taken to a recovery area. Pressure is put on the insertion site for about 30 to 45 minutes. Your healthcare provider will tell you how long to lie down and keep the insertion site still. You will go home that day or spend the night in the facility. You will be given aftercare instructions for when you go home.   Call your healthcare provider  Call  your healthcare provider right away or get immediate medical attention if:  · You notice a lump or bleeding at the site where the catheter was inserted  · You feel increasing pain at the insertion site  · You become lightheaded or dizzy  · You have leg pain or numbness  · You have a leg that turns blue or feels cold  · You develop a fever greater than 101.5°F (38.6°C).  · You develop a skin rash  · You cannot urinate after the procedure  · You have chest pain or shortness of breath   Date Last Reviewed: 5/1/2016  © 1785-0468 [employer]. [address] [place] [address]. All rights reserved. This information is not intended as a substitute for professional medical care. Always follow your healthcare professional's instructions.        Recovery After Procedural Sedation (Adult)   You have been given medicine by vein to make you sleep during your surgery. This may have included both a pain medicine and sleeping medicine. Most of the effects have worn off. But you may still have some drowsiness for the next 6 to 8 hours.  Home care  Follow these guidelines when you get home:  · For the next 8 hours, you should be watched by a responsible adult. This person should make sure your condition is not getting worse.  · Don't drink any alcohol for the next 24 hours.  · Don't drive, operate dangerous machinery, or make important business or personal decisions during the next 24 hours.  · To prevent injury or falls, use caution when standing and walking for at least 24 hours after your procedure.  Note: Your healthcare provider may tell you not to take any medicine by mouth for pain or sleep in the next 4 hours. These medicines may react with the medicines you were given in the hospital. This could cause a much stronger response than usual.  Follow-up care  Follow up with your healthcare provider if you are not alert and back to your usual level of activity within 12 hours.  When to seek medical  advice  Call your healthcare provider right away if any of these occur:  · Drowsiness gets worse  · Weakness or dizziness gets worse  · Repeated vomiting  · You can't be awakened  · Fever  · New rash  Ladi last reviewed this educational content on 9/1/2019 © 2000-2020 [employer]. [address] [place] [address]. All rights reserved. This information is not intended as a substitute for professional medical care. Always follow your healthcare professional's instructions.        Understanding Transradial Cardiac Catheterization    Cardiac catheterization (cardiac cath) is a common, non-surgical procedure. During the procedure, your doctor will insert a long, thin tube (catheter) into an artery and move it up into your heart. Transradial means the catheter is inserted into an artery in the wrist (the radial artery). This procedure can be used to diagnose and treat certain heart problems.  Why do I need a transradial cardiac cath?  You may need a cardiac cath if signs indicate a problem with your heart. These may include:  · Symptoms of chest pain, tightness, or heaviness (known as angina). This is a common symptom of blocked heart arteries, known as coronary artery disease.  · Symptoms of weakness, dizziness, trouble breathing, or swollen legs or feet. These may be symptoms of a problem with a heart valve or the heart muscle.  · Other test results show heart problems. Tests may include stress tests, heart scans, and echocardiography.  During a cardiac cath, your doctor can see the condition of the coronary arteries and heart valves. He or she can also check how well the heart pumps and the flow of blood through the heart. Your doctor can also measure pressures and take blood samples. And, if needed, he or she can open blocked arteries. This can help reduce symptoms of angina.  Cardiac cath is often done using a catheter inserted into an artery in the groin. During transradial cardiac cath,  the catheter is inserted into an artery in the wrist. This can mean less bleeding and a faster recovery. Some people may have blockages in the groin arteries as well as in the heart arteries, making it difficult to reach the heart. The transradial approach can be used to get around this problem.   What happens during a transradial cardiac cath?  The procedure is done in the hospital or a surgery center. First, an IV line is put in your arm or hand to deliver fluids and medicines. You will likely be given medicine to relax you and make you drowsy. When the procedure begins:  · You lie on an X-ray table.  · The skin over the insertion site in your wrist is numbed.  · The doctor makes a tiny puncture or incision into the artery in the wrist. He or she then inserts a catheter and threads it through the blood vessel into your heart.    · The doctor may inject a contrast fluid through the catheter into the arteries. This fluid makes the arteries show up better on X-rays.  · Tests may be done to check the condition of your heart and arteries. If needed, the doctor can clear blockages in the arteries or do other repairs.  · When the doctor is finished, he or she will remove the catheter and put direct pressure on the site to prevent bleeding.  · You will stay for a time to recover, and then go home.  What are the risks of transradial cardiac cath?  These include:  · Bleeding, bruising, infection, or blood clots  · Damage to the radial artery that may cause injury to the hand  · Allergic reaction to the contrast fluid  · Abnormal heartbeat (arrhythmia)  · Damage to blood vessels or tissues  · Kidney damage or failure  · The need for emergency heart surgery  · Heart attack, stroke, or death  Date Last Reviewed: 5/1/2016  © 1325-3007 [employer]. [address] [place] [address], [place] [address]. All rights reserved. This information is not intended as a substitute for professional medical care. Always follow your  healthcare professional's instructions.

## 2020-12-08 NOTE — BRIEF OP NOTE
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

## 2020-12-09 ENCOUNTER — TELEPHONE (OUTPATIENT)
Dept: CARDIOLOGY | Facility: CLINIC | Age: 80
End: 2020-12-09

## 2020-12-09 NOTE — TELEPHONE ENCOUNTER
----- Message from Cydney Griffin sent at 12/9/2020  9:10 AM CST -----  Contact: [phone]  Type:  Needs Medical Advice    Who Called: PT  Reason for call: pt would like to speak with the nurse regarding pt had a Procedure on Yesterday and is having Pain in her Stomach Area and loss of appetite, and the pt had Trouble sleeping last night  Would the patient rather a call back or a response via [facility]?  Call back  Best Call Back Number: [phone] Nicole ( Niece) or [phone]

## 2020-12-09 NOTE — TELEPHONE ENCOUNTER
I spoke to the patient to let her know that her message was sent to the doctor and he is in clinic and we will call once he gives his recommendation.  Patient was instructed to go to the ER if the pain was getting worst.  She expressed understanding.

## 2020-12-09 NOTE — TELEPHONE ENCOUNTER
Patient called to let the doctor know that after her procedure yesterday she started having abdominal pain,  loss of appetite, and trouble sleeping last night because the patient was bad.  Patient would like to know what you would advise her to do.          [phone] Nicole ( Niece) or [phone]

## 2020-12-09 NOTE — TELEPHONE ENCOUNTER
I have called the patient several times with no answer and no voicemail.    I called and spoke to the patients navarrokiko Liu and he will see if he can reach her and have her call back.

## 2020-12-09 NOTE — TELEPHONE ENCOUNTER
----- Message from Cydney Griffin sent at 12/9/2020  9:10 AM CST -----  Contact: [phone]  Type:  Needs Medical Advice    Who Called: PT  Reason for call: pt would like to speak with the nurse regarding pt had a Procedure on Yesterday and is having Pain in her Stomach Area and loss of appetite, and the pt had Trouble sleeping last night  Would the patient rather a call back or a response via [facility]?  Call back  Best Call Back Number: [phone] Nicole ( Niece) or [phone]

## 2020-12-09 NOTE — TELEPHONE ENCOUNTER
----- Message from Cydney Griffin sent at 12/9/2020  4:04 PM CST -----  Contact: Nicole( Niece)-[phone]      Type:  Needs Medical Advice    Who Called:Nicole ( Niece)  Reason for call: pt is in Pain in her Stomach area, loss appetite due to the Pain, pt's Niece is not understand why the pt is not getting a call back and not being concern with the pt's pain  Would the patient rather a call back or a response via [facility]?  Call back  Best Call Back Number:  [phone]

## 2020-12-09 NOTE — TELEPHONE ENCOUNTER
----- Message from Luz Elena Ugalde sent at 12/9/2020  1:10 PM CST -----  Type:  Needs Medical Advice    Who Called: self  Reason:Pain in her stomach area  Would the patient rather a call back or a response via [facility]? call  Best Call Back Number: [phone]  Additional Information: none

## 2020-12-09 NOTE — TELEPHONE ENCOUNTER
I have called the patient several times with no answer and no voicemail.    I called and spoke to the patients nephew and he will see if he can reach her and have her call back.

## 2020-12-10 ENCOUNTER — SPECIALTY PHARMACY (OUTPATIENT)
Dept: PHARMACY | Facility: CLINIC | Age: 80
End: 2020-12-10

## 2020-12-10 ENCOUNTER — TELEPHONE (OUTPATIENT)
Dept: CARDIOLOGY | Facility: CLINIC | Age: 80
End: 2020-12-10

## 2020-12-10 ENCOUNTER — TELEPHONE (OUTPATIENT)
Dept: CARDIOLOGY | Facility: HOSPITAL | Age: 80
End: 2020-12-10

## 2020-12-10 LAB
POC ACTIVATED CLOTTING TIME K: 213 SEC (ref 74–137)
POC ACTIVATED CLOTTING TIME K: 246 SEC (ref 74–137)
POC ACTIVATED CLOTTING TIME K: 252 SEC (ref 74–137)
SAMPLE: ABNORMAL
SARS-COV-2 RNA RESP QL NAA+PROBE: NOT DETECTED
SITE: ABNORMAL

## 2020-12-10 NOTE — TELEPHONE ENCOUNTER
I reached out to  and     Also reached out and spoke to her as well.    I called Patient per  wants  Her to    Do a CTA-abdominal and that has been scheduled    At  [facility] main [facility] 12-16-20 an,    A follow up with  to review test 12-21-20.    Patient verbalized understanding, has no other     Questions or concerns at this time.    Her appointments have also been mailed    To her home.        NW

## 2020-12-10 NOTE — TELEPHONE ENCOUNTER
I reached out to  this morning     to see how she is feeling today.    Patient stated feeling a little better today.     will  Be calling her shortly,    Or he will advise us what to do.      NW

## 2020-12-10 NOTE — TELEPHONE ENCOUNTER
Specialty Pharmacy - Refill Coordination    Specialty Medication Orders Linked to Encounter      Most Recent Value   Medication #1  alirocumab (PRALUENT PEN) 75 mg/mL PnIj (Order#[id number], Rx#[id number])          Refill Questions - Documented Responses      Most Recent Value   Relationship to patient of person spoken to?  Self   HIPAA/medical authority confirmed?  Yes   How will the patient receive the medication?  Mail   When does the patient need to receive the medication?  12/15/20   Shipping Address  Home   Address in [facility] confirmed and updated if neccessary?  Yes   Expected Copay ($)  20   Is the patient able to afford the medication copay?  Yes   Payment Method  CC on file   Days supply of Refill  28   Would patient like to speak to a pharmacist?  No   Do you want to trigger an intervention?  No   Do you want to trigger an additional referral task?  No   Refill activity completed?  Yes   Refill activity plan  Refill scheduled   Shipment/Pickup Date:  12/14/20          Current Outpatient Medications   Medication Sig    alirocumab (PRALUENT PEN) 75 mg/mL PnIj Inject 1 mL (75 mg total) into the skin every 14 (fourteen) days.    amLODIPine (NORVASC) 10 MG tablet Take 10 mg by mouth once daily.    ascorbic acid, vitamin C, (VITAMIN C) 1000 MG tablet Take 1,000 mg by mouth once daily.    aspirin 81 MG Chew Take 81 mg by mouth once daily.    azelastine (ASTELIN) 137 mcg (0.1 %) nasal spray USE 1 TO 2 SPRAY(S) IN EACH NOSTRIL TWICE DAILY    bempedoic acid 180 mg Tab Take 180 mg by mouth once daily.    budesonide 1 mg/2 mL NbSp EMPTY CONTENTS OF 1 RESPULE INTO NASAL IRRIGATION SYSTEM, ADD DISTILLED WATER, SALT PACK, MIX & IRRIGATE. PERFORM TWICE DAILY    carvediloL (COREG) 6.25 MG tablet Take 1 tablet (6.25 mg total) by mouth 2 (two) times daily with meals.    clopidogreL (PLAVIX) 75 mg tablet Take 1 tablet (75 mg total) by mouth once daily.    clorazepate (TRANXENE) 7.5 MG Tab Take 1 tablet  (7.5 mg total) by mouth 3 (three) times daily. (Patient taking differently: Take 3.75 mg by mouth once daily. )    co-enzyme Q-10 50 mg capsule Take 50 mg by mouth once daily.    diphenhydrAMINE (BENADRYL) 25 mg capsule Take 25 mg by mouth every 6 (six) hours as needed for Itching.    ferrous sulfate (FEOSOL) 325 mg (65 mg iron) Tab tablet Take 1 tablet by mouth once daily.    multivitamin (ONE DAILY MULTIVITAMIN) per tablet Take 1 tablet by mouth once daily.    NEILMED SINUS RINSE COMPLETE pkdv use as directed    omega-3 fatty acids/fish oil (FISH OIL-OMEGA-3 FATTY ACIDS) 300-1,000 mg capsule Take 1 capsule by mouth once daily.    omeprazole (PRILOSEC) 20 MG capsule Take 1 capsule (20 mg total) by mouth 2 (two) times daily before meals.    predniSONE (DELTASONE) 50 MG Tab Take 1 tablet (50 mg total) by mouth 3 (three) times daily as needed (Take one tab at 6pm night before procedure, 1 tab at 11pm night before procedure, and 1 tab at 0600 morning of procedure with a small sip of water.).    TURMERIC ORAL Take 1 packet by mouth once daily.   Last reviewed on 12/8/2020  7:44 AM by Zandra Guerrero RN    Review of patient's allergies indicates:   Allergen Reactions    Benazepril Other (See Comments)     cough    Chlorthalidone     Iodinated contrast media     Iodine and iodide containing products     Lipitor [atorvastatin]      States she is allergic to all statin medications    Sertraline      Medication caused patient to get sick.    Spironolactone     Last reviewed on  12/8/2020 7:38 AM by Zandra Guerrero      Tasks added this encounter   No tasks added.   Tasks due within next 3 months   12/7/2020 - Refill Call (Auto Added)     Ema Hidalgo  [facility]  [address] [place] [id number]  Phone: [phone]  Fax: [phone]

## 2020-12-10 NOTE — TELEPHONE ENCOUNTER
I spoke with patient       Her abdominal pain is better       She is able to eat without any issues      Please order a CTA for her prior to her follow up       I instructed her to stop taking motrin because she is taking aspirin and plavix      She expressed verbal understanding of the plan        Thanks      ZN

## 2020-12-11 ENCOUNTER — TELEPHONE (OUTPATIENT)
Dept: CARDIOLOGY | Facility: CLINIC | Age: 80
End: 2020-12-11

## 2020-12-11 NOTE — TELEPHONE ENCOUNTER
I  reached out to patient ,this morning,    No answer , left her V/Message.      Im also aware that  spoke to patient     Yesterday 12-12-20, he ordered a CTA and a follow up.       reminder that she has a CTA 12-    To do prior to seeing  12- STAT    Per     Appointment also mailed to patient home.          Kellen Vera (rita).                              ----- Message from Светлана Jacobson sent at 12/11/2020  8:54 AM CST -----  Regarding: advice  Contact: pt [phone]  Type:  Needs Medical Advice    Who Called: pt   Symptoms (please be specific): Right leg not doing well  How long has patient had these symptoms:  2 days   Pharmacy name and phone #:  [facility] [place] - [address] [phone] (Phone)  [phone] (Fax)  Would the patient rather a call back or a response via [facility]? Call back   Best Call Back Number: [phone]  Additional Information:

## 2020-12-11 NOTE — TELEPHONE ENCOUNTER
----- Message from Irish Brown sent at 12/11/2020  3:58 PM CST -----  Type:  Patient Returning Call    Who Called:patient  Who Left Message for Patient:office  Does the patient know what this is regarding?:returning patient call  Would the patient rather a call back or a response via [employer]?  call  Best Call Back Number: [phone]  Additional Information:

## 2020-12-11 NOTE — TELEPHONE ENCOUNTER
Returned pt phone call     Pt states she is still experiencing abdominal pain even after instructed by Dr. Junior to stop taking the motrin    Pt stated she has taken 2 ASA 321mg today and last night she experienced no sleep because of the pain     Advised pt that if pain worsens and is unbearable over the weekend then she can head over to the nearest ED or urgent care     Pt is already scheduled for CTA and has a follow up jasmina scheduled     Please advise if any further recommendations

## 2020-12-15 ENCOUNTER — SPECIALTY PHARMACY (OUTPATIENT)
Dept: PHARMACY | Facility: CLINIC | Age: 80
End: 2020-12-15

## 2020-12-15 DIAGNOSIS — E78.00 HYPERCHOLESTEREMIA: Primary | ICD-10-CM

## 2020-12-15 NOTE — TELEPHONE ENCOUNTER
Incoming call from patient requesting guidance on how to inject Praluent. Patient has a new caregiver. Successfully walked caregiver and patient through injection and email sent to patient on how to inject for future reference. Closing intervention.

## 2020-12-16 ENCOUNTER — TELEPHONE (OUTPATIENT)
Dept: CARDIOLOGY | Facility: CLINIC | Age: 80
End: 2020-12-16

## 2020-12-16 ENCOUNTER — HOSPITAL ENCOUNTER (INPATIENT)
Facility: HOSPITAL | Age: 80
LOS: 3 days | Discharge: HOME | DRG: 392 | End: 2020-12-19
Attending: EMERGENCY MEDICINE | Admitting: INTERNAL MEDICINE
Payer: MEDICARE

## 2020-12-16 ENCOUNTER — HOSPITAL ENCOUNTER (OUTPATIENT)
Dept: RADIOLOGY | Facility: HOSPITAL | Age: 80
Discharge: HOME | End: 2020-12-16
Attending: INTERNAL MEDICINE
Payer: MEDICARE

## 2020-12-16 DIAGNOSIS — I10 ESSENTIAL HYPERTENSION: ICD-10-CM

## 2020-12-16 DIAGNOSIS — I70.8 CELIAC ARTERY ATHEROSCLEROSIS: ICD-10-CM

## 2020-12-16 DIAGNOSIS — K55.9 MESENTERIC ISCHEMIA: Primary | ICD-10-CM

## 2020-12-16 DIAGNOSIS — E78.00 HYPERCHOLESTEREMIA: ICD-10-CM

## 2020-12-16 DIAGNOSIS — R10.9 ABDOMINAL PAIN: ICD-10-CM

## 2020-12-16 DIAGNOSIS — R10.10 PAIN OF UPPER ABDOMEN: ICD-10-CM

## 2020-12-16 DIAGNOSIS — I77.1 CELIAC ARTERY STENOSIS: ICD-10-CM

## 2020-12-16 DIAGNOSIS — I73.9 PAD (PERIPHERAL ARTERY DISEASE): ICD-10-CM

## 2020-12-16 LAB
ALBUMIN SERPL BCP-MCNC: 3.8 G/DL (ref 3.5–5.2)
ALP SERPL-CCNC: 47 U/L (ref 55–135)
ALT SERPL W/O P-5'-P-CCNC: 23 U/L (ref 10–44)
ANION GAP SERPL CALC-SCNC: 11 MMOL/L (ref 8–16)
AST SERPL-CCNC: 25 U/L (ref 10–40)
BASOPHILS # BLD AUTO: 0.04 K/UL (ref 0–0.2)
BASOPHILS NFR BLD: 0.9 % (ref 0–1.9)
BILIRUB SERPL-MCNC: 0.4 MG/DL (ref 0.1–1)
BILIRUB UR QL STRIP: NEGATIVE
BUN SERPL-MCNC: 11 MG/DL (ref 6–30)
BUN SERPL-MCNC: 11 MG/DL (ref 8–23)
CALCIUM SERPL-MCNC: 9.7 MG/DL (ref 8.7–10.5)
CHLORIDE SERPL-SCNC: 103 MMOL/L (ref 95–110)
CHLORIDE SERPL-SCNC: 103 MMOL/L (ref 95–110)
CLARITY UR REFRACT.AUTO: CLEAR
CO2 SERPL-SCNC: 26 MMOL/L (ref 23–29)
COLOR UR AUTO: COLORLESS
CREAT SERPL-MCNC: 0.9 MG/DL (ref 0.5–1.4)
CREAT SERPL-MCNC: 0.9 MG/DL (ref 0.5–1.4)
CTP QC/QA: YES
DIFFERENTIAL METHOD: ABNORMAL
EOSINOPHIL # BLD AUTO: 0 K/UL (ref 0–0.5)
EOSINOPHIL NFR BLD: 0.9 % (ref 0–8)
ERYTHROCYTE [DISTWIDTH] IN BLOOD BY AUTOMATED COUNT: 14.6 % (ref 11.5–14.5)
ERYTHROCYTE [DISTWIDTH] IN BLOOD BY AUTOMATED COUNT: 14.6 % (ref 11.5–14.5)
EST. GFR  (AFRICAN AMERICAN): >60 ML/MIN/1.73 M^2
EST. GFR  (NON AFRICAN AMERICAN): >60 ML/MIN/1.73 M^2
GLUCOSE SERPL-MCNC: 89 MG/DL (ref 70–110)
GLUCOSE SERPL-MCNC: 90 MG/DL (ref 70–110)
GLUCOSE UR QL STRIP: NEGATIVE
HCT VFR BLD AUTO: 36.1 % (ref 37–48.5)
HCT VFR BLD AUTO: 36.9 % (ref 37–48.5)
HCT VFR BLD CALC: 39 %PCV (ref 36–54)
HGB BLD-MCNC: 11.6 G/DL (ref 12–16)
HGB BLD-MCNC: 11.6 G/DL (ref 12–16)
HGB UR QL STRIP: NEGATIVE
IMM GRANULOCYTES # BLD AUTO: 0.01 K/UL (ref 0–0.04)
IMM GRANULOCYTES NFR BLD AUTO: 0.2 % (ref 0–0.5)
KETONES UR QL STRIP: NEGATIVE
LACTATE SERPL-SCNC: 0.6 MMOL/L (ref 0.5–2.2)
LEUKOCYTE ESTERASE UR QL STRIP: NEGATIVE
LIPASE SERPL-CCNC: 28 U/L (ref 4–60)
LYMPHOCYTES # BLD AUTO: 1.7 K/UL (ref 1–4.8)
LYMPHOCYTES NFR BLD: 37.4 % (ref 18–48)
MAGNESIUM SERPL-MCNC: 2.3 MG/DL (ref 1.6–2.6)
MCH RBC QN AUTO: 26.7 PG (ref 27–31)
MCH RBC QN AUTO: 27.1 PG (ref 27–31)
MCHC RBC AUTO-ENTMCNC: 31.4 G/DL (ref 32–36)
MCHC RBC AUTO-ENTMCNC: 32.1 G/DL (ref 32–36)
MCV RBC AUTO: 84 FL (ref 82–98)
MCV RBC AUTO: 85 FL (ref 82–98)
MONOCYTES # BLD AUTO: 0.6 K/UL (ref 0.3–1)
MONOCYTES NFR BLD: 12.2 % (ref 4–15)
NEUTROPHILS # BLD AUTO: 2.2 K/UL (ref 1.8–7.7)
NEUTROPHILS NFR BLD: 48.4 % (ref 38–73)
NITRITE UR QL STRIP: NEGATIVE
NRBC BLD-RTO: 0 /100 WBC
PH UR STRIP: 8 [PH] (ref 5–8)
PHOSPHATE SERPL-MCNC: 2.9 MG/DL (ref 2.7–4.5)
PLATELET # BLD AUTO: 181 K/UL (ref 150–350)
PLATELET # BLD AUTO: 187 K/UL (ref 150–350)
PMV BLD AUTO: 9.5 FL (ref 9.2–12.9)
PMV BLD AUTO: 9.8 FL (ref 9.2–12.9)
POC IONIZED CALCIUM: 1.15 MMOL/L (ref 1.06–1.42)
POC TCO2 (MEASURED): 29 MMOL/L (ref 23–29)
POTASSIUM BLD-SCNC: 4.3 MMOL/L (ref 3.5–5.1)
POTASSIUM SERPL-SCNC: 4.4 MMOL/L (ref 3.5–5.1)
PROT SERPL-MCNC: 7.9 G/DL (ref 6–8.4)
PROT UR QL STRIP: NEGATIVE
RBC # BLD AUTO: 4.28 M/UL (ref 4–5.4)
RBC # BLD AUTO: 4.34 M/UL (ref 4–5.4)
SAMPLE: NORMAL
SARS-COV-2 RDRP RESP QL NAA+PROBE: NEGATIVE
SODIUM BLD-SCNC: 139 MMOL/L (ref 136–145)
SODIUM SERPL-SCNC: 140 MMOL/L (ref 136–145)
SP GR UR STRIP: 1 (ref 1–1.03)
URN SPEC COLLECT METH UR: ABNORMAL
WBC # BLD AUTO: 4.55 K/UL (ref 3.9–12.7)
WBC # BLD AUTO: 4.6 K/UL (ref 3.9–12.7)

## 2020-12-16 PROCEDURE — 25500020 PHARM REV CODE 255: Performed by: EMERGENCY MEDICINE

## 2020-12-16 PROCEDURE — 96374 THER/PROPH/DIAG INJ IV PUSH: CPT

## 2020-12-16 PROCEDURE — 25000003 PHARM REV CODE 250: Performed by: EMERGENCY MEDICINE

## 2020-12-16 PROCEDURE — 80047 BASIC METABLC PNL IONIZED CA: CPT

## 2020-12-16 PROCEDURE — 99223 1ST HOSP IP/OBS HIGH 75: CPT | Mod: AI,,, | Performed by: INTERNAL MEDICINE

## 2020-12-16 PROCEDURE — 83690 ASSAY OF LIPASE: CPT

## 2020-12-16 PROCEDURE — 85025 COMPLETE CBC W/AUTO DIFF WBC: CPT

## 2020-12-16 PROCEDURE — 85027 COMPLETE CBC AUTOMATED: CPT

## 2020-12-16 PROCEDURE — 83735 ASSAY OF MAGNESIUM: CPT

## 2020-12-16 PROCEDURE — 99285 EMERGENCY DEPT VISIT HI MDM: CPT | Mod: 25

## 2020-12-16 PROCEDURE — 63600175 PHARM REV CODE 636 W HCPCS: Performed by: EMERGENCY MEDICINE

## 2020-12-16 PROCEDURE — 96375 TX/PRO/DX INJ NEW DRUG ADDON: CPT

## 2020-12-16 PROCEDURE — 80048 BASIC METABOLIC PNL TOTAL CA: CPT

## 2020-12-16 PROCEDURE — 36415 COLL VENOUS BLD VENIPUNCTURE: CPT

## 2020-12-16 PROCEDURE — 80053 COMPREHEN METABOLIC PANEL: CPT

## 2020-12-16 PROCEDURE — 99285 EMERGENCY DEPT VISIT HI MDM: CPT | Mod: CS,,, | Performed by: EMERGENCY MEDICINE

## 2020-12-16 PROCEDURE — 20600001 HC STEP DOWN PRIVATE ROOM

## 2020-12-16 PROCEDURE — 83605 ASSAY OF LACTIC ACID: CPT

## 2020-12-16 PROCEDURE — 25000003 PHARM REV CODE 250: Performed by: STUDENT IN AN ORGANIZED HEALTH CARE EDUCATION/TRAINING PROGRAM

## 2020-12-16 PROCEDURE — U0002 COVID-19 LAB TEST NON-CDC: HCPCS | Performed by: EMERGENCY MEDICINE

## 2020-12-16 PROCEDURE — 84100 ASSAY OF PHOSPHORUS: CPT

## 2020-12-16 PROCEDURE — 99223 PR INITIAL HOSPITAL CARE,LEVL III: ICD-10-PCS | Mod: AI,,, | Performed by: INTERNAL MEDICINE

## 2020-12-16 PROCEDURE — 81003 URINALYSIS AUTO W/O SCOPE: CPT

## 2020-12-16 PROCEDURE — 99285 PR EMERGENCY DEPT VISIT,LEVEL V: ICD-10-PCS | Mod: CS,,, | Performed by: EMERGENCY MEDICINE

## 2020-12-16 RX ORDER — DIPHENHYDRAMINE HCL 50 MG
50 CAPSULE ORAL ONCE
Status: DISCONTINUED | OUTPATIENT
Start: 2020-12-16 | End: 2020-12-16

## 2020-12-16 RX ORDER — ASCORBIC ACID 500 MG
1000 TABLET ORAL DAILY
Status: DISCONTINUED | OUTPATIENT
Start: 2020-12-17 | End: 2020-12-19 | Stop reason: HOSPADM

## 2020-12-16 RX ORDER — CARVEDILOL 6.25 MG/1
6.25 TABLET ORAL 2 TIMES DAILY WITH MEALS
Status: DISCONTINUED | OUTPATIENT
Start: 2020-12-16 | End: 2020-12-19 | Stop reason: HOSPADM

## 2020-12-16 RX ORDER — ACETAMINOPHEN 500 MG
1000 TABLET ORAL
Status: COMPLETED | OUTPATIENT
Start: 2020-12-16 | End: 2020-12-16

## 2020-12-16 RX ORDER — DIPHENHYDRAMINE HCL 25 MG
25 CAPSULE ORAL EVERY 6 HOURS PRN
Status: DISCONTINUED | OUTPATIENT
Start: 2020-12-16 | End: 2020-12-19 | Stop reason: HOSPADM

## 2020-12-16 RX ORDER — PANTOPRAZOLE SODIUM 40 MG/1
40 TABLET, DELAYED RELEASE ORAL DAILY
Status: DISCONTINUED | OUTPATIENT
Start: 2020-12-17 | End: 2020-12-19 | Stop reason: HOSPADM

## 2020-12-16 RX ORDER — DIPHENHYDRAMINE HYDROCHLORIDE 50 MG/ML
50 INJECTION INTRAMUSCULAR; INTRAVENOUS
Status: COMPLETED | OUTPATIENT
Start: 2020-12-16 | End: 2020-12-16

## 2020-12-16 RX ORDER — DIPHENHYDRAMINE HYDROCHLORIDE 50 MG/ML
50 INJECTION INTRAMUSCULAR; INTRAVENOUS
Status: DISCONTINUED | OUTPATIENT
Start: 2020-12-16 | End: 2020-12-16

## 2020-12-16 RX ORDER — METHYLPREDNISOLONE SOD SUCC 125 MG
125 VIAL (EA) INJECTION
Status: COMPLETED | OUTPATIENT
Start: 2020-12-16 | End: 2020-12-16

## 2020-12-16 RX ORDER — DIPHENHYDRAMINE HCL 50 MG
50 CAPSULE ORAL ONCE
Status: COMPLETED | OUTPATIENT
Start: 2020-12-16 | End: 2020-12-17

## 2020-12-16 RX ORDER — SODIUM CHLORIDE 0.9 % (FLUSH) 0.9 %
10 SYRINGE (ML) INJECTION
Status: DISCONTINUED | OUTPATIENT
Start: 2020-12-16 | End: 2020-12-19 | Stop reason: HOSPADM

## 2020-12-16 RX ORDER — AMLODIPINE BESYLATE 10 MG/1
10 TABLET ORAL DAILY
Status: DISCONTINUED | OUTPATIENT
Start: 2020-12-17 | End: 2020-12-19 | Stop reason: HOSPADM

## 2020-12-16 RX ORDER — FAMOTIDINE 20 MG/1
20 TABLET, FILM COATED ORAL 2 TIMES DAILY
Status: DISCONTINUED | OUTPATIENT
Start: 2020-12-16 | End: 2020-12-17

## 2020-12-16 RX ORDER — NAPROXEN SODIUM 220 MG/1
81 TABLET, FILM COATED ORAL DAILY
Status: DISCONTINUED | OUTPATIENT
Start: 2020-12-17 | End: 2020-12-19 | Stop reason: HOSPADM

## 2020-12-16 RX ORDER — CLOPIDOGREL BISULFATE 75 MG/1
75 TABLET ORAL DAILY
Status: DISCONTINUED | OUTPATIENT
Start: 2020-12-17 | End: 2020-12-19 | Stop reason: HOSPADM

## 2020-12-16 RX ORDER — DIPHENHYDRAMINE HCL 50 MG
50 CAPSULE ORAL EVERY 6 HOURS
Status: DISCONTINUED | OUTPATIENT
Start: 2020-12-17 | End: 2020-12-17

## 2020-12-16 RX ORDER — ACETAMINOPHEN 325 MG/1
650 TABLET ORAL EVERY 4 HOURS PRN
Status: DISCONTINUED | OUTPATIENT
Start: 2020-12-16 | End: 2020-12-19 | Stop reason: HOSPADM

## 2020-12-16 RX ADMIN — CARVEDILOL 6.25 MG: 6.25 TABLET, FILM COATED ORAL at 09:12

## 2020-12-16 RX ADMIN — IOHEXOL 75 ML: 350 INJECTION, SOLUTION INTRAVENOUS at 05:12

## 2020-12-16 RX ADMIN — DIPHENHYDRAMINE HYDROCHLORIDE 50 MG: 50 INJECTION, SOLUTION INTRAMUSCULAR; INTRAVENOUS at 05:12

## 2020-12-16 RX ADMIN — ACETAMINOPHEN 1000 MG: 500 TABLET ORAL at 03:12

## 2020-12-16 RX ADMIN — METHYLPREDNISOLONE SODIUM SUCCINATE 125 MG: 125 INJECTION, POWDER, FOR SOLUTION INTRAMUSCULAR; INTRAVENOUS at 04:12

## 2020-12-16 NOTE — TELEPHONE ENCOUNTER
Received call from CT lab     Pt arrived for CTA this morning with an allergy to the Iodine.  She reports that she was not prescribed a prep for this test.     CTA will be rescheduled     Please advise on medication prep for CTA

## 2020-12-16 NOTE — ED TRIAGE NOTES
Patient identifiers for Alana Beth 80 y.o. female checked and correct.  Chief Complaint   Patient presents with    Post-op Problem     celiac artery stent     Past Medical History:   Diagnosis Date    Atherosclerotic peripheral vascular disease     Chronic cystic mastitis     Essential hypertension     Hypercholesterolemia     Osteopenia     Shingles      Allergies reported:   Review of patient's allergies indicates:   Allergen Reactions    Benazepril Other (See Comments)     cough    Chlorthalidone     Iodinated contrast media     Iodine and iodide containing products     Lipitor [atorvastatin]      States she is allergic to all statin medications    Sertraline      Medication caused patient to get sick.    Spironolactone          LOC: Patient is awake, alert, and aware of environment with an appropriate affect. Patient is oriented x 4 and speaking appropriately.  APPEARANCE: Patient resting comfortably and in no acute distress. Patient is clean and well groomed, patient's clothing is properly fastened.    SKIN: The skin is warm and dry. Patient has normal skin turgor and moist mucus membranes.   MUSKULOSKELETAL: Patient is moving all extremities well, no obvious deformities noted. Pulses intact.   RESPIRATORY: Airway is open and patent. Respirations are spontaneous and non-labored with normal effort and rate.  CARDIAC: Patient has a normal rate and rhythm. ** on cardiac monitor. No peripheral edema noted.   ABDOMEN: No distention noted. Soft and non-tender upon palpation. Lower abdominal pain  NEUROLOGICAL:PERRL. Facial expression is symmetrical. Hand grasps are equal bilaterally. Normal sensation in all extremities when touched with finger. Numbness and pain in the lower right extremity        Pt got a celiac stent placed on Dec 8th and is now having lower abdominal pain and pain and numbness in her right leg

## 2020-12-16 NOTE — ED NOTES
I-STAT Chem-8+ Results:   Value Reference Range   Sodium 139 136-145 mmol/L   Potassium  4.3 3.5-5.1 mmol/L   Chloride 103  mmol/L   Ionized Calcium 1.15 1.06-1.42 mmol/L   CO2 (measured) 29 23-29 mmol/L   Glucose 90  mg/dL   BUN 11 6-30 mg/dL   Creatinine 0.9 0.5-1.4 mg/dL   Hematocrit 39 36-54%

## 2020-12-16 NOTE — ED PROVIDER NOTES
Encounter Date: 12/16/2020       History     Chief Complaint   Patient presents with    Post-op Problem     celiac artery stent     81 yo W with pmhx HTN, HLD, PVD presents POD#8 s/p celiac artery stenting presents with a chief complaint of abdominal pain and right lower extremity paresthesias.  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 Junior of interventional cardiology.  She was discharged the following day on dual anti-platelet therapy.  Patient reports that since the procedure she has been suffering from abdominal pain.  Pain is present in the right lower quadrant greater than left lower quadrant.  It is constant.  It is worsened by eating.  Currently pain is 5/10.  No associated nausea, vomiting, diarrhea, constipation, dysuria, urinary frequency.  No history of similar symptoms.  Patient has tried to connect with her cardiologist as an outpatient who scheduled a CTA chest/abdomen/pelvis for this morning.  Unfortunately, no steroid prep was administered so the patient did not receive her CTA.  Patient denies any history of reactions to contrast media.  She does report that it was listed as an allergy because of her kidney function.  Prior to her angiogram, patient did undergo a prep with prednisone.  Patient denies that the pain is any worse today than previous but she feels that has been going on for too long so came to the emergency department.  She also reports paresthesias throughout the right lower extremity.  They began at the right hip and radiate posteriorly along her right lower extremity to her toes.  She also reports some occasional paresthesias to the left calf.  No weakness.  She is able to ambulate despite paresthesias.  Patient reports a history of paresthesias in the past but never this severe,  However the abdominal pain and paresthesias are concerning enough that thet are oprenting sleep.        Review of  patient's allergies indicates:   Allergen Reactions    Benazepril Other (See Comments)     cough    Chlorthalidone     Iodinated contrast media     Iodine and iodide containing products     Lipitor [atorvastatin]      States she is allergic to all statin medications    Sertraline      Medication caused patient to get sick.    Spironolactone      Past Medical History:   Diagnosis Date    Atherosclerotic peripheral vascular disease     Chronic cystic mastitis     Essential hypertension     Hypercholesterolemia     Osteopenia     Shingles      Past Surgical History:   Procedure Laterality Date    BREAST BIOPSY      biopsies benign    ESOPHAGOGASTRODUODENOSCOPY  08/20/2020    PERCUTANEOUS TRANSLUMINAL ANGIOPLASTY N/A 12/8/2020    Procedure: Pta (Angioplasty, Percutaneous, Transluminal);  Surgeon: Hernando Junior MD;  Location: [facility] CATH LAB/EP;  Service: Cardiology;  Laterality: N/A;     Family History   Problem Relation Age of Onset    Hypertension Mother     Diabetes Brother     Diabetes Sister      Social History     Tobacco Use    Smoking status: Never Smoker    Smokeless tobacco: Never Used   Substance Use Topics    Alcohol use: No    Drug use: No     Review of Systems   Constitutional: Negative for fever.   HENT: Negative for sore throat.    Respiratory: Negative for shortness of breath.    Cardiovascular: Negative for chest pain.   Gastrointestinal: Positive for abdominal pain. Negative for abdominal distention, constipation, diarrhea, nausea and vomiting.   Genitourinary: Negative for dysuria and flank pain.   Musculoskeletal: Negative for back pain and gait problem.   Skin: Negative for rash.   Neurological: Positive for numbness. Negative for weakness.   Hematological: Does not bruise/bleed easily.       Physical Exam     Initial Vitals [12/16/20 1411]   BP Pulse Resp Temp SpO2   (!) 154/77 86 18 98.6 °F (37 °C) 100 %      MAP       --         Physical Exam    Nursing note and vitals  reviewed.  Constitutional: She appears well-developed and well-nourished. She is not diaphoretic. No distress.   HENT:   Head: Normocephalic and atraumatic.   Eyes: EOM are normal. Right eye exhibits no discharge. Left eye exhibits no discharge. No scleral icterus.   Neck: Normal range of motion. Neck supple. No JVD present.   Cardiovascular: Normal rate, regular rhythm, normal heart sounds and intact distal pulses. Exam reveals no gallop and no friction rub.    No murmur heard.  Pulses:       Dorsalis pedis pulses are 2+ on the right side and 2+ on the left side.        Posterior tibial pulses are 1+ on the right side and 1+ on the left side.   Pulmonary/Chest: Breath sounds normal. No respiratory distress. She has no wheezes. She has no rhonchi. She has no rales. She exhibits no tenderness.   Abdominal: Soft. Bowel sounds are normal. She exhibits no distension and no mass. There is no abdominal tenderness. There is no rebound and no guarding.   Musculoskeletal: Normal range of motion. No tenderness or edema.   Neurological: She is alert and oriented to person, place, and time. She has normal strength.   Skin: Skin is warm and dry. Capillary refill takes less than 2 seconds.   Psychiatric: She has a normal mood and affect.         ED Course   Procedures  Labs Reviewed   CBC W/ AUTO DIFFERENTIAL - Abnormal; Notable for the following components:       Result Value    Hemoglobin 11.6 (*)     Hematocrit 36.9 (*)     MCH 26.7 (*)     MCHC 31.4 (*)     RDW 14.6 (*)     All other components within normal limits   COMPREHENSIVE METABOLIC PANEL - Abnormal; Notable for the following components:    Alkaline Phosphatase 47 (*)     All other components within normal limits   URINALYSIS, REFLEX TO URINE CULTURE - Abnormal; Notable for the following components:    Color, UA Colorless (*)     All other components within normal limits    Narrative:     Specimen Source->Urine   LIPASE   LACTIC ACID, PLASMA   COMPREHENSIVE METABOLIC  PANEL   MAGNESIUM   PHOSPHORUS   CBC W/ AUTO DIFFERENTIAL   SARS-COV-2 RDRP GENE    Narrative:     This test utilizes isothermal nucleic acid amplification   technology to detect the SARS-CoV-2 RdRp nucleic acid segment.   The analytical sensitivity (limit of detection) is 125 genome   equivalents/mL.   A POSITIVE result implies infection with the SARS-CoV-2 virus;   the patient is presumed to be contagious.     A NEGATIVE result means that SARS-CoV-2 nucleic acids are not   present above the limit of detection. A NEGATIVE result should be   treated as presumptive. It does not rule out the possibility of   COVID-19 and should not be the sole basis for treatment decisions.   If COVID-19 is strongly suspected based on clinical and exposure   history, re-testing using an alternate molecular assay should be   considered.   This test is only for use under the Food and Drug   Administration s Emergency Use Authorization (EUA).   Commercial kits are provided by [employer].   Performance characteristics of the EUA have been independently   verified by [facility] Department of   Pathology and Laboratory Medicine.   _________________________________________________________________   The authorized Fact Sheet for Healthcare Providers and the authorized Fact   Sheet for Patients of the ID NOW COVID-19 are available on the FDA   website:     https://www.fda.gov/media/543281/download  https://www.fda.gov/media/286875/download       POCT URINE PREGNANCY   ISTAT PROCEDURE          Imaging Results           CTA Chest Abdomen Pelvis (Final result)  Result time 12/16/20 19:16:45    Final result by Jono Seals MD (12/16/20 19:16:45)                 Impression:      Celiac artery stent with mild kinking or collapse of the stent just proximal to the bifurcation of the common hepatic and splenic artery.    Calcified and noncalcified atherosclerotic disease.  Tiny outpouching at the infrarenal abdominal aorta  suspicious for ulcerated plaque.    Multiple hypoattenuating foci in the liver.  Further evaluation with elective abdominal ultrasound is recommended.    The left ureter is poorly visualized.  There is mild dilatation of the left ureter near the ureteropelvic junction a 1 cm calcification near the left ureterovesicular junction which could represent a urinary stone or phlebolith.  Notice made of symmetric bladder wall thickening which can be seen with bladder outlet obstruction or cystitis.    Multiple hypoenhancing foci in the left kidney, likely renal cyst.  Further evaluation can be performed with renal ultrasound if clinically indicated.    Appearance of numerous centrilobular micronodules, which could be due to artifact versus inflammatory/infectious airway process.  Clinical correlation recommended.    Few additional findings as above.    This report was flagged in Epic as abnormal.    Electronically signed by resident: Екатерина Child  Date:    12/16/2020  Time:    18:11    Electronically signed by: Jono Seals MD  Date:    12/16/2020  Time:    19:16             Narrative:    EXAMINATION:  CTA CHEST ABDOMEN PELVIS    CLINICAL HISTORY:  Celiac artery compression syndrome;s/p celiac artery stentinf;    TECHNIQUE:  Using 75 cc of  Omnipaque 350 IV contrast, and multi-detector helical CT technique, axial CT angiogram images were obtained from the thoracic inlet to the iliac crests.  2D post-processing coronal and sagittal reconstructions were performed.    COMPARISON:  CT chest 08/21/2020    FINDINGS:  CTA:    Postsurgical change of celiac artery stent placement.  Mild focal kinking or collapse of the stent just proximal to the bifurcation of the common hepatic artery and splenic artery (axial series 2, image 461 and coronal series 601, image 96).  The SMA, CEDRICK, and bilateral renal arteries are patent.    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  Scattered calcified and noncalcified atherosclerotic disease  throughout the abdominal aorta and its branch vessels.  No significant atherosclerotic stenosis.  Tiny outpouching at the infrarenal abdominal aorta suspicious for ulcerated plaque (axial series 2, image 529).  No evidence of plaque rupture.  No aortic aneurysm or dissection.  Mild-to-moderate calcific atherosclerosis of the bilateral iliac arteries and visualized femoral arteries.    CHEST:    Evaluation of the thoracic structures is degraded by motion artifact.    - Lungs: Emphysematous changes.  Stable subcentimeter calcified right middle lobe nodule.  Evaluation of lung parenchyma is degraded by motion artifact.  Appearance of numerous centrilobular nodules versus artifact.  Clinical correlation recommended.  No pleural fluid.    - Heart/Pericardial structures: Mildly enlarged.  Small volume pericardial fluid.    - EG Junction: No hiatal hernia.    ABDOMEN:    - Liver: Normal size.  Multiple hypoattenuating foci in the liver.  1.7 cm ill-defined hypoattenuating focus in hepatic segment VII could represent focal fatty infiltration, perfusion defect, or mass lesion.  Several additional small hypodense foci e.g. 1.2 cm lesion in the left hepatic lobe and inferior right hepatic lobe.    - Gallbladder: No calcified gallstones. No wall thickening or pericholecystic fluid.    - Bile Ducts: No intra or extrahepatic biliary ductal dilatation.    - Spleen: Negative.    - Stomach: Diffuse stomach wall thickening, possibly due to decompressed state.    - Pancreas: Unremarkable.    - Adrenals: Unremarkable.    - Bowel: No evidence of bowel obstruction or inflammation.  The appendix is not clearly visualized.    - Kidneys/ureters and bladder: Multiple hypoenhancing foci in the left kidney, likely renal cysts.  No hydronephrosis or nephrolithiasis.  Right ureter is normal caliber with punctate stone just proximal to the right ureterovesicular junction.  Mild left ureteral dilatation at the ureteropelvic junction the course of  the left ureter is not well visualized.  There is a 1 cm calcification near the left ureterovesicular junction which could be a urinary stone or phlebolith.    - Retroperitoneum: No significant adenopathy.  Trace pelvic ascites.    - Peritoneal Space: No ascites or free air.    PELVIS:    - Reproductive: Uterus is tilted towards the right demonstrating slight lobulation and heterogeneous appearance with parenchymal calcifications suggesting underlying fibroids.  No adnexal mass seen.    - Bladder: Symmetric bladder wall thickening suspicious for bladder outlet obstruction or cystitis.    BONES: Degenerative changes in the spine.  Small lucent focus in the right ilium at the sacroiliac joint, likely degenerative.  Recommend correlation with past medical history for malignancy.  No acute fracture..    EXTRAPERITONEAL SOFT TISSUES: Small left lower quadrant cutaneous abdominal wall defect.                                 Medical Decision Making:   History:   Old Medical Records: I decided to obtain old medical records.  Initial Assessment:   81 yo W with pmhx HTN, HLD, PVD presents POD#8 s/p celiac artery stenting presents with a chief complaint of abdominal pain and right lower extremity paresthesias.  Differential Diagnosis:   Postop pain, postop complication, pancreatitis, ischemia, hematoma, stenosis, UTI, gastro  Clinical Tests:   Lab Tests: Ordered  Radiological Study: Ordered  ED Management:  Somewhat oddly documented history of allergy to contrast media.  Will perform emergent prep at this time and obtain a CTA.  Will administer Tylenol for pain.  Will obtain labs.  Cardiology fellow on-call updated of plan who recommends adding lactic acid.  Right lower extremity paresthesias are also odd.  Patient has full range of motion of the right leg.  No objective decreased sensation to light touch.  Strong pulses.    Reassessment: CBC without leukocytosis or anemia.  CMP normal.  Lipase normal.  Lactic acid normal. CTA  reveals celiac artery stent with mild kinking or collapse of the stent just proximal to the bifurcation of the common hepatic and splenic artery. Reviewed with cardiology who will admit patient for possible angiogram in morning.                              Clinical Impression:     ICD-10-CM ICD-9-CM   1. Mesenteric ischemia  K55.9 557.9                          ED Disposition Condition    Admit                             Arvind Roche MD  12/16/20 2004

## 2020-12-16 NOTE — HPI
This is a 80 yoF, Patient of Dr. Garza, admitted to Cardiology. PMHx of HTN, HLD (Statin in tolerance), PAD and granulomatous lung disease who was seen by Dr. 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  As reported by RN clinic visits patient had close to 30-35 lb weight was without any classic postprandial symptoms which are typically seen with mesenteric angina.  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  She was subsequently discharged home since being discharged patient reports ongoing right lower abdominal discomfort which is crampy and 80 nature with over 10, she reports this abdominal discomfort is progressively worsened postprandial 20-30 minutes after meals.  Associated with nausea.  She was supposed to receive outpatient CTA abdomen unfortunately due to not being prepped for iodine allergy she presented here with abdominal pain.  Upon arrival to [facility], her overall workup was unremarkable lactic acid 0.6, CBC and CMP negative, lipase negative.  She underwent a CT a abdomen which demonstrated kinking at the celiac stent or call last of the stent just proximal to the bifurcation.  Case was discussed with Dr. Soto and made aware of the admission        Non contrast CT 8/2020              0.5 cm calcified nodule in the medial segment of the right middle lobe              Aortic and coronary artery calcific atherosclerosis     US 9/2020                 Celiac  cm/sec with ratio 6.2              SMA  cm/sec with ratio 3.8              CEDRICK 152 cm/sec with ratio 2.6    Esophagram 9/2020                 Mild esophageal dysmotility               No evidence of cricopharyngeal     Echo 9/2020                 Normal EF               Mild LAE              Mild TR              PASP 30 mmHg     Nuclear stress test 9/2020                 No ischemia

## 2020-12-17 ENCOUNTER — TELEPHONE (OUTPATIENT)
Dept: INTERNAL MEDICINE | Facility: CLINIC | Age: 80
End: 2020-12-17

## 2020-12-17 LAB
ABO + RH BLD: NORMAL
ANION GAP SERPL CALC-SCNC: 8 MMOL/L (ref 8–16)
BLD GP AB SCN CELLS X3 SERPL QL: NORMAL
BUN SERPL-MCNC: 13 MG/DL (ref 8–23)
CALCIUM SERPL-MCNC: 9 MG/DL (ref 8.7–10.5)
CHLORIDE SERPL-SCNC: 103 MMOL/L (ref 95–110)
CO2 SERPL-SCNC: 26 MMOL/L (ref 23–29)
CREAT SERPL-MCNC: 1.1 MG/DL (ref 0.5–1.4)
EST. GFR  (AFRICAN AMERICAN): 55.6 ML/MIN/1.73 M^2
EST. GFR  (NON AFRICAN AMERICAN): 48.2 ML/MIN/1.73 M^2
GLUCOSE SERPL-MCNC: 235 MG/DL (ref 70–110)
POTASSIUM SERPL-SCNC: 4.1 MMOL/L (ref 3.5–5.1)
SODIUM SERPL-SCNC: 137 MMOL/L (ref 136–145)

## 2020-12-17 PROCEDURE — 20600001 HC STEP DOWN PRIVATE ROOM

## 2020-12-17 PROCEDURE — 36415 COLL VENOUS BLD VENIPUNCTURE: CPT

## 2020-12-17 PROCEDURE — 86850 RBC ANTIBODY SCREEN: CPT

## 2020-12-17 PROCEDURE — 25000003 PHARM REV CODE 250: Performed by: STUDENT IN AN ORGANIZED HEALTH CARE EDUCATION/TRAINING PROGRAM

## 2020-12-17 PROCEDURE — 63600175 PHARM REV CODE 636 W HCPCS: Performed by: STUDENT IN AN ORGANIZED HEALTH CARE EDUCATION/TRAINING PROGRAM

## 2020-12-17 PROCEDURE — 25000003 PHARM REV CODE 250: Performed by: INTERNAL MEDICINE

## 2020-12-17 PROCEDURE — 94761 N-INVAS EAR/PLS OXIMETRY MLT: CPT

## 2020-12-17 RX ORDER — SODIUM CHLORIDE 9 MG/ML
INJECTION, SOLUTION INTRAVENOUS CONTINUOUS
Status: CANCELLED | OUTPATIENT
Start: 2020-12-17 | End: 2020-12-17

## 2020-12-17 RX ADMIN — DIPHENHYDRAMINE HYDROCHLORIDE 50 MG: 50 CAPSULE ORAL at 02:12

## 2020-12-17 RX ADMIN — AMLODIPINE BESYLATE 10 MG: 10 TABLET ORAL at 08:12

## 2020-12-17 RX ADMIN — CARVEDILOL 6.25 MG: 6.25 TABLET, FILM COATED ORAL at 08:12

## 2020-12-17 RX ADMIN — DIPHENHYDRAMINE HYDROCHLORIDE 50 MG: 50 CAPSULE ORAL at 12:12

## 2020-12-17 RX ADMIN — PREDNISONE 50 MG: 20 TABLET ORAL at 12:12

## 2020-12-17 RX ADMIN — CLOPIDOGREL 75 MG: 75 TABLET, FILM COATED ORAL at 08:12

## 2020-12-17 RX ADMIN — PANTOPRAZOLE SODIUM 40 MG: 40 TABLET, DELAYED RELEASE ORAL at 08:12

## 2020-12-17 RX ADMIN — OXYCODONE HYDROCHLORIDE AND ACETAMINOPHEN 1000 MG: 500 TABLET ORAL at 08:12

## 2020-12-17 RX ADMIN — ASPIRIN 81 MG CHEWABLE TABLET 81 MG: 81 TABLET CHEWABLE at 08:12

## 2020-12-17 RX ADMIN — CARVEDILOL 6.25 MG: 6.25 TABLET, FILM COATED ORAL at 05:12

## 2020-12-17 NOTE — PROGRESS NOTES
[facility]  Cardiology  Progress Note    Patient Name: Alana Beth  MRN: [id number]  Admission Date: 12/16/2020  Hospital Length of Stay: 1 days  Code Status: Full Code   Attending Physician: Jacek Paz MD   Primary Care Physician: Nurys Bearden MD  Expected Discharge Date:   Principal Problem:Mesenteric ischemia    Subjective:     Interval History: No acute events overnight, patient remains afebrile and hemodynamically stable this morning. Still with complaints of intermittent abdominal pain and nausea.    Review of Systems   Constitution: Negative for chills.   HENT: Negative for congestion.    Eyes: Negative for blurred vision.   Cardiovascular: Negative for chest pain, leg swelling, near-syncope, palpitations and syncope.   Respiratory: Negative for cough and shortness of breath.    Endocrine: Negative for polyuria.   Skin: Negative for itching and rash.   Musculoskeletal: Negative for back pain.   Gastrointestinal: Positive for abdominal pain and nausea. Negative for constipation, diarrhea and vomiting.   Genitourinary: Negative for dysuria.   Neurological: Negative for dizziness, headaches and light-headedness.   Psychiatric/Behavioral: Negative for altered mental status.     Objective:     Vital Signs (Most Recent):  Temp: 98.7 °F (37.1 °C) (12/17/20 0745)  Pulse: 88 (12/17/20 0745)  Resp: 18 (12/17/20 0745)  BP: (!) 158/84 (12/17/20 0745)  SpO2: 98 % (12/17/20 0745) Vital Signs (24h Range):  Temp:  [98.6 °F (37 °C)-98.7 °F (37.1 °C)] 98.7 °F (37.1 °C)  Pulse:  [65-93] 88  Resp:  [13-19] 18  SpO2:  [98 %-100 %] 98 %  BP: (128-166)/(63-84) 158/84     Weight: 52 kg (114 lb 10.2 oz)  Body mass index is 20.63 kg/m².     SpO2: 98 %  O2 Device (Oxygen Therapy): room air      Intake/Output Summary (Last 24 hours) at 12/17/2020 1008  Last data filed at 12/17/2020 0500  Gross per 24 hour   Intake 120 ml   Output 100 ml   Net 20 ml       Lines/Drains/Airways     Peripheral Intravenous  Line                 Peripheral IV - Single Lumen 12/16/20 1618 20 G Left Antecubital less than 1 day                Physical Exam   Constitutional: She is oriented to person, place, and time. She appears well-developed and well-nourished.   HENT:   Head: Normocephalic and atraumatic.   Eyes: Pupils are equal, round, and reactive to light. Conjunctivae are normal.   Neck: Normal range of motion. Neck supple.   Cardiovascular: Normal rate, regular rhythm, S1 normal, S2 normal and normal heart sounds. Exam reveals no gallop and no friction rub.   No murmur heard.  Pulses:       Radial pulses are 2+ on the right side and 2+ on the left side.   Pulmonary/Chest: Effort normal and breath sounds normal. No respiratory distress. She has no wheezes. She has no rales. She exhibits no tenderness.   Abdominal: Soft. Bowel sounds are normal. She exhibits no distension and no mass. There is no abdominal tenderness. There is no rebound and no guarding.   Musculoskeletal:         General: No edema.   Neurological: She is alert and oriented to person, place, and time.   Skin: Skin is warm and dry.   Psychiatric: She has a normal mood and affect.       Significant Labs:   BMP:   Recent Labs   Lab 12/16/20  1609 12/16/20  2326   GLU 89 235*    137   K 4.4 4.1    103   CO2 26 26   BUN 11 13   CREATININE 0.9 1.1   CALCIUM 9.7 9.0   MG 2.3  --      CMP   Recent Labs   Lab 12/16/20  1609 12/16/20  2326    137   K 4.4 4.1    103   CO2 26 26   GLU 89 235*   BUN 11 13   CREATININE 0.9 1.1   CALCIUM 9.7 9.0   PROT 7.9  --    ALBUMIN 3.8  --    BILITOT 0.4  --    ALKPHOS 47*  --    AST 25  --    ALT 23  --    ANIONGAP 11 8   ESTGFRAFRICA >60.0 55.6*   EGFRNONAA >60.0 48.2*     CBC   Recent Labs   Lab 12/16/20  1609 12/16/20  2326   WBC 4.60 4.55   HGB 11.6* 11.6*   HCT 36.9* 36.1*    181     Recent Labs   Lab 12/16/20  1609   LIPASE 28       Significant Imaging:    CTA Chest/Abdomen/Pelvis (12/16/2020):      Celiac artery stent with mild kinking or collapse of the stent just proximal to the bifurcation of the common hepatic and splenic artery.     Calcified and noncalcified atherosclerotic disease.  Tiny outpouching at the infrarenal abdominal aorta suspicious for ulcerated plaque.     Multiple hypoattenuating foci in the liver.  Further evaluation with elective abdominal ultrasound is recommended.     The left ureter is poorly visualized.  There is mild dilatation of the left ureter near the ureteropelvic junction a 1 cm calcification near the left ureterovesicular junction which could represent a urinary stone or phlebolith.  Notice made of symmetric bladder wall thickening which can be seen with bladder outlet obstruction or cystitis.     Multiple hypoenhancing foci in the left kidney, likely renal cyst.  Further evaluation can be performed with renal ultrasound if clinically indicated.     Appearance of numerous centrilobular micronodules, which could be due to artifact versus inflammatory/infectious airway process.  Clinical correlation recommended.    Assessment and Plan:     Brief HPI: Ms. Beth is a 80Y woman with HTN, HLD, PAD, and mesenteric ischemia s/p celiac artery stent admitted to Cardiology for evaluation of her continued abdominal pain    * Mesenteric ischemia  In summary, this patient has abdominal pain consistent with mesenteric ischemia with CTA evidence of either kinking or collapse of her celiac artery stent, possibly due to arcuate ligament.    Plan:   - Interventional Cardiology and Vascular Surgery consults for evaluation and possible intervention   - Keep NPO pending possible intervention    Essential hypertension  On amlodipine 10mg daily and carvedilol 6.25mg BID at home. BP mildly elevated this admission to 150s/80s.    Plan:   - Continue amlodipine 10mg daily   - Continue carvedilol 6.25mg BID    PAD (peripheral artery disease)  Patient has known BLE PAD, denies claudication or other  peripheral symptoms suggestive of limb ischemia at this time.    Plan:   - Continue DAPT with aspirin 81mg daily and clopidogrel 75mg daily   - Recommend better lipid control as noted above    Hypercholesteremia  Lab Results   Component Value Date    CHOL 348 (H) 09/10/2020    HDL 97 (H) 09/10/2020    LDLCALC 233.6 (H) 09/10/2020    TRIG 87 09/10/2020     Patient's cholesterol uncontrolled as noted above with . Is statin-intolerant. Per previous clinic notes, patient has been prescribed PCSK-9 inhibitor and received one dose but would prefer oral regimen. Also prescribed bempedoic acid, unsure if patient taking.    Plan:   - Patient re-education in importance of LDL control and to consider ezetimibe, PCSK-9 inhibitor (eg. Repatha, Praluent), or bempedoic acid as previously prescribed as outpatient for better LDL control        VTE Risk Mitigation (From admission, onward)         Ordered     IP VTE HIGH RISK PATIENT  Once      12/16/20 1949     Place sequential compression device  Until discontinued      12/16/20 1949              Case discussed with staff, Dr. Paz; final recommendations per attestation above.        John Shen MD, PGY-1  Cardiology  [facility]

## 2020-12-17 NOTE — NURSING
Because of the time constraint; 50 mg PO benadryl and 50 mg of PO prednisone administered @0046; post two hours than they were scheduled.  Second benadryl administered @0246.

## 2020-12-17 NOTE — SUBJECTIVE & OBJECTIVE
Past Medical History:   Diagnosis Date    Atherosclerotic peripheral vascular disease     Chronic cystic mastitis     Essential hypertension     Hypercholesterolemia     Osteopenia     Shingles        Past Surgical History:   Procedure Laterality Date    BREAST BIOPSY      biopsies benign    ESOPHAGOGASTRODUODENOSCOPY  08/20/2020    PERCUTANEOUS TRANSLUMINAL ANGIOPLASTY N/A 12/8/2020    Procedure: Pta (Angioplasty, Percutaneous, Transluminal);  Surgeon: Hernando Junior MD;  Location: [facility] CATH LAB/EP;  Service: Cardiology;  Laterality: N/A;       Review of patient's allergies indicates:   Allergen Reactions    Benazepril Other (See Comments)     cough    Chlorthalidone     Iodinated contrast media     Iodine and iodide containing products     Lipitor [atorvastatin]      States she is allergic to all statin medications    Sertraline      Medication caused patient to get sick.    Spironolactone        (Not in a hospital admission)    Family History     Problem Relation (Age of Onset)    Diabetes Brother, Sister    Hypertension Mother        Tobacco Use    Smoking status: Never Smoker    Smokeless tobacco: Never Used   Substance and Sexual Activity    Alcohol use: No    Drug use: No    Sexual activity: Not Currently     Review of Systems   Constitution: Negative for chills, decreased appetite, diaphoresis, fever, weight gain and weight loss.   Eyes: Negative for blurred vision.   Cardiovascular: Negative for chest pain, dyspnea on exertion, irregular heartbeat, leg swelling, near-syncope, orthopnea and palpitations.   Respiratory: Negative for cough, shortness of breath, snoring and wheezing.    Gastrointestinal: Positive for abdominal pain. Negative for nausea and vomiting.   Genitourinary: Negative for bladder incontinence and urgency.     Objective:     Vital Signs (Most Recent):  Temp: 98.6 °F (37 °C) (12/16/20 1411)  Pulse: 81 (12/16/20 1932)  Resp: 19 (12/16/20 1604)  BP: (!) 161/77 (12/16/20  1931)  SpO2: 98 % (12/16/20 1932) Vital Signs (24h Range):  Temp:  [98.6 °F (37 °C)] 98.6 °F (37 °C)  Pulse:  [69-86] 81  Resp:  [18-19] 19  SpO2:  [98 %-100 %] 98 %  BP: (128-166)/(63-77) 161/77     Weight: 53.5 kg (117 lb 15.1 oz)  Body mass index is 21.23 kg/m².    SpO2: 98 %       No intake or output data in the 24 hours ending 12/16/20 2005    Lines/Drains/Airways     Peripheral Intravenous Line                 Peripheral IV - Single Lumen 12/16/20 1618 20 G Left Antecubital less than 1 day                Physical Exam   Constitutional: She is oriented to person, place, and time. She appears well-developed and well-nourished. No distress.   HENT:   Head: Normocephalic and atraumatic.   Eyes: Pupils are equal, round, and reactive to light. Conjunctivae are normal.   Neck: Normal range of motion. Neck supple. No thyromegaly present.   Cardiovascular: Normal rate, regular rhythm, normal heart sounds and intact distal pulses. Exam reveals no gallop and no friction rub.   No murmur heard.  Pulses:       Carotid pulses are 2+ on the right side and 2+ on the left side.       Radial pulses are 2+ on the right side and 2+ on the left side.   Pulmonary/Chest: Effort normal and breath sounds normal. No respiratory distress. She has no wheezes.   Abdominal: Soft. Bowel sounds are normal. She exhibits no distension. There is no abdominal tenderness.   Neurological: She is alert and oriented to person, place, and time.   Skin: She is not diaphoretic.       Significant Labs:   CMP   Recent Labs   Lab 12/16/20  1609      K 4.4      CO2 26   GLU 89   BUN 11   CREATININE 0.9   CALCIUM 9.7   PROT 7.9   ALBUMIN 3.8   BILITOT 0.4   ALKPHOS 47*   AST 25   ALT 23   ANIONGAP 11   ESTGFRAFRICA >60.0   EGFRNONAA >60.0   , CBC   Recent Labs   Lab 12/16/20  1609   WBC 4.60   HGB 11.6*   HCT 36.9*       and INR No results for input(s): INR, PROTIME in the last 48 hours.    Significant Imaging: Echocardiogram:    Transthoracic echo (TTE) complete (Cupid Only):   Results for orders placed or performed during the hospital encounter of 09/10/20   Echo Color Flow Doppler? Yes   Result Value Ref Range    Ascending aorta 2.94 cm    STJ 2.61 cm    AV mean gradient 3 mmHg    Ao peak earl 1.12 m/s    Ao VTI 26.40 cm    IVRT 91.34 msec    IVS 1.10 0.6 - 1.1 cm    LA size 3.50 cm    Left Atrium Major Axis 4.80 cm    Left Atrium Minor Axis 4.80 cm    LVIDd 3.70 3.5 - 6.0 cm    LVIDs 2.19 2.1 - 4.0 cm    LVOT diameter 2.04 cm    LVOT peak VTI 19.42 cm    Posterior Wall 1.00 0.6 - 1.1 cm    MV Peak A Earl 0.75 m/s    E wave decelartion time 161.05 msec    MV Peak E Earl 0.72 m/s    PV Peak D Earl 0.37 m/s    PV Peak S Earl 0.57 m/s    RA Major Axis 4.30 cm    RA Width 2.94 cm    RVDD 3.49 cm    Sinus 3.07 cm    TAPSE 2.27 cm    TR Max Earl 2.62 m/s    TDI LATERAL 0.10 m/s    TDI SEPTAL 0.08 m/s    LA WIDTH 3.80 cm    MV stenosis pressure 1/2 time 46.70 ms    LV Diastolic Volume 42.10 mL    LV Systolic Volume 16.03 mL    RV S' 13.98 cm/s    LVOT peak earl 0.86 m/s    LV LATERAL E/E' RATIO 7.20 m/s    LV SEPTAL E/E' RATIO 9.00 m/s    FS 41 %    LA volume 54.26 cm3    LV mass 120.79 g    Left Ventricle Relative Wall Thickness 0.54 cm    AV valve area 2.40 cm2    AV Velocity Ratio 0.77     AV index (prosthetic) 0.74     MV valve area p 1/2 method 4.71 cm2    E/A ratio 0.96     Mean e' 0.09 m/s    Pulm vein S/D ratio 1.54     LVOT area 3.3 cm2    LVOT stroke volume 63.44 cm3    AV peak gradient 5 mmHg    E/E' ratio 8.00 m/s    Triscuspid Valve Regurgitation Peak Gradient 27 mmHg    BSA 1.54 m2    LV Systolic Volume Index 10.4 mL/m2    LV Diastolic Volume Index 27.24 mL/m2    LA Volume Index 35.1 mL/m2    LV Mass Index 78 g/m2    Right Atrial Pressure (from IVC) 3 mmHg    TV rest pulmonary artery pressure 30 mmHg    Narrative    · Normal left ventricular systolic function. The estimated ejection   fraction is 60%.  · Concentric left ventricular  remodeling.  · Normal LV diastolic function.  · No wall motion abnormalities.  · Normal right ventricular systolic function.  · Mild left atrial enlargement.  · Mild tricuspid regurgitation.  · The estimated PA systolic pressure is 30 mmHg.  · Normal central venous pressure (3 mmHg).

## 2020-12-17 NOTE — ASSESSMENT & PLAN NOTE
80 year old female presenting with post-prandial abdominal pain and celiac stenting for celiac stenosis.  Based on her CT scan she does not have any flow limiting lesions of the SMA and CEDRICK, making the diagnosis of chronic mesenteric ischemia very unlikely.  Ultrasound doppler velocities may have been increased due to extrinsic compression from the arcuate ligament.  Will contact Dr. Junior to see if any additional testing had been performed prior to stenting to rule this out.  May need additional inspiratory and expiratory studies to confirm this, which would require laparoscopic release by general surgery and possible angioplasty after this. Currently, no urgent vascular intervention is needed.    -OK for diet from surgery  -Will speak with interventionalist who placed stent to gather more details  -Will likely need inspiratory/expiratory films to see if the diagnosis of MALS is more likely  -Recommend ASA/plavix as the stent is narrowed likely from extrinsic compression.  Her other mesenteric vessels are widely patent.

## 2020-12-17 NOTE — ASSESSMENT & PLAN NOTE
On amlodipine 10mg daily and carvedilol 6.25mg BID at home. BP mildly elevated this admission to 150s/80s.    Plan:   - Continue amlodipine 10mg daily   - Continue carvedilol 6.25mg BID

## 2020-12-17 NOTE — PLAN OF CARE
AOx4; independent. Patient allergic to iodine; benadryl and prednisone administered as ordered.  IR consult today. NPO since midnight.  Cath procedure planned. Plan of care reviewed with the patient. Denies SOB/CP/discomfort. No falls and no injuries. All questions answered.  [facility]    Problem: Fall Injury Risk  Goal: Absence of Fall and Fall-Related Injury  Outcome: Ongoing, Progressing   Fall precautions in place.    Problem: Adult Inpatient Plan of Care  Goal: Plan of Care Review  Outcome: Ongoing, Progressing  Flowsheets (Taken 12/17/2020 0411)  Plan of Care Reviewed With: patient  Goal: Patient-Specific Goal (Individualization)  Outcome: Ongoing, Progressing  Flowsheets (Taken 12/17/2020 0411)  Individualized Care Needs:   Nothing cold   water warmed   heat packs   extra blanket.  Anxieties, Fears or Concerns: Concerns about having to do angiogram again  Patient-Specific Goals (Include Timeframe): maintain VSS throughout shift  Goal: Absence of Hospital-Acquired Illness or Injury  Outcome: Ongoing, Progressing  Goal: Optimal Comfort and Wellbeing  Outcome: Ongoing, Progressing

## 2020-12-17 NOTE — HPI
Ms. Cole is a 80Y woman, patient of Dr. Junior, admitted to Cardiology. PMHx of HTN, HLD (statin intolerance), PAD and granulomatous lung disease who was seen by Dr. 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 As reported by clinic visits patient had close to 30-35 lb weight loss without any classic postprandial symptoms which are typically seen with mesenteric angina. 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 She was subsequently discharged home. Since being discharged patient reports ongoing right lower abdominal discomfort which is crampy in nature, she reports this abdominal discomfort is progressively worsened postprandial 20-30 minutes after meals.  Associated with nausea.  She was supposed to receive outpatient CTA abdomen unfortunately due to not being prepped for iodine allergy she presented here with abdominal pain.  Upon arrival to [facility], her overall workup was unremarkable with lactic acid 0.6, CBC and CMP negative, lipase negative. She underwent a CTA abdomen which demonstrated kinking at the celiac stent or collapse of the stent just proximal to the bifurcation. Case was discussed with Dr. Junior and made aware of the admission      Non contrast CT 8/2020              0.5 cm calcified nodule in the medial segment of the right middle lobe              Aortic and coronary artery calcific atherosclerosis     US 9/2020              Celiac  cm/sec with ratio 6.2              SMA  cm/sec with ratio 3.8              CEDRICK 152 cm/sec with ratio 2.6     Esophagram 9/2020                 Mild esophageal dysmotility               No evidence of cricopharyngeal     Echo 9/2020              Normal EF              Mild  LAE              Mild TR              PASP 30 mmHg     Nuclear stress test 9/2020               No ischemia

## 2020-12-17 NOTE — ASSESSMENT & PLAN NOTE
In summary, this patient has abdominal pain consistent with mesenteric ischemia with CTA evidence of either kinking or collapse of her celiac artery stent, possibly due to arcuate ligament.    Plan:   - Interventional Cardiology and Vascular Surgery consults for evaluation and possible intervention   - Keep NPO pending possible intervention

## 2020-12-17 NOTE — H&P
[facility]  Interventional Cardiology  H&P    Patient Name: Alana Beth  MRN: [id number]  Admission Date: 12/16/2020  Code Status: Full Code   Attending Provider: Arvind Roche MD   Primary Care Physician: Nurys Bearden MD  Principal Problem:<principal problem not specified>    Patient information was obtained from patient and ER records.     Subjective:     Chief Complaint:  Mesenteric ischemia     HPI:  This is a 80 yoF, Patient of Dr. Garza, admitted to Cardiology. PMHx of HTN, HLD (Statin in tolerance), PAD and granulomatous lung disease who was seen by Dr. 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  As reported by RN clinic visits patient had close to 30-35 lb weight was without any classic postprandial symptoms which are typically seen with mesenteric angina.  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  She was subsequently discharged home since being discharged patient reports ongoing right lower abdominal discomfort which is crampy and 80 nature with over 10, she reports this abdominal discomfort is progressively worsened postprandial 20-30 minutes after meals.  Associated with nausea.  She was supposed to receive outpatient CTA abdomen unfortunately due to not being prepped for iodine allergy she presented here with abdominal pain.  Upon arrival to [facility], her overall workup was unremarkable lactic acid 0.6, CBC and CMP negative, lipase negative.  She underwent a CT a abdomen which demonstrated kinking at the celiac stent or call last of the stent just proximal to the bifurcation.  Case was discussed with Dr. Soto and made aware of the admission        Non contrast CT 8/2020              0.5 cm  calcified nodule in the medial segment of the right middle lobe              Aortic and coronary artery calcific atherosclerosis     US 9/2020                 Celiac  cm/sec with ratio 6.2              SMA  cm/sec with ratio 3.8              CEDRICK 152 cm/sec with ratio 2.6    Esophagram 9/2020                 Mild esophageal dysmotility               No evidence of cricopharyngeal     Echo 9/2020                 Normal EF              Mild LAE              Mild TR              PASP 30 mmHg     Nuclear stress test 9/2020                 No ischemia         Past Medical History:   Diagnosis Date    Atherosclerotic peripheral vascular disease     Chronic cystic mastitis     Essential hypertension     Hypercholesterolemia     Osteopenia     Shingles        Past Surgical History:   Procedure Laterality Date    BREAST BIOPSY      biopsies benign    ESOPHAGOGASTRODUODENOSCOPY  08/20/2020    PERCUTANEOUS TRANSLUMINAL ANGIOPLASTY N/A 12/8/2020    Procedure: Pta (Angioplasty, Percutaneous, Transluminal);  Surgeon: Hernando Junior MD;  Location: [facility] CATH LAB/EP;  Service: Cardiology;  Laterality: N/A;       Review of patient's allergies indicates:   Allergen Reactions    Benazepril Other (See Comments)     cough    Chlorthalidone     Iodinated contrast media     Iodine and iodide containing products     Lipitor [atorvastatin]      States she is allergic to all statin medications    Sertraline      Medication caused patient to get sick.    Spironolactone        (Not in a hospital admission)    Family History     Problem Relation (Age of Onset)    Diabetes Brother, Sister    Hypertension Mother        Tobacco Use    Smoking status: Never Smoker    Smokeless tobacco: Never Used   Substance and Sexual Activity    Alcohol use: No    Drug use: No    Sexual activity: Not Currently     Review of Systems   Constitution: Negative for chills, decreased appetite, diaphoresis, fever, weight gain and weight  loss.   Eyes: Negative for blurred vision.   Cardiovascular: Negative for chest pain, dyspnea on exertion, irregular heartbeat, leg swelling, near-syncope, orthopnea and palpitations.   Respiratory: Negative for cough, shortness of breath, snoring and wheezing.    Gastrointestinal: Positive for abdominal pain. Negative for nausea and vomiting.   Genitourinary: Negative for bladder incontinence and urgency.     Objective:     Vital Signs (Most Recent):  Temp: 98.6 °F (37 °C) (12/16/20 1411)  Pulse: 81 (12/16/20 1932)  Resp: 19 (12/16/20 1604)  BP: (!) 161/77 (12/16/20 1931)  SpO2: 98 % (12/16/20 1932) Vital Signs (24h Range):  Temp:  [98.6 °F (37 °C)] 98.6 °F (37 °C)  Pulse:  [69-86] 81  Resp:  [18-19] 19  SpO2:  [98 %-100 %] 98 %  BP: (128-166)/(63-77) 161/77     Weight: 53.5 kg (117 lb 15.1 oz)  Body mass index is 21.23 kg/m².    SpO2: 98 %       No intake or output data in the 24 hours ending 12/16/20 2005    Lines/Drains/Airways     Peripheral Intravenous Line                 Peripheral IV - Single Lumen 12/16/20 1618 20 G Left Antecubital less than 1 day                Physical Exam   Constitutional: She is oriented to person, place, and time. She appears well-developed and well-nourished. No distress.   HENT:   Head: Normocephalic and atraumatic.   Eyes: Pupils are equal, round, and reactive to light. Conjunctivae are normal.   Neck: Normal range of motion. Neck supple. No thyromegaly present.   Cardiovascular: Normal rate, regular rhythm, normal heart sounds and intact distal pulses. Exam reveals no gallop and no friction rub.   No murmur heard.  Pulses:       Carotid pulses are 2+ on the right side and 2+ on the left side.       Radial pulses are 2+ on the right side and 2+ on the left side.   Pulmonary/Chest: Effort normal and breath sounds normal. No respiratory distress. She has no wheezes.   Abdominal: Soft. Bowel sounds are normal. She exhibits no distension. There is no abdominal tenderness.    Neurological: She is alert and oriented to person, place, and time.   Skin: She is not diaphoretic.       Significant Labs:   CMP   Recent Labs   Lab 12/16/20  1609      K 4.4      CO2 26   GLU 89   BUN 11   CREATININE 0.9   CALCIUM 9.7   PROT 7.9   ALBUMIN 3.8   BILITOT 0.4   ALKPHOS 47*   AST 25   ALT 23   ANIONGAP 11   ESTGFRAFRICA >60.0   EGFRNONAA >60.0   , CBC   Recent Labs   Lab 12/16/20  1609   WBC 4.60   HGB 11.6*   HCT 36.9*       and INR No results for input(s): INR, PROTIME in the last 48 hours.    Significant Imaging: Echocardiogram:   Transthoracic echo (TTE) complete (Cupid Only):   Results for orders placed or performed during the hospital encounter of 09/10/20   Echo Color Flow Doppler? Yes   Result Value Ref Range    Ascending aorta 2.94 cm    STJ 2.61 cm    AV mean gradient 3 mmHg    Ao peak ealr 1.12 m/s    Ao VTI 26.40 cm    IVRT 91.34 msec    IVS 1.10 0.6 - 1.1 cm    LA size 3.50 cm    Left Atrium Major Axis 4.80 cm    Left Atrium Minor Axis 4.80 cm    LVIDd 3.70 3.5 - 6.0 cm    LVIDs 2.19 2.1 - 4.0 cm    LVOT diameter 2.04 cm    LVOT peak VTI 19.42 cm    Posterior Wall 1.00 0.6 - 1.1 cm    MV Peak A Earl 0.75 m/s    E wave decelartion time 161.05 msec    MV Peak E Earl 0.72 m/s    PV Peak D Earl 0.37 m/s    PV Peak S Earl 0.57 m/s    RA Major Axis 4.30 cm    RA Width 2.94 cm    RVDD 3.49 cm    Sinus 3.07 cm    TAPSE 2.27 cm    TR Max Earl 2.62 m/s    TDI LATERAL 0.10 m/s    TDI SEPTAL 0.08 m/s    LA WIDTH 3.80 cm    MV stenosis pressure 1/2 time 46.70 ms    LV Diastolic Volume 42.10 mL    LV Systolic Volume 16.03 mL    RV S' 13.98 cm/s    LVOT peak earl 0.86 m/s    LV LATERAL E/E' RATIO 7.20 m/s    LV SEPTAL E/E' RATIO 9.00 m/s    FS 41 %    LA volume 54.26 cm3    LV mass 120.79 g    Left Ventricle Relative Wall Thickness 0.54 cm    AV valve area 2.40 cm2    AV Velocity Ratio 0.77     AV index (prosthetic) 0.74     MV valve area p 1/2 method 4.71 cm2    E/A ratio 0.96     Mean e'  0.09 m/s    Pulm vein S/D ratio 1.54     LVOT area 3.3 cm2    LVOT stroke volume 63.44 cm3    AV peak gradient 5 mmHg    E/E' ratio 8.00 m/s    Triscuspid Valve Regurgitation Peak Gradient 27 mmHg    BSA 1.54 m2    LV Systolic Volume Index 10.4 mL/m2    LV Diastolic Volume Index 27.24 mL/m2    LA Volume Index 35.1 mL/m2    LV Mass Index 78 g/m2    Right Atrial Pressure (from IVC) 3 mmHg    TV rest pulmonary artery pressure 30 mmHg    Narrative    · Normal left ventricular systolic function. The estimated ejection   fraction is 60%.  · Concentric left ventricular remodeling.  · Normal LV diastolic function.  · No wall motion abnormalities.  · Normal right ventricular systolic function.  · Mild left atrial enlargement.  · Mild tricuspid regurgitation.  · The estimated PA systolic pressure is 30 mmHg.  · Normal central venous pressure (3 mmHg).        Assessment and Plan:     Mesenteric ischemia  This is a 80 yoF, Patient of Dr. Garza, admitted to Cardiology. PMHx of HTN, HLD (Statin in tolerance), PAD and granulomatous lung disease who was seen by Dr. 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  As reported by RN clinic visits patient had close to 30-35 lb weight was without any classic postprandial symptoms which are typically seen with mesenteric angina.  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  She was subsequently discharged home since being discharged patient reports ongoing right lower abdominal discomfort which is crampy and 80 nature with over 10, she reports this abdominal discomfort is progressively worsened postprandial 20-30 minutes after meals.  Associated with nausea.  She was supposed to receive outpatient CTA abdomen unfortunately due  to not being prepped for iodine allergy she presented here with abdominal pain.  Upon arrival to [facility], her overall workup was unremarkable lactic acid 0.6, CBC and CMP negative, lipase negative.  She underwent a CT a abdomen which demonstrated kinking at the celiac stent or call last of the stent just proximal to the bifurcation.  Case was discussed with Dr. Soto and made aware of the admission      - Admit to Cardiology team A, discuss with Dr. Paz  - Interventional Cardiology consult   - Dye load   - Recommend CFA access (issues with radial access prior procedure)  - Continue ASA / Plavix   - NPO at MN   - Pain control              VTE Risk Mitigation (From admission, onward)         Ordered     IP VTE HIGH RISK PATIENT  Once      12/16/20 1949     Place sequential compression device  Until discontinued      12/16/20 1949                Letha Boone MD  Interventional Cardiology   [facility]

## 2020-12-17 NOTE — NURSING
Consulted the team regarding prophylactic benadryl and prednisone administration. Per Dr. Jack; consult the charge nurse. The charge nurse notified; instruction followed. The last dose to be administered an hour prior to procedure.  REYNALDO.

## 2020-12-17 NOTE — ASSESSMENT & PLAN NOTE
This is a 80 yoF, Patient of Dr. Garza, admitted to Cardiology. PMHx of HTN, HLD (Statin in tolerance), PAD and granulomatous lung disease who was seen by Dr. 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  As reported by RN clinic visits patient had close to 30-35 lb weight was without any classic postprandial symptoms which are typically seen with mesenteric angina.  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  She was subsequently discharged home since being discharged patient reports ongoing right lower abdominal discomfort which is crampy and 80 nature with over 10, she reports this abdominal discomfort is progressively worsened postprandial 20-30 minutes after meals.  Associated with nausea.  She was supposed to receive outpatient CTA abdomen unfortunately due to not being prepped for iodine allergy she presented here with abdominal pain.  Upon arrival to [facility], her overall workup was unremarkable lactic acid 0.6, CBC and CMP negative, lipase negative.  She underwent a CT a abdomen which demonstrated kinking at the celiac stent or call last of the stent just proximal to the bifurcation.  Case was discussed with Dr. Soto and made aware of the admission      - Admit to Cardiology team A, discuss with Dr. Paz  - Interventional Cardiology consult   - Dye load   - Recommend CFA access (issues with radial access prior procedure)  - Continue ASA / Plavix   - NPO at MN   - Pain control

## 2020-12-17 NOTE — TELEPHONE ENCOUNTER
Review of the ED admission documentation routed to me is patient's PCP noted several incidental intra-abdominal & intrapelvic findings (other than continued compromised for celiac trunk); these will need further evaluation post discharge.

## 2020-12-17 NOTE — PLAN OF CARE
12/17/20 1050   Discharge Assessment   Assessment Type Discharge Planning Assessment   Confirmed/corrected address and phone number on facesheet? Yes   Assessment information obtained from? Patient;Medical Record   Expected Length of Stay (days) 3   Communicated expected length of stay with patient/caregiver yes   Prior to hospitilization cognitive status: Alert/Oriented   Prior to hospitalization functional status: Independent   Current cognitive status: Alert/Oriented   Current Functional Status: Independent   Lives With sibling(s)  (sister)   Able to Return to Prior Arrangements yes   Is patient able to care for self after discharge? Yes   Patient's perception of discharge disposition home or selfcare   Readmission Within the Last 30 Days no previous admission in last 30 days   Patient currently being followed by outpatient case management? No   Patient currently receives any other outside agency services? No   Equipment Currently Used at Home none   Do you have any problems affording any of your prescribed medications? TBD   Is the patient taking medications as prescribed? yes   Does the patient have transportation home? Yes   Transportation Anticipated family or friend will provide  (sister or nephew)   Does the patient receive services at the [facility]? No   Discharge Plan A Home with family   Patient/Family in Agreement with Plan yes   Admitted with Mesenteric Ischemia. Lives alone but is currently staying at her brother's house with her sister. Plan is to return there. No DC needs identified.

## 2020-12-17 NOTE — ASSESSMENT & PLAN NOTE
This is a 80 yoF, Patient of Dr. Garza, admitted to Cardiology. PMHx of HTN, HLD (Statin in tolerance), PAD and granulomatous lung disease who was seen by Dr. 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  As reported by RN clinic visits patient had close to 30-35 lb weight was without any classic postprandial symptoms which are typically seen with mesenteric angina.  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  She was subsequently discharged home since being discharged patient reports ongoing right lower abdominal discomfort which is crampy and 80 nature with over 10, she reports this abdominal discomfort is progressively worsened postprandial 20-30 minutes after meals.  Associated with nausea.  She was supposed to receive outpatient CTA abdomen unfortunately due to not being prepped for iodine allergy she presented here with abdominal pain.  Upon arrival to [facility], her overall workup was unremarkable lactic acid 0.6, CBC and CMP negative, lipase negative.  She underwent a CT a abdomen which demonstrated kinking at the celiac stent or call last of the stent just proximal to the bifurcation.      - Reviewed CTA  - concern for arcuate ligament kink at mid celiac artery within the existing stent and possible stent fracture.  - Recommend vascular consultation for consideration of arcuate ligament ligation and/or bypass consideration.

## 2020-12-17 NOTE — HPI
80 yoF, Patient of Dr. Garza, admitted to Cardiology. PMHx of HTN, HLD (Statin intolerance), PAD and granulomatous lung disease who was seen by Dr. 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 She underwent placement of a celiac stent on 12/8/20.  Prior to this her she says her only symptoms was weight loss and malaise.  Since placement of the stent she complains of postprandial pain, right lower quadrant, crampy in nature, often 8 of 10 that is the size after several minutes.  It does not seem to be related to anyone type of food.  She denies a history of abdominal surgery.  She denies any change in bowel habits.  She occasionally has intermittent abdominal pain.  She has a contrast allergy.  She is a lifelong nonsmoker and does not drink alcohol or do drugs.    A CTA was performed admission that shows a kink in her celiac stent, possible median arcuate ligament impinging on the stent, widely patent SMA and CEDRICK.  She has a very mild calcium burden as expected of a woman of her age who does not smoke. She does have a moderate stool burden in eileen cecum with a large cecum overall.  Cannot visualize her appendix.

## 2020-12-17 NOTE — CONSULTS
[facility]  Interventional Cardiology  Consult Note    Patient Name: Alana Beth  MRN: [id number]  Admission Date: 12/16/2020  Hospital Length of Stay: 1 days  Code Status: Full Code   Attending Provider: Jacek Paz MD  Consulting Provider: Navdeep Cordova MD  Primary Care Physician: Nurys Bearden MD  Principal Problem:<principal problem not specified>    Patient information was obtained from patient.     Inpatient consult to Interventional Cardiology  Consult performed by: Navdeep Cordova MD  Consult ordered by: Letha Aiken MD        Subjective:     Chief Complaint:  Abdominal pain     HPI:  This is a 80 yoF, Patient of Dr. Garza, admitted to Cardiology. PMHx of HTN, HLD (Statin in tolerance), PAD and granulomatous lung disease who was seen by Dr. 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  As reported by RN clinic visits patient had close to 30-35 lb weight was without any classic postprandial symptoms which are typically seen with mesenteric angina.  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  She was subsequently discharged home since being discharged patient reports ongoing right lower abdominal discomfort which is crampy and 80 nature with over 10, she reports this abdominal discomfort is progressively worsened postprandial 20-30 minutes after meals.  Associated with nausea.  She was supposed to receive outpatient CTA abdomen unfortunately due to not being prepped for iodine allergy she presented here with abdominal pain.  Upon arrival to [facility], her overall workup was unremarkable lactic acid 0.6, CBC and CMP negative, lipase negative.  She underwent a CT a abdomen which demonstrated  kinking at the celiac stent or call last of the stent just proximal to the bifurcation.  Case was discussed with Dr. Soto and made aware of the admission        Non contrast CT 8/2020              0.5 cm calcified nodule in the medial segment of the right middle lobe              Aortic and coronary artery calcific atherosclerosis     US 9/2020                 Celiac  cm/sec with ratio 6.2              SMA  cm/sec with ratio 3.8              CEDRICK 152 cm/sec with ratio 2.6    Esophagram 9/2020                 Mild esophageal dysmotility               No evidence of cricopharyngeal     Echo 9/2020                 Normal EF              Mild LAE              Mild TR              PASP 30 mmHg     Nuclear stress test 9/2020                 No ischemia         Past Medical History:   Diagnosis Date    Atherosclerotic peripheral vascular disease     Chronic cystic mastitis     Essential hypertension     Hypercholesterolemia     Osteopenia     Shingles        Past Surgical History:   Procedure Laterality Date    BREAST BIOPSY      biopsies benign    ESOPHAGOGASTRODUODENOSCOPY  08/20/2020    PERCUTANEOUS TRANSLUMINAL ANGIOPLASTY N/A 12/8/2020    Procedure: Pta (Angioplasty, Percutaneous, Transluminal);  Surgeon: Hernando Junior MD;  [facility];  Service: Cardiology;  Laterality: N/A;       Review of patient's allergies indicates:   Allergen Reactions    Benazepril Other (See Comments)     cough    Chlorthalidone     Iodinated contrast media     Iodine and iodide containing products     Lipitor [atorvastatin]      States she is allergic to all statin medications    Sertraline      Medication caused patient to get sick.    Spironolactone        PTA Medications   Medication Sig    alirocumab (PRALUENT PEN) 75 mg/mL PnIj Inject 1 mL (75 mg total) into the skin every 14 (fourteen) days.    amLODIPine (NORVASC) 10 MG tablet Take 10 mg by mouth once daily.    ascorbic acid, vitamin  C, (VITAMIN C) 1000 MG tablet Take 1,000 mg by mouth once daily.    aspirin 81 MG Chew Take 81 mg by mouth once daily.    azelastine (ASTELIN) 137 mcg (0.1 %) nasal spray USE 1 TO 2 SPRAY(S) IN EACH NOSTRIL TWICE DAILY    budesonide 1 mg/2 mL NbSp EMPTY CONTENTS OF 1 RESPULE INTO NASAL IRRIGATION SYSTEM, ADD DISTILLED WATER, SALT PACK, MIX & IRRIGATE. PERFORM TWICE DAILY    carvediloL (COREG) 6.25 MG tablet Take 1 tablet (6.25 mg total) by mouth 2 (two) times daily with meals.    clopidogreL (PLAVIX) 75 mg tablet Take 1 tablet (75 mg total) by mouth once daily.    clorazepate (TRANXENE) 7.5 MG Tab Take 1 tablet (7.5 mg total) by mouth 3 (three) times daily. (Patient taking differently: Take 3.75 mg by mouth once daily. )    co-enzyme Q-10 50 mg capsule Take 50 mg by mouth once daily.    diphenhydrAMINE (BENADRYL) 25 mg capsule Take 25 mg by mouth every 6 (six) hours as needed for Itching.    ferrous sulfate (FEOSOL) 325 mg (65 mg iron) Tab tablet Take 1 tablet by mouth once daily.    multivitamin (ONE DAILY MULTIVITAMIN) per tablet Take 1 tablet by mouth once daily.    NEILMED SINUS RINSE COMPLETE pkdv use as directed    omega-3 fatty acids/fish oil (FISH OIL-OMEGA-3 FATTY ACIDS) 300-1,000 mg capsule Take 1 capsule by mouth once daily.    omeprazole (PRILOSEC) 20 MG capsule Take 1 capsule (20 mg total) by mouth 2 (two) times daily before meals.    predniSONE (DELTASONE) 50 MG Tab Take 1 tablet (50 mg total) by mouth 3 (three) times daily as needed (Take one tab at 6pm night before procedure, 1 tab at 11pm night before procedure, and 1 tab at 0600 morning of procedure with a small sip of water.).    TURMERIC ORAL Take 1 packet by mouth once daily.     Family History     Problem Relation (Age of Onset)    Diabetes Brother, Sister    Hypertension Mother        Tobacco Use    Smoking status: Never Smoker    Smokeless tobacco: Never Used   Substance and Sexual Activity    Alcohol use: No    Drug use:  No    Sexual activity: Not Currently     Review of Systems   Constitution: Negative for chills.   HENT: Negative for congestion.    Eyes: Negative for blurred vision.   Cardiovascular: Negative for chest pain, leg swelling, near-syncope, palpitations and syncope.   Respiratory: Negative for cough and shortness of breath.    Endocrine: Negative for polyuria.   Skin: Negative for itching and rash.   Musculoskeletal: Negative for back pain.   Gastrointestinal: Positive for abdominal pain and nausea. Negative for constipation, diarrhea and vomiting.   Genitourinary: Negative for dysuria.   Neurological: Negative for dizziness, headaches and light-headedness.   Psychiatric/Behavioral: Negative for altered mental status.     Objective:     Vital Signs (Most Recent):  Temp: 98.6 °F (37 °C) (12/16/20 1411)  Pulse: 92 (12/17/20 0600)  Resp: 17 (12/17/20 0416)  BP: (!) 143/76 (12/17/20 0416)  SpO2: 99 % (12/16/20 2312) Vital Signs (24h Range):  Temp:  [98.6 °F (37 °C)] 98.6 °F (37 °C)  Pulse:  [65-93] 92  Resp:  [13-19] 17  SpO2:  [98 %-100 %] 99 %  BP: (128-166)/(63-84) 143/76     Weight: 52 kg (114 lb 10.2 oz)  Body mass index is 20.63 kg/m².    SpO2: 99 %         Intake/Output Summary (Last 24 hours) at 12/17/2020 0719  Last data filed at 12/17/2020 0500  Gross per 24 hour   Intake 120 ml   Output 100 ml   Net 20 ml       Lines/Drains/Airways     Peripheral Intravenous Line                 Peripheral IV - Single Lumen 12/16/20 1618 20 G Left Antecubital less than 1 day                Physical Exam   Constitutional: She is oriented to person, place, and time. She appears well-developed and well-nourished.   HENT:   Head: Normocephalic and atraumatic.   Eyes: Pupils are equal, round, and reactive to light. Conjunctivae are normal.   Neck: Normal range of motion. Neck supple.   Cardiovascular: Normal rate, regular rhythm, S1 normal, S2 normal and normal heart sounds. Exam reveals no gallop and no friction rub.   No murmur  heard.  Pulses:       Radial pulses are 2+ on the right side and 2+ on the left side.   Pulmonary/Chest: Effort normal and breath sounds normal. No respiratory distress. She has no wheezes. She has no rales. She exhibits no tenderness.   Abdominal: Soft. Bowel sounds are normal. She exhibits no distension and no mass. There is no abdominal tenderness. There is no rebound and no guarding.   Musculoskeletal:         General: No edema.   Neurological: She is alert and oriented to person, place, and time.   Skin: Skin is warm and dry.   Psychiatric: She has a normal mood and affect.       Significant Labs:   BMP:   Recent Labs   Lab 12/16/20  1609 12/16/20  2326   GLU 89 235*    137   K 4.4 4.1    103   CO2 26 26   BUN 11 13   CREATININE 0.9 1.1   CALCIUM 9.7 9.0   MG 2.3  --    , CMP   Recent Labs   Lab 12/16/20  1609 12/16/20  2326    137   K 4.4 4.1    103   CO2 26 26   GLU 89 235*   BUN 11 13   CREATININE 0.9 1.1   CALCIUM 9.7 9.0   PROT 7.9  --    ALBUMIN 3.8  --    BILITOT 0.4  --    ALKPHOS 47*  --    AST 25  --    ALT 23  --    ANIONGAP 11 8   ESTGFRAFRICA >60.0 55.6*   EGFRNONAA >60.0 48.2*   , CBC   Recent Labs   Lab 12/16/20  1609 12/16/20  2326   WBC 4.60 4.55   HGB 11.6* 11.6*   HCT 36.9* 36.1*    181   , INR No results for input(s): INR, PROTIME in the last 48 hours., Lipid Panel No results for input(s): CHOL, HDL, LDLCALC, TRIG, CHOLHDL in the last 48 hours., Troponin No results for input(s): TROPONINI in the last 48 hours. and All pertinent lab results from the last 24 hours have been reviewed.    Significant Imaging: X-Ray: CXR: X-Ray Chest 1 View (CXR): No results found for this visit on 12/16/20.    Assessment and Plan:     Mesenteric ischemia  This is a 80 yoF, Patient of Dr. Garza, admitted to Cardiology. PMHx of HTN, HLD (Statin in tolerance), PAD and granulomatous lung disease who was seen by Dr. Garza for management of celiac and superior mesenteric artery  stenosis noted on ultrasound.  As reported by RN clinic visits patient had close to 30-35 lb weight was without any classic postprandial symptoms which are typically seen with mesenteric angina.  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  She was subsequently discharged home since being discharged patient reports ongoing right lower abdominal discomfort which is crampy and 80 nature with over 10, she reports this abdominal discomfort is progressively worsened postprandial 20-30 minutes after meals.  Associated with nausea.  She was supposed to receive outpatient CTA abdomen unfortunately due to not being prepped for iodine allergy she presented here with abdominal pain.  Upon arrival to [facility], her overall workup was unremarkable lactic acid 0.6, CBC and CMP negative, lipase negative.  She underwent a CT a abdomen which demonstrated kinking at the celiac stent or call last of the stent just proximal to the bifurcation.      - Reviewed CTA  - concern for arcuate ligament kink at mid celiac artery within the existing stent and possible stent fracture.  - Recommend vascular consultation for consideration of arcuate ligament ligation and/or bypass consideration.         PAD (peripheral artery disease)  -s/p renal stents  - continue asa, plavix    Hypercholesteremia  -uncontrolled  - statin intolerant    Essential hypertension  -controlled  - management per primary team      VTE Risk Mitigation (From admission, onward)         Ordered     IP VTE HIGH RISK PATIENT  Once      12/16/20 1949     Place sequential compression device  Until discontinued      12/16/20 1949                Thank you for your consult. I will follow-up with patient. Please contact us if you have any additional  questions.    Navdeep Cordova MD  Interventional Cardiology   [facility]

## 2020-12-17 NOTE — SUBJECTIVE & OBJECTIVE
Medications Prior to Admission   Medication Sig Dispense Refill Last Dose    alirocumab (PRALUENT PEN) 75 mg/mL PnIj Inject 1 mL (75 mg total) into the skin every 14 (fourteen) days. 4 Syringe 6 12/15/2020 at Unknown time    amLODIPine (NORVASC) 10 MG tablet Take 10 mg by mouth once daily.       ascorbic acid, vitamin C, (VITAMIN C) 1000 MG tablet Take 1,000 mg by mouth once daily.       aspirin 81 MG Chew Take 81 mg by mouth once daily.       azelastine (ASTELIN) 137 mcg (0.1 %) nasal spray USE 1 TO 2 SPRAY(S) IN EACH NOSTRIL TWICE DAILY       budesonide 1 mg/2 mL NbSp EMPTY CONTENTS OF 1 RESPULE INTO NASAL IRRIGATION SYSTEM, ADD DISTILLED WATER, SALT PACK, MIX & IRRIGATE. PERFORM TWICE DAILY       carvediloL (COREG) 6.25 MG tablet Take 1 tablet (6.25 mg total) by mouth 2 (two) times daily with meals. 90 tablet 1     clopidogreL (PLAVIX) 75 mg tablet Take 1 tablet (75 mg total) by mouth once daily. 90 tablet 3     clorazepate (TRANXENE) 7.5 MG Tab Take 1 tablet (7.5 mg total) by mouth 3 (three) times daily. (Patient taking differently: Take 3.75 mg by mouth once daily. ) 90 tablet 0     co-enzyme Q-10 50 mg capsule Take 50 mg by mouth once daily.       diphenhydrAMINE (BENADRYL) 25 mg capsule Take 25 mg by mouth every 6 (six) hours as needed for Itching.       ferrous sulfate (FEOSOL) 325 mg (65 mg iron) Tab tablet Take 1 tablet by mouth once daily.       multivitamin (ONE DAILY MULTIVITAMIN) per tablet Take 1 tablet by mouth once daily.       NEILMED SINUS RINSE COMPLETE pkdv use as directed       omega-3 fatty acids/fish oil (FISH OIL-OMEGA-3 FATTY ACIDS) 300-1,000 mg capsule Take 1 capsule by mouth once daily.       omeprazole (PRILOSEC) 20 MG capsule Take 1 capsule (20 mg total) by mouth 2 (two) times daily before meals. 60 capsule 5     predniSONE (DELTASONE) 50 MG Tab Take 1 tablet (50 mg total) by mouth 3 (three) times daily as needed (Take one tab at 6pm night before procedure, 1 tab at  11pm night before procedure, and 1 tab at 0600 morning of procedure with a small sip of water.). 3 tablet 0     TURMERIC ORAL Take 1 packet by mouth once daily.          Review of patient's allergies indicates:   Allergen Reactions    Benazepril Other (See Comments)     cough    Chlorthalidone     Iodinated contrast media     Iodine and iodide containing products     Lipitor [atorvastatin]      States she is allergic to all statin medications    Sertraline      Medication caused patient to get sick.    Spironolactone        Past Medical History:   Diagnosis Date    Atherosclerotic peripheral vascular disease     Chronic cystic mastitis     Essential hypertension     Hypercholesterolemia     Osteopenia     Shingles      Past Surgical History:   Procedure Laterality Date    BREAST BIOPSY      biopsies benign    ESOPHAGOGASTRODUODENOSCOPY  08/20/2020    PERCUTANEOUS TRANSLUMINAL ANGIOPLASTY N/A 12/8/2020    Procedure: Pta (Angioplasty, Percutaneous, Transluminal);  Surgeon: Hernando Junior MD;  Location: [facility] CATH LAB/EP;  Service: Cardiology;  Laterality: N/A;     Family History     Problem Relation (Age of Onset)    Diabetes Brother, Sister    Hypertension Mother        Tobacco Use    Smoking status: Never Smoker    Smokeless tobacco: Never Used   Substance and Sexual Activity    Alcohol use: No    Drug use: No    Sexual activity: Not Currently     Review of Systems   Constitutional: Positive for unexpected weight change. Negative for activity change, chills and fever.   HENT: Negative for congestion, rhinorrhea, sinus pain and sore throat.    Respiratory: Negative for cough, chest tightness, shortness of breath and wheezing.    Cardiovascular: Negative for chest pain, palpitations and leg swelling.   Gastrointestinal: Positive for abdominal pain. Negative for anal bleeding, blood in stool, diarrhea and rectal pain.   Genitourinary: Negative for pelvic pain.   Musculoskeletal: Negative for joint  swelling and myalgias.   Skin: Negative for color change and wound.   Neurological: Negative for dizziness, speech difficulty and numbness.     Objective:     Vital Signs (Most Recent):  Temp: 98.7 °F (37.1 °C) (12/17/20 0745)  Pulse: 88 (12/17/20 0745)  Resp: 18 (12/17/20 0745)  BP: (!) 158/84 (12/17/20 0745)  SpO2: 98 % (12/17/20 0745) Vital Signs (24h Range):  Temp:  [98.6 °F (37 °C)-98.7 °F (37.1 °C)] 98.7 °F (37.1 °C)  Pulse:  [65-93] 88  Resp:  [13-19] 18  SpO2:  [98 %-100 %] 98 %  BP: (128-166)/(63-84) 158/84     Weight: 52 kg (114 lb 10.2 oz)  Body mass index is 20.63 kg/m².    Physical Exam  HENT:      Head: Normocephalic.      Mouth/Throat:      Mouth: Mucous membranes are moist.   Eyes:      Pupils: Pupils are equal, round, and reactive to light.   Cardiovascular:      Rate and Rhythm: Normal rate and regular rhythm.      Comments: Palpable radial pulses bilaterally  Palpable femoral pulses bilaterally 2+  Palpable PT and DP pulses bilaterally.  Pulmonary:      Effort: Pulmonary effort is normal. No respiratory distress.   Abdominal:      General: Abdomen is flat. There is no distension.      Palpations: Abdomen is soft.      Tenderness: There is no abdominal tenderness.      Comments: Abdomen is mildly tender to deep palpation throughout.  No overt tenderness however.  No obvious signs of surgical scars.   Musculoskeletal: Normal range of motion.         General: No swelling.      Right lower leg: No edema.      Left lower leg: No edema.   Skin:     General: Skin is warm and dry.   Neurological:      Mental Status: She is alert and oriented to person, place, and time.         Significant Labs:  BMP:   Recent Labs   Lab 12/16/20  1609 12/16/20  2326   GLU 89 235*    137   K 4.4 4.1    103   CO2 26 26   BUN 11 13   CREATININE 0.9 1.1   CALCIUM 9.7 9.0   MG 2.3  --      CBC:   Recent Labs   Lab 12/16/20  2326   WBC 4.55   RBC 4.28   HGB 11.6*   HCT 36.1*      MCV 84   MCH 27.1   MCHC 32.1        Significant Diagnostics:  I have reviewed all pertinent imaging results/findings within the past 24 hours.     EXAMINATION:  CTA CHEST ABDOMEN PELVIS     CLINICAL HISTORY:  Celiac artery compression syndrome;s/p celiac artery stentinf;     TECHNIQUE:  Using 75 cc of  Omnipaque 350 IV contrast, and multi-detector helical CT technique, axial CT angiogram images were obtained from the thoracic inlet to the iliac crests.  2D post-processing coronal and sagittal reconstructions were performed.     COMPARISON:  CT chest 08/21/2020     FINDINGS:  CTA:     Postsurgical change of celiac artery stent placement.  Mild focal kinking or collapse of the stent just proximal to the bifurcation of the common hepatic artery and splenic artery (axial series 2, image 461 and coronal series 601, image 96).  The SMA, CEDRICK, and bilateral renal arteries are patent.     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  Scattered calcified and noncalcified atherosclerotic disease throughout the abdominal aorta and its branch vessels.  No significant atherosclerotic stenosis.  Tiny outpouching at the infrarenal abdominal aorta suspicious for ulcerated plaque (axial series 2, image 529).  No evidence of plaque rupture.  No aortic aneurysm or dissection.  Mild-to-moderate calcific atherosclerosis of the bilateral iliac arteries and visualized femoral arteries.     CHEST:     Evaluation of the thoracic structures is degraded by motion artifact.     - Lungs: Emphysematous changes.  Stable subcentimeter calcified right middle lobe nodule.  Evaluation of lung parenchyma is degraded by motion artifact.  Appearance of numerous centrilobular nodules versus artifact.  Clinical correlation recommended.  No pleural fluid.     - Heart/Pericardial structures: Mildly enlarged.  Small volume pericardial fluid.     - EG Junction: No hiatal hernia.     ABDOMEN:     - Liver: Normal size.  Multiple hypoattenuating foci in the liver.  1.7 cm ill-defined  hypoattenuating focus in hepatic segment VII could represent focal fatty infiltration, perfusion defect, or mass lesion.  Several additional small hypodense foci e.g. 1.2 cm lesion in the left hepatic lobe and inferior right hepatic lobe.     - Gallbladder: No calcified gallstones. No wall thickening or pericholecystic fluid.     - Bile Ducts: No intra or extrahepatic biliary ductal dilatation.     - Spleen: Negative.     - Stomach: Diffuse stomach wall thickening, possibly due to decompressed state.     - Pancreas: Unremarkable.     - Adrenals: Unremarkable.     - Bowel: No evidence of bowel obstruction or inflammation.  The appendix is not clearly visualized.     - Kidneys/ureters and bladder: Multiple hypoenhancing foci in the left kidney, likely renal cysts.  No hydronephrosis or nephrolithiasis.  Right ureter is normal caliber with punctate stone just proximal to the right ureterovesicular junction.  Mild left ureteral dilatation at the ureteropelvic junction the course of the left ureter is not well visualized.  There is a 1 cm calcification near the left ureterovesicular junction which could be a urinary stone or phlebolith.     - Retroperitoneum: No significant adenopathy.  Trace pelvic ascites.     - Peritoneal Space: No ascites or free air.     PELVIS:     - Reproductive: Uterus is tilted towards the right demonstrating slight lobulation and heterogeneous appearance with parenchymal calcifications suggesting underlying fibroids.  No adnexal mass seen.     - Bladder: Symmetric bladder wall thickening suspicious for bladder outlet obstruction or cystitis.     BONES: Degenerative changes in the spine.  Small lucent focus in the right ilium at the sacroiliac joint, likely degenerative.  Recommend correlation with past medical history for malignancy.  No acute fracture..     EXTRAPERITONEAL SOFT TISSUES: Small left lower quadrant cutaneous abdominal wall defect.     Impression:     Celiac artery stent with mild  kinking or collapse of the stent just proximal to the bifurcation of the common hepatic and splenic artery.     Calcified and noncalcified atherosclerotic disease.  Tiny outpouching at the infrarenal abdominal aorta suspicious for ulcerated plaque.     Multiple hypoattenuating foci in the liver.  Further evaluation with elective abdominal ultrasound is recommended.     The left ureter is poorly visualized.  There is mild dilatation of the left ureter near the ureteropelvic junction a 1 cm calcification near the left ureterovesicular junction which could represent a urinary stone or phlebolith.  Notice made of symmetric bladder wall thickening which can be seen with bladder outlet obstruction or cystitis.     Multiple hypoenhancing foci in the left kidney, likely renal cyst.  Further evaluation can be performed with renal ultrasound if clinically indicated.     Appearance of numerous centrilobular micronodules, which could be due to artifact versus inflammatory/infectious airway process.  Clinical correlation recommended.     Few additional findings as above.

## 2020-12-17 NOTE — SUBJECTIVE & OBJECTIVE
Interval History: No acute events overnight, patient remains afebrile and hemodynamically stable this morning. Still with complaints of intermittent abdominal pain and nausea.    Review of Systems   Constitution: Negative for chills.   HENT: Negative for congestion.    Eyes: Negative for blurred vision.   Cardiovascular: Negative for chest pain, leg swelling, near-syncope, palpitations and syncope.   Respiratory: Negative for cough and shortness of breath.    Endocrine: Negative for polyuria.   Skin: Negative for itching and rash.   Musculoskeletal: Negative for back pain.   Gastrointestinal: Positive for abdominal pain and nausea. Negative for constipation, diarrhea and vomiting.   Genitourinary: Negative for dysuria.   Neurological: Negative for dizziness, headaches and light-headedness.   Psychiatric/Behavioral: Negative for altered mental status.     Objective:     Vital Signs (Most Recent):  Temp: 98.7 °F (37.1 °C) (12/17/20 0745)  Pulse: 88 (12/17/20 0745)  Resp: 18 (12/17/20 0745)  BP: (!) 158/84 (12/17/20 0745)  SpO2: 98 % (12/17/20 0745) Vital Signs (24h Range):  Temp:  [98.6 °F (37 °C)-98.7 °F (37.1 °C)] 98.7 °F (37.1 °C)  Pulse:  [65-93] 88  Resp:  [13-19] 18  SpO2:  [98 %-100 %] 98 %  BP: (128-166)/(63-84) 158/84     Weight: 52 kg (114 lb 10.2 oz)  Body mass index is 20.63 kg/m².     SpO2: 98 %  O2 Device (Oxygen Therapy): room air      Intake/Output Summary (Last 24 hours) at 12/17/2020 1008  Last data filed at 12/17/2020 0500  Gross per 24 hour   Intake 120 ml   Output 100 ml   Net 20 ml       Lines/Drains/Airways     Peripheral Intravenous Line                 Peripheral IV - Single Lumen 12/16/20 1618 20 G Left Antecubital less than 1 day                Physical Exam   Constitutional: She is oriented to person, place, and time. She appears well-developed and well-nourished.   HENT:   Head: Normocephalic and atraumatic.   Eyes: Pupils are equal, round, and reactive to light. Conjunctivae are normal.    Neck: Normal range of motion. Neck supple.   Cardiovascular: Normal rate, regular rhythm, S1 normal, S2 normal and normal heart sounds. Exam reveals no gallop and no friction rub.   No murmur heard.  Pulses:       Radial pulses are 2+ on the right side and 2+ on the left side.   Pulmonary/Chest: Effort normal and breath sounds normal. No respiratory distress. She has no wheezes. She has no rales. She exhibits no tenderness.   Abdominal: Soft. Bowel sounds are normal. She exhibits no distension and no mass. There is no abdominal tenderness. There is no rebound and no guarding.   Musculoskeletal:         General: No edema.   Neurological: She is alert and oriented to person, place, and time.   Skin: Skin is warm and dry.   Psychiatric: She has a normal mood and affect.       Significant Labs:   BMP:   Recent Labs   Lab 12/16/20  1609 12/16/20  2326   GLU 89 235*    137   K 4.4 4.1    103   CO2 26 26   BUN 11 13   CREATININE 0.9 1.1   CALCIUM 9.7 9.0   MG 2.3  --      CMP   Recent Labs   Lab 12/16/20  1609 12/16/20  2326    137   K 4.4 4.1    103   CO2 26 26   GLU 89 235*   BUN 11 13   CREATININE 0.9 1.1   CALCIUM 9.7 9.0   PROT 7.9  --    ALBUMIN 3.8  --    BILITOT 0.4  --    ALKPHOS 47*  --    AST 25  --    ALT 23  --    ANIONGAP 11 8   ESTGFRAFRICA >60.0 55.6*   EGFRNONAA >60.0 48.2*     CBC   Recent Labs   Lab 12/16/20  1609 12/16/20  2326   WBC 4.60 4.55   HGB 11.6* 11.6*   HCT 36.9* 36.1*    181     Recent Labs   Lab 12/16/20  1609   LIPASE 28       Significant Imaging:    CTA Chest/Abdomen/Pelvis (12/16/2020):     Celiac artery stent with mild kinking or collapse of the stent just proximal to the bifurcation of the common hepatic and splenic artery.     Calcified and noncalcified atherosclerotic disease.  Tiny outpouching at the infrarenal abdominal aorta suspicious for ulcerated plaque.     Multiple hypoattenuating foci in the liver.  Further evaluation with elective abdominal  ultrasound is recommended.     The left ureter is poorly visualized.  There is mild dilatation of the left ureter near the ureteropelvic junction a 1 cm calcification near the left ureterovesicular junction which could represent a urinary stone or phlebolith.  Notice made of symmetric bladder wall thickening which can be seen with bladder outlet obstruction or cystitis.     Multiple hypoenhancing foci in the left kidney, likely renal cyst.  Further evaluation can be performed with renal ultrasound if clinically indicated.     Appearance of numerous centrilobular micronodules, which could be due to artifact versus inflammatory/infectious airway process.  Clinical correlation recommended.

## 2020-12-17 NOTE — ASSESSMENT & PLAN NOTE
Lab Results   Component Value Date    CHOL 348 (H) 09/10/2020    HDL 97 (H) 09/10/2020    LDLCALC 233.6 (H) 09/10/2020    TRIG 87 09/10/2020     Patient's cholesterol uncontrolled as noted above with . Is statin-intolerant. Per previous clinic notes, patient has been prescribed PCSK-9 inhibitor and received one dose but would prefer oral regimen. Also prescribed bempedoic acid, unsure if patient taking.    Plan:   - Patient re-education in importance of LDL control and to consider ezetimibe, PCSK-9 inhibitor (eg. Repatha, Praluent), or bempedoic acid as previously prescribed as outpatient for better LDL control

## 2020-12-17 NOTE — SUBJECTIVE & OBJECTIVE
Past Medical History:   Diagnosis Date    Atherosclerotic peripheral vascular disease     Chronic cystic mastitis     Essential hypertension     Hypercholesterolemia     Osteopenia     Shingles        Past Surgical History:   Procedure Laterality Date    BREAST BIOPSY      biopsies benign    ESOPHAGOGASTRODUODENOSCOPY  08/20/2020    PERCUTANEOUS TRANSLUMINAL ANGIOPLASTY N/A 12/8/2020    Procedure: Pta (Angioplasty, Percutaneous, Transluminal);  Surgeon: Hernando Junior MD;  Location: [facility] CATH LAB/EP;  Service: Cardiology;  Laterality: N/A;       Review of patient's allergies indicates:   Allergen Reactions    Benazepril Other (See Comments)     cough    Chlorthalidone     Iodinated contrast media     Iodine and iodide containing products     Lipitor [atorvastatin]      States she is allergic to all statin medications    Sertraline      Medication caused patient to get sick.    Spironolactone        PTA Medications   Medication Sig    alirocumab (PRALUENT PEN) 75 mg/mL PnIj Inject 1 mL (75 mg total) into the skin every 14 (fourteen) days.    amLODIPine (NORVASC) 10 MG tablet Take 10 mg by mouth once daily.    ascorbic acid, vitamin C, (VITAMIN C) 1000 MG tablet Take 1,000 mg by mouth once daily.    aspirin 81 MG Chew Take 81 mg by mouth once daily.    azelastine (ASTELIN) 137 mcg (0.1 %) nasal spray USE 1 TO 2 SPRAY(S) IN EACH NOSTRIL TWICE DAILY    budesonide 1 mg/2 mL NbSp EMPTY CONTENTS OF 1 RESPULE INTO NASAL IRRIGATION SYSTEM, ADD DISTILLED WATER, SALT PACK, MIX & IRRIGATE. PERFORM TWICE DAILY    carvediloL (COREG) 6.25 MG tablet Take 1 tablet (6.25 mg total) by mouth 2 (two) times daily with meals.    clopidogreL (PLAVIX) 75 mg tablet Take 1 tablet (75 mg total) by mouth once daily.    clorazepate (TRANXENE) 7.5 MG Tab Take 1 tablet (7.5 mg total) by mouth 3 (three) times daily. (Patient taking differently: Take 3.75 mg by mouth once daily. )    co-enzyme Q-10 50 mg capsule Take 50 mg  by mouth once daily.    diphenhydrAMINE (BENADRYL) 25 mg capsule Take 25 mg by mouth every 6 (six) hours as needed for Itching.    ferrous sulfate (FEOSOL) 325 mg (65 mg iron) Tab tablet Take 1 tablet by mouth once daily.    multivitamin (ONE DAILY MULTIVITAMIN) per tablet Take 1 tablet by mouth once daily.    NEILMED SINUS RINSE COMPLETE pkdv use as directed    omega-3 fatty acids/fish oil (FISH OIL-OMEGA-3 FATTY ACIDS) 300-1,000 mg capsule Take 1 capsule by mouth once daily.    omeprazole (PRILOSEC) 20 MG capsule Take 1 capsule (20 mg total) by mouth 2 (two) times daily before meals.    predniSONE (DELTASONE) 50 MG Tab Take 1 tablet (50 mg total) by mouth 3 (three) times daily as needed (Take one tab at 6pm night before procedure, 1 tab at 11pm night before procedure, and 1 tab at 0600 morning of procedure with a small sip of water.).    TURMERIC ORAL Take 1 packet by mouth once daily.     Family History     Problem Relation (Age of Onset)    Diabetes Brother, Sister    Hypertension Mother        Tobacco Use    Smoking status: Never Smoker    Smokeless tobacco: Never Used   Substance and Sexual Activity    Alcohol use: No    Drug use: No    Sexual activity: Not Currently     Review of Systems   Constitution: Negative for chills.   HENT: Negative for congestion.    Eyes: Negative for blurred vision.   Cardiovascular: Negative for chest pain, leg swelling, near-syncope, palpitations and syncope.   Respiratory: Negative for cough and shortness of breath.    Endocrine: Negative for polyuria.   Skin: Negative for itching and rash.   Musculoskeletal: Negative for back pain.   Gastrointestinal: Positive for abdominal pain and nausea. Negative for constipation, diarrhea and vomiting.   Genitourinary: Negative for dysuria.   Neurological: Negative for dizziness, headaches and light-headedness.   Psychiatric/Behavioral: Negative for altered mental status.     Objective:     Vital Signs (Most Recent):  Temp:  98.6 °F (37 °C) (12/16/20 1411)  Pulse: 92 (12/17/20 0600)  Resp: 17 (12/17/20 0416)  BP: (!) 143/76 (12/17/20 0416)  SpO2: 99 % (12/16/20 2312) Vital Signs (24h Range):  Temp:  [98.6 °F (37 °C)] 98.6 °F (37 °C)  Pulse:  [65-93] 92  Resp:  [13-19] 17  SpO2:  [98 %-100 %] 99 %  BP: (128-166)/(63-84) 143/76     Weight: 52 kg (114 lb 10.2 oz)  Body mass index is 20.63 kg/m².    SpO2: 99 %         Intake/Output Summary (Last 24 hours) at 12/17/2020 0719  Last data filed at 12/17/2020 0500  Gross per 24 hour   Intake 120 ml   Output 100 ml   Net 20 ml       Lines/Drains/Airways     Peripheral Intravenous Line                 Peripheral IV - Single Lumen 12/16/20 1618 20 G Left Antecubital less than 1 day                Physical Exam   Constitutional: She is oriented to person, place, and time. She appears well-developed and well-nourished.   HENT:   Head: Normocephalic and atraumatic.   Eyes: Pupils are equal, round, and reactive to light. Conjunctivae are normal.   Neck: Normal range of motion. Neck supple.   Cardiovascular: Normal rate, regular rhythm, S1 normal, S2 normal and normal heart sounds. Exam reveals no gallop and no friction rub.   No murmur heard.  Pulses:       Radial pulses are 2+ on the right side and 2+ on the left side.   Pulmonary/Chest: Effort normal and breath sounds normal. No respiratory distress. She has no wheezes. She has no rales. She exhibits no tenderness.   Abdominal: Soft. Bowel sounds are normal. She exhibits no distension and no mass. There is no abdominal tenderness. There is no rebound and no guarding.   Musculoskeletal:         General: No edema.   Neurological: She is alert and oriented to person, place, and time.   Skin: Skin is warm and dry.   Psychiatric: She has a normal mood and affect.       Significant Labs:   BMP:   Recent Labs   Lab 12/16/20  1609 12/16/20  2326   GLU 89 235*    137   K 4.4 4.1    103   CO2 26 26   BUN 11 13   CREATININE 0.9 1.1   CALCIUM 9.7 9.0    MG 2.3  --    , CMP   Recent Labs   Lab 12/16/20  1609 12/16/20  2326    137   K 4.4 4.1    103   CO2 26 26   GLU 89 235*   BUN 11 13   CREATININE 0.9 1.1   CALCIUM 9.7 9.0   PROT 7.9  --    ALBUMIN 3.8  --    BILITOT 0.4  --    ALKPHOS 47*  --    AST 25  --    ALT 23  --    ANIONGAP 11 8   ESTGFRAFRICA >60.0 55.6*   EGFRNONAA >60.0 48.2*   , CBC   Recent Labs   Lab 12/16/20  1609 12/16/20  2326   WBC 4.60 4.55   HGB 11.6* 11.6*   HCT 36.9* 36.1*    181   , INR No results for input(s): INR, PROTIME in the last 48 hours., Lipid Panel No results for input(s): CHOL, HDL, LDLCALC, TRIG, CHOLHDL in the last 48 hours., Troponin No results for input(s): TROPONINI in the last 48 hours. and All pertinent lab results from the last 24 hours have been reviewed.    Significant Imaging: X-Ray: CXR: X-Ray Chest 1 View (CXR): No results found for this visit on 12/16/20.

## 2020-12-17 NOTE — ASSESSMENT & PLAN NOTE
Patient has known BLE PAD, denies claudication or other peripheral symptoms suggestive of limb ischemia at this time.    Plan:   - Continue DAPT with aspirin 81mg daily and clopidogrel 75mg daily   - Recommend better lipid control as noted above

## 2020-12-17 NOTE — ASSESSMENT & PLAN NOTE
This is a 80 yoF, Patient of Dr. Garza, admitted to Cardiology. PMHx of HTN, HLD (Statin in tolerance), PAD and granulomatous lung disease who was seen by Dr. 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  As reported by RN clinic visits patient had close to 30-35 lb weight was without any classic postprandial symptoms which are typically seen with mesenteric angina.  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  She was subsequently discharged home since being discharged patient reports ongoing right lower abdominal discomfort which is crampy and 80 nature with over 10, she reports this abdominal discomfort is progressively worsened postprandial 20-30 minutes after meals.  Associated with nausea.  She was supposed to receive outpatient CTA abdomen unfortunately due to not being prepped for iodine allergy she presented here with abdominal pain.  Upon arrival to [facility], her overall workup was unremarkable lactic acid 0.6, CBC and CMP negative, lipase negative.  She underwent a CT a abdomen which demonstrated kinking at the celiac stent or call last of the stent just proximal to the bifurcation.  Case was discussed with Dr. Soto and made aware of the admission     EF 60%  - Access: Right groin, left groin as back up  - Mesenteric angiogram +/- stenting       Renal Risk Score/Predicts risk of contrast-induced nephropathy (FERNANDO) after PCI:    Prior Contrast Allergy: Yes   Pre Cath Routine Benadryl 50 mg, Prednisone, famotidine, and Routine IVF NaCl PreCath      Sedation   Type of sedation: RN IV sedation       -The risks, benefits & alternatives of the procedure were explained to the patient.    -The risks of coronary angiography include but are  not limited to: Bleeding, infection, heart rhythm abnormalities, allergic reactions, kidney injury, stroke and death.    -Should stenting be indicated, the patient has agreed to dual anti-platelet therapy for 1-consecutive year with a drug-eluting stent and a minimum of 1-month with the use of a bare metal stent.    -The risks of moderate sedation include hypotension, respiratory depression, arrhythmias, bronchospasm, & death.    -Informed consent was obtained & the patient is agreeable to proceed with the procedure.  -This patient will be discussed in the morning discussion with interventional cardiology staff.

## 2020-12-17 NOTE — CONSULTS
[facility]  Vascular Surgery  Consult Note    Inpatient consult to Vascular Surgery  Consult performed by: Santi Rowe MD  Consult ordered by: Hanane Moe MD        Subjective:     Chief Complaint/Reason for Admission: Abdominal pain    History of Present Illness: 80 yoF, Patient of Dr. Garza, admitted to Cardiology. PMHx of HTN, HLD (Statin intolerance), PAD and granulomatous lung disease who was seen by Dr. 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 She underwent placement of a celiac stent on 12/8/20.  Prior to this her she says her only symptoms was weight loss and malaise.  Since placement of the stent she complains of postprandial pain, right lower quadrant, crampy in nature, often 8 of 10 that is the size after several minutes.  It does not seem to be related to anyone type of food.  She denies a history of abdominal surgery.  She denies any change in bowel habits.  She occasionally has intermittent abdominal pain.  She has a contrast allergy.  She is a lifelong nonsmoker and does not drink alcohol or do drugs.    A CTA was performed admission that shows a kink in her celiac stent, possible median arcuate ligament impinging on the stent, widely patent SMA and CEDRICK.  She has a very mild calcium burden as expected of a woman of her age who does not smoke. She does have a moderate stool burden in eileen cecum with a large cecum overall.  Cannot visualize her appendix.    Medications Prior to Admission   Medication Sig Dispense Refill Last Dose    alirocumab (PRALUENT PEN) 75 mg/mL PnIj Inject 1 mL (75 mg total) into the skin every 14 (fourteen) days. 4 Syringe 6 12/15/2020 at Unknown time    amLODIPine (NORVASC) 10 MG tablet Take 10 mg by mouth once daily.       ascorbic acid, vitamin C, (VITAMIN C) 1000 MG tablet Take 1,000 mg by mouth once daily.       aspirin 81 MG Chew Take 81 mg by mouth once daily.       azelastine  (ASTELIN) 137 mcg (0.1 %) nasal spray USE 1 TO 2 SPRAY(S) IN EACH NOSTRIL TWICE DAILY       budesonide 1 mg/2 mL NbSp EMPTY CONTENTS OF 1 RESPULE INTO NASAL IRRIGATION SYSTEM, ADD DISTILLED WATER, SALT PACK, MIX & IRRIGATE. PERFORM TWICE DAILY       carvediloL (COREG) 6.25 MG tablet Take 1 tablet (6.25 mg total) by mouth 2 (two) times daily with meals. 90 tablet 1     clopidogreL (PLAVIX) 75 mg tablet Take 1 tablet (75 mg total) by mouth once daily. 90 tablet 3     clorazepate (TRANXENE) 7.5 MG Tab Take 1 tablet (7.5 mg total) by mouth 3 (three) times daily. (Patient taking differently: Take 3.75 mg by mouth once daily. ) 90 tablet 0     co-enzyme Q-10 50 mg capsule Take 50 mg by mouth once daily.       diphenhydrAMINE (BENADRYL) 25 mg capsule Take 25 mg by mouth every 6 (six) hours as needed for Itching.       ferrous sulfate (FEOSOL) 325 mg (65 mg iron) Tab tablet Take 1 tablet by mouth once daily.       multivitamin (ONE DAILY MULTIVITAMIN) per tablet Take 1 tablet by mouth once daily.       NEILMED SINUS RINSE COMPLETE pkdv use as directed       omega-3 fatty acids/fish oil (FISH OIL-OMEGA-3 FATTY ACIDS) 300-1,000 mg capsule Take 1 capsule by mouth once daily.       omeprazole (PRILOSEC) 20 MG capsule Take 1 capsule (20 mg total) by mouth 2 (two) times daily before meals. 60 capsule 5     predniSONE (DELTASONE) 50 MG Tab Take 1 tablet (50 mg total) by mouth 3 (three) times daily as needed (Take one tab at 6pm night before procedure, 1 tab at 11pm night before procedure, and 1 tab at 0600 morning of procedure with a small sip of water.). 3 tablet 0     TURMERIC ORAL Take 1 packet by mouth once daily.          Review of patient's allergies indicates:   Allergen Reactions    Benazepril Other (See Comments)     cough    Chlorthalidone     Iodinated contrast media     Iodine and iodide containing products     Lipitor [atorvastatin]      States she is allergic to all statin medications    Sertraline       Medication caused patient to get sick.    Spironolactone        Past Medical History:   Diagnosis Date    Atherosclerotic peripheral vascular disease     Chronic cystic mastitis     Essential hypertension     Hypercholesterolemia     Osteopenia     Shingles      Past Surgical History:   Procedure Laterality Date    BREAST BIOPSY      biopsies benign    ESOPHAGOGASTRODUODENOSCOPY  08/20/2020    PERCUTANEOUS TRANSLUMINAL ANGIOPLASTY N/A 12/8/2020    Procedure: Pta (Angioplasty, Percutaneous, Transluminal);  Surgeon: Hernando Junior MD;  Location: [facility] CATH LAB/EP;  Service: Cardiology;  Laterality: N/A;     Family History     Problem Relation (Age of Onset)    Diabetes Brother, Sister    Hypertension Mother        Tobacco Use    Smoking status: Never Smoker    Smokeless tobacco: Never Used   Substance and Sexual Activity    Alcohol use: No    Drug use: No    Sexual activity: Not Currently     Review of Systems   Constitutional: Positive for unexpected weight change. Negative for activity change, chills and fever.   HENT: Negative for congestion, rhinorrhea, sinus pain and sore throat.    Respiratory: Negative for cough, chest tightness, shortness of breath and wheezing.    Cardiovascular: Negative for chest pain, palpitations and leg swelling.   Gastrointestinal: Positive for abdominal pain. Negative for anal bleeding, blood in stool, diarrhea and rectal pain.   Genitourinary: Negative for pelvic pain.   Musculoskeletal: Negative for joint swelling and myalgias.   Skin: Negative for color change and wound.   Neurological: Negative for dizziness, speech difficulty and numbness.     Objective:     Vital Signs (Most Recent):  Temp: 98.7 °F (37.1 °C) (12/17/20 0745)  Pulse: 88 (12/17/20 0745)  Resp: 18 (12/17/20 0745)  BP: (!) 158/84 (12/17/20 0745)  SpO2: 98 % (12/17/20 0745) Vital Signs (24h Range):  Temp:  [98.6 °F (37 °C)-98.7 °F (37.1 °C)] 98.7 °F (37.1 °C)  Pulse:  [65-93] 88  Resp:  [13-19]  18  SpO2:  [98 %-100 %] 98 %  BP: (128-166)/(63-84) 158/84     Weight: 52 kg (114 lb 10.2 oz)  Body mass index is 20.63 kg/m².    Physical Exam  HENT:      Head: Normocephalic.      Mouth/Throat:      Mouth: Mucous membranes are moist.   Eyes:      Pupils: Pupils are equal, round, and reactive to light.   Cardiovascular:      Rate and Rhythm: Normal rate and regular rhythm.      Comments: Palpable radial pulses bilaterally  Palpable femoral pulses bilaterally 2+  Palpable PT and DP pulses bilaterally.  Pulmonary:      Effort: Pulmonary effort is normal. No respiratory distress.   Abdominal:      General: Abdomen is flat. There is no distension.      Palpations: Abdomen is soft.      Tenderness: There is no abdominal tenderness.      Comments: Abdomen is mildly tender to deep palpation throughout.  No overt tenderness however.  No obvious signs of surgical scars.   Musculoskeletal: Normal range of motion.         General: No swelling.      Right lower leg: No edema.      Left lower leg: No edema.   Skin:     General: Skin is warm and dry.   Neurological:      Mental Status: She is alert and oriented to person, place, and time.         Significant Labs:  BMP:   Recent Labs   Lab 12/16/20  1609 12/16/20  2326   GLU 89 235*    137   K 4.4 4.1    103   CO2 26 26   BUN 11 13   CREATININE 0.9 1.1   CALCIUM 9.7 9.0   MG 2.3  --      CBC:   Recent Labs   Lab 12/16/20  2326   WBC 4.55   RBC 4.28   HGB 11.6*   HCT 36.1*      MCV 84   MCH 27.1   MCHC 32.1       Significant Diagnostics:  I have reviewed all pertinent imaging results/findings within the past 24 hours.     EXAMINATION:  CTA CHEST ABDOMEN PELVIS     CLINICAL HISTORY:  Celiac artery compression syndrome;s/p celiac artery stentinf;     TECHNIQUE:  Using 75 cc of  Omnipaque 350 IV contrast, and multi-detector helical CT technique, axial CT angiogram images were obtained from the thoracic inlet to the iliac crests.  2D post-processing coronal and  sagittal reconstructions were performed.     COMPARISON:  CT chest 08/21/2020     FINDINGS:  CTA:     Postsurgical change of celiac artery stent placement.  Mild focal kinking or collapse of the stent just proximal to the bifurcation of the common hepatic artery and splenic artery (axial series 2, image 461 and coronal series 601, image 96).  The SMA, CEDRICK, and bilateral renal arteries are patent.     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  Scattered calcified and noncalcified atherosclerotic disease throughout the abdominal aorta and its branch vessels.  No significant atherosclerotic stenosis.  Tiny outpouching at the infrarenal abdominal aorta suspicious for ulcerated plaque (axial series 2, image 529).  No evidence of plaque rupture.  No aortic aneurysm or dissection.  Mild-to-moderate calcific atherosclerosis of the bilateral iliac arteries and visualized femoral arteries.     CHEST:     Evaluation of the thoracic structures is degraded by motion artifact.     - Lungs: Emphysematous changes.  Stable subcentimeter calcified right middle lobe nodule.  Evaluation of lung parenchyma is degraded by motion artifact.  Appearance of numerous centrilobular nodules versus artifact.  Clinical correlation recommended.  No pleural fluid.     - Heart/Pericardial structures: Mildly enlarged.  Small volume pericardial fluid.     - EG Junction: No hiatal hernia.     ABDOMEN:     - Liver: Normal size.  Multiple hypoattenuating foci in the liver.  1.7 cm ill-defined hypoattenuating focus in hepatic segment VII could represent focal fatty infiltration, perfusion defect, or mass lesion.  Several additional small hypodense foci e.g. 1.2 cm lesion in the left hepatic lobe and inferior right hepatic lobe.     - Gallbladder: No calcified gallstones. No wall thickening or pericholecystic fluid.     - Bile Ducts: No intra or extrahepatic biliary ductal dilatation.     - Spleen: Negative.     - Stomach: Diffuse stomach wall  thickening, possibly due to decompressed state.     - Pancreas: Unremarkable.     - Adrenals: Unremarkable.     - Bowel: No evidence of bowel obstruction or inflammation.  The appendix is not clearly visualized.     - Kidneys/ureters and bladder: Multiple hypoenhancing foci in the left kidney, likely renal cysts.  No hydronephrosis or nephrolithiasis.  Right ureter is normal caliber with punctate stone just proximal to the right ureterovesicular junction.  Mild left ureteral dilatation at the ureteropelvic junction the course of the left ureter is not well visualized.  There is a 1 cm calcification near the left ureterovesicular junction which could be a urinary stone or phlebolith.     - Retroperitoneum: No significant adenopathy.  Trace pelvic ascites.     - Peritoneal Space: No ascites or free air.     PELVIS:     - Reproductive: Uterus is tilted towards the right demonstrating slight lobulation and heterogeneous appearance with parenchymal calcifications suggesting underlying fibroids.  No adnexal mass seen.     - Bladder: Symmetric bladder wall thickening suspicious for bladder outlet obstruction or cystitis.     BONES: Degenerative changes in the spine.  Small lucent focus in the right ilium at the sacroiliac joint, likely degenerative.  Recommend correlation with past medical history for malignancy.  No acute fracture..     EXTRAPERITONEAL SOFT TISSUES: Small left lower quadrant cutaneous abdominal wall defect.     Impression:     Celiac artery stent with mild kinking or collapse of the stent just proximal to the bifurcation of the common hepatic and splenic artery.     Calcified and noncalcified atherosclerotic disease.  Tiny outpouching at the infrarenal abdominal aorta suspicious for ulcerated plaque.     Multiple hypoattenuating foci in the liver.  Further evaluation with elective abdominal ultrasound is recommended.     The left ureter is poorly visualized.  There is mild dilatation of the left ureter  near the ureteropelvic junction a 1 cm calcification near the left ureterovesicular junction which could represent a urinary stone or phlebolith.  Notice made of symmetric bladder wall thickening which can be seen with bladder outlet obstruction or cystitis.     Multiple hypoenhancing foci in the left kidney, likely renal cyst.  Further evaluation can be performed with renal ultrasound if clinically indicated.     Appearance of numerous centrilobular micronodules, which could be due to artifact versus inflammatory/infectious airway process.  Clinical correlation recommended.     Few additional findings as above.    Assessment/Plan:     * Mesenteric ischemia  80 year old female presenting with post-prandial abdominal pain and celiac stenting for celiac stenosis.  Based on her CT scan she does not have any flow limiting lesions of the SMA and CEDRICK, making the diagnosis of chronic mesenteric ischemia very unlikely.  Ultrasound doppler velocities may have been increased due to extrinsic compression from the arcuate ligament.  Will contact Dr. Junior to see if any additional testing had been performed prior to stenting to rule this out.  May need additional inspiratory and expiratory studies to confirm this, which would require laparoscopic release by general surgery and possible angioplasty after this. Currently, no urgent vascular intervention is needed.    -OK for diet from surgery  -Will speak with interventionalist who placed stent to gather more details  -Will likely need inspiratory/expiratory films to see if the diagnosis of MALS is more likely  -Recommend ASA/plavix as the stent is narrowed likely from extrinsic compression.  Her other mesenteric vessels are widely patent.        Thank you for your consult. I will follow-up with patient. Please contact us if you have any additional questions.    Snati Rowe MD  Vascular Surgery  [facility]    Vascular Attending  Agree with above  Images  reviewed  Check inspiration/expiration abdominal u/s to look for any evidence of celiac compression / median arcuate ligament syndrome    Aaron Yan MD DFSVS FACS   Vascular/Endovascular Surgery

## 2020-12-17 NOTE — NURSING TRANSFER
Nursing Transfer Note      12/16/20 4046     Transfer From: ED    Transfer via stretcher    Transfer with cardiac monitoring    Transported by transport tech    Medicines sent: No    Chart send with patient: Yes    Notified: sister and nephew     Patient reassessed at: 2310 12/16/20    Upon arrival to floor: cardiac monitor applied, patient oriented to room, call bell in reach and bed in lowest position; RN to bedside; assessed; all questions answered, WCTM.

## 2020-12-18 ENCOUNTER — PATIENT MESSAGE (OUTPATIENT)
Dept: OBSTETRICS AND GYNECOLOGY | Facility: CLINIC | Age: 80
End: 2020-12-18

## 2020-12-18 LAB
ANION GAP SERPL CALC-SCNC: 11 MMOL/L (ref 8–16)
BUN SERPL-MCNC: 24 MG/DL (ref 8–23)
CALCIUM SERPL-MCNC: 9.2 MG/DL (ref 8.7–10.5)
CHLORIDE SERPL-SCNC: 104 MMOL/L (ref 95–110)
CO2 SERPL-SCNC: 26 MMOL/L (ref 23–29)
CREAT SERPL-MCNC: 1 MG/DL (ref 0.5–1.4)
EST. GFR  (AFRICAN AMERICAN): >60 ML/MIN/1.73 M^2
EST. GFR  (NON AFRICAN AMERICAN): 54.1 ML/MIN/1.73 M^2
GLUCOSE SERPL-MCNC: 89 MG/DL (ref 70–110)
POTASSIUM SERPL-SCNC: 3.8 MMOL/L (ref 3.5–5.1)
SODIUM SERPL-SCNC: 141 MMOL/L (ref 136–145)

## 2020-12-18 PROCEDURE — 99232 PR SUBSEQUENT HOSPITAL CARE,LEVL II: ICD-10-PCS | Mod: ,,, | Performed by: INTERNAL MEDICINE

## 2020-12-18 PROCEDURE — 36415 COLL VENOUS BLD VENIPUNCTURE: CPT

## 2020-12-18 PROCEDURE — 20600001 HC STEP DOWN PRIVATE ROOM

## 2020-12-18 PROCEDURE — 99232 SBSQ HOSP IP/OBS MODERATE 35: CPT | Mod: ,,, | Performed by: INTERNAL MEDICINE

## 2020-12-18 PROCEDURE — 94761 N-INVAS EAR/PLS OXIMETRY MLT: CPT

## 2020-12-18 PROCEDURE — 80048 BASIC METABOLIC PNL TOTAL CA: CPT

## 2020-12-18 PROCEDURE — 25000003 PHARM REV CODE 250: Performed by: STUDENT IN AN ORGANIZED HEALTH CARE EDUCATION/TRAINING PROGRAM

## 2020-12-18 PROCEDURE — 76705 ECHO EXAM OF ABDOMEN: CPT | Performed by: SURGERY

## 2020-12-18 RX ADMIN — AMLODIPINE BESYLATE 10 MG: 10 TABLET ORAL at 08:12

## 2020-12-18 RX ADMIN — OXYCODONE HYDROCHLORIDE AND ACETAMINOPHEN 1000 MG: 500 TABLET ORAL at 08:12

## 2020-12-18 RX ADMIN — CARVEDILOL 6.25 MG: 6.25 TABLET, FILM COATED ORAL at 05:12

## 2020-12-18 RX ADMIN — ASPIRIN 81 MG CHEWABLE TABLET 81 MG: 81 TABLET CHEWABLE at 08:12

## 2020-12-18 RX ADMIN — CLOPIDOGREL 75 MG: 75 TABLET, FILM COATED ORAL at 08:12

## 2020-12-18 RX ADMIN — CARVEDILOL 6.25 MG: 6.25 TABLET, FILM COATED ORAL at 08:12

## 2020-12-18 RX ADMIN — PANTOPRAZOLE SODIUM 40 MG: 40 TABLET, DELAYED RELEASE ORAL at 08:12

## 2020-12-18 NOTE — ASSESSMENT & PLAN NOTE
In summary, this patient has abdominal pain consistent with mesenteric ischemia with CTA evidence of either kinking or collapse of her celiac artery stent, possibly due to median arcuate ligament syndrome.    Plan:   - Interventional Cardiology and Vascular Surgery consulted and following   - US demonstrates patent stent in celiac artery and no significant changes with respiration to suggest extrinsic compression by median arcuate ligament   - Awaiting final recommendations from Vascular Surgery team

## 2020-12-18 NOTE — PROGRESS NOTES
Mesenteric ultrasound reviewed.  No evidence of MALS on inspiration or expiration.  Velocities suggest only moderate stenosis of the stent.  Recommend followup with Dr. Junior.  No evidence of chronic mesenteric ischemia on imaging.    Camilo Rowe MD  Vascular Fellow PGY6

## 2020-12-18 NOTE — PLAN OF CARE
12/18/20 1251   Discharge Reassessment   Assessment Type Discharge Planning Reassessment   Discharge Plan A Home with family   Discharge Plan B Home Health     Julie Haase RN  Case Management [phone]

## 2020-12-18 NOTE — SUBJECTIVE & OBJECTIVE
Interval History: No acute events overnight. Patient remained afebrile and hemodynamically stable. Reports some continued intermittent, crampy abdominal pain but is able to tolerate PO intake. Plan for US of celiac artery today.     Review of Systems   Constitution: Negative for chills.   HENT: Negative for congestion.    Eyes: Negative for blurred vision.   Cardiovascular: Negative for chest pain, leg swelling, near-syncope, palpitations and syncope.   Respiratory: Negative for cough and shortness of breath.    Endocrine: Negative for polyuria.   Skin: Negative for itching and rash.   Musculoskeletal: Negative for back pain.   Gastrointestinal: Positive for abdominal pain. Negative for constipation, diarrhea, nausea and vomiting.   Genitourinary: Negative for dysuria.   Neurological: Negative for dizziness, headaches and light-headedness.   Psychiatric/Behavioral: Negative for altered mental status.     Objective:     Vital Signs (Most Recent):  Temp: 98.4 °F (36.9 °C) (12/18/20 1124)  Pulse: 71 (12/18/20 1514)  Resp: 18 (12/18/20 0800)  BP: 113/73 (12/18/20 0800)  SpO2: 99 % (12/18/20 0800) Vital Signs (24h Range):  Temp:  [97 °F (36.1 °C)-98.4 °F (36.9 °C)] 98.4 °F (36.9 °C)  Pulse:  [62-89] 71  Resp:  [16-18] 18  SpO2:  [97 %-99 %] 99 %  BP: (102-123)/(66-79) 113/73     Weight: 52.2 kg (115 lb 1.3 oz)  Body mass index is 20.71 kg/m².     SpO2: 99 %  O2 Device (Oxygen Therapy): room air      Intake/Output Summary (Last 24 hours) at 12/18/2020 1616  Last data filed at 12/18/2020 1300  Gross per 24 hour   Intake 702 ml   Output 300 ml   Net 402 ml       Lines/Drains/Airways     Peripheral Intravenous Line                 Peripheral IV - Single Lumen 20 G Left;Lateral Wrist -- days         Peripheral IV - Single Lumen 12/16/20 1618 20 G Left Antecubital 1 day                Physical Exam   Constitutional: She is oriented to person, place, and time. She appears well-developed and well-nourished.   HENT:   Head:  Normocephalic and atraumatic.   Eyes: Pupils are equal, round, and reactive to light. Conjunctivae are normal.   Neck: Normal range of motion. Neck supple.   Cardiovascular: Normal rate, regular rhythm, S1 normal, S2 normal and normal heart sounds. Exam reveals no gallop and no friction rub.   No murmur heard.  Pulses:       Radial pulses are 2+ on the right side and 2+ on the left side.   Pulmonary/Chest: Effort normal and breath sounds normal. No respiratory distress. She has no wheezes. She has no rales. She exhibits no tenderness.   Abdominal: Soft. Bowel sounds are normal. She exhibits no distension and no mass. There is no abdominal tenderness. There is no rebound and no guarding.   Musculoskeletal:         General: No edema.   Neurological: She is alert and oriented to person, place, and time.   Skin: Skin is warm and dry.   Psychiatric: She has a normal mood and affect.       Significant Labs:   BMP:   Recent Labs   Lab 12/16/20  2326 12/18/20  0810   * 89    141   K 4.1 3.8    104   CO2 26 26   BUN 13 24*   CREATININE 1.1 1.0   CALCIUM 9.0 9.2       Significant Imaging:     Abdominal US - Mesenteric Ischemia Evaluation:    Color flow duplex imaging reveals patent superior mesenteric and inferior mesenteric arteries with no evidence of a hemodynamically significant stenosis. The Celiac artery stent is patent with and velocities are 168 cm/s at rest; 157 cm/s with inspiration; 153 cm/s with expiration. No suggestion of MALS. An accelerated flow velocity of 282 cm/s is visualized at the distal edge on the Celiac stent. However, this region appears widely patent.

## 2020-12-18 NOTE — PROGRESS NOTES
[facility]  Cardiology  Progress Note    Patient Name: Alana Beth  MRN: [id number]  Admission Date: 12/16/2020  Hospital Length of Stay: 2 days  Code Status: Full Code   Attending Physician: Keith Wray MD   Primary Care Physician: Nurys Bearden MD  Expected Discharge Date: 12/22/2020  Principal Problem:Mesenteric ischemia    Subjective:     Interval History: No acute events overnight. Patient remained afebrile and hemodynamically stable. Reports some continued intermittent, crampy abdominal pain but is able to tolerate PO intake. Plan for US of celiac artery today.     Review of Systems   Constitution: Negative for chills.   HENT: Negative for congestion.    Eyes: Negative for blurred vision.   Cardiovascular: Negative for chest pain, leg swelling, near-syncope, palpitations and syncope.   Respiratory: Negative for cough and shortness of breath.    Endocrine: Negative for polyuria.   Skin: Negative for itching and rash.   Musculoskeletal: Negative for back pain.   Gastrointestinal: Positive for abdominal pain. Negative for constipation, diarrhea, nausea and vomiting.   Genitourinary: Negative for dysuria.   Neurological: Negative for dizziness, headaches and light-headedness.   Psychiatric/Behavioral: Negative for altered mental status.     Objective:     Vital Signs (Most Recent):  Temp: 98.4 °F (36.9 °C) (12/18/20 1124)  Pulse: 71 (12/18/20 1514)  Resp: 18 (12/18/20 0800)  BP: 113/73 (12/18/20 0800)  SpO2: 99 % (12/18/20 0800) Vital Signs (24h Range):  Temp:  [97 °F (36.1 °C)-98.4 °F (36.9 °C)] 98.4 °F (36.9 °C)  Pulse:  [62-89] 71  Resp:  [16-18] 18  SpO2:  [97 %-99 %] 99 %  BP: (102-123)/(66-79) 113/73     Weight: 52.2 kg (115 lb 1.3 oz)  Body mass index is 20.71 kg/m².     SpO2: 99 %  O2 Device (Oxygen Therapy): room air      Intake/Output Summary (Last 24 hours) at 12/18/2020 1616  Last data filed at 12/18/2020 1300  Gross per 24 hour   Intake 702 ml   Output 300 ml   Net 402 ml        Lines/Drains/Airways     Peripheral Intravenous Line                 Peripheral IV - Single Lumen 20 G Left;Lateral Wrist -- days         Peripheral IV - Single Lumen 12/16/20 1618 20 G Left Antecubital 1 day                Physical Exam   Constitutional: She is oriented to person, place, and time. She appears well-developed and well-nourished.   HENT:   Head: Normocephalic and atraumatic.   Eyes: Pupils are equal, round, and reactive to light. Conjunctivae are normal.   Neck: Normal range of motion. Neck supple.   Cardiovascular: Normal rate, regular rhythm, S1 normal, S2 normal and normal heart sounds. Exam reveals no gallop and no friction rub.   No murmur heard.  Pulses:       Radial pulses are 2+ on the right side and 2+ on the left side.   Pulmonary/Chest: Effort normal and breath sounds normal. No respiratory distress. She has no wheezes. She has no rales. She exhibits no tenderness.   Abdominal: Soft. Bowel sounds are normal. She exhibits no distension and no mass. There is no abdominal tenderness. There is no rebound and no guarding.   Musculoskeletal:         General: No edema.   Neurological: She is alert and oriented to person, place, and time.   Skin: Skin is warm and dry.   Psychiatric: She has a normal mood and affect.       Significant Labs:   BMP:   Recent Labs   Lab 12/16/20  2326 12/18/20  0810   * 89    141   K 4.1 3.8    104   CO2 26 26   BUN 13 24*   CREATININE 1.1 1.0   CALCIUM 9.0 9.2       Significant Imaging:     Abdominal US - Mesenteric Ischemia Evaluation:    Color flow duplex imaging reveals patent superior mesenteric and inferior mesenteric arteries with no evidence of a hemodynamically significant stenosis. The Celiac artery stent is patent with and velocities are 168 cm/s at rest; 157 cm/s with inspiration; 153 cm/s with expiration. No suggestion of MALS. An accelerated flow velocity of 282 cm/s is visualized at the distal edge on the Celiac stent. However,  this region appears widely patent.    Assessment and Plan:     Brief HPI: Ms. 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    Plan:   - Interventional Cardiology and Vascular Surgery consulted and following   - US demonstrates patent stent in celiac artery and no significant changes with respiration to suggest extrinsic compression by median arcuate ligament   - Awaiting final recommendations from Vascular Surgery team    Essential hypertension  On amlodipine 10mg daily and carvedilol 6.25mg BID at home. BP mildly elevated this admission to 150s/80s.    Plan:   - Continue amlodipine 10mg daily   - Continue carvedilol 6.25mg BID    PAD (peripheral artery disease)  Patient has known BLE PAD, denies claudication or other peripheral symptoms suggestive of limb ischemia at this time.    Plan:   - Continue DAPT with aspirin 81mg daily and clopidogrel 75mg daily   - Recommend better lipid control as noted above    Hypercholesteremia  Lab Results   Component Value Date    CHOL 348 (H) 09/10/2020    HDL 97 (H) 09/10/2020    LDLCALC 233.6 (H) 09/10/2020    TRIG 87 09/10/2020     Patient's cholesterol uncontrolled as noted above with . Is statin-intolerant. Per previous clinic notes, patient has been prescribed PCSK-9 inhibitor and received one dose but would prefer oral regimen. Also prescribed bempedoic acid, unsure if patient taking.    Plan:   - Patient re-education in importance of LDL control and to consider ezetimibe, PCSK-9 inhibitor (eg. Repatha, Praluent), or bempedoic acid as previously prescribed as outpatient for better LDL control        VTE Risk Mitigation (From  admission, onward)         Ordered     IP VTE HIGH RISK PATIENT  Once      12/16/20 1949     Place sequential compression device  Until discontinued      12/16/20 1949              Case discussed with staff, Dr. Wray; final recommendations per attestation above.        John Shen MD, PGY-1  Cardiology  [facility]  I have personally taken the history and examined the patient and agree with the resident's note as stated above.

## 2020-12-19 VITALS
TEMPERATURE: 98 F | HEIGHT: 63 IN | SYSTOLIC BLOOD PRESSURE: 108 MMHG | OXYGEN SATURATION: 99 % | BODY MASS INDEX: 20.39 KG/M2 | WEIGHT: 115.06 LBS | RESPIRATION RATE: 24 BRPM | HEART RATE: 70 BPM | DIASTOLIC BLOOD PRESSURE: 59 MMHG

## 2020-12-19 LAB
ANION GAP SERPL CALC-SCNC: 8 MMOL/L (ref 8–16)
BUN SERPL-MCNC: 18 MG/DL (ref 8–23)
CALCIUM SERPL-MCNC: 8.8 MG/DL (ref 8.7–10.5)
CHLORIDE SERPL-SCNC: 103 MMOL/L (ref 95–110)
CHOLEST SERPL-MCNC: 244 MG/DL (ref 120–199)
CHOLEST/HDLC SERPL: 3.1 {RATIO} (ref 2–5)
CO2 SERPL-SCNC: 27 MMOL/L (ref 23–29)
CREAT SERPL-MCNC: 0.9 MG/DL (ref 0.5–1.4)
EST. GFR  (AFRICAN AMERICAN): >60 ML/MIN/1.73 M^2
EST. GFR  (NON AFRICAN AMERICAN): >60 ML/MIN/1.73 M^2
GLUCOSE SERPL-MCNC: 93 MG/DL (ref 70–110)
HDLC SERPL-MCNC: 80 MG/DL (ref 40–75)
HDLC SERPL: 32.8 % (ref 20–50)
LDLC SERPL CALC-MCNC: 150.2 MG/DL (ref 63–159)
NONHDLC SERPL-MCNC: 164 MG/DL
POTASSIUM SERPL-SCNC: 3.7 MMOL/L (ref 3.5–5.1)
SODIUM SERPL-SCNC: 138 MMOL/L (ref 136–145)
TRIGL SERPL-MCNC: 69 MG/DL (ref 30–150)

## 2020-12-19 PROCEDURE — 80061 LIPID PANEL: CPT

## 2020-12-19 PROCEDURE — 99232 PR SUBSEQUENT HOSPITAL CARE,LEVL II: ICD-10-PCS | Mod: ,,, | Performed by: INTERNAL MEDICINE

## 2020-12-19 PROCEDURE — 80048 BASIC METABOLIC PNL TOTAL CA: CPT

## 2020-12-19 PROCEDURE — 25000003 PHARM REV CODE 250: Performed by: STUDENT IN AN ORGANIZED HEALTH CARE EDUCATION/TRAINING PROGRAM

## 2020-12-19 PROCEDURE — 99232 SBSQ HOSP IP/OBS MODERATE 35: CPT | Mod: ,,, | Performed by: INTERNAL MEDICINE

## 2020-12-19 PROCEDURE — 36415 COLL VENOUS BLD VENIPUNCTURE: CPT

## 2020-12-19 RX ORDER — EZETIMIBE 10 MG/1
10 TABLET ORAL NIGHTLY
Qty: 90 TABLET | Refills: 3 | Status: SHIPPED | OUTPATIENT
Start: 2020-12-19 | End: 2021-03-09 | Stop reason: SDUPTHER

## 2020-12-19 RX ADMIN — OXYCODONE HYDROCHLORIDE AND ACETAMINOPHEN 1000 MG: 500 TABLET ORAL at 08:12

## 2020-12-19 RX ADMIN — ASPIRIN 81 MG CHEWABLE TABLET 81 MG: 81 TABLET CHEWABLE at 08:12

## 2020-12-19 RX ADMIN — AMLODIPINE BESYLATE 10 MG: 10 TABLET ORAL at 08:12

## 2020-12-19 RX ADMIN — CARVEDILOL 6.25 MG: 6.25 TABLET, FILM COATED ORAL at 08:12

## 2020-12-19 RX ADMIN — PANTOPRAZOLE SODIUM 40 MG: 40 TABLET, DELAYED RELEASE ORAL at 08:12

## 2020-12-19 RX ADMIN — CLOPIDOGREL 75 MG: 75 TABLET, FILM COATED ORAL at 08:12

## 2020-12-19 NOTE — DISCHARGE SUMMARY
[facility]  Cardiology  Discharge Summary      Patient Name: Alana Beth  MRN: [id number]  Admission Date: 12/16/2020  Hospital Length of Stay: 3 days  Discharge Date and Time:  12/19/2020 11:46 AM  Attending Physician: Keith Wray MD  Discharging Provider: Jose Luis Gabriel MD  Primary Care Physician: Nurys Bearden MD    HPI: 80 yoF, Patient of Dr. Garza, admitted to Cardiology. PMHx of HTN, HLD (Statin in tolerance), PAD and granulomatous lung disease who was seen by Dr. 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  As reported by RN clinic visits patient had close to 30-35 lb weight was without any classic postprandial symptoms which are typically seen with mesenteric angina.  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  She was subsequently discharged home since being discharged patient reports ongoing right lower abdominal discomfort which is crampy and 80 nature with over 10, she reports this abdominal discomfort is progressively worsened postprandial 20-30 minutes after meals.  Associated with nausea.  She was supposed to receive outpatient CTA abdomen unfortunately due to not being prepped for iodine allergy she presented here with abdominal pain.  Upon arrival to [facility], her overall workup was unremarkable lactic acid 0.6, CBC and CMP negative, lipase negative.  She underwent a CT a abdomen which demonstrated kinking at the celiac stent or call last of the stent just proximal to the bifurcation.  Case was discussed with Dr. Soto and made aware of the admission         Non contrast CT 8/2020              0.5 cm calcified nodule in the medial segment of the right middle  lobe              Aortic and coronary artery calcific atherosclerosis     US 9/2020                 Celiac  cm/sec with ratio 6.2              SMA  cm/sec with ratio 3.8              CEDRICK 152 cm/sec with ratio 2.6     Esophagram 9/2020                 Mild esophageal dysmotility               No evidence of cricopharyngeal     Echo 9/2020                 Normal EF              Mild LAE              Mild TR              PASP 30 mmHg     Nuclear stress test 9/2020                 No ischemia    Procedure(s) (LRB):  ANGIOGRAM, MESENTERIC ARTERIES (N/A)     Indwelling Lines/Drains at time of discharge:  Lines/Drains/Airways     None                 Hospital Course (synopsis of major diagnoses, care, treatment, and services provided during the course of the hospital stay):   The patient was evaluated by vascular surgery and interventional cardiology and underwent an abdominal CTA that raised concerns for a celiac artery stent occlusion or kinking, but subsequent vascular US showed patency of the stent. Hence, no procedure or surgery was performed. Under concerns for her elevated LDL of 233 (prior to being started on evolocumab) which has decreased to 150s, she was prescribed ezetimibe 10 before discharge. She has been free of abdominal pain for over a day, has been eating, and had a BM today AM.     Consults:   Consults (From admission, onward)        Status Ordering Provider     Inpatient consult to Interventional Cardiology  Once     Provider:  (Not yet assigned)    Completed DOMINGUEZ EID     Inpatient consult to Vascular Surgery  Once     Provider:  (Not yet assigned)    Completed ROSIO SHARIF          Significant Diagnostic Studies:   Vascular AB US:  Impression   =========     Color flow duplex imaging reveals patent superior mesenteric and inferior mesenteric arteries with no evidence of a hemodynamically   significant stenosis. The Celiac artery stent is patent with and velocities are  168 cm/s at rest; 157 cm/s with inspiration; 153 cm/s   with expiration. No suggestion of MALS. An accelerated flow velocity of 282 cm/s is visualized at the distal edge on the Celiac stent.   However, this region appear widely patent.     CTA Abdomen:    Impression:     Celiac artery stent with mild kinking or collapse of the stent just proximal to the bifurcation of the common hepatic and splenic artery.     Calcified and noncalcified atherosclerotic disease.  Tiny outpouching at the infrarenal abdominal aorta suspicious for ulcerated plaque.     Multiple hypoattenuating foci in the liver.  Further evaluation with elective abdominal ultrasound is recommended.     The left ureter is poorly visualized.  There is mild dilatation of the left ureter near the ureteropelvic junction a 1 cm calcification near the left ureterovesicular junction which could represent a urinary stone or phlebolith.  Notice made of symmetric bladder wall thickening which can be seen with bladder outlet obstruction or cystitis.     Multiple hypoenhancing foci in the left kidney, likely renal cyst.  Further evaluation can be performed with renal ultrasound if clinically indicated.     Appearance of numerous centrilobular micronodules, which could be due to artifact versus inflammatory/infectious airway process.  Clinical correlation recommended.    Pending Diagnostic Studies:     Procedure Component Value Units Date/Time    Lipid panel [[id number]] Collected: 12/19/20 1113    Order Status: Sent Lab Status: In process Updated: 12/19/20 1131    Specimen: Blood           Final Active Diagnoses:    Diagnosis Date Noted POA    PRINCIPAL PROBLEM:  Mesenteric ischemia [K55.9] 12/16/2020 Yes    PAD (peripheral artery disease) [I73.9] 09/25/2020 Yes    Hypercholesteremia [E78.00] 08/19/2020 Yes    Essential hypertension [I10] 12/30/2019 Yes      Problems Resolved During this Admission:    Diagnosis Date Noted Date Resolved POA    Abdominal pain  [R10.9]  12/19/2020 Unknown     Physical Exam   Constitutional: She is oriented to person, place, and time. She appears well-developed and well-nourished.   HENT:   Head: Normocephalic and atraumatic.   Eyes: Pupils are equal, round, and reactive to light. Conjunctivae are normal.   Neck: Normal range of motion. Neck supple.   Cardiovascular: Normal rate, regular rhythm, S1 normal, S2 normal and normal heart sounds. Exam reveals no gallop and no friction rub.   No murmur heard.  Pulses:       Radial pulses are 2+ on the right side and 2+ on the left side.   Pulmonary/Chest: Effort normal and breath sounds normal. No respiratory distress. She has no wheezes. She has no rales. She exhibits no tenderness.   Abdominal: Soft. Bowel sounds are normal. She exhibits no distension and no mass. There is no abdominal tenderness. There is no rebound and no guarding.   Musculoskeletal:         General: No edema.   Neurological: She is alert and oriented to person, place, and time.   Skin: Skin is warm and dry.   Psychiatric: She has a normal mood and affect.     Discharged Condition: good    Follow Up:  Follow-up Information     Ramon Pink MD PhD In 1 week.    Specialty: INTERVENTIONAL CARDIOLOGY  Contact information:  [address] [place] [id number]  [phone]             uNrys Bearden MD In 1 week.    Specialty: Family Medicine  Contact information:  [address] [facility] [address]  [phone]                 Patient Instructions:   -Follow up with your PCP and Dr Pink in 1-2 week  -Take more frequent smaller meals to decrease the risk of recurrence of the abdominal pain  -Take your medications as instructed    Medications:  Reconciled Home Medications:      Medication List      START taking these medications    ezetimibe 10 mg tablet  Commonly known as: ZETIA  Take 1 tablet (10 mg total) by mouth every evening.        CHANGE how you take these medications    clorazepate 7.5 MG  Tab  Commonly known as: TRANXENE  Take 1 tablet (7.5 mg total) by mouth 3 (three) times daily.  What changed:   · how much to take  · when to take this        CONTINUE taking these medications    amLODIPine 10 MG tablet  Commonly known as: NORVASC  Take 10 mg by mouth once daily.     aspirin 81 MG Chew  Take 81 mg by mouth once daily.     azelastine 137 mcg (0.1 %) nasal spray  Commonly known as: ASTELIN  USE 1 TO 2 SPRAY(S) IN EACH NOSTRIL TWICE DAILY     budesonide 1 mg/2 mL Nbsp  EMPTY CONTENTS OF 1 RESPULE INTO NASAL IRRIGATION SYSTEM, ADD DISTILLED WATER, SALT PACK, MIX & IRRIGATE. PERFORM TWICE DAILY     carvediloL 6.25 MG tablet  Commonly known as: COREG  Take 1 tablet (6.25 mg total) by mouth 2 (two) times daily with meals.     clopidogreL 75 mg tablet  Commonly known as: PLAVIX  Take 1 tablet (75 mg total) by mouth once daily.     co-enzyme Q-10 50 mg capsule  Take 50 mg by mouth once daily.     diphenhydrAMINE 25 mg capsule  Commonly known as: BENADRYL  Take 25 mg by mouth every 6 (six) hours as needed for Itching.     ferrous sulfate 325 mg (65 mg iron) Tab tablet  Commonly known as: FEOSOL  Take 1 tablet by mouth once daily.     fish oil-omega-3 fatty acids 300-1,000 mg capsule  Take 1 capsule by mouth once daily.     NEILMED SINUS RINSE COMPLETE Pkdv  Generic drug: sod chlor-bicarb-squeez bottle  use as directed     omeprazole 20 MG capsule  Commonly known as: PRILOSEC  Take 1 capsule (20 mg total) by mouth 2 (two) times daily before meals.     ONE DAILY MULTIVITAMIN per tablet  Generic drug: multivitamin  Take 1 tablet by mouth once daily.     PRALUENT PEN 75 mg/mL Pnij  Generic drug: alirocumab  Inject 1 mL (75 mg total) into the skin every 14 (fourteen) days.     predniSONE 50 MG Tab  Commonly known as: DELTASONE  Take 1 tablet (50 mg total) by mouth 3 (three) times daily as needed (Take one tab at 6pm night before procedure, 1 tab at 11pm night before procedure, and 1 tab at 0600 morning of  procedure with a small sip of water.).     TURMERIC ORAL  Take 1 packet by mouth once daily.     VITAMIN C 1000 MG tablet  Generic drug: ascorbic acid (vitamin C)  Take 1,000 mg by mouth once daily.            Time spent on the discharge of patient: 20 minutes    Jose Luis Gabriel MD  Cardiology  [facility]

## 2020-12-19 NOTE — DISCHARGE INSTRUCTIONS
-Follow up with your PCP and Dr Pink in 1-2 week  -Take more frequent smaller meals to decrease the risk of recurrence of the abdominal pain  -Take your medications as instructed

## 2020-12-19 NOTE — PLAN OF CARE
POC reviewed with patient. Vital signs stable overnight.  Patient's only complete is a discomfort in her right leg.  She states it started this morning after going to the bathroom.  No acute distress noted.

## 2020-12-19 NOTE — NURSING
Patient DC. IV and telemetry removed. DC instructions reviewed. All questions answered. All belongings accounted for. Medications delivered to room. Patient left unit via patient escort.

## 2020-12-21 ENCOUNTER — OFFICE VISIT (OUTPATIENT)
Dept: CARDIOLOGY | Facility: CLINIC | Age: 80
End: 2020-12-21
Payer: MEDICARE

## 2020-12-21 VITALS
SYSTOLIC BLOOD PRESSURE: 127 MMHG | DIASTOLIC BLOOD PRESSURE: 71 MMHG | BODY MASS INDEX: 19.12 KG/M2 | HEART RATE: 79 BPM | WEIGHT: 112 LBS | HEIGHT: 64 IN

## 2020-12-21 DIAGNOSIS — I10 ESSENTIAL HYPERTENSION: ICD-10-CM

## 2020-12-21 DIAGNOSIS — Z78.9 STATIN INTOLERANCE: ICD-10-CM

## 2020-12-21 DIAGNOSIS — R10.9 ABDOMINAL DISCOMFORT: ICD-10-CM

## 2020-12-21 DIAGNOSIS — E78.00 HYPERCHOLESTEREMIA: ICD-10-CM

## 2020-12-21 DIAGNOSIS — I70.0 ATHEROSCLEROSIS OF AORTIC ARCH: ICD-10-CM

## 2020-12-21 DIAGNOSIS — I70.219 ATHEROSCLEROTIC PERIPHERAL VASCULAR DISEASE WITH INTERMITTENT CLAUDICATION: ICD-10-CM

## 2020-12-21 DIAGNOSIS — I70.8 CELIAC ARTERY ATHEROSCLEROSIS: Primary | ICD-10-CM

## 2020-12-21 PROCEDURE — 99215 PR OFFICE/OUTPT VISIT, EST, LEVL V, 40-54 MIN: ICD-10-PCS | Mod: S$GLB,,, | Performed by: INTERNAL MEDICINE

## 2020-12-21 PROCEDURE — 99999 PR PBB SHADOW E&M-EST. PATIENT-LVL IV: ICD-10-PCS | Mod: PBBFAC,,, | Performed by: INTERNAL MEDICINE

## 2020-12-21 PROCEDURE — 3078F DIAST BP <80 MM HG: CPT | Mod: CPTII,S$GLB,, | Performed by: INTERNAL MEDICINE

## 2020-12-21 PROCEDURE — 1101F PR PT FALLS ASSESS DOC 0-1 FALLS W/OUT INJ PAST YR: ICD-10-PCS | Mod: CPTII,S$GLB,, | Performed by: INTERNAL MEDICINE

## 2020-12-21 PROCEDURE — 1159F MED LIST DOCD IN RCRD: CPT | Mod: S$GLB,,, | Performed by: INTERNAL MEDICINE

## 2020-12-21 PROCEDURE — 1159F PR MEDICATION LIST DOCUMENTED IN MEDICAL RECORD: ICD-10-PCS | Mod: S$GLB,,, | Performed by: INTERNAL MEDICINE

## 2020-12-21 PROCEDURE — 99999 PR PBB SHADOW E&M-EST. PATIENT-LVL IV: CPT | Mod: PBBFAC,,, | Performed by: INTERNAL MEDICINE

## 2020-12-21 PROCEDURE — 3074F SYST BP LT 130 MM HG: CPT | Mod: CPTII,S$GLB,, | Performed by: INTERNAL MEDICINE

## 2020-12-21 PROCEDURE — 1126F AMNT PAIN NOTED NONE PRSNT: CPT | Mod: S$GLB,,, | Performed by: INTERNAL MEDICINE

## 2020-12-21 PROCEDURE — 3288F FALL RISK ASSESSMENT DOCD: CPT | Mod: CPTII,S$GLB,, | Performed by: INTERNAL MEDICINE

## 2020-12-21 PROCEDURE — 1101F PT FALLS ASSESS-DOCD LE1/YR: CPT | Mod: CPTII,S$GLB,, | Performed by: INTERNAL MEDICINE

## 2020-12-21 PROCEDURE — 99215 OFFICE O/P EST HI 40 MIN: CPT | Mod: S$GLB,,, | Performed by: INTERNAL MEDICINE

## 2020-12-21 PROCEDURE — 3074F PR MOST RECENT SYSTOLIC BLOOD PRESSURE < 130 MM HG: ICD-10-PCS | Mod: CPTII,S$GLB,, | Performed by: INTERNAL MEDICINE

## 2020-12-21 PROCEDURE — 3078F PR MOST RECENT DIASTOLIC BLOOD PRESSURE < 80 MM HG: ICD-10-PCS | Mod: CPTII,S$GLB,, | Performed by: INTERNAL MEDICINE

## 2020-12-21 PROCEDURE — 3288F PR FALLS RISK ASSESSMENT DOCUMENTED: ICD-10-PCS | Mod: CPTII,S$GLB,, | Performed by: INTERNAL MEDICINE

## 2020-12-21 PROCEDURE — 1126F PR PAIN SEVERITY QUANTIFIED, NO PAIN PRESENT: ICD-10-PCS | Mod: S$GLB,,, | Performed by: INTERNAL MEDICINE

## 2020-12-21 NOTE — PROGRESS NOTES
Subjective:   Patient ID:  Alana Beth is a 80 y.o. female who presents for follow up of s/p celiac stent, resolved abdominal pain, Hyperlipidemia, and Hypertension      HPI:       She is here for follow up s/p celiac PTAS with 6.0 x 18 and 6.0 x 14 Express SD dilated to 18 noemí. She complained of abdominal post procedure that intermittently improved then got worse. A CTA was ordered but unfortunately she ended in the ED because of pain. CTA revealed a patent CEDRICK, SMA and celiac stent with a mild kink. She was worked up for arcuate ligament related pain but the ultrasound with inspiration + expiration revealed no PSV changes consistent with MALS. She denies any pain. She is able to eat without any issues. Reviewed consultations noted from ED, CARD, and VASC Sx.       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 She lost close to 30-35 lb over the past year. She did not have the classic post prandial angina seen with mesenteric ischemia. No fear of meals. She has HTN, HLP, intolerance to statin, PAD, and granulomatous lung disease. She was prescribed praluent but she states she cannot give herself injections.         CTA ([facility]) 8/2020     Severe stenosis of the celiac axis with poststenotic dilatation.        Non contrast CT 8/2020     0.5 cm calcified nodule in the medial segment of the right middle lobe    Aortic and coronary artery calcific atherosclerosis.        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  Discussed with patient to follow-up with PCP      Granulomatous lung disease 2020      Seen on CT of chest 2020- 0.5 cm calcified nodule in the medial segment of the right middle lobe.  This is a benign finding.  No further workup needed      Hypercholesteremia 2020    Solitary pulmonary nodule 2020     Patient had emergency room visit on 08/15/2020, chest x-ray revealed medial right lung base 4 mm indeterminate nodule.  Further evaluation with CT was recommended.  Ms. Beth denies smoking history.  She does report having weight loss since November.    Plan:  -obtain designated CT of chest to further assess lung nodule.      Abnormal chest x-ray 2020     Patient had chest x-ray on 08/15/2020, findings were consistent with underlying COPD/emphysema.  Also, noted to have right medial lung base 4 mm indeterminate nodule.  She denies smoking history.  Denies secondhand smoke.  Denies history of lung disease.  Does endorse having weight loss since November.    Plan:  Obtain designated CT of chest  Obtain spirometry  Obtain TSH      Weight loss 2020    Cough 2020     Cough is likely from upper airway disease.   She is following with ENT.   On Flonase and sinus regimen.     Plan:  -Obtain spirometry.       Atherosclerosis of aortic arch 2020     Noted on CXR 8/15/2020  Discussed with patient to follow up with PCP or Cardiology.       Palpitations 2020    Sensitivity to medication 2020    Essential hypertension 2019    Atherosclerotic peripheral vascular disease with intermittent claudication 2019    Anxiety 2019           Right Arm BP - Sittin/72  Left Arm BP - Sittin/71        LABS      Lipid panel  Lab Results   Component Value Date     CHOL 244 (H) 12/19/2020    CHOL 348 (H) 09/10/2020     Lab Results   Component Value Date    HDL 80 (H) 12/19/2020    HDL 97 (H) 09/10/2020     Lab Results   Component Value Date    LDLCALC 150.2 12/19/2020    LDLCALC 233.6 (H) 09/10/2020     Lab Results   Component Value Date    TRIG 69 12/19/2020    TRIG 87 09/10/2020     Lab Results   Component Value Date    CHOLHDL 32.8 12/19/2020    CHOLHDL 27.9 09/10/2020            Review of Systems   Constitution: Positive for weight loss (30 lbs). Negative for diaphoresis, night sweats and weight gain.   HENT: Negative for congestion.    Eyes: Negative for blurred vision, discharge and double vision.   Cardiovascular: Negative for chest pain, claudication, cyanosis, dyspnea on exertion, irregular heartbeat, leg swelling, near-syncope, orthopnea, palpitations, paroxysmal nocturnal dyspnea and syncope.   Respiratory: Negative for cough, shortness of breath and wheezing.    Endocrine: Negative for cold intolerance, heat intolerance and polyphagia.   Hematologic/Lymphatic: Negative for adenopathy and bleeding problem. Does not bruise/bleed easily.   Skin: Negative for dry skin and nail changes.   Musculoskeletal: Negative for arthritis, back pain, falls, joint pain, myalgias and neck pain.   Gastrointestinal: Negative for bloating, abdominal pain, change in bowel habit and constipation.   Genitourinary: Negative for bladder incontinence, dysuria, flank pain, genital sores and missed menses.   Neurological: Negative for aphonia, brief paralysis, difficulty with concentration, dizziness and weakness.   Psychiatric/Behavioral: Negative for altered mental status and memory loss. The patient does not have insomnia.    Allergic/Immunologic: Negative for environmental allergies.       Objective:   Physical Exam   Constitutional: She is oriented to person, place, and time. She appears well-developed and well-nourished. She is not intubated.   HENT:   Head: Normocephalic and atraumatic.    Right Ear: External ear normal.   Left Ear: External ear normal.   Mouth/Throat: Oropharynx is clear and moist.   Eyes: Pupils are equal, round, and reactive to light. Conjunctivae and EOM are normal. Right eye exhibits no discharge. Left eye exhibits no discharge. No scleral icterus.   Neck: Normal range of motion. Neck supple. Normal carotid pulses, no hepatojugular reflux and no JVD present. Carotid bruit is not present. No tracheal deviation present. No thyromegaly present.   Cardiovascular: Normal rate, regular rhythm, S1 normal and S2 normal.  No extrasystoles are present. PMI is not displaced. Exam reveals no gallop, no S3, no distant heart sounds, no friction rub and no midsystolic click.   No murmur heard.  Pulses:       Carotid pulses are 2+ on the right side and 2+ on the left side.       Radial pulses are 2+ on the right side and 2+ on the left side.        Femoral pulses are 2+ on the right side and 2+ on the left side.       Popliteal pulses are 2+ on the right side and 2+ on the left side.        Dorsalis pedis pulses are 1+ on the right side and 1+ on the left side.        Posterior tibial pulses are 1+ on the right side and 1+ on the left side.   Pulmonary/Chest: Effort normal and breath sounds normal. No accessory muscle usage or stridor. No apnea, no tachypnea and no bradypnea. She is not intubated. No respiratory distress. She has no decreased breath sounds. She has no wheezes. She has no rales. She exhibits no tenderness and no bony tenderness.   Abdominal: She exhibits no distension, no pulsatile liver, no abdominal bruit, no ascites, no pulsatile midline mass and no mass. There is no abdominal tenderness. There is no rebound and no guarding.   Musculoskeletal: Normal range of motion.         General: No tenderness or edema.   Lymphadenopathy:     She has no cervical adenopathy.   Neurological: She is alert and oriented to person, place, and time. She has normal reflexes. No cranial nerve  deficit. Coordination normal.   Skin: Skin is warm. No rash noted. No erythema. No pallor.   Psychiatric: She has a normal mood and affect. Her behavior is normal. Judgment and thought content normal.       Assessment:     1. Celiac artery atherosclerosis    2. Atherosclerotic peripheral vascular disease with intermittent claudication    3. Atherosclerosis of aortic arch    4. Essential hypertension    5. Hypercholesteremia    6. Abdominal discomfort    7. Statin intolerance        Plan:         No additional intervention needed. Celiac artery stent is patent despite a mild kink. No MALS. She is asymptomatic. She was reassured during the visit. She was instructed to call for any new changes          Lipid lowering therapy with goal LDL <70 with praluent + zetia  BP goal < 130/80 mmHg   Substitute ARB for Norvasc in view of PAD   DAPT with aspirin + plavix  Follow up in 6 months           Continue with current medical plan and lifestyle changes.  Return sooner for concerns or questions. If symptoms persist go to the ED  I have reviewed all pertinent data on this patient       I have reviewed the patient's medical history in detail and updated the computerized patient record.    Orders Placed This Encounter   Procedures    CV Ultrasound Mesenteric Arterial (Cupid Only)     Standing Status:   Future     Standing Expiration Date:   12/21/2021     Order Specific Question:   Reason for Exam:     Answer:   please do with inspiration and expiration while looking at celiac velocity       Follow up after CTA. Return sooner for concerns or questions            She expressed verbal understanding and agreed with the plan        -In today's visit, at least 4 established conditions that pose a risk to life or bodily function have been addressed and the conditions are severe.    -In today's visit, monitoring for drug toxicity was accomplished.        Patient's Medications   New Prescriptions    No medications on file   Previous  Medications    ALIROCUMAB (PRALUENT PEN) 75 MG/ML PNIJ    Inject 1 mL (75 mg total) into the skin every 14 (fourteen) days.    AMLODIPINE (NORVASC) 10 MG TABLET    Take 10 mg by mouth once daily.    ASCORBIC ACID, VITAMIN C, (VITAMIN C) 1000 MG TABLET    Take 1,000 mg by mouth once daily.    ASPIRIN 81 MG CHEW    Take 81 mg by mouth once daily.    AZELASTINE (ASTELIN) 137 MCG (0.1 %) NASAL SPRAY    USE 1 TO 2 SPRAY(S) IN EACH NOSTRIL TWICE DAILY    BUDESONIDE 1 MG/2 ML NBSP    EMPTY CONTENTS OF 1 RESPULE INTO NASAL IRRIGATION SYSTEM, ADD DISTILLED WATER, SALT PACK, MIX & IRRIGATE. PERFORM TWICE DAILY    CARVEDILOL (COREG) 6.25 MG TABLET    Take 1 tablet (6.25 mg total) by mouth 2 (two) times daily with meals.    CLOPIDOGREL (PLAVIX) 75 MG TABLET    Take 1 tablet (75 mg total) by mouth once daily.    CLORAZEPATE (TRANXENE) 7.5 MG TAB    Take 1 tablet (7.5 mg total) by mouth 3 (three) times daily.    CO-ENZYME Q-10 50 MG CAPSULE    Take 50 mg by mouth once daily.    DIPHENHYDRAMINE (BENADRYL) 25 MG CAPSULE    Take 25 mg by mouth every 6 (six) hours as needed for Itching.    EZETIMIBE (ZETIA) 10 MG TABLET    Take 1 tablet (10 mg total) by mouth every evening.    FERROUS SULFATE (FEOSOL) 325 MG (65 MG IRON) TAB TABLET    Take 1 tablet by mouth once daily.    MULTIVITAMIN (ONE DAILY MULTIVITAMIN) PER TABLET    Take 1 tablet by mouth once daily.    NEILMED SINUS RINSE COMPLETE PKDV    use as directed    OMEGA-3 FATTY ACIDS/FISH OIL (FISH OIL-OMEGA-3 FATTY ACIDS) 300-1,000 MG CAPSULE    Take 1 capsule by mouth once daily.    OMEPRAZOLE (PRILOSEC) 20 MG CAPSULE    Take 1 capsule (20 mg total) by mouth 2 (two) times daily before meals.    PREDNISONE (DELTASONE) 50 MG TAB    Take 1 tablet (50 mg total) by mouth 3 (three) times daily as needed (Take one tab at 6pm night before procedure, 1 tab at 11pm night before procedure, and 1 tab at 0600 morning of procedure with a small sip of water.).    TURMERIC ORAL    Take 1 packet  by mouth once daily.   Modified Medications    No medications on file   Discontinued Medications    No medications on file

## 2020-12-21 NOTE — PATIENT INSTRUCTIONS
Taking Aspirin for Atherosclerosis       Aspirin is a medicine often prescribed to treat atherosclerosis. This condition affects your arteries. These are the blood vessels that carry blood away from your heart. Having atherosclerosis means youre at greater risk of a heart attack or stroke. Aspirin can help prevent these from happening.  How atherosclerosis affects your arteries   A fatty material (plaque) can build up in your arteries. This makes it harder for blood to flow through them. A blood clot can then form on the plaque. This may block the artery, cutting off blood flow. This can cause conditions such as coronary artery disease (CAD) and peripheral arterial disease (PAD):  · CAD occurs when plaque builds up in the coronary artery. This artery supplies the heart with oxygen-rich blood.  · PAD occurs when plaque forms in leg arteries.  The same things that cause CAD and PAD can also cause plaque to form in other arteries in your body, such as those in the brain. When plaque occurs in any of these arteries, it raises your risk of heart attack or stroke.  What aspirin does  Aspirin is a blood-thinner (antiplatelet medicine). It helps keep blood clots from forming. This reduces the risk of blockage. Aspirin can be taken daily if you are at high risk of or have already had a heart attack or stroke. It is also used after a procedure called a stent placement. This is when a tiny wire mesh tube, or stent, is placed in an artery to keep it open. Aspirin helps prevent blood clots from forming on the stent.  Taking aspirin safely  Tell your healthcare provider about any medicines you are taking. This includes:  · All prescription medicines  · Over-the-counter medicines  · Herbs, vitamins, and other supplements  Also mention if you have a history of ulcers or bleeding problems. Ask whether you will need to stop taking aspirin before having surgery or dental work. Always take medicines as directed.  Tips for taking  aspirin  · Have a routine. For example, take aspirin with the same meal each day.  · Dont take more than prescribed. A low dose gives the same benefit as a higher one, with a lower risk of side effects.  · Dont skip doses. Aspirin needs to be taken each day to work well.  · Keep track of what you take. A pillbox with days of the week can help, especially if you take several medicines. Or use a list or chart to keep track.  When to call your healthcare provider  Side effects of aspirin are not usually serious. If you do have problems, changing your dose may help. Call your healthcare provider if you have any of the following:  · Excessive bruising (some bruising is normal)  · Nosebleeds, bleeding gums, or other excessive bleeding  · An upset stomach or stomach pain   Date Last Reviewed: 6/1/2016  © 5791-9336 [employer]. [address] [place] [address], [place] [address]. All rights reserved. This information is not intended as a substitute for professional medical care. Always follow your healthcare professional's instructions.

## 2020-12-21 NOTE — PLAN OF CARE
12/21/20 0851   Final Note   Assessment Type Final Discharge Note   Anticipated Discharge Disposition Home   Hospital Follow Up  Appt(s) scheduled? Yes   Discharge plans and expectations educations in teach back method with documentation complete? Yes     Pt d/c home no needs.    Future Appointments   Date Time Provider Department Center   12/21/2020  9:00 AM Hernando Junior MD [facility] CARDIO [facility]   1/15/2021 10:30 AM Edmund Walter MD [facility] FAM MED [facility]   1/27/2021  8:30 AM Ramon Pink MD PhD [facility] [place] Julie Haase RN  Case Management [phone]

## 2020-12-22 NOTE — DISCHARGE SUMMARY
[facility]  Cardiology  Discharge Summary      Patient Name: Alana Beth  MRN: [id number]  Admission Date: 12/8/2020  Hospital Length of Stay: 0 days  Discharge Date and Time: 12/8/2020  5:32 PM  Attending Physician: No att. providers found  Discharging Provider: NAIN Perkins, ANP  Primary Care Physician: Nurys Bearden MD    HPI: She is here by recommendation of her care for management of celiac and superior mesenteric artery stenosis noted on ultrasound. She lost close to 30-35 lb over the past year. She does not the classic post prandial angina seen with mesenteric ischemia. No fear of meals. She has HTN, HLP, intolerance to statin, PAD, and granulomatous lung disease. She was prescribed praluent but she states she cannot give herself injections.            CTA ([facility]) 8/2020                 Severe stenosis of the celiac axis with poststenotic dilatation.          Non contrast CT 8/2020                 0.5 cm calcified nodule in the medial segment of the right middle lobe              Aortic and coronary artery calcific atherosclerosis.           US 9/2020                 Celiac  cm/sec with ratio 6.2              SMA  cm/sec with ratio 3.8              CEDRICK 152 cm/sec with ratio 2.6        Esophagram 9/2020                 Mild esophageal dysmotility               No evidence of cricopharyngeal        Echo 9/2020                 Normal EF              Mild LAE              Mild TR              PASP 30 mmHg     Nuclear stress test 9/2020                 No ischemia                        Procedure(s) (LRB):  Angiogram, Abdominal Aorta (N/A)  Stent, Celiac  Pta (Angioplasty, Percutaneous, Transluminal) (N/A)     Indwelling Lines/Drains at time of discharge:      Hospital Course     12/8/2020Admitted for elective abdominal aortogram with following results;    S/p aortogram with selective celiac and SMA angiogram                  Initially started R radial then  switched to L radial               Patent SMA and 80% celiac stenosis              IVUS guided celiac PTAS              6.0 x 18 and 6 x 14 mm Express SD dilated to 18 noemí                    Plan:                    DAPT with aspirin + plavix              Statin therapy               Surveillance US in 4 weeks      Taken to the cath lab for the procedure via right radial access with the following results:      Tolerated procedure well. Recovered in the pre post cath area. HR and BP remained stable. Access site with no active bleeding, hematoma or ecchymosis. Deemed ready for discharge and sent home on medication regimen as detailed on AVS. Will follow up in cardiology clinic with Dr. Junior    Consults: none     Significant Diagnostic Studies: none   Pending Diagnostic Studies:     None          Final Active Diagnoses:    Diagnosis Date Noted POA    PRINCIPAL PROBLEM:  Celiac artery atherosclerosis [I70.8] 09/04/2020 Yes    Statin intolerance [Z78.9] 09/29/2020 Yes    PAD (peripheral artery disease) [I73.9] 09/25/2020 Yes    Abdominal discomfort [R10.9] 09/25/2020 Yes    Granulomatous lung disease [J84.10] 08/21/2020 Yes    Aortic atherosclerosis [I70.0] 08/21/2020 Yes    Hypercholesteremia [E78.00] 08/19/2020 Yes    Weight loss [R63.4] 08/18/2020 Yes    Solitary pulmonary nodule [R91.1] 08/18/2020 Yes    Atherosclerosis of aortic arch [I70.0] 08/18/2020 Yes    Essential hypertension [I10] 12/30/2019 Yes    Atherosclerotic peripheral vascular disease with intermittent claudication [I70.219] 12/30/2019 Yes      Problems Resolved During this Admission:       Discharged Condition: good    Follow Up:    Patient Instructions:      CBC W/ AUTO DIFFERENTIAL   Standing Status: Future Number of Occurrences: 1 Standing Exp. Date: 01/29/22     BASIC METABOLIC PANEL   Standing Status: Future Number of Occurrences: 1 Standing Exp. Date: 01/29/22     COVID-19 Routine Screening   Standing Status: Future Number of  Occurrences: 1 Standing Exp. Date: 01/29/22     Order Specific Question Answer Comments   Is the patient symptomatic? No    Is this needed for pre-procedure or pre-op testing? Yes    Diagnosis: Preoperative cardiovascular examination [689088]      Medications:  Reconciled Home Medications:      Medication List      START taking these medications    clopidogreL 75 mg tablet  Commonly known as: PLAVIX  Take 1 tablet (75 mg total) by mouth once daily.        CHANGE how you take these medications    clorazepate 7.5 MG Tab  Commonly known as: TRANXENE  Take 1 tablet (7.5 mg total) by mouth 3 (three) times daily.  What changed:   · how much to take  · when to take this     predniSONE 50 MG Tab  Commonly known as: DELTASONE  Take 1 tablet (50 mg total) by mouth 3 (three) times daily as needed (Take one tab at 6pm night before procedure, 1 tab at 11pm night before procedure, and 1 tab at 0600 morning of procedure with a small sip of water.).  What changed: reasons to take this        CONTINUE taking these medications    amLODIPine 10 MG tablet  Commonly known as: NORVASC  Take 10 mg by mouth once daily.     aspirin 81 MG Chew  Take 81 mg by mouth once daily.     azelastine 137 mcg (0.1 %) nasal spray  Commonly known as: ASTELIN  USE 1 TO 2 SPRAY(S) IN EACH NOSTRIL TWICE DAILY     budesonide 1 mg/2 mL Nbsp  EMPTY CONTENTS OF 1 RESPULE INTO NASAL IRRIGATION SYSTEM, ADD DISTILLED WATER, SALT PACK, MIX & IRRIGATE. PERFORM TWICE DAILY     carvediloL 6.25 MG tablet  Commonly known as: COREG  Take 1 tablet (6.25 mg total) by mouth 2 (two) times daily with meals.     co-enzyme Q-10 50 mg capsule  Take 50 mg by mouth once daily.     diphenhydrAMINE 25 mg capsule  Commonly known as: BENADRYL  Take 25 mg by mouth every 6 (six) hours as needed for Itching.     ferrous sulfate 325 mg (65 mg iron) Tab tablet  Commonly known as: FEOSOL  Take 1 tablet by mouth once daily.     fish oil-omega-3 fatty acids 300-1,000 mg capsule  Take 1  capsule by mouth once daily.     NEILMED SINUS RINSE COMPLETE Pkdv  Generic drug: sod chlor-bicarb-squeez bottle  use as directed     omeprazole 20 MG capsule  Commonly known as: PRILOSEC  Take 1 capsule (20 mg total) by mouth 2 (two) times daily before meals.     ONE DAILY MULTIVITAMIN per tablet  Generic drug: multivitamin  Take 1 tablet by mouth once daily.     PRALUENT PEN 75 mg/mL Pnij  Generic drug: alirocumab  Inject 1 mL (75 mg total) into the skin every 14 (fourteen) days.     TURMERIC ORAL  Take 1 packet by mouth once daily.     VITAMIN C 1000 MG tablet  Generic drug: ascorbic acid (vitamin C)  Take 1,000 mg by mouth once daily.            Time spent on the discharge of patient: 45 minutes    NAIN Perkins, JUAN JOSÉ  Cardiology  [facility]

## 2020-12-29 ENCOUNTER — OFFICE VISIT (OUTPATIENT)
Dept: INTERNAL MEDICINE | Facility: CLINIC | Age: 80
End: 2020-12-29
Payer: MEDICARE

## 2020-12-29 VITALS
OXYGEN SATURATION: 99 % | TEMPERATURE: 98 F | WEIGHT: 116.63 LBS | RESPIRATION RATE: 16 BRPM | DIASTOLIC BLOOD PRESSURE: 72 MMHG | HEIGHT: 63 IN | BODY MASS INDEX: 20.66 KG/M2 | SYSTOLIC BLOOD PRESSURE: 118 MMHG | HEART RATE: 84 BPM

## 2020-12-29 DIAGNOSIS — I70.0 ATHEROSCLEROSIS OF AORTIC ARCH: ICD-10-CM

## 2020-12-29 DIAGNOSIS — R10.9 ABDOMINAL DISCOMFORT: ICD-10-CM

## 2020-12-29 DIAGNOSIS — I10 ESSENTIAL HYPERTENSION: ICD-10-CM

## 2020-12-29 DIAGNOSIS — I70.219 ATHEROSCLEROTIC PERIPHERAL VASCULAR DISEASE WITH INTERMITTENT CLAUDICATION: ICD-10-CM

## 2020-12-29 DIAGNOSIS — K55.9 MESENTERIC ISCHEMIA: ICD-10-CM

## 2020-12-29 DIAGNOSIS — Z09 HOSPITAL DISCHARGE FOLLOW-UP: Primary | ICD-10-CM

## 2020-12-29 DIAGNOSIS — Z78.9 STATIN INTOLERANCE: ICD-10-CM

## 2020-12-29 DIAGNOSIS — I73.9 PAD (PERIPHERAL ARTERY DISEASE): ICD-10-CM

## 2020-12-29 DIAGNOSIS — E78.00 HYPERCHOLESTEREMIA: ICD-10-CM

## 2020-12-29 DIAGNOSIS — I70.0 AORTIC ATHEROSCLEROSIS: ICD-10-CM

## 2020-12-29 DIAGNOSIS — I70.8 CELIAC ARTERY ATHEROSCLEROSIS: ICD-10-CM

## 2020-12-29 PROCEDURE — 1101F PT FALLS ASSESS-DOCD LE1/YR: CPT | Mod: CPTII,S$GLB,, | Performed by: FAMILY MEDICINE

## 2020-12-29 PROCEDURE — 99214 PR OFFICE/OUTPT VISIT, EST, LEVL IV, 30-39 MIN: ICD-10-PCS | Mod: S$GLB,,, | Performed by: FAMILY MEDICINE

## 2020-12-29 PROCEDURE — 99214 OFFICE O/P EST MOD 30 MIN: CPT | Mod: S$GLB,,, | Performed by: FAMILY MEDICINE

## 2020-12-29 PROCEDURE — 1126F PR PAIN SEVERITY QUANTIFIED, NO PAIN PRESENT: ICD-10-PCS | Mod: S$GLB,,, | Performed by: FAMILY MEDICINE

## 2020-12-29 PROCEDURE — 1101F PR PT FALLS ASSESS DOC 0-1 FALLS W/OUT INJ PAST YR: ICD-10-PCS | Mod: CPTII,S$GLB,, | Performed by: FAMILY MEDICINE

## 2020-12-29 PROCEDURE — 3288F PR FALLS RISK ASSESSMENT DOCUMENTED: ICD-10-PCS | Mod: CPTII,S$GLB,, | Performed by: FAMILY MEDICINE

## 2020-12-29 PROCEDURE — 1126F AMNT PAIN NOTED NONE PRSNT: CPT | Mod: S$GLB,,, | Performed by: FAMILY MEDICINE

## 2020-12-29 PROCEDURE — 99999 PR PBB SHADOW E&M-EST. PATIENT-LVL V: CPT | Mod: PBBFAC,,, | Performed by: FAMILY MEDICINE

## 2020-12-29 PROCEDURE — 99999 PR PBB SHADOW E&M-EST. PATIENT-LVL V: ICD-10-PCS | Mod: PBBFAC,,, | Performed by: FAMILY MEDICINE

## 2020-12-29 PROCEDURE — 3288F FALL RISK ASSESSMENT DOCD: CPT | Mod: CPTII,S$GLB,, | Performed by: FAMILY MEDICINE

## 2020-12-29 RX ORDER — CETIRIZINE HYDROCHLORIDE 10 MG/1
10 TABLET ORAL
COMMUNITY

## 2020-12-29 NOTE — PROGRESS NOTES
Subjective:       Patient ID: Alana Beth is a 80 y.o. female.    Chief Complaint: Follow-up (hospital d/c f/u)    HPI  Review of Systems   Constitutional: Negative for appetite change, chills, fatigue and fever.   HENT: Positive for postnasal drip and rhinorrhea. Negative for nasal congestion, ear pain, hearing loss, sinus pressure/congestion, sore throat and tinnitus.    Eyes: Negative for redness, itching and visual disturbance.   Respiratory: Negative for cough, chest tightness and shortness of breath.    Cardiovascular: Negative for chest pain and palpitations.   Gastrointestinal: Negative for abdominal pain, constipation, diarrhea, nausea and vomiting.   Genitourinary: Negative for decreased urine volume, difficulty urinating, dysuria, frequency, hematuria and urgency.   Musculoskeletal: Negative for back pain, myalgias, neck pain and neck stiffness.   Integumentary:  Negative for rash.   Neurological: Negative for dizziness, light-headedness and headaches.   Psychiatric/Behavioral: Positive for sleep disturbance.         Objective:      Physical Exam    Assessment:       No diagnosis found.    Plan:       ***

## 2020-12-29 NOTE — PROGRESS NOTES
Transitional Care Note  Subjective:       Patient ID: Alana Beth is a 80 y.o. female.  Chief Complaint: Follow-up (hospital d/c f/u)    Family and/or Caretaker present at visit?  No.  Diagnostic tests reviewed/disposition: I have reviewed all completed as well as pending diagnostic tests at the time of discharge.  Disease/illness education:  Peripheral artery disease, hypertension, celiac and mesenteric stenosis.  Home health/community services discussion/referrals: Patient does not have home health established from hospital visit.  They do not need home health.  If needed, we will set up home health for the patient.   Establishment or re-establishment of referral orders for community resources: No other necessary community resources.   Discussion with other health care providers: No discussion with other health care providers necessary.   HPI 80-year-old  female patient of Dr. Bearden but new patient to me presents to clinic today for hospital follow-up.  She was admitted on December 16, 2020 and discharged on December 19, 2020 secondary to suspected restenoses of a celiac stent.  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  She began noting continued abdominal pain and presented to the emergency room for further evaluation.  Initial CT was suspicious for possible kinking of the celiac stent and cardiology was consulted.  Vascular ultrasound was performed and showed patent superior mesenteric and inferior mesenteric arteries without significant stenosis.  Celiac artery stent was patent.  Patient has been noted to have statin intolerance; therefore, she was started on Praluent and Zetia for further treatment of hyperlipidemia.  At this  time she reports an overall improvement in her abdominal pain and appetite.  Her only complaint is some increased nasal congestion this past week.  Review of Systems   Constitutional: Negative for appetite change, chills, fatigue and fever.   HENT: Positive for postnasal drip and rhinorrhea. Negative for congestion, ear pain, hearing loss, sinus pressure, sore throat and tinnitus.    Eyes: Negative for redness, itching and visual disturbance.   Respiratory: Negative for cough, chest tightness and shortness of breath.    Cardiovascular: Negative for chest pain and palpitations.   Gastrointestinal: Negative for abdominal pain, constipation, diarrhea, nausea and vomiting.   Genitourinary: Negative for decreased urine volume, difficulty urinating, dysuria, frequency, hematuria and urgency.   Musculoskeletal: Negative for back pain, myalgias, neck pain and neck stiffness.   Skin: Negative for rash.   Neurological: Negative for dizziness, light-headedness and headaches.   Psychiatric/Behavioral: Positive for sleep disturbance.       Objective:      Physical Exam  Vitals signs and nursing note reviewed.   Constitutional:       General: She is not in acute distress.     Appearance: She is well-developed. She is not diaphoretic.   HENT:      Head: Normocephalic and atraumatic.      Right Ear: External ear normal.      Left Ear: External ear normal.      Nose: Nose normal.      Mouth/Throat:      Pharynx: No oropharyngeal exudate.   Eyes:      General: No scleral icterus.        Right eye: No discharge.         Left eye: No discharge.      Conjunctiva/sclera: Conjunctivae normal.      Pupils: Pupils are equal, round, and reactive to light.   Neck:      Musculoskeletal: Normal range of motion and neck supple.      Thyroid: No thyromegaly.      Vascular: No JVD.      Trachea: No tracheal deviation.   Cardiovascular:      Rate and Rhythm: Normal rate and regular rhythm.      Heart sounds: Normal heart sounds. No murmur. No  friction rub. No gallop.    Pulmonary:      Effort: Pulmonary effort is normal. No respiratory distress.      Breath sounds: Normal breath sounds. No stridor. No wheezing or rales.   Abdominal:      General: Bowel sounds are normal. There is no distension.      Palpations: Abdomen is soft. There is no mass.      Tenderness: There is no abdominal tenderness. There is no guarding or rebound.   Musculoskeletal: Normal range of motion.         General: No tenderness.   Lymphadenopathy:      Cervical: No cervical adenopathy.   Skin:     General: Skin is warm and dry.      Coloration: Skin is not pale.      Findings: No erythema or rash.   Neurological:      Mental Status: She is alert and oriented to person, place, and time.   Psychiatric:         Behavior: Behavior normal.         Thought Content: Thought content normal.         Judgment: Judgment normal.         Assessment:       1. Hospital discharge follow-up    2. Mesenteric ischemia    3. PAD (peripheral artery disease)    4. Celiac artery atherosclerosis    5. Atherosclerosis of aortic arch    6. Aortic atherosclerosis    7. Atherosclerotic peripheral vascular disease with intermittent claudication    8. Essential hypertension    9. Hypercholesteremia    10. Statin intolerance    11. Abdominal discomfort        Plan:         1.  Hospital records have been reviewed.  2.  Continue aspirin and Plavix as prescribed and continue follow-up with cardiology as scheduled.  3.  Continue Praluent and Zetia as prescribed.  Hyperlipidemia stable.  4.  Continue Norvasc 10 mg daily and carvedilol 6.25 mg b.i.d..  Hypertension is well controlled.  5.  Abdominal discomfort has resolved status post stenting.  6.  Return to clinic as needed or in 10 months for annual exam.

## 2021-01-06 ENCOUNTER — SPECIALTY PHARMACY (OUTPATIENT)
Dept: PHARMACY | Facility: CLINIC | Age: 81
End: 2021-01-06

## 2021-01-07 LAB
FINAL PATHOLOGIC DIAGNOSIS: NORMAL
Lab: NORMAL

## 2021-01-08 ENCOUNTER — TELEPHONE (OUTPATIENT)
Dept: OBSTETRICS AND GYNECOLOGY | Facility: CLINIC | Age: 81
End: 2021-01-08

## 2021-01-19 ENCOUNTER — TELEPHONE (OUTPATIENT)
Dept: INTERNAL MEDICINE | Facility: CLINIC | Age: 81
End: 2021-01-19

## 2021-01-19 RX ORDER — FERROUS SULFATE 325(65) MG
1 TABLET ORAL DAILY
Qty: 60 TABLET | Status: CANCELLED | OUTPATIENT
Start: 2021-01-19

## 2021-01-20 ENCOUNTER — TELEPHONE (OUTPATIENT)
Dept: INTERNAL MEDICINE | Facility: CLINIC | Age: 81
End: 2021-01-20

## 2021-01-21 ENCOUNTER — OFFICE VISIT (OUTPATIENT)
Dept: INTERNAL MEDICINE | Facility: CLINIC | Age: 81
End: 2021-01-21
Payer: MEDICARE

## 2021-01-21 ENCOUNTER — TELEPHONE (OUTPATIENT)
Dept: INTERNAL MEDICINE | Facility: CLINIC | Age: 81
End: 2021-01-21

## 2021-01-21 VITALS
HEART RATE: 78 BPM | HEIGHT: 63 IN | BODY MASS INDEX: 20.59 KG/M2 | DIASTOLIC BLOOD PRESSURE: 60 MMHG | RESPIRATION RATE: 18 BRPM | SYSTOLIC BLOOD PRESSURE: 118 MMHG | TEMPERATURE: 98 F | WEIGHT: 116.19 LBS

## 2021-01-21 DIAGNOSIS — Z86.2 HISTORY OF IRON DEFICIENCY ANEMIA: Primary | ICD-10-CM

## 2021-01-21 PROCEDURE — 99999 PR PBB SHADOW E&M-EST. PATIENT-LVL IV: ICD-10-PCS | Mod: PBBFAC,,, | Performed by: STUDENT IN AN ORGANIZED HEALTH CARE EDUCATION/TRAINING PROGRAM

## 2021-01-21 PROCEDURE — 3288F FALL RISK ASSESSMENT DOCD: CPT | Mod: CPTII,S$GLB,, | Performed by: STUDENT IN AN ORGANIZED HEALTH CARE EDUCATION/TRAINING PROGRAM

## 2021-01-21 PROCEDURE — 3078F DIAST BP <80 MM HG: CPT | Mod: CPTII,S$GLB,, | Performed by: STUDENT IN AN ORGANIZED HEALTH CARE EDUCATION/TRAINING PROGRAM

## 2021-01-21 PROCEDURE — 1126F PR PAIN SEVERITY QUANTIFIED, NO PAIN PRESENT: ICD-10-PCS | Mod: S$GLB,,, | Performed by: STUDENT IN AN ORGANIZED HEALTH CARE EDUCATION/TRAINING PROGRAM

## 2021-01-21 PROCEDURE — 1159F MED LIST DOCD IN RCRD: CPT | Mod: S$GLB,,, | Performed by: STUDENT IN AN ORGANIZED HEALTH CARE EDUCATION/TRAINING PROGRAM

## 2021-01-21 PROCEDURE — 99999 PR PBB SHADOW E&M-EST. PATIENT-LVL IV: CPT | Mod: PBBFAC,,, | Performed by: STUDENT IN AN ORGANIZED HEALTH CARE EDUCATION/TRAINING PROGRAM

## 2021-01-21 PROCEDURE — 1159F PR MEDICATION LIST DOCUMENTED IN MEDICAL RECORD: ICD-10-PCS | Mod: S$GLB,,, | Performed by: STUDENT IN AN ORGANIZED HEALTH CARE EDUCATION/TRAINING PROGRAM

## 2021-01-21 PROCEDURE — 3288F PR FALLS RISK ASSESSMENT DOCUMENTED: ICD-10-PCS | Mod: CPTII,S$GLB,, | Performed by: STUDENT IN AN ORGANIZED HEALTH CARE EDUCATION/TRAINING PROGRAM

## 2021-01-21 PROCEDURE — 99213 OFFICE O/P EST LOW 20 MIN: CPT | Mod: S$GLB,,, | Performed by: STUDENT IN AN ORGANIZED HEALTH CARE EDUCATION/TRAINING PROGRAM

## 2021-01-21 PROCEDURE — 3078F PR MOST RECENT DIASTOLIC BLOOD PRESSURE < 80 MM HG: ICD-10-PCS | Mod: CPTII,S$GLB,, | Performed by: STUDENT IN AN ORGANIZED HEALTH CARE EDUCATION/TRAINING PROGRAM

## 2021-01-21 PROCEDURE — 1126F AMNT PAIN NOTED NONE PRSNT: CPT | Mod: S$GLB,,, | Performed by: STUDENT IN AN ORGANIZED HEALTH CARE EDUCATION/TRAINING PROGRAM

## 2021-01-21 PROCEDURE — 99213 PR OFFICE/OUTPT VISIT, EST, LEVL III, 20-29 MIN: ICD-10-PCS | Mod: S$GLB,,, | Performed by: STUDENT IN AN ORGANIZED HEALTH CARE EDUCATION/TRAINING PROGRAM

## 2021-01-21 PROCEDURE — 1101F PT FALLS ASSESS-DOCD LE1/YR: CPT | Mod: CPTII,S$GLB,, | Performed by: STUDENT IN AN ORGANIZED HEALTH CARE EDUCATION/TRAINING PROGRAM

## 2021-01-21 PROCEDURE — 3074F PR MOST RECENT SYSTOLIC BLOOD PRESSURE < 130 MM HG: ICD-10-PCS | Mod: CPTII,S$GLB,, | Performed by: STUDENT IN AN ORGANIZED HEALTH CARE EDUCATION/TRAINING PROGRAM

## 2021-01-21 PROCEDURE — 1101F PR PT FALLS ASSESS DOC 0-1 FALLS W/OUT INJ PAST YR: ICD-10-PCS | Mod: CPTII,S$GLB,, | Performed by: STUDENT IN AN ORGANIZED HEALTH CARE EDUCATION/TRAINING PROGRAM

## 2021-01-21 PROCEDURE — 3074F SYST BP LT 130 MM HG: CPT | Mod: CPTII,S$GLB,, | Performed by: STUDENT IN AN ORGANIZED HEALTH CARE EDUCATION/TRAINING PROGRAM

## 2021-01-27 ENCOUNTER — OFFICE VISIT (OUTPATIENT)
Dept: CARDIOLOGY | Facility: CLINIC | Age: 81
End: 2021-01-27
Payer: MEDICARE

## 2021-01-27 VITALS
OXYGEN SATURATION: 98 % | WEIGHT: 114.63 LBS | HEART RATE: 97 BPM | SYSTOLIC BLOOD PRESSURE: 102 MMHG | DIASTOLIC BLOOD PRESSURE: 60 MMHG | HEIGHT: 62 IN | BODY MASS INDEX: 21.1 KG/M2

## 2021-01-27 DIAGNOSIS — F41.9 ANXIETY: ICD-10-CM

## 2021-01-27 DIAGNOSIS — E78.00 HYPERCHOLESTEREMIA: ICD-10-CM

## 2021-01-27 DIAGNOSIS — Z78.9 STATIN INTOLERANCE: ICD-10-CM

## 2021-01-27 DIAGNOSIS — I10 ESSENTIAL HYPERTENSION: ICD-10-CM

## 2021-01-27 DIAGNOSIS — R10.9 ABDOMINAL DISCOMFORT: ICD-10-CM

## 2021-01-27 DIAGNOSIS — I73.9 PAD (PERIPHERAL ARTERY DISEASE): ICD-10-CM

## 2021-01-27 DIAGNOSIS — K55.9 MESENTERIC ISCHEMIA: ICD-10-CM

## 2021-01-27 DIAGNOSIS — I70.0 AORTIC ATHEROSCLEROSIS: ICD-10-CM

## 2021-01-27 DIAGNOSIS — I70.8 CELIAC ARTERY ATHEROSCLEROSIS: ICD-10-CM

## 2021-01-27 DIAGNOSIS — I70.0 ATHEROSCLEROSIS OF AORTIC ARCH: ICD-10-CM

## 2021-01-27 DIAGNOSIS — I70.219 ATHEROSCLEROTIC PERIPHERAL VASCULAR DISEASE WITH INTERMITTENT CLAUDICATION: ICD-10-CM

## 2021-01-27 PROCEDURE — 3074F SYST BP LT 130 MM HG: CPT | Mod: CPTII,S$GLB,, | Performed by: INTERNAL MEDICINE

## 2021-01-27 PROCEDURE — 99215 PR OFFICE/OUTPT VISIT, EST, LEVL V, 40-54 MIN: ICD-10-PCS | Mod: S$GLB,,, | Performed by: INTERNAL MEDICINE

## 2021-01-27 PROCEDURE — 99999 PR PBB SHADOW E&M-EST. PATIENT-LVL IV: ICD-10-PCS | Mod: PBBFAC,,, | Performed by: INTERNAL MEDICINE

## 2021-01-27 PROCEDURE — 3078F PR MOST RECENT DIASTOLIC BLOOD PRESSURE < 80 MM HG: ICD-10-PCS | Mod: CPTII,S$GLB,, | Performed by: INTERNAL MEDICINE

## 2021-01-27 PROCEDURE — 1159F MED LIST DOCD IN RCRD: CPT | Mod: S$GLB,,, | Performed by: INTERNAL MEDICINE

## 2021-01-27 PROCEDURE — 99999 PR PBB SHADOW E&M-EST. PATIENT-LVL IV: CPT | Mod: PBBFAC,,, | Performed by: INTERNAL MEDICINE

## 2021-01-27 PROCEDURE — 3078F DIAST BP <80 MM HG: CPT | Mod: CPTII,S$GLB,, | Performed by: INTERNAL MEDICINE

## 2021-01-27 PROCEDURE — 1159F PR MEDICATION LIST DOCUMENTED IN MEDICAL RECORD: ICD-10-PCS | Mod: S$GLB,,, | Performed by: INTERNAL MEDICINE

## 2021-01-27 PROCEDURE — 1126F PR PAIN SEVERITY QUANTIFIED, NO PAIN PRESENT: ICD-10-PCS | Mod: S$GLB,,, | Performed by: INTERNAL MEDICINE

## 2021-01-27 PROCEDURE — 99215 OFFICE O/P EST HI 40 MIN: CPT | Mod: S$GLB,,, | Performed by: INTERNAL MEDICINE

## 2021-01-27 PROCEDURE — 1126F AMNT PAIN NOTED NONE PRSNT: CPT | Mod: S$GLB,,, | Performed by: INTERNAL MEDICINE

## 2021-01-27 PROCEDURE — 3074F PR MOST RECENT SYSTOLIC BLOOD PRESSURE < 130 MM HG: ICD-10-PCS | Mod: CPTII,S$GLB,, | Performed by: INTERNAL MEDICINE

## 2021-01-27 RX ORDER — SODIUM CHLORIDE/SODIUM BICARB
PACKET (EA) NASAL
COMMUNITY
Start: 2021-01-25

## 2021-01-27 RX ORDER — CEFDINIR 300 MG/1
300 CAPSULE ORAL 2 TIMES DAILY
COMMUNITY
Start: 2021-01-25 | End: 2021-04-28

## 2021-01-29 ENCOUNTER — TELEPHONE (OUTPATIENT)
Dept: INTERNAL MEDICINE | Facility: CLINIC | Age: 81
End: 2021-01-29

## 2021-02-01 ENCOUNTER — TELEPHONE (OUTPATIENT)
Dept: INTERNAL MEDICINE | Facility: CLINIC | Age: 81
End: 2021-02-01

## 2021-02-03 ENCOUNTER — PATIENT MESSAGE (OUTPATIENT)
Dept: PHARMACY | Facility: CLINIC | Age: 81
End: 2021-02-03

## 2021-02-09 ENCOUNTER — PATIENT MESSAGE (OUTPATIENT)
Dept: PHARMACY | Facility: CLINIC | Age: 81
End: 2021-02-09

## 2021-02-12 ENCOUNTER — SPECIALTY PHARMACY (OUTPATIENT)
Dept: PHARMACY | Facility: CLINIC | Age: 81
End: 2021-02-12

## 2021-03-03 ENCOUNTER — TELEPHONE (OUTPATIENT)
Dept: INTERNAL MEDICINE | Facility: CLINIC | Age: 81
End: 2021-03-03

## 2021-03-03 ENCOUNTER — TELEPHONE (OUTPATIENT)
Dept: CARDIOLOGY | Facility: CLINIC | Age: 81
End: 2021-03-03

## 2021-03-08 ENCOUNTER — PATIENT MESSAGE (OUTPATIENT)
Dept: PHARMACY | Facility: CLINIC | Age: 81
End: 2021-03-08

## 2021-03-09 RX ORDER — EZETIMIBE 10 MG/1
10 TABLET ORAL NIGHTLY
Qty: 90 TABLET | Refills: 3 | Status: SHIPPED | OUTPATIENT
Start: 2021-03-09 | End: 2022-01-06

## 2021-03-12 ENCOUNTER — TELEPHONE (OUTPATIENT)
Dept: INTERNAL MEDICINE | Facility: CLINIC | Age: 81
End: 2021-03-12

## 2021-03-12 ENCOUNTER — SPECIALTY PHARMACY (OUTPATIENT)
Dept: PHARMACY | Facility: CLINIC | Age: 81
End: 2021-03-12

## 2021-04-12 ENCOUNTER — OFFICE VISIT (OUTPATIENT)
Dept: OTOLARYNGOLOGY | Facility: CLINIC | Age: 81
End: 2021-04-12
Payer: MEDICARE

## 2021-04-12 DIAGNOSIS — J34.89 NASAL DISCOMFORT: Primary | ICD-10-CM

## 2021-04-12 DIAGNOSIS — R09.82 POST-NASAL DRIP: ICD-10-CM

## 2021-04-12 DIAGNOSIS — R09.A2 GLOBUS SENSATION: ICD-10-CM

## 2021-04-12 PROCEDURE — 1126F PR PAIN SEVERITY QUANTIFIED, NO PAIN PRESENT: ICD-10-PCS | Mod: S$GLB,,, | Performed by: NURSE PRACTITIONER

## 2021-04-12 PROCEDURE — 1101F PR PT FALLS ASSESS DOC 0-1 FALLS W/OUT INJ PAST YR: ICD-10-PCS | Mod: CPTII,S$GLB,, | Performed by: NURSE PRACTITIONER

## 2021-04-12 PROCEDURE — 31575 LARYNGOSCOPY: ICD-10-PCS | Mod: S$GLB,,, | Performed by: NURSE PRACTITIONER

## 2021-04-12 PROCEDURE — 1101F PT FALLS ASSESS-DOCD LE1/YR: CPT | Mod: CPTII,S$GLB,, | Performed by: NURSE PRACTITIONER

## 2021-04-12 PROCEDURE — 3288F FALL RISK ASSESSMENT DOCD: CPT | Mod: CPTII,S$GLB,, | Performed by: NURSE PRACTITIONER

## 2021-04-12 PROCEDURE — 1126F AMNT PAIN NOTED NONE PRSNT: CPT | Mod: S$GLB,,, | Performed by: NURSE PRACTITIONER

## 2021-04-12 PROCEDURE — 99999 PR PBB SHADOW E&M-EST. PATIENT-LVL III: ICD-10-PCS | Mod: PBBFAC,,, | Performed by: NURSE PRACTITIONER

## 2021-04-12 PROCEDURE — 1159F MED LIST DOCD IN RCRD: CPT | Mod: S$GLB,,, | Performed by: NURSE PRACTITIONER

## 2021-04-12 PROCEDURE — 31575 DIAGNOSTIC LARYNGOSCOPY: CPT | Mod: S$GLB,,, | Performed by: NURSE PRACTITIONER

## 2021-04-12 PROCEDURE — 99999 PR PBB SHADOW E&M-EST. PATIENT-LVL III: CPT | Mod: PBBFAC,,, | Performed by: NURSE PRACTITIONER

## 2021-04-12 PROCEDURE — 99214 PR OFFICE/OUTPT VISIT, EST, LEVL IV, 30-39 MIN: ICD-10-PCS | Mod: 25,S$GLB,, | Performed by: NURSE PRACTITIONER

## 2021-04-12 PROCEDURE — 1159F PR MEDICATION LIST DOCUMENTED IN MEDICAL RECORD: ICD-10-PCS | Mod: S$GLB,,, | Performed by: NURSE PRACTITIONER

## 2021-04-12 PROCEDURE — 3288F PR FALLS RISK ASSESSMENT DOCUMENTED: ICD-10-PCS | Mod: CPTII,S$GLB,, | Performed by: NURSE PRACTITIONER

## 2021-04-12 PROCEDURE — 99214 OFFICE O/P EST MOD 30 MIN: CPT | Mod: 25,S$GLB,, | Performed by: NURSE PRACTITIONER

## 2021-04-16 ENCOUNTER — PATIENT MESSAGE (OUTPATIENT)
Dept: RESEARCH | Facility: HOSPITAL | Age: 81
End: 2021-04-16

## 2021-04-16 ENCOUNTER — TELEPHONE (OUTPATIENT)
Dept: GASTROENTEROLOGY | Facility: CLINIC | Age: 81
End: 2021-04-16

## 2021-04-20 ENCOUNTER — SPECIALTY PHARMACY (OUTPATIENT)
Dept: PHARMACY | Facility: CLINIC | Age: 81
End: 2021-04-20

## 2021-04-21 ENCOUNTER — LAB VISIT (OUTPATIENT)
Dept: LAB | Facility: HOSPITAL | Age: 81
End: 2021-04-21
Attending: INTERNAL MEDICINE
Payer: MEDICARE

## 2021-04-21 DIAGNOSIS — E78.00 HYPERCHOLESTEREMIA: ICD-10-CM

## 2021-04-21 LAB
CHOLEST SERPL-MCNC: 252 MG/DL (ref 120–199)
CHOLEST/HDLC SERPL: 2.7 {RATIO} (ref 2–5)
HDLC SERPL-MCNC: 95 MG/DL (ref 40–75)
HDLC SERPL: 37.7 % (ref 20–50)
LDLC SERPL CALC-MCNC: 144.8 MG/DL (ref 63–159)
NONHDLC SERPL-MCNC: 157 MG/DL
TRIGL SERPL-MCNC: 61 MG/DL (ref 30–150)

## 2021-04-21 PROCEDURE — 36415 COLL VENOUS BLD VENIPUNCTURE: CPT | Mod: PO | Performed by: INTERNAL MEDICINE

## 2021-04-21 PROCEDURE — 80061 LIPID PANEL: CPT | Performed by: INTERNAL MEDICINE

## 2021-04-28 ENCOUNTER — PATIENT MESSAGE (OUTPATIENT)
Dept: PHARMACY | Facility: CLINIC | Age: 81
End: 2021-04-28

## 2021-04-28 ENCOUNTER — OFFICE VISIT (OUTPATIENT)
Dept: CARDIOLOGY | Facility: CLINIC | Age: 81
End: 2021-04-28
Payer: MEDICARE

## 2021-04-28 VITALS
WEIGHT: 122.13 LBS | SYSTOLIC BLOOD PRESSURE: 130 MMHG | BODY MASS INDEX: 22.48 KG/M2 | HEIGHT: 62 IN | HEART RATE: 78 BPM | DIASTOLIC BLOOD PRESSURE: 70 MMHG | OXYGEN SATURATION: 99 %

## 2021-04-28 DIAGNOSIS — I70.0 ATHEROSCLEROSIS OF AORTIC ARCH: ICD-10-CM

## 2021-04-28 DIAGNOSIS — I70.0 AORTIC ATHEROSCLEROSIS: ICD-10-CM

## 2021-04-28 DIAGNOSIS — K55.9 MESENTERIC ISCHEMIA: ICD-10-CM

## 2021-04-28 DIAGNOSIS — I70.219 ATHEROSCLEROTIC PERIPHERAL VASCULAR DISEASE WITH INTERMITTENT CLAUDICATION: ICD-10-CM

## 2021-04-28 DIAGNOSIS — R10.84 GENERALIZED ABDOMINAL PAIN: ICD-10-CM

## 2021-04-28 DIAGNOSIS — I70.8 CELIAC ARTERY ATHEROSCLEROSIS: ICD-10-CM

## 2021-04-28 DIAGNOSIS — I73.9 PAD (PERIPHERAL ARTERY DISEASE): Primary | ICD-10-CM

## 2021-04-28 DIAGNOSIS — R10.9 ABDOMINAL DISCOMFORT: ICD-10-CM

## 2021-04-28 DIAGNOSIS — Z78.9 STATIN INTOLERANCE: ICD-10-CM

## 2021-04-28 DIAGNOSIS — E78.00 HYPERCHOLESTEREMIA: ICD-10-CM

## 2021-04-28 DIAGNOSIS — I10 ESSENTIAL HYPERTENSION: ICD-10-CM

## 2021-04-28 DIAGNOSIS — E78.2 MIXED HYPERLIPIDEMIA: ICD-10-CM

## 2021-04-28 PROCEDURE — 1126F AMNT PAIN NOTED NONE PRSNT: CPT | Mod: S$GLB,,, | Performed by: INTERNAL MEDICINE

## 2021-04-28 PROCEDURE — 1101F PT FALLS ASSESS-DOCD LE1/YR: CPT | Mod: CPTII,S$GLB,, | Performed by: INTERNAL MEDICINE

## 2021-04-28 PROCEDURE — 99999 PR PBB SHADOW E&M-EST. PATIENT-LVL IV: ICD-10-PCS | Mod: PBBFAC,,, | Performed by: INTERNAL MEDICINE

## 2021-04-28 PROCEDURE — 3288F PR FALLS RISK ASSESSMENT DOCUMENTED: ICD-10-PCS | Mod: CPTII,S$GLB,, | Performed by: INTERNAL MEDICINE

## 2021-04-28 PROCEDURE — 99215 PR OFFICE/OUTPT VISIT, EST, LEVL V, 40-54 MIN: ICD-10-PCS | Mod: S$GLB,,, | Performed by: INTERNAL MEDICINE

## 2021-04-28 PROCEDURE — 1159F PR MEDICATION LIST DOCUMENTED IN MEDICAL RECORD: ICD-10-PCS | Mod: S$GLB,,, | Performed by: INTERNAL MEDICINE

## 2021-04-28 PROCEDURE — 1101F PR PT FALLS ASSESS DOC 0-1 FALLS W/OUT INJ PAST YR: ICD-10-PCS | Mod: CPTII,S$GLB,, | Performed by: INTERNAL MEDICINE

## 2021-04-28 PROCEDURE — 99999 PR PBB SHADOW E&M-EST. PATIENT-LVL IV: CPT | Mod: PBBFAC,,, | Performed by: INTERNAL MEDICINE

## 2021-04-28 PROCEDURE — 3288F FALL RISK ASSESSMENT DOCD: CPT | Mod: CPTII,S$GLB,, | Performed by: INTERNAL MEDICINE

## 2021-04-28 PROCEDURE — 1126F PR PAIN SEVERITY QUANTIFIED, NO PAIN PRESENT: ICD-10-PCS | Mod: S$GLB,,, | Performed by: INTERNAL MEDICINE

## 2021-04-28 PROCEDURE — 1159F MED LIST DOCD IN RCRD: CPT | Mod: S$GLB,,, | Performed by: INTERNAL MEDICINE

## 2021-04-28 PROCEDURE — 99215 OFFICE O/P EST HI 40 MIN: CPT | Mod: S$GLB,,, | Performed by: INTERNAL MEDICINE

## 2021-04-28 RX ORDER — ALIROCUMAB 150 MG/ML
150 INJECTION, SOLUTION SUBCUTANEOUS
Qty: 10 ML | Refills: 10 | Status: SHIPPED | OUTPATIENT
Start: 2021-04-28 | End: 2022-05-06 | Stop reason: SDUPTHER

## 2021-05-10 ENCOUNTER — OFFICE VISIT (OUTPATIENT)
Dept: GASTROENTEROLOGY | Facility: CLINIC | Age: 81
End: 2021-05-10
Payer: MEDICARE

## 2021-05-10 VITALS
DIASTOLIC BLOOD PRESSURE: 65 MMHG | SYSTOLIC BLOOD PRESSURE: 112 MMHG | HEIGHT: 62 IN | BODY MASS INDEX: 22.44 KG/M2 | HEART RATE: 79 BPM | WEIGHT: 121.94 LBS

## 2021-05-10 DIAGNOSIS — R09.3 EXCESSIVE SPUTUM: Primary | ICD-10-CM

## 2021-05-10 PROCEDURE — 99213 OFFICE O/P EST LOW 20 MIN: CPT | Mod: GC,S$GLB,,

## 2021-05-10 PROCEDURE — 1159F MED LIST DOCD IN RCRD: CPT | Mod: GC,S$GLB,,

## 2021-05-10 PROCEDURE — 99999 PR PBB SHADOW E&M-EST. PATIENT-LVL IV: CPT | Mod: PBBFAC,GC,,

## 2021-05-10 PROCEDURE — 99999 PR PBB SHADOW E&M-EST. PATIENT-LVL IV: ICD-10-PCS | Mod: PBBFAC,GC,,

## 2021-05-10 PROCEDURE — 99213 PR OFFICE/OUTPT VISIT, EST, LEVL III, 20-29 MIN: ICD-10-PCS | Mod: GC,S$GLB,,

## 2021-05-10 PROCEDURE — 1159F PR MEDICATION LIST DOCUMENTED IN MEDICAL RECORD: ICD-10-PCS | Mod: GC,S$GLB,,

## 2021-05-19 ENCOUNTER — SPECIALTY PHARMACY (OUTPATIENT)
Dept: PHARMACY | Facility: CLINIC | Age: 81
End: 2021-05-19

## 2021-05-28 RX ORDER — AMLODIPINE BESYLATE 10 MG/1
10 TABLET ORAL DAILY
Qty: 90 TABLET | Refills: 1 | Status: SHIPPED | OUTPATIENT
Start: 2021-05-28 | End: 2021-12-13 | Stop reason: SDUPTHER

## 2021-06-09 ENCOUNTER — OFFICE VISIT (OUTPATIENT)
Dept: OTOLARYNGOLOGY | Facility: CLINIC | Age: 81
End: 2021-06-09
Payer: MEDICARE

## 2021-06-09 DIAGNOSIS — H92.03 OTALGIA OF BOTH EARS: ICD-10-CM

## 2021-06-09 DIAGNOSIS — H69.93 DYSFUNCTION OF BOTH EUSTACHIAN TUBES: Primary | ICD-10-CM

## 2021-06-09 PROCEDURE — 99214 PR OFFICE/OUTPT VISIT, EST, LEVL IV, 30-39 MIN: ICD-10-PCS | Mod: S$GLB,,, | Performed by: NURSE PRACTITIONER

## 2021-06-09 PROCEDURE — 1101F PR PT FALLS ASSESS DOC 0-1 FALLS W/OUT INJ PAST YR: ICD-10-PCS | Mod: CPTII,S$GLB,, | Performed by: NURSE PRACTITIONER

## 2021-06-09 PROCEDURE — 1126F PR PAIN SEVERITY QUANTIFIED, NO PAIN PRESENT: ICD-10-PCS | Mod: S$GLB,,, | Performed by: NURSE PRACTITIONER

## 2021-06-09 PROCEDURE — 1159F MED LIST DOCD IN RCRD: CPT | Mod: S$GLB,,, | Performed by: NURSE PRACTITIONER

## 2021-06-09 PROCEDURE — 99999 PR PBB SHADOW E&M-EST. PATIENT-LVL III: ICD-10-PCS | Mod: PBBFAC,,, | Performed by: NURSE PRACTITIONER

## 2021-06-09 PROCEDURE — 1101F PT FALLS ASSESS-DOCD LE1/YR: CPT | Mod: CPTII,S$GLB,, | Performed by: NURSE PRACTITIONER

## 2021-06-09 PROCEDURE — 1126F AMNT PAIN NOTED NONE PRSNT: CPT | Mod: S$GLB,,, | Performed by: NURSE PRACTITIONER

## 2021-06-09 PROCEDURE — 3288F PR FALLS RISK ASSESSMENT DOCUMENTED: ICD-10-PCS | Mod: CPTII,S$GLB,, | Performed by: NURSE PRACTITIONER

## 2021-06-09 PROCEDURE — 1159F PR MEDICATION LIST DOCUMENTED IN MEDICAL RECORD: ICD-10-PCS | Mod: S$GLB,,, | Performed by: NURSE PRACTITIONER

## 2021-06-09 PROCEDURE — 99214 OFFICE O/P EST MOD 30 MIN: CPT | Mod: S$GLB,,, | Performed by: NURSE PRACTITIONER

## 2021-06-09 PROCEDURE — 3288F FALL RISK ASSESSMENT DOCD: CPT | Mod: CPTII,S$GLB,, | Performed by: NURSE PRACTITIONER

## 2021-06-09 PROCEDURE — 99999 PR PBB SHADOW E&M-EST. PATIENT-LVL III: CPT | Mod: PBBFAC,,, | Performed by: NURSE PRACTITIONER

## 2021-06-15 ENCOUNTER — TELEPHONE (OUTPATIENT)
Dept: CARDIOLOGY | Facility: CLINIC | Age: 81
End: 2021-06-15

## 2021-06-15 DIAGNOSIS — R07.9 CHEST PAIN, UNSPECIFIED TYPE: Primary | ICD-10-CM

## 2021-06-18 ENCOUNTER — PATIENT OUTREACH (OUTPATIENT)
Dept: ADMINISTRATIVE | Facility: HOSPITAL | Age: 81
End: 2021-06-18

## 2021-06-21 ENCOUNTER — OFFICE VISIT (OUTPATIENT)
Dept: CARDIOLOGY | Facility: CLINIC | Age: 81
End: 2021-06-21
Payer: MEDICARE

## 2021-06-21 VITALS
HEART RATE: 62 BPM | BODY MASS INDEX: 21.9 KG/M2 | SYSTOLIC BLOOD PRESSURE: 130 MMHG | WEIGHT: 119 LBS | DIASTOLIC BLOOD PRESSURE: 70 MMHG | OXYGEN SATURATION: 99 % | HEIGHT: 62 IN

## 2021-06-21 DIAGNOSIS — I70.219 ATHEROSCLEROTIC PERIPHERAL VASCULAR DISEASE WITH INTERMITTENT CLAUDICATION: ICD-10-CM

## 2021-06-21 DIAGNOSIS — I70.0 ATHEROSCLEROSIS OF AORTIC ARCH: ICD-10-CM

## 2021-06-21 DIAGNOSIS — I70.8 CELIAC ARTERY ATHEROSCLEROSIS: Primary | ICD-10-CM

## 2021-06-21 DIAGNOSIS — Z78.9 STATIN INTOLERANCE: ICD-10-CM

## 2021-06-21 DIAGNOSIS — E78.2 MIXED HYPERLIPIDEMIA: ICD-10-CM

## 2021-06-21 DIAGNOSIS — I10 ESSENTIAL HYPERTENSION: ICD-10-CM

## 2021-06-21 PROCEDURE — 3288F FALL RISK ASSESSMENT DOCD: CPT | Mod: CPTII,S$GLB,, | Performed by: INTERNAL MEDICINE

## 2021-06-21 PROCEDURE — 1101F PR PT FALLS ASSESS DOC 0-1 FALLS W/OUT INJ PAST YR: ICD-10-PCS | Mod: CPTII,S$GLB,, | Performed by: INTERNAL MEDICINE

## 2021-06-21 PROCEDURE — 99499 RISK ADDL DX/OHS AUDIT: ICD-10-PCS | Mod: S$GLB,,, | Performed by: INTERNAL MEDICINE

## 2021-06-21 PROCEDURE — 1101F PT FALLS ASSESS-DOCD LE1/YR: CPT | Mod: CPTII,S$GLB,, | Performed by: INTERNAL MEDICINE

## 2021-06-21 PROCEDURE — 1159F MED LIST DOCD IN RCRD: CPT | Mod: S$GLB,,, | Performed by: INTERNAL MEDICINE

## 2021-06-21 PROCEDURE — 99499 UNLISTED E&M SERVICE: CPT | Mod: S$GLB,,, | Performed by: INTERNAL MEDICINE

## 2021-06-21 PROCEDURE — 3288F PR FALLS RISK ASSESSMENT DOCUMENTED: ICD-10-PCS | Mod: CPTII,S$GLB,, | Performed by: INTERNAL MEDICINE

## 2021-06-21 PROCEDURE — 99215 PR OFFICE/OUTPT VISIT, EST, LEVL V, 40-54 MIN: ICD-10-PCS | Mod: S$GLB,,, | Performed by: INTERNAL MEDICINE

## 2021-06-21 PROCEDURE — 1159F PR MEDICATION LIST DOCUMENTED IN MEDICAL RECORD: ICD-10-PCS | Mod: S$GLB,,, | Performed by: INTERNAL MEDICINE

## 2021-06-21 PROCEDURE — 1126F PR PAIN SEVERITY QUANTIFIED, NO PAIN PRESENT: ICD-10-PCS | Mod: S$GLB,,, | Performed by: INTERNAL MEDICINE

## 2021-06-21 PROCEDURE — 1126F AMNT PAIN NOTED NONE PRSNT: CPT | Mod: S$GLB,,, | Performed by: INTERNAL MEDICINE

## 2021-06-21 PROCEDURE — 99215 OFFICE O/P EST HI 40 MIN: CPT | Mod: S$GLB,,, | Performed by: INTERNAL MEDICINE

## 2021-06-21 PROCEDURE — 99999 PR PBB SHADOW E&M-EST. PATIENT-LVL IV: CPT | Mod: PBBFAC,,, | Performed by: INTERNAL MEDICINE

## 2021-06-21 PROCEDURE — 99999 PR PBB SHADOW E&M-EST. PATIENT-LVL IV: ICD-10-PCS | Mod: PBBFAC,,, | Performed by: INTERNAL MEDICINE

## 2021-06-23 ENCOUNTER — SPECIALTY PHARMACY (OUTPATIENT)
Dept: PHARMACY | Facility: CLINIC | Age: 81
End: 2021-06-23

## 2021-07-19 ENCOUNTER — LAB VISIT (OUTPATIENT)
Dept: LAB | Facility: HOSPITAL | Age: 81
End: 2021-07-19
Attending: INTERNAL MEDICINE
Payer: MEDICARE

## 2021-07-19 ENCOUNTER — PATIENT OUTREACH (OUTPATIENT)
Dept: ADMINISTRATIVE | Facility: OTHER | Age: 81
End: 2021-07-19

## 2021-07-19 DIAGNOSIS — E78.00 HYPERCHOLESTEREMIA: ICD-10-CM

## 2021-07-19 LAB
ALBUMIN SERPL BCP-MCNC: 3.6 G/DL (ref 3.5–5.2)
ALP SERPL-CCNC: 40 U/L (ref 55–135)
ALT SERPL W/O P-5'-P-CCNC: 39 U/L (ref 10–44)
ANION GAP SERPL CALC-SCNC: 7 MMOL/L (ref 8–16)
AST SERPL-CCNC: 32 U/L (ref 10–40)
BILIRUB SERPL-MCNC: 0.4 MG/DL (ref 0.1–1)
BUN SERPL-MCNC: 20 MG/DL (ref 8–23)
CALCIUM SERPL-MCNC: 9.9 MG/DL (ref 8.7–10.5)
CHLORIDE SERPL-SCNC: 103 MMOL/L (ref 95–110)
CHOLEST SERPL-MCNC: 211 MG/DL (ref 120–199)
CHOLEST/HDLC SERPL: 2.8 {RATIO} (ref 2–5)
CO2 SERPL-SCNC: 29 MMOL/L (ref 23–29)
CREAT SERPL-MCNC: 1.1 MG/DL (ref 0.5–1.4)
EST. GFR  (AFRICAN AMERICAN): 55.6 ML/MIN/1.73 M^2
EST. GFR  (NON AFRICAN AMERICAN): 48.2 ML/MIN/1.73 M^2
GLUCOSE SERPL-MCNC: 136 MG/DL (ref 70–110)
HDLC SERPL-MCNC: 75 MG/DL (ref 40–75)
HDLC SERPL: 35.5 % (ref 20–50)
LDLC SERPL CALC-MCNC: 124 MG/DL (ref 63–159)
NONHDLC SERPL-MCNC: 136 MG/DL
POTASSIUM SERPL-SCNC: 4.3 MMOL/L (ref 3.5–5.1)
PROT SERPL-MCNC: 7.3 G/DL (ref 6–8.4)
SODIUM SERPL-SCNC: 139 MMOL/L (ref 136–145)
TRIGL SERPL-MCNC: 60 MG/DL (ref 30–150)

## 2021-07-19 PROCEDURE — 36415 COLL VENOUS BLD VENIPUNCTURE: CPT | Mod: PN | Performed by: INTERNAL MEDICINE

## 2021-07-19 PROCEDURE — 80061 LIPID PANEL: CPT | Performed by: INTERNAL MEDICINE

## 2021-07-19 PROCEDURE — 80053 COMPREHEN METABOLIC PANEL: CPT | Performed by: INTERNAL MEDICINE

## 2021-07-20 ENCOUNTER — OFFICE VISIT (OUTPATIENT)
Dept: OTOLARYNGOLOGY | Facility: CLINIC | Age: 81
End: 2021-07-20
Payer: MEDICARE

## 2021-07-20 VITALS
BODY MASS INDEX: 21.9 KG/M2 | TEMPERATURE: 98 F | DIASTOLIC BLOOD PRESSURE: 75 MMHG | HEART RATE: 81 BPM | SYSTOLIC BLOOD PRESSURE: 123 MMHG | WEIGHT: 119 LBS | HEIGHT: 62 IN

## 2021-07-20 DIAGNOSIS — K21.9 LPRD (LARYNGOPHARYNGEAL REFLUX DISEASE): Primary | ICD-10-CM

## 2021-07-20 PROCEDURE — 99213 PR OFFICE/OUTPT VISIT, EST, LEVL III, 20-29 MIN: ICD-10-PCS | Mod: 25,S$GLB,, | Performed by: OTOLARYNGOLOGY

## 2021-07-20 PROCEDURE — 3078F PR MOST RECENT DIASTOLIC BLOOD PRESSURE < 80 MM HG: ICD-10-PCS | Mod: CPTII,S$GLB,, | Performed by: OTOLARYNGOLOGY

## 2021-07-20 PROCEDURE — 1126F AMNT PAIN NOTED NONE PRSNT: CPT | Mod: CPTII,S$GLB,, | Performed by: OTOLARYNGOLOGY

## 2021-07-20 PROCEDURE — 99499 RISK ADDL DX/OHS AUDIT: ICD-10-PCS | Mod: S$GLB,,, | Performed by: OTOLARYNGOLOGY

## 2021-07-20 PROCEDURE — 99213 OFFICE O/P EST LOW 20 MIN: CPT | Mod: 25,S$GLB,, | Performed by: OTOLARYNGOLOGY

## 2021-07-20 PROCEDURE — 3288F PR FALLS RISK ASSESSMENT DOCUMENTED: ICD-10-PCS | Mod: CPTII,S$GLB,, | Performed by: OTOLARYNGOLOGY

## 2021-07-20 PROCEDURE — 3074F SYST BP LT 130 MM HG: CPT | Mod: CPTII,S$GLB,, | Performed by: OTOLARYNGOLOGY

## 2021-07-20 PROCEDURE — 99499 UNLISTED E&M SERVICE: CPT | Mod: S$GLB,,, | Performed by: OTOLARYNGOLOGY

## 2021-07-20 PROCEDURE — 3288F FALL RISK ASSESSMENT DOCD: CPT | Mod: CPTII,S$GLB,, | Performed by: OTOLARYNGOLOGY

## 2021-07-20 PROCEDURE — 1101F PR PT FALLS ASSESS DOC 0-1 FALLS W/OUT INJ PAST YR: ICD-10-PCS | Mod: CPTII,S$GLB,, | Performed by: OTOLARYNGOLOGY

## 2021-07-20 PROCEDURE — 1159F MED LIST DOCD IN RCRD: CPT | Mod: CPTII,S$GLB,, | Performed by: OTOLARYNGOLOGY

## 2021-07-20 PROCEDURE — 31575 PR LARYNGOSCOPY, FLEXIBLE; DIAGNOSTIC: ICD-10-PCS | Mod: S$GLB,,, | Performed by: OTOLARYNGOLOGY

## 2021-07-20 PROCEDURE — 1101F PT FALLS ASSESS-DOCD LE1/YR: CPT | Mod: CPTII,S$GLB,, | Performed by: OTOLARYNGOLOGY

## 2021-07-20 PROCEDURE — 3078F DIAST BP <80 MM HG: CPT | Mod: CPTII,S$GLB,, | Performed by: OTOLARYNGOLOGY

## 2021-07-20 PROCEDURE — 3074F PR MOST RECENT SYSTOLIC BLOOD PRESSURE < 130 MM HG: ICD-10-PCS | Mod: CPTII,S$GLB,, | Performed by: OTOLARYNGOLOGY

## 2021-07-20 PROCEDURE — 31575 DIAGNOSTIC LARYNGOSCOPY: CPT | Mod: S$GLB,,, | Performed by: OTOLARYNGOLOGY

## 2021-07-20 PROCEDURE — 1159F PR MEDICATION LIST DOCUMENTED IN MEDICAL RECORD: ICD-10-PCS | Mod: CPTII,S$GLB,, | Performed by: OTOLARYNGOLOGY

## 2021-07-20 PROCEDURE — 1126F PR PAIN SEVERITY QUANTIFIED, NO PAIN PRESENT: ICD-10-PCS | Mod: CPTII,S$GLB,, | Performed by: OTOLARYNGOLOGY

## 2021-07-21 ENCOUNTER — SPECIALTY PHARMACY (OUTPATIENT)
Dept: PHARMACY | Facility: CLINIC | Age: 81
End: 2021-07-21

## 2021-07-27 ENCOUNTER — TELEPHONE (OUTPATIENT)
Dept: INTERNAL MEDICINE | Facility: CLINIC | Age: 81
End: 2021-07-27

## 2021-07-27 ENCOUNTER — TELEPHONE (OUTPATIENT)
Dept: ENDOSCOPY | Facility: HOSPITAL | Age: 81
End: 2021-07-27

## 2021-07-28 ENCOUNTER — OFFICE VISIT (OUTPATIENT)
Dept: CARDIOLOGY | Facility: CLINIC | Age: 81
End: 2021-07-28
Payer: MEDICARE

## 2021-07-28 VITALS
DIASTOLIC BLOOD PRESSURE: 74 MMHG | WEIGHT: 117.31 LBS | HEIGHT: 63 IN | HEART RATE: 84 BPM | BODY MASS INDEX: 20.79 KG/M2 | SYSTOLIC BLOOD PRESSURE: 116 MMHG | OXYGEN SATURATION: 98 %

## 2021-07-28 DIAGNOSIS — I70.0 ATHEROSCLEROSIS OF AORTIC ARCH: ICD-10-CM

## 2021-07-28 DIAGNOSIS — I70.8 CELIAC ARTERY ATHEROSCLEROSIS: Primary | ICD-10-CM

## 2021-07-28 DIAGNOSIS — E78.00 HYPERCHOLESTEREMIA: ICD-10-CM

## 2021-07-28 DIAGNOSIS — I73.9 PAD (PERIPHERAL ARTERY DISEASE): ICD-10-CM

## 2021-07-28 DIAGNOSIS — E78.2 MIXED HYPERLIPIDEMIA: ICD-10-CM

## 2021-07-28 DIAGNOSIS — Z78.9 STATIN INTOLERANCE: ICD-10-CM

## 2021-07-28 DIAGNOSIS — I70.219 ATHEROSCLEROTIC PERIPHERAL VASCULAR DISEASE WITH INTERMITTENT CLAUDICATION: ICD-10-CM

## 2021-07-28 DIAGNOSIS — R10.9 ABDOMINAL DISCOMFORT: ICD-10-CM

## 2021-07-28 DIAGNOSIS — K55.9 MESENTERIC ISCHEMIA: ICD-10-CM

## 2021-07-28 DIAGNOSIS — I70.0 AORTIC ATHEROSCLEROSIS: ICD-10-CM

## 2021-07-28 DIAGNOSIS — R91.1 LUNG NODULE: ICD-10-CM

## 2021-07-28 DIAGNOSIS — I10 ESSENTIAL HYPERTENSION: ICD-10-CM

## 2021-07-28 PROCEDURE — 3078F DIAST BP <80 MM HG: CPT | Mod: CPTII,S$GLB,, | Performed by: INTERNAL MEDICINE

## 2021-07-28 PROCEDURE — 1126F AMNT PAIN NOTED NONE PRSNT: CPT | Mod: CPTII,S$GLB,, | Performed by: INTERNAL MEDICINE

## 2021-07-28 PROCEDURE — 3288F FALL RISK ASSESSMENT DOCD: CPT | Mod: CPTII,S$GLB,, | Performed by: INTERNAL MEDICINE

## 2021-07-28 PROCEDURE — 99215 PR OFFICE/OUTPT VISIT, EST, LEVL V, 40-54 MIN: ICD-10-PCS | Mod: S$GLB,,, | Performed by: INTERNAL MEDICINE

## 2021-07-28 PROCEDURE — 3074F SYST BP LT 130 MM HG: CPT | Mod: CPTII,S$GLB,, | Performed by: INTERNAL MEDICINE

## 2021-07-28 PROCEDURE — 1126F PR PAIN SEVERITY QUANTIFIED, NO PAIN PRESENT: ICD-10-PCS | Mod: CPTII,S$GLB,, | Performed by: INTERNAL MEDICINE

## 2021-07-28 PROCEDURE — 1101F PR PT FALLS ASSESS DOC 0-1 FALLS W/OUT INJ PAST YR: ICD-10-PCS | Mod: CPTII,S$GLB,, | Performed by: INTERNAL MEDICINE

## 2021-07-28 PROCEDURE — 99215 OFFICE O/P EST HI 40 MIN: CPT | Mod: S$GLB,,, | Performed by: INTERNAL MEDICINE

## 2021-07-28 PROCEDURE — 1159F PR MEDICATION LIST DOCUMENTED IN MEDICAL RECORD: ICD-10-PCS | Mod: CPTII,S$GLB,, | Performed by: INTERNAL MEDICINE

## 2021-07-28 PROCEDURE — 1159F MED LIST DOCD IN RCRD: CPT | Mod: CPTII,S$GLB,, | Performed by: INTERNAL MEDICINE

## 2021-07-28 PROCEDURE — 3074F PR MOST RECENT SYSTOLIC BLOOD PRESSURE < 130 MM HG: ICD-10-PCS | Mod: CPTII,S$GLB,, | Performed by: INTERNAL MEDICINE

## 2021-07-28 PROCEDURE — 99999 PR PBB SHADOW E&M-EST. PATIENT-LVL IV: ICD-10-PCS | Mod: PBBFAC,,, | Performed by: INTERNAL MEDICINE

## 2021-07-28 PROCEDURE — 1101F PT FALLS ASSESS-DOCD LE1/YR: CPT | Mod: CPTII,S$GLB,, | Performed by: INTERNAL MEDICINE

## 2021-07-28 PROCEDURE — 3288F PR FALLS RISK ASSESSMENT DOCUMENTED: ICD-10-PCS | Mod: CPTII,S$GLB,, | Performed by: INTERNAL MEDICINE

## 2021-07-28 PROCEDURE — 99999 PR PBB SHADOW E&M-EST. PATIENT-LVL IV: CPT | Mod: PBBFAC,,, | Performed by: INTERNAL MEDICINE

## 2021-07-28 PROCEDURE — 3078F PR MOST RECENT DIASTOLIC BLOOD PRESSURE < 80 MM HG: ICD-10-PCS | Mod: CPTII,S$GLB,, | Performed by: INTERNAL MEDICINE

## 2021-07-28 RX ORDER — OMEPRAZOLE 20 MG/1
CAPSULE, DELAYED RELEASE ORAL
Qty: 60 CAPSULE | Refills: 3 | Status: SHIPPED | OUTPATIENT
Start: 2021-07-28 | End: 2021-11-08

## 2021-08-02 ENCOUNTER — SPECIALTY PHARMACY (OUTPATIENT)
Dept: PHARMACY | Facility: CLINIC | Age: 81
End: 2021-08-02

## 2021-08-04 ENCOUNTER — HOSPITAL ENCOUNTER (OUTPATIENT)
Dept: RADIOLOGY | Facility: HOSPITAL | Age: 81
Discharge: HOME | End: 2021-08-04
Attending: INTERNAL MEDICINE
Payer: MEDICARE

## 2021-08-04 DIAGNOSIS — R91.1 LUNG NODULE: ICD-10-CM

## 2021-08-04 PROCEDURE — 71250 CT THORAX DX C-: CPT | Mod: 26,,, | Performed by: RADIOLOGY

## 2021-08-04 PROCEDURE — 71250 CT CHEST WITHOUT CONTRAST: ICD-10-PCS | Mod: 26,,, | Performed by: RADIOLOGY

## 2021-08-04 PROCEDURE — 71250 CT THORAX DX C-: CPT | Mod: TC

## 2021-08-15 ENCOUNTER — SPECIALTY PHARMACY (OUTPATIENT)
Dept: PHARMACY | Facility: CLINIC | Age: 81
End: 2021-08-15

## 2021-08-23 RX ORDER — CARVEDILOL 6.25 MG/1
6.25 TABLET ORAL 2 TIMES DAILY WITH MEALS
Qty: 180 TABLET | Refills: 0 | OUTPATIENT
Start: 2021-08-23

## 2021-09-13 ENCOUNTER — SPECIALTY PHARMACY (OUTPATIENT)
Dept: PHARMACY | Facility: CLINIC | Age: 81
End: 2021-09-13

## 2021-09-23 ENCOUNTER — OFFICE VISIT (OUTPATIENT)
Dept: INTERNAL MEDICINE | Facility: CLINIC | Age: 81
End: 2021-09-23
Payer: MEDICARE

## 2021-09-23 VITALS
HEART RATE: 91 BPM | TEMPERATURE: 98 F | RESPIRATION RATE: 18 BRPM | DIASTOLIC BLOOD PRESSURE: 60 MMHG | HEIGHT: 63 IN | OXYGEN SATURATION: 98 % | WEIGHT: 119.25 LBS | SYSTOLIC BLOOD PRESSURE: 126 MMHG | BODY MASS INDEX: 21.13 KG/M2

## 2021-09-23 DIAGNOSIS — R47.02 DYSPHASIA: Primary | ICD-10-CM

## 2021-09-23 DIAGNOSIS — R13.11 DYSPHAGIA, ORAL PHASE: ICD-10-CM

## 2021-09-23 PROCEDURE — 3078F DIAST BP <80 MM HG: CPT | Mod: CPTII,S$GLB,, | Performed by: STUDENT IN AN ORGANIZED HEALTH CARE EDUCATION/TRAINING PROGRAM

## 2021-09-23 PROCEDURE — 99214 PR OFFICE/OUTPT VISIT, EST, LEVL IV, 30-39 MIN: ICD-10-PCS | Mod: S$GLB,,, | Performed by: STUDENT IN AN ORGANIZED HEALTH CARE EDUCATION/TRAINING PROGRAM

## 2021-09-23 PROCEDURE — 1101F PT FALLS ASSESS-DOCD LE1/YR: CPT | Mod: CPTII,S$GLB,, | Performed by: STUDENT IN AN ORGANIZED HEALTH CARE EDUCATION/TRAINING PROGRAM

## 2021-09-23 PROCEDURE — 3074F SYST BP LT 130 MM HG: CPT | Mod: CPTII,S$GLB,, | Performed by: STUDENT IN AN ORGANIZED HEALTH CARE EDUCATION/TRAINING PROGRAM

## 2021-09-23 PROCEDURE — 1159F PR MEDICATION LIST DOCUMENTED IN MEDICAL RECORD: ICD-10-PCS | Mod: CPTII,S$GLB,, | Performed by: STUDENT IN AN ORGANIZED HEALTH CARE EDUCATION/TRAINING PROGRAM

## 2021-09-23 PROCEDURE — 3288F FALL RISK ASSESSMENT DOCD: CPT | Mod: CPTII,S$GLB,, | Performed by: STUDENT IN AN ORGANIZED HEALTH CARE EDUCATION/TRAINING PROGRAM

## 2021-09-23 PROCEDURE — 1126F PR PAIN SEVERITY QUANTIFIED, NO PAIN PRESENT: ICD-10-PCS | Mod: CPTII,S$GLB,, | Performed by: STUDENT IN AN ORGANIZED HEALTH CARE EDUCATION/TRAINING PROGRAM

## 2021-09-23 PROCEDURE — 3078F PR MOST RECENT DIASTOLIC BLOOD PRESSURE < 80 MM HG: ICD-10-PCS | Mod: CPTII,S$GLB,, | Performed by: STUDENT IN AN ORGANIZED HEALTH CARE EDUCATION/TRAINING PROGRAM

## 2021-09-23 PROCEDURE — 99214 OFFICE O/P EST MOD 30 MIN: CPT | Mod: S$GLB,,, | Performed by: STUDENT IN AN ORGANIZED HEALTH CARE EDUCATION/TRAINING PROGRAM

## 2021-09-23 PROCEDURE — 3288F PR FALLS RISK ASSESSMENT DOCUMENTED: ICD-10-PCS | Mod: CPTII,S$GLB,, | Performed by: STUDENT IN AN ORGANIZED HEALTH CARE EDUCATION/TRAINING PROGRAM

## 2021-09-23 PROCEDURE — 99999 PR PBB SHADOW E&M-EST. PATIENT-LVL V: ICD-10-PCS | Mod: PBBFAC,,, | Performed by: STUDENT IN AN ORGANIZED HEALTH CARE EDUCATION/TRAINING PROGRAM

## 2021-09-23 PROCEDURE — 99999 PR PBB SHADOW E&M-EST. PATIENT-LVL V: CPT | Mod: PBBFAC,,, | Performed by: STUDENT IN AN ORGANIZED HEALTH CARE EDUCATION/TRAINING PROGRAM

## 2021-09-23 PROCEDURE — 3074F PR MOST RECENT SYSTOLIC BLOOD PRESSURE < 130 MM HG: ICD-10-PCS | Mod: CPTII,S$GLB,, | Performed by: STUDENT IN AN ORGANIZED HEALTH CARE EDUCATION/TRAINING PROGRAM

## 2021-09-23 PROCEDURE — 1126F AMNT PAIN NOTED NONE PRSNT: CPT | Mod: CPTII,S$GLB,, | Performed by: STUDENT IN AN ORGANIZED HEALTH CARE EDUCATION/TRAINING PROGRAM

## 2021-09-23 PROCEDURE — 1101F PR PT FALLS ASSESS DOC 0-1 FALLS W/OUT INJ PAST YR: ICD-10-PCS | Mod: CPTII,S$GLB,, | Performed by: STUDENT IN AN ORGANIZED HEALTH CARE EDUCATION/TRAINING PROGRAM

## 2021-09-23 PROCEDURE — 1159F MED LIST DOCD IN RCRD: CPT | Mod: CPTII,S$GLB,, | Performed by: STUDENT IN AN ORGANIZED HEALTH CARE EDUCATION/TRAINING PROGRAM

## 2021-09-23 RX ORDER — PANTOPRAZOLE SODIUM 40 MG/1
TABLET, DELAYED RELEASE ORAL
COMMUNITY
Start: 2021-08-10 | End: 2022-02-09

## 2021-09-30 ENCOUNTER — CLINICAL SUPPORT (OUTPATIENT)
Dept: REHABILITATION | Facility: HOSPITAL | Age: 81
End: 2021-09-30
Attending: STUDENT IN AN ORGANIZED HEALTH CARE EDUCATION/TRAINING PROGRAM
Payer: MEDICARE

## 2021-09-30 DIAGNOSIS — R47.02 DYSPHASIA: ICD-10-CM

## 2021-09-30 DIAGNOSIS — R05.3 CHRONIC COUGH: ICD-10-CM

## 2021-09-30 DIAGNOSIS — R09.89 CHRONIC THROAT CLEARING: ICD-10-CM

## 2021-09-30 PROCEDURE — 92524 BEHAVRAL QUALIT ANALYS VOICE: CPT | Mod: PO | Performed by: SPEECH-LANGUAGE PATHOLOGIST

## 2021-09-30 PROCEDURE — 92507 TX SP LANG VOICE COMM INDIV: CPT | Mod: PO | Performed by: SPEECH-LANGUAGE PATHOLOGIST

## 2021-09-30 PROCEDURE — 92610 EVALUATE SWALLOWING FUNCTION: CPT | Mod: PO | Performed by: SPEECH-LANGUAGE PATHOLOGIST

## 2021-10-01 ENCOUNTER — CLINICAL SUPPORT (OUTPATIENT)
Dept: SPEECH THERAPY | Facility: HOSPITAL | Age: 81
End: 2021-10-01
Attending: STUDENT IN AN ORGANIZED HEALTH CARE EDUCATION/TRAINING PROGRAM
Payer: MEDICARE

## 2021-10-01 ENCOUNTER — HOSPITAL ENCOUNTER (OUTPATIENT)
Dept: RADIOLOGY | Facility: HOSPITAL | Age: 81
Discharge: HOME | End: 2021-10-01
Attending: STUDENT IN AN ORGANIZED HEALTH CARE EDUCATION/TRAINING PROGRAM
Payer: MEDICARE

## 2021-10-01 DIAGNOSIS — R09.A2 GLOBUS SENSATION: ICD-10-CM

## 2021-10-01 DIAGNOSIS — R47.02 DYSPHASIA: ICD-10-CM

## 2021-10-01 DIAGNOSIS — R13.12 DYSPHAGIA, OROPHARYNGEAL: Primary | ICD-10-CM

## 2021-10-01 DIAGNOSIS — R13.11 DYSPHAGIA, ORAL PHASE: ICD-10-CM

## 2021-10-01 PROCEDURE — 25500020 PHARM REV CODE 255: Performed by: STUDENT IN AN ORGANIZED HEALTH CARE EDUCATION/TRAINING PROGRAM

## 2021-10-01 PROCEDURE — A9698 NON-RAD CONTRAST MATERIALNOC: HCPCS | Performed by: STUDENT IN AN ORGANIZED HEALTH CARE EDUCATION/TRAINING PROGRAM

## 2021-10-01 PROCEDURE — 92611 MOTION FLUOROSCOPY/SWALLOW: CPT | Mod: GN | Performed by: SPEECH-LANGUAGE PATHOLOGIST

## 2021-10-01 PROCEDURE — 74230 FL MODIFIED BARIUM SWALLOW SPEECH STUDY: ICD-10-PCS | Mod: 26,GC,, | Performed by: RADIOLOGY

## 2021-10-01 PROCEDURE — 74230 X-RAY XM SWLNG FUNCJ C+: CPT | Mod: TC

## 2021-10-01 PROCEDURE — 74230 X-RAY XM SWLNG FUNCJ C+: CPT | Mod: 26,GC,, | Performed by: RADIOLOGY

## 2021-10-01 RX ADMIN — BARIUM SULFATE 100 ML: 0.81 POWDER, FOR SUSPENSION ORAL at 02:10

## 2021-10-06 ENCOUNTER — TELEPHONE (OUTPATIENT)
Dept: INTERNAL MEDICINE | Facility: CLINIC | Age: 81
End: 2021-10-06

## 2021-10-08 ENCOUNTER — SPECIALTY PHARMACY (OUTPATIENT)
Dept: PHARMACY | Facility: CLINIC | Age: 81
End: 2021-10-08

## 2021-10-14 ENCOUNTER — OFFICE VISIT (OUTPATIENT)
Dept: INTERNAL MEDICINE | Facility: CLINIC | Age: 81
End: 2021-10-14
Payer: MEDICARE

## 2021-10-14 VITALS
SYSTOLIC BLOOD PRESSURE: 108 MMHG | WEIGHT: 118.63 LBS | OXYGEN SATURATION: 100 % | RESPIRATION RATE: 18 BRPM | HEIGHT: 63 IN | DIASTOLIC BLOOD PRESSURE: 50 MMHG | TEMPERATURE: 98 F | HEART RATE: 80 BPM | BODY MASS INDEX: 21.02 KG/M2

## 2021-10-14 DIAGNOSIS — Z01.818 PREOP EXAMINATION: Primary | ICD-10-CM

## 2021-10-14 DIAGNOSIS — R53.83 FATIGUE, UNSPECIFIED TYPE: ICD-10-CM

## 2021-10-14 PROCEDURE — 99214 PR OFFICE/OUTPT VISIT, EST, LEVL IV, 30-39 MIN: ICD-10-PCS | Mod: S$GLB,,, | Performed by: STUDENT IN AN ORGANIZED HEALTH CARE EDUCATION/TRAINING PROGRAM

## 2021-10-14 PROCEDURE — 1159F PR MEDICATION LIST DOCUMENTED IN MEDICAL RECORD: ICD-10-PCS | Mod: CPTII,S$GLB,, | Performed by: STUDENT IN AN ORGANIZED HEALTH CARE EDUCATION/TRAINING PROGRAM

## 2021-10-14 PROCEDURE — 1126F PR PAIN SEVERITY QUANTIFIED, NO PAIN PRESENT: ICD-10-PCS | Mod: CPTII,S$GLB,, | Performed by: STUDENT IN AN ORGANIZED HEALTH CARE EDUCATION/TRAINING PROGRAM

## 2021-10-14 PROCEDURE — 3078F PR MOST RECENT DIASTOLIC BLOOD PRESSURE < 80 MM HG: ICD-10-PCS | Mod: CPTII,S$GLB,, | Performed by: STUDENT IN AN ORGANIZED HEALTH CARE EDUCATION/TRAINING PROGRAM

## 2021-10-14 PROCEDURE — 3074F SYST BP LT 130 MM HG: CPT | Mod: CPTII,S$GLB,, | Performed by: STUDENT IN AN ORGANIZED HEALTH CARE EDUCATION/TRAINING PROGRAM

## 2021-10-14 PROCEDURE — 3074F PR MOST RECENT SYSTOLIC BLOOD PRESSURE < 130 MM HG: ICD-10-PCS | Mod: CPTII,S$GLB,, | Performed by: STUDENT IN AN ORGANIZED HEALTH CARE EDUCATION/TRAINING PROGRAM

## 2021-10-14 PROCEDURE — 1101F PT FALLS ASSESS-DOCD LE1/YR: CPT | Mod: CPTII,S$GLB,, | Performed by: STUDENT IN AN ORGANIZED HEALTH CARE EDUCATION/TRAINING PROGRAM

## 2021-10-14 PROCEDURE — 3288F FALL RISK ASSESSMENT DOCD: CPT | Mod: CPTII,S$GLB,, | Performed by: STUDENT IN AN ORGANIZED HEALTH CARE EDUCATION/TRAINING PROGRAM

## 2021-10-14 PROCEDURE — 90694 VACC AIIV4 NO PRSRV 0.5ML IM: CPT | Mod: S$GLB,,, | Performed by: STUDENT IN AN ORGANIZED HEALTH CARE EDUCATION/TRAINING PROGRAM

## 2021-10-14 PROCEDURE — 3288F PR FALLS RISK ASSESSMENT DOCUMENTED: ICD-10-PCS | Mod: CPTII,S$GLB,, | Performed by: STUDENT IN AN ORGANIZED HEALTH CARE EDUCATION/TRAINING PROGRAM

## 2021-10-14 PROCEDURE — 99214 OFFICE O/P EST MOD 30 MIN: CPT | Mod: S$GLB,,, | Performed by: STUDENT IN AN ORGANIZED HEALTH CARE EDUCATION/TRAINING PROGRAM

## 2021-10-14 PROCEDURE — G0008 ADMIN INFLUENZA VIRUS VAC: HCPCS | Mod: S$GLB,,, | Performed by: STUDENT IN AN ORGANIZED HEALTH CARE EDUCATION/TRAINING PROGRAM

## 2021-10-14 PROCEDURE — 1126F AMNT PAIN NOTED NONE PRSNT: CPT | Mod: CPTII,S$GLB,, | Performed by: STUDENT IN AN ORGANIZED HEALTH CARE EDUCATION/TRAINING PROGRAM

## 2021-10-14 PROCEDURE — 99999 PR PBB SHADOW E&M-EST. PATIENT-LVL IV: ICD-10-PCS | Mod: PBBFAC,,, | Performed by: STUDENT IN AN ORGANIZED HEALTH CARE EDUCATION/TRAINING PROGRAM

## 2021-10-14 PROCEDURE — G0008 FLU VACCINE - QUADRIVALENT - ADJUVANTED: ICD-10-PCS | Mod: S$GLB,,, | Performed by: STUDENT IN AN ORGANIZED HEALTH CARE EDUCATION/TRAINING PROGRAM

## 2021-10-14 PROCEDURE — 1101F PR PT FALLS ASSESS DOC 0-1 FALLS W/OUT INJ PAST YR: ICD-10-PCS | Mod: CPTII,S$GLB,, | Performed by: STUDENT IN AN ORGANIZED HEALTH CARE EDUCATION/TRAINING PROGRAM

## 2021-10-14 PROCEDURE — 1159F MED LIST DOCD IN RCRD: CPT | Mod: CPTII,S$GLB,, | Performed by: STUDENT IN AN ORGANIZED HEALTH CARE EDUCATION/TRAINING PROGRAM

## 2021-10-14 PROCEDURE — 90694 FLU VACCINE - QUADRIVALENT - ADJUVANTED: ICD-10-PCS | Mod: S$GLB,,, | Performed by: STUDENT IN AN ORGANIZED HEALTH CARE EDUCATION/TRAINING PROGRAM

## 2021-10-14 PROCEDURE — 99999 PR PBB SHADOW E&M-EST. PATIENT-LVL IV: CPT | Mod: PBBFAC,,, | Performed by: STUDENT IN AN ORGANIZED HEALTH CARE EDUCATION/TRAINING PROGRAM

## 2021-10-14 PROCEDURE — 3078F DIAST BP <80 MM HG: CPT | Mod: CPTII,S$GLB,, | Performed by: STUDENT IN AN ORGANIZED HEALTH CARE EDUCATION/TRAINING PROGRAM

## 2021-10-18 ENCOUNTER — TELEPHONE (OUTPATIENT)
Dept: OBSTETRICS AND GYNECOLOGY | Facility: CLINIC | Age: 81
End: 2021-10-18

## 2021-11-01 ENCOUNTER — LAB VISIT (OUTPATIENT)
Dept: LAB | Facility: HOSPITAL | Age: 81
End: 2021-11-01
Attending: INTERNAL MEDICINE
Payer: MEDICARE

## 2021-11-01 DIAGNOSIS — E78.00 HYPERCHOLESTEREMIA: ICD-10-CM

## 2021-11-01 LAB
CHOLEST SERPL-MCNC: 241 MG/DL (ref 120–199)
CHOLEST/HDLC SERPL: 2.7 {RATIO} (ref 2–5)
HDLC SERPL-MCNC: 88 MG/DL (ref 40–75)
HDLC SERPL: 36.5 % (ref 20–50)
LDLC SERPL CALC-MCNC: 142 MG/DL (ref 63–159)
NONHDLC SERPL-MCNC: 153 MG/DL
TRIGL SERPL-MCNC: 55 MG/DL (ref 30–150)

## 2021-11-01 PROCEDURE — 36415 COLL VENOUS BLD VENIPUNCTURE: CPT | Mod: PN | Performed by: INTERNAL MEDICINE

## 2021-11-01 PROCEDURE — 80061 LIPID PANEL: CPT | Performed by: INTERNAL MEDICINE

## 2021-11-05 ENCOUNTER — PATIENT MESSAGE (OUTPATIENT)
Dept: PHARMACY | Facility: CLINIC | Age: 81
End: 2021-11-05
Payer: MEDICARE

## 2021-11-07 ENCOUNTER — PATIENT OUTREACH (OUTPATIENT)
Dept: ADMINISTRATIVE | Facility: OTHER | Age: 81
End: 2021-11-07
Payer: MEDICARE

## 2021-11-08 ENCOUNTER — OFFICE VISIT (OUTPATIENT)
Dept: CARDIOLOGY | Facility: CLINIC | Age: 81
End: 2021-11-08
Payer: MEDICARE

## 2021-11-08 VITALS
HEART RATE: 84 BPM | DIASTOLIC BLOOD PRESSURE: 60 MMHG | HEIGHT: 63 IN | WEIGHT: 121.5 LBS | SYSTOLIC BLOOD PRESSURE: 134 MMHG | BODY MASS INDEX: 21.53 KG/M2

## 2021-11-08 DIAGNOSIS — E78.2 MIXED HYPERLIPIDEMIA: ICD-10-CM

## 2021-11-08 DIAGNOSIS — I70.8 CELIAC ARTERY ATHEROSCLEROSIS: ICD-10-CM

## 2021-11-08 DIAGNOSIS — I70.0 AORTIC ATHEROSCLEROSIS: ICD-10-CM

## 2021-11-08 DIAGNOSIS — I70.0 ATHEROSCLEROSIS OF AORTIC ARCH: ICD-10-CM

## 2021-11-08 DIAGNOSIS — R91.1 LUNG NODULE: ICD-10-CM

## 2021-11-08 DIAGNOSIS — K76.9 LIVER LESION: ICD-10-CM

## 2021-11-08 DIAGNOSIS — I70.219 ATHEROSCLEROTIC PERIPHERAL VASCULAR DISEASE WITH INTERMITTENT CLAUDICATION: ICD-10-CM

## 2021-11-08 DIAGNOSIS — I10 ESSENTIAL HYPERTENSION: ICD-10-CM

## 2021-11-08 DIAGNOSIS — K55.9 MESENTERIC ISCHEMIA: ICD-10-CM

## 2021-11-08 DIAGNOSIS — I73.9 PAD (PERIPHERAL ARTERY DISEASE): Primary | ICD-10-CM

## 2021-11-08 DIAGNOSIS — E78.00 HYPERCHOLESTEREMIA: ICD-10-CM

## 2021-11-08 DIAGNOSIS — R10.9 ABDOMINAL DISCOMFORT: ICD-10-CM

## 2021-11-08 DIAGNOSIS — R91.1 SOLITARY PULMONARY NODULE: ICD-10-CM

## 2021-11-08 DIAGNOSIS — Z78.9 STATIN INTOLERANCE: ICD-10-CM

## 2021-11-08 DIAGNOSIS — J84.10 GRANULOMATOUS LUNG DISEASE: ICD-10-CM

## 2021-11-08 PROCEDURE — 3078F PR MOST RECENT DIASTOLIC BLOOD PRESSURE < 80 MM HG: ICD-10-PCS | Mod: CPTII,S$GLB,, | Performed by: INTERNAL MEDICINE

## 2021-11-08 PROCEDURE — 1126F AMNT PAIN NOTED NONE PRSNT: CPT | Mod: CPTII,S$GLB,, | Performed by: INTERNAL MEDICINE

## 2021-11-08 PROCEDURE — 99999 PR PBB SHADOW E&M-EST. PATIENT-LVL V: ICD-10-PCS | Mod: PBBFAC,,, | Performed by: INTERNAL MEDICINE

## 2021-11-08 PROCEDURE — 99215 PR OFFICE/OUTPT VISIT, EST, LEVL V, 40-54 MIN: ICD-10-PCS | Mod: S$GLB,,, | Performed by: INTERNAL MEDICINE

## 2021-11-08 PROCEDURE — 3075F SYST BP GE 130 - 139MM HG: CPT | Mod: CPTII,S$GLB,, | Performed by: INTERNAL MEDICINE

## 2021-11-08 PROCEDURE — 1101F PR PT FALLS ASSESS DOC 0-1 FALLS W/OUT INJ PAST YR: ICD-10-PCS | Mod: CPTII,S$GLB,, | Performed by: INTERNAL MEDICINE

## 2021-11-08 PROCEDURE — 99215 OFFICE O/P EST HI 40 MIN: CPT | Mod: S$GLB,,, | Performed by: INTERNAL MEDICINE

## 2021-11-08 PROCEDURE — 3288F FALL RISK ASSESSMENT DOCD: CPT | Mod: CPTII,S$GLB,, | Performed by: INTERNAL MEDICINE

## 2021-11-08 PROCEDURE — 3288F PR FALLS RISK ASSESSMENT DOCUMENTED: ICD-10-PCS | Mod: CPTII,S$GLB,, | Performed by: INTERNAL MEDICINE

## 2021-11-08 PROCEDURE — 3075F PR MOST RECENT SYSTOLIC BLOOD PRESS GE 130-139MM HG: ICD-10-PCS | Mod: CPTII,S$GLB,, | Performed by: INTERNAL MEDICINE

## 2021-11-08 PROCEDURE — 1101F PT FALLS ASSESS-DOCD LE1/YR: CPT | Mod: CPTII,S$GLB,, | Performed by: INTERNAL MEDICINE

## 2021-11-08 PROCEDURE — 1159F MED LIST DOCD IN RCRD: CPT | Mod: CPTII,S$GLB,, | Performed by: INTERNAL MEDICINE

## 2021-11-08 PROCEDURE — 1126F PR PAIN SEVERITY QUANTIFIED, NO PAIN PRESENT: ICD-10-PCS | Mod: CPTII,S$GLB,, | Performed by: INTERNAL MEDICINE

## 2021-11-08 PROCEDURE — 1159F PR MEDICATION LIST DOCUMENTED IN MEDICAL RECORD: ICD-10-PCS | Mod: CPTII,S$GLB,, | Performed by: INTERNAL MEDICINE

## 2021-11-08 PROCEDURE — 99999 PR PBB SHADOW E&M-EST. PATIENT-LVL V: CPT | Mod: PBBFAC,,, | Performed by: INTERNAL MEDICINE

## 2021-11-08 PROCEDURE — 3078F DIAST BP <80 MM HG: CPT | Mod: CPTII,S$GLB,, | Performed by: INTERNAL MEDICINE

## 2021-11-08 RX ORDER — KETOROLAC TROMETHAMINE 5 MG/ML
SOLUTION OPHTHALMIC
COMMUNITY
Start: 2021-10-18 | End: 2022-02-09

## 2021-11-08 RX ORDER — PREDNISOLONE ACETATE 10 MG/ML
SUSPENSION/ DROPS OPHTHALMIC
COMMUNITY
Start: 2021-10-18 | End: 2022-02-09

## 2021-11-09 ENCOUNTER — PATIENT MESSAGE (OUTPATIENT)
Dept: PHARMACY | Facility: CLINIC | Age: 81
End: 2021-11-09
Payer: MEDICARE

## 2021-11-09 ENCOUNTER — SPECIALTY PHARMACY (OUTPATIENT)
Dept: PHARMACY | Facility: CLINIC | Age: 81
End: 2021-11-09
Payer: MEDICARE

## 2021-11-20 ENCOUNTER — HOSPITAL ENCOUNTER (OUTPATIENT)
Dept: RADIOLOGY | Facility: HOSPITAL | Age: 81
Discharge: HOME | End: 2021-11-20
Attending: INTERNAL MEDICINE
Payer: MEDICARE

## 2021-11-20 DIAGNOSIS — K76.9 LIVER LESION: ICD-10-CM

## 2021-11-20 PROCEDURE — 74183 MRI ABD W/O CNTR FLWD CNTR: CPT | Mod: 26,,, | Performed by: INTERNAL MEDICINE

## 2021-11-20 PROCEDURE — A9585 GADOBUTROL INJECTION: HCPCS | Performed by: INTERNAL MEDICINE

## 2021-11-20 PROCEDURE — 25500020 PHARM REV CODE 255: Performed by: INTERNAL MEDICINE

## 2021-11-20 PROCEDURE — 74183 MRI ABDOMEN W WO CONTRAST: ICD-10-PCS | Mod: 26,,, | Performed by: INTERNAL MEDICINE

## 2021-11-20 PROCEDURE — 74183 MRI ABD W/O CNTR FLWD CNTR: CPT | Mod: TC

## 2021-11-20 RX ORDER — GADOBUTROL 604.72 MG/ML
10 INJECTION INTRAVENOUS
Status: COMPLETED | OUTPATIENT
Start: 2021-11-20 | End: 2021-11-20

## 2021-11-20 RX ADMIN — GADOBUTROL 10 ML: 604.72 INJECTION INTRAVENOUS at 09:11

## 2021-11-23 ENCOUNTER — OFFICE VISIT (OUTPATIENT)
Dept: OBSTETRICS AND GYNECOLOGY | Facility: CLINIC | Age: 81
End: 2021-11-23
Payer: MEDICARE

## 2021-11-23 ENCOUNTER — TELEPHONE (OUTPATIENT)
Dept: OBSTETRICS AND GYNECOLOGY | Facility: CLINIC | Age: 81
End: 2021-11-23

## 2021-11-23 VITALS
DIASTOLIC BLOOD PRESSURE: 76 MMHG | SYSTOLIC BLOOD PRESSURE: 104 MMHG | WEIGHT: 119.94 LBS | BODY MASS INDEX: 21.59 KG/M2

## 2021-11-23 DIAGNOSIS — Z01.419 WELL WOMAN EXAM WITH ROUTINE GYNECOLOGICAL EXAM: Primary | ICD-10-CM

## 2021-11-23 PROCEDURE — G0101 CA SCREEN;PELVIC/BREAST EXAM: HCPCS | Mod: GZ,S$GLB,, | Performed by: OBSTETRICS & GYNECOLOGY

## 2021-11-23 PROCEDURE — 99999 PR PBB SHADOW E&M-EST. PATIENT-LVL III: CPT | Mod: PBBFAC,,, | Performed by: OBSTETRICS & GYNECOLOGY

## 2021-11-23 PROCEDURE — G0101 PR CA SCREEN;PELVIC/BREAST EXAM: ICD-10-PCS | Mod: GZ,S$GLB,, | Performed by: OBSTETRICS & GYNECOLOGY

## 2021-11-23 PROCEDURE — 99999 PR PBB SHADOW E&M-EST. PATIENT-LVL III: ICD-10-PCS | Mod: PBBFAC,,, | Performed by: OBSTETRICS & GYNECOLOGY

## 2021-11-30 ENCOUNTER — PES CALL (OUTPATIENT)
Dept: ADMINISTRATIVE | Facility: CLINIC | Age: 81
End: 2021-11-30
Payer: MEDICARE

## 2021-12-07 ENCOUNTER — TELEPHONE (OUTPATIENT)
Dept: HEPATOLOGY | Facility: CLINIC | Age: 81
End: 2021-12-07
Payer: MEDICARE

## 2021-12-07 ENCOUNTER — OFFICE VISIT (OUTPATIENT)
Dept: HEPATOLOGY | Facility: CLINIC | Age: 81
End: 2021-12-07
Payer: MEDICARE

## 2021-12-07 VITALS
WEIGHT: 118.25 LBS | SYSTOLIC BLOOD PRESSURE: 135 MMHG | BODY MASS INDEX: 19.01 KG/M2 | OXYGEN SATURATION: 99 % | TEMPERATURE: 99 F | HEIGHT: 66 IN | HEART RATE: 97 BPM | RESPIRATION RATE: 17 BRPM | DIASTOLIC BLOOD PRESSURE: 60 MMHG

## 2021-12-07 DIAGNOSIS — K76.9 LIVER LESION: ICD-10-CM

## 2021-12-07 PROCEDURE — 3078F PR MOST RECENT DIASTOLIC BLOOD PRESSURE < 80 MM HG: ICD-10-PCS | Mod: CPTII,S$GLB,, | Performed by: INTERNAL MEDICINE

## 2021-12-07 PROCEDURE — 1101F PR PT FALLS ASSESS DOC 0-1 FALLS W/OUT INJ PAST YR: ICD-10-PCS | Mod: CPTII,S$GLB,, | Performed by: INTERNAL MEDICINE

## 2021-12-07 PROCEDURE — 3078F DIAST BP <80 MM HG: CPT | Mod: CPTII,S$GLB,, | Performed by: INTERNAL MEDICINE

## 2021-12-07 PROCEDURE — 1101F PT FALLS ASSESS-DOCD LE1/YR: CPT | Mod: CPTII,S$GLB,, | Performed by: INTERNAL MEDICINE

## 2021-12-07 PROCEDURE — 99999 PR PBB SHADOW E&M-EST. PATIENT-LVL V: CPT | Mod: PBBFAC,,, | Performed by: INTERNAL MEDICINE

## 2021-12-07 PROCEDURE — 99999 PR PBB SHADOW E&M-EST. PATIENT-LVL V: ICD-10-PCS | Mod: PBBFAC,,, | Performed by: INTERNAL MEDICINE

## 2021-12-07 PROCEDURE — 3288F PR FALLS RISK ASSESSMENT DOCUMENTED: ICD-10-PCS | Mod: CPTII,S$GLB,, | Performed by: INTERNAL MEDICINE

## 2021-12-07 PROCEDURE — 3075F SYST BP GE 130 - 139MM HG: CPT | Mod: CPTII,S$GLB,, | Performed by: INTERNAL MEDICINE

## 2021-12-07 PROCEDURE — 99204 PR OFFICE/OUTPT VISIT, NEW, LEVL IV, 45-59 MIN: ICD-10-PCS | Mod: S$GLB,,, | Performed by: INTERNAL MEDICINE

## 2021-12-07 PROCEDURE — 1126F PR PAIN SEVERITY QUANTIFIED, NO PAIN PRESENT: ICD-10-PCS | Mod: CPTII,S$GLB,, | Performed by: INTERNAL MEDICINE

## 2021-12-07 PROCEDURE — 1126F AMNT PAIN NOTED NONE PRSNT: CPT | Mod: CPTII,S$GLB,, | Performed by: INTERNAL MEDICINE

## 2021-12-07 PROCEDURE — 3075F PR MOST RECENT SYSTOLIC BLOOD PRESS GE 130-139MM HG: ICD-10-PCS | Mod: CPTII,S$GLB,, | Performed by: INTERNAL MEDICINE

## 2021-12-07 PROCEDURE — 99204 OFFICE O/P NEW MOD 45 MIN: CPT | Mod: S$GLB,,, | Performed by: INTERNAL MEDICINE

## 2021-12-07 PROCEDURE — 3288F FALL RISK ASSESSMENT DOCD: CPT | Mod: CPTII,S$GLB,, | Performed by: INTERNAL MEDICINE

## 2021-12-09 ENCOUNTER — PATIENT MESSAGE (OUTPATIENT)
Dept: PHARMACY | Facility: CLINIC | Age: 81
End: 2021-12-09
Payer: MEDICARE

## 2021-12-13 ENCOUNTER — SPECIALTY PHARMACY (OUTPATIENT)
Dept: PHARMACY | Facility: CLINIC | Age: 81
End: 2021-12-13
Payer: MEDICARE

## 2021-12-13 ENCOUNTER — TELEPHONE (OUTPATIENT)
Dept: INTERNAL MEDICINE | Facility: CLINIC | Age: 81
End: 2021-12-13
Payer: MEDICARE

## 2021-12-13 RX ORDER — CLOPIDOGREL BISULFATE 75 MG/1
75 TABLET ORAL DAILY
Qty: 90 TABLET | Refills: 3 | Status: SHIPPED | OUTPATIENT
Start: 2021-12-13 | End: 2023-03-09

## 2021-12-13 RX ORDER — AMLODIPINE BESYLATE 10 MG/1
10 TABLET ORAL DAILY
Qty: 90 TABLET | Refills: 1 | Status: SHIPPED | OUTPATIENT
Start: 2021-12-13 | End: 2022-05-21

## 2021-12-15 RX ORDER — AMLODIPINE BESYLATE 10 MG/1
TABLET ORAL
Qty: 90 TABLET | Refills: 0 | OUTPATIENT
Start: 2021-12-15

## 2021-12-30 ENCOUNTER — OFFICE VISIT (OUTPATIENT)
Dept: INTERNAL MEDICINE | Facility: CLINIC | Age: 81
End: 2021-12-30
Payer: MEDICARE

## 2021-12-30 VITALS
WEIGHT: 116.38 LBS | HEART RATE: 85 BPM | OXYGEN SATURATION: 99 % | SYSTOLIC BLOOD PRESSURE: 104 MMHG | HEIGHT: 66 IN | DIASTOLIC BLOOD PRESSURE: 60 MMHG | BODY MASS INDEX: 18.7 KG/M2 | RESPIRATION RATE: 18 BRPM | TEMPERATURE: 98 F

## 2021-12-30 DIAGNOSIS — Z00.00 PHYSICAL EXAM, ANNUAL: Primary | ICD-10-CM

## 2021-12-30 PROCEDURE — 99213 PR OFFICE/OUTPT VISIT, EST, LEVL III, 20-29 MIN: ICD-10-PCS | Mod: 25,S$GLB,, | Performed by: STUDENT IN AN ORGANIZED HEALTH CARE EDUCATION/TRAINING PROGRAM

## 2021-12-30 PROCEDURE — 3078F PR MOST RECENT DIASTOLIC BLOOD PRESSURE < 80 MM HG: ICD-10-PCS | Mod: CPTII,S$GLB,, | Performed by: STUDENT IN AN ORGANIZED HEALTH CARE EDUCATION/TRAINING PROGRAM

## 2021-12-30 PROCEDURE — 3074F PR MOST RECENT SYSTOLIC BLOOD PRESSURE < 130 MM HG: ICD-10-PCS | Mod: CPTII,S$GLB,, | Performed by: STUDENT IN AN ORGANIZED HEALTH CARE EDUCATION/TRAINING PROGRAM

## 2021-12-30 PROCEDURE — 3288F PR FALLS RISK ASSESSMENT DOCUMENTED: ICD-10-PCS | Mod: CPTII,S$GLB,, | Performed by: STUDENT IN AN ORGANIZED HEALTH CARE EDUCATION/TRAINING PROGRAM

## 2021-12-30 PROCEDURE — G0009 PNEUMOCOCCAL POLYSACCHARIDE VACCINE 23-VALENT =>2YO SQ IM: ICD-10-PCS | Mod: S$GLB,,, | Performed by: STUDENT IN AN ORGANIZED HEALTH CARE EDUCATION/TRAINING PROGRAM

## 2021-12-30 PROCEDURE — 1101F PT FALLS ASSESS-DOCD LE1/YR: CPT | Mod: CPTII,S$GLB,, | Performed by: STUDENT IN AN ORGANIZED HEALTH CARE EDUCATION/TRAINING PROGRAM

## 2021-12-30 PROCEDURE — 3288F FALL RISK ASSESSMENT DOCD: CPT | Mod: CPTII,S$GLB,, | Performed by: STUDENT IN AN ORGANIZED HEALTH CARE EDUCATION/TRAINING PROGRAM

## 2021-12-30 PROCEDURE — 99499 UNLISTED E&M SERVICE: CPT | Mod: S$GLB,,, | Performed by: STUDENT IN AN ORGANIZED HEALTH CARE EDUCATION/TRAINING PROGRAM

## 2021-12-30 PROCEDURE — 1159F MED LIST DOCD IN RCRD: CPT | Mod: CPTII,S$GLB,, | Performed by: STUDENT IN AN ORGANIZED HEALTH CARE EDUCATION/TRAINING PROGRAM

## 2021-12-30 PROCEDURE — 1126F AMNT PAIN NOTED NONE PRSNT: CPT | Mod: CPTII,S$GLB,, | Performed by: STUDENT IN AN ORGANIZED HEALTH CARE EDUCATION/TRAINING PROGRAM

## 2021-12-30 PROCEDURE — 99999 PR PBB SHADOW E&M-EST. PATIENT-LVL IV: CPT | Mod: PBBFAC,,, | Performed by: STUDENT IN AN ORGANIZED HEALTH CARE EDUCATION/TRAINING PROGRAM

## 2021-12-30 PROCEDURE — 90732 PPSV23 VACC 2 YRS+ SUBQ/IM: CPT | Mod: S$GLB,,, | Performed by: STUDENT IN AN ORGANIZED HEALTH CARE EDUCATION/TRAINING PROGRAM

## 2021-12-30 PROCEDURE — 99499 RISK ADDL DX/OHS AUDIT: ICD-10-PCS | Mod: S$GLB,,, | Performed by: STUDENT IN AN ORGANIZED HEALTH CARE EDUCATION/TRAINING PROGRAM

## 2021-12-30 PROCEDURE — 1101F PR PT FALLS ASSESS DOC 0-1 FALLS W/OUT INJ PAST YR: ICD-10-PCS | Mod: CPTII,S$GLB,, | Performed by: STUDENT IN AN ORGANIZED HEALTH CARE EDUCATION/TRAINING PROGRAM

## 2021-12-30 PROCEDURE — 90732 PNEUMOCOCCAL POLYSACCHARIDE VACCINE 23-VALENT =>2YO SQ IM: ICD-10-PCS | Mod: S$GLB,,, | Performed by: STUDENT IN AN ORGANIZED HEALTH CARE EDUCATION/TRAINING PROGRAM

## 2021-12-30 PROCEDURE — 99213 OFFICE O/P EST LOW 20 MIN: CPT | Mod: 25,S$GLB,, | Performed by: STUDENT IN AN ORGANIZED HEALTH CARE EDUCATION/TRAINING PROGRAM

## 2021-12-30 PROCEDURE — 1126F PR PAIN SEVERITY QUANTIFIED, NO PAIN PRESENT: ICD-10-PCS | Mod: CPTII,S$GLB,, | Performed by: STUDENT IN AN ORGANIZED HEALTH CARE EDUCATION/TRAINING PROGRAM

## 2021-12-30 PROCEDURE — 3074F SYST BP LT 130 MM HG: CPT | Mod: CPTII,S$GLB,, | Performed by: STUDENT IN AN ORGANIZED HEALTH CARE EDUCATION/TRAINING PROGRAM

## 2021-12-30 PROCEDURE — G0009 ADMIN PNEUMOCOCCAL VACCINE: HCPCS | Mod: S$GLB,,, | Performed by: STUDENT IN AN ORGANIZED HEALTH CARE EDUCATION/TRAINING PROGRAM

## 2021-12-30 PROCEDURE — 99999 PR PBB SHADOW E&M-EST. PATIENT-LVL IV: ICD-10-PCS | Mod: PBBFAC,,, | Performed by: STUDENT IN AN ORGANIZED HEALTH CARE EDUCATION/TRAINING PROGRAM

## 2021-12-30 PROCEDURE — 1159F PR MEDICATION LIST DOCUMENTED IN MEDICAL RECORD: ICD-10-PCS | Mod: CPTII,S$GLB,, | Performed by: STUDENT IN AN ORGANIZED HEALTH CARE EDUCATION/TRAINING PROGRAM

## 2021-12-30 PROCEDURE — 3078F DIAST BP <80 MM HG: CPT | Mod: CPTII,S$GLB,, | Performed by: STUDENT IN AN ORGANIZED HEALTH CARE EDUCATION/TRAINING PROGRAM

## 2021-12-30 RX ORDER — CLORAZEPATE DIPOTASSIUM 7.5 MG/1
7.5 TABLET ORAL DAILY PRN
Qty: 90 TABLET | Refills: 2 | Status: SHIPPED | OUTPATIENT
Start: 2021-12-30 | End: 2023-01-04 | Stop reason: SDUPTHER

## 2022-01-03 ENCOUNTER — TELEPHONE (OUTPATIENT)
Dept: HEPATOLOGY | Facility: CLINIC | Age: 82
End: 2022-01-03
Payer: MEDICARE

## 2022-01-04 ENCOUNTER — TELEPHONE (OUTPATIENT)
Dept: HEPATOLOGY | Facility: CLINIC | Age: 82
End: 2022-01-04
Payer: MEDICARE

## 2022-01-04 NOTE — TELEPHONE ENCOUNTER
Radiology reviewLiver lesion thought to be hemangiomas- no f/u needed. Will discuss at upcoming appt with me

## 2022-01-06 ENCOUNTER — TELEPHONE (OUTPATIENT)
Dept: INTERNAL MEDICINE | Facility: CLINIC | Age: 82
End: 2022-01-06
Payer: MEDICARE

## 2022-01-06 RX ORDER — EZETIMIBE 10 MG/1
TABLET ORAL
Qty: 90 TABLET | Refills: 5 | Status: SHIPPED | OUTPATIENT
Start: 2022-01-06 | End: 2022-04-07

## 2022-01-06 NOTE — TELEPHONE ENCOUNTER
No new care gaps identified.  Powered by [employer]. Reference number: [id number].   1/06/2022 5:31:21 AM CST

## 2022-01-06 NOTE — TELEPHONE ENCOUNTER
----- Message from Mirella Andrade sent at 1/6/2022  8:20 AM CST -----  Contact: [phone]  Requesting an RX refill or new RX.  Is this a refill or new RX: refill  RX name and strength (copy/paste from chart): ezetimibe (ZETIA) 10 mg tablet  Is this a 30 day or 90 day RX: 90 day  Patient advised that in the future they can use their [facility] account to request a refill?:  Pharmacy name and phone # (copy/paste from chart):    [id number] - [place] - [address]  [address] [place] [address]  Phone: [phone] Fax: [phone]      Comments:

## 2022-01-11 ENCOUNTER — PATIENT MESSAGE (OUTPATIENT)
Dept: PHARMACY | Facility: CLINIC | Age: 82
End: 2022-01-11
Payer: MEDICARE

## 2022-01-11 ENCOUNTER — SPECIALTY PHARMACY (OUTPATIENT)
Dept: PHARMACY | Facility: CLINIC | Age: 82
End: 2022-01-11
Payer: MEDICARE

## 2022-01-11 NOTE — TELEPHONE ENCOUNTER
Specialty Pharmacy - Refill Coordination  Specialty Pharmacy - Clinical Intervention    Called patient regarding Praluent refill. Patient stated she has not missed any doses. She said next dose due 1/15. Patient stated the pens are difficult for her to initiate. After further discussion, seems that patient is not pushing the pen down enough, to push up yellow needle guard, to unlock the top button. Reviewed to push pen down at 90 degrees onto injection site, enough to push up needle guard, and then click button on the top. Encouraged patient to call if she has any issues with injection. She voiced understanding. Patient said she has not missed any doses due to injection issue but said it was taking her a long time and several times clicking the button before it would work.     Patient also reported a dark spot/bruise that sometimes forms at injection site 1-2 days later and lightens back up. Reviewed that bruising can occur at times. Reviewed to report if excessive bruising or injection site irritation lasting more than 3-5 days. Patient voiced understanding. She denies any itching or burning and said the spot eventually lightens up. She reported taking Benadryl and Zyrtec each as needed for allergy-like symptoms/drainage. She said she is taking Robitussin PRN cough. No DDIs of concern upon review. She reported no new allergies. Patient had no further questions or concerns.     Specialty Medication Orders Linked to Encounter    Flowsheet Row Most Recent Value   Medication #1 alirocumab (PRALUENT PEN) 150 mg/mL Crow (Order#[id number], Rx#[id number])          Refill Questions - Documented Responses    Flowsheet Row Most Recent Value   Patient Availability and HIPAA Verification    Does patient want to proceed with activity? Yes   HIPAA/medical authority confirmed? Yes   Relationship to patient of person spoken to? Self   Refill Screening Questions    Would patient like to speak to a pharmacist? Yes   When does the  patient need to receive the medication? 01/15/22   Refill Delivery Questions    How will the patient receive the medication? Delivery Shirley   When does the patient need to receive the medication? 01/15/22   Shipping Address Home   Address in [facility] confirmed and updated if neccessary? Yes   Expected Copay ($) 20   Is the patient able to afford the medication copay? Yes   Payment Method CC on file   Days supply of Refill 28   Supplies needed? Alcohol Swabs   Refill activity completed? Yes   Refill activity plan Refill scheduled   Shipment/Pickup Date: 01/13/22          Current Outpatient Medications   Medication Sig    alirocumab (PRALUENT PEN) 150 mg/mL PnIj Inject 1 mL (150 mg total) into the skin every 14 (fourteen) days. (Patient taking differently: Inject 150 mg into the skin every 14 (fourteen) days.)    amLODIPine (NORVASC) 10 MG tablet Take 1 tablet (10 mg total) by mouth once daily.    ascorbic acid, vitamin C, (VITAMIN C) 1000 MG tablet Take 1,000 mg by mouth once daily.    aspirin 81 MG Chew Take 81 mg by mouth once daily.    azelastine (ASTELIN) 137 mcg (0.1 %) nasal spray USE 1 TO 2 SPRAY(S) IN EACH NOSTRIL TWICE DAILY    budesonide 1 mg/2 mL NbSp EMPTY CONTENTS OF 1 RESPULE INTO NASAL IRRIGATION SYSTEM, ADD DISTILLED WATER, SALT PACK, MIX & IRRIGATE. PERFORM TWICE DAILY    carvediloL (COREG) 6.25 MG tablet TAKE 1 TABLET BY MOUTH TWICE DAILY WITH MEALS    cetirizine (ZYRTEC) 10 MG tablet Take 10 mg by mouth as needed for Allergies. Patients states only when provider prescribe it.    clopidogreL (PLAVIX) 75 mg tablet Take 1 tablet by mouth once daily    clopidogreL (PLAVIX) 75 mg tablet Take 1 tablet (75 mg total) by mouth once daily.    clorazepate (TRANXENE) 7.5 MG Tab Take 1 tablet (7.5 mg total) by mouth daily as needed (Anxiety).    co-enzyme Q-10 50 mg capsule Take 50 mg by mouth once daily.    diphenhydrAMINE (BENADRYL) 25 mg capsule Take 25 mg by mouth every 6 (six) hours as  needed for Itching.    ezetimibe (ZETIA) 10 mg tablet TAKE 1 TABLET BY MOUTH ONCE DAILY IN THE EVENING    ketorolac 0.5% (ACULAR) 0.5 % Drop     multivitamin (THERAGRAN) per tablet Take 1 tablet by mouth once daily.    NEILMED SINUS RINSE REFILL Pack use as directed    omega-3 fatty acids/fish oil (FISH OIL-OMEGA-3 FATTY ACIDS) 300-1,000 mg capsule Take 1 capsule by mouth once daily.    pantoprazole (PROTONIX) 40 MG tablet pantoprazole 40 mg tablet,delayed release   TAKE 1 TABLET BY MOUTH TWICE DAILY    prednisoLONE acetate (PRED FORTE) 1 % DrpS     TURMERIC ORAL Take 1 packet by mouth once daily.   Last reviewed on 12/30/2021  9:37 AM by Porsha Carr LPN    Review of patient's allergies indicates:   Allergen Reactions    Benazepril Other (See Comments)     cough    Chlorthalidone     Iodinated contrast media     Iodine and iodide containing products     Lipitor [atorvastatin]      States she is allergic to all statin medications    Sertraline      Medication caused patient to get sick.    Spironolactone     Last reviewed on  12/30/2021 9:35 AM by Porsha Carr      Tasks added this encounter   2/5/2022 - Refill Call (Auto Added)   Tasks due within next 3 months   No tasks due.     Inna Zhang, PharmD  [facility]  [address] [place] [id number]  Phone: [phone]  Fax: [phone]

## 2022-01-13 NOTE — TELEPHONE ENCOUNTER
Patient called to report new medications cefdinir, promethazine-DM, and benzonatate. Patient asked if any DDIs. No DDIs of concern upon review of med list. Mentioned to separate multivitamin from cefdinir by at least 2 hours if multivitamin contains iron. Patient stated no iron in her multivitamin. Patient had no further questions or concerns.

## 2022-01-27 ENCOUNTER — PATIENT OUTREACH (OUTPATIENT)
Dept: ADMINISTRATIVE | Facility: OTHER | Age: 82
End: 2022-01-27
Payer: MEDICARE

## 2022-01-27 NOTE — PROGRESS NOTES
LINKS immunization registry updated  Care Everywhere updated  Health Maintenance updated  Chart reviewed for overdue Proactive [facility] Encounters (CHAPIS) health maintenance testing (CRS, Breast Ca, Diabetic Eye Exam)   Orders entered:N/A

## 2022-01-31 ENCOUNTER — TELEPHONE (OUTPATIENT)
Dept: HEPATOLOGY | Facility: CLINIC | Age: 82
End: 2022-01-31
Payer: MEDICARE

## 2022-01-31 NOTE — TELEPHONE ENCOUNTER
[facility]te[facility] Staff  Caller: patient (Today,  2:27 PM)  [phone]   please call patient returning office call concerning appointment on tomorrow waiting on a call back thanks.   She missed a call and she confirmed she was that she will be at appt tomorrow in [facility]

## 2022-02-01 ENCOUNTER — OFFICE VISIT (OUTPATIENT)
Dept: HEPATOLOGY | Facility: CLINIC | Age: 82
End: 2022-02-01
Payer: MEDICARE

## 2022-02-01 VITALS
BODY MASS INDEX: 19.01 KG/M2 | WEIGHT: 118.25 LBS | SYSTOLIC BLOOD PRESSURE: 131 MMHG | DIASTOLIC BLOOD PRESSURE: 63 MMHG | HEART RATE: 84 BPM | RESPIRATION RATE: 17 BRPM | TEMPERATURE: 99 F | OXYGEN SATURATION: 100 % | HEIGHT: 66 IN

## 2022-02-01 DIAGNOSIS — K76.9 LIVER LESION: Primary | ICD-10-CM

## 2022-02-01 PROCEDURE — 1159F PR MEDICATION LIST DOCUMENTED IN MEDICAL RECORD: ICD-10-PCS | Mod: CPTII,S$GLB,, | Performed by: INTERNAL MEDICINE

## 2022-02-01 PROCEDURE — 3075F PR MOST RECENT SYSTOLIC BLOOD PRESS GE 130-139MM HG: ICD-10-PCS | Mod: CPTII,S$GLB,, | Performed by: INTERNAL MEDICINE

## 2022-02-01 PROCEDURE — 3078F PR MOST RECENT DIASTOLIC BLOOD PRESSURE < 80 MM HG: ICD-10-PCS | Mod: CPTII,S$GLB,, | Performed by: INTERNAL MEDICINE

## 2022-02-01 PROCEDURE — 1159F MED LIST DOCD IN RCRD: CPT | Mod: CPTII,S$GLB,, | Performed by: INTERNAL MEDICINE

## 2022-02-01 PROCEDURE — 3078F DIAST BP <80 MM HG: CPT | Mod: CPTII,S$GLB,, | Performed by: INTERNAL MEDICINE

## 2022-02-01 PROCEDURE — 3288F PR FALLS RISK ASSESSMENT DOCUMENTED: ICD-10-PCS | Mod: CPTII,S$GLB,, | Performed by: INTERNAL MEDICINE

## 2022-02-01 PROCEDURE — 3075F SYST BP GE 130 - 139MM HG: CPT | Mod: CPTII,S$GLB,, | Performed by: INTERNAL MEDICINE

## 2022-02-01 PROCEDURE — 1160F PR REVIEW ALL MEDS BY PRESCRIBER/CLIN PHARMACIST DOCUMENTED: ICD-10-PCS | Mod: CPTII,S$GLB,, | Performed by: INTERNAL MEDICINE

## 2022-02-01 PROCEDURE — 1126F AMNT PAIN NOTED NONE PRSNT: CPT | Mod: CPTII,S$GLB,, | Performed by: INTERNAL MEDICINE

## 2022-02-01 PROCEDURE — 1101F PR PT FALLS ASSESS DOC 0-1 FALLS W/OUT INJ PAST YR: ICD-10-PCS | Mod: CPTII,S$GLB,, | Performed by: INTERNAL MEDICINE

## 2022-02-01 PROCEDURE — 1126F PR PAIN SEVERITY QUANTIFIED, NO PAIN PRESENT: ICD-10-PCS | Mod: CPTII,S$GLB,, | Performed by: INTERNAL MEDICINE

## 2022-02-01 PROCEDURE — 99213 OFFICE O/P EST LOW 20 MIN: CPT | Mod: S$GLB,,, | Performed by: INTERNAL MEDICINE

## 2022-02-01 PROCEDURE — 1101F PT FALLS ASSESS-DOCD LE1/YR: CPT | Mod: CPTII,S$GLB,, | Performed by: INTERNAL MEDICINE

## 2022-02-01 PROCEDURE — 1160F RVW MEDS BY RX/DR IN RCRD: CPT | Mod: CPTII,S$GLB,, | Performed by: INTERNAL MEDICINE

## 2022-02-01 PROCEDURE — 99213 PR OFFICE/OUTPT VISIT, EST, LEVL III, 20-29 MIN: ICD-10-PCS | Mod: S$GLB,,, | Performed by: INTERNAL MEDICINE

## 2022-02-01 PROCEDURE — 3288F FALL RISK ASSESSMENT DOCD: CPT | Mod: CPTII,S$GLB,, | Performed by: INTERNAL MEDICINE

## 2022-02-01 PROCEDURE — 99999 PR PBB SHADOW E&M-EST. PATIENT-LVL V: ICD-10-PCS | Mod: PBBFAC,,, | Performed by: INTERNAL MEDICINE

## 2022-02-01 PROCEDURE — 99999 PR PBB SHADOW E&M-EST. PATIENT-LVL V: CPT | Mod: PBBFAC,,, | Performed by: INTERNAL MEDICINE

## 2022-02-01 NOTE — PROGRESS NOTES
HEPATOLOGY FOLLOW UP    Referring Physician: Nurys Bearden MD  Current Corresponding Physician: Nurys Bearden MD    Alana Beth is here for follow up of Abnormal Abdominal/Liver Imaging      HPI  Alana Beth is a 81 y.o. female with PAD, celiac artery stenosis s/p PTAS, HTN, HLD, palpitationswho presents for evaluation of liver lesions     At [facility]:  Abdo US 10/2/18: There are 1.5 cm and 1.8 cm hyperechoic foci in the right lobe of the liver. Statistically, these are most documented hemangiomas, but other lesions can have similar imaging characteristics, including both benign and malignant processes. These foci can be further evaluated with a CT scan of the abdomen with and without contrast and with hemangioma protocol.   CT abdo w wo contrast 12/12/18: Approximately 3 small hepatic lesions. A couple of the lesions demonstrate discontinuous small peripheral nodular enhancement suggesting hemangiomas, however these lesions are incompletely characterized on CT. Abdominal MRI without and with contrast is recommended for further assessment.  MRI abdo wo contrast 1/7/19: There are few small cystic hepatic lesions measuring up to 1.5 cm in size are scattered in the liver, stable in size from recent abdominal CT. These lesions are incompletely characterized on this noncontrast exam.     At [facility]:  CT chest without contrast 8/4/21: Subtle regions of hypoattenuation along the right and left hepatic lobes, better evaluated on prior contrast enhanced CTA 12/16/2020.  CTA chest abdo pelvis 12/16/20: Liver: Normal size.  Multiple hypoattenuating foci in the liver.  1.7 cm ill-defined hypoattenuating focus in hepatic segment VII could represent focal fatty infiltration, perfusion defect, or mass lesion.  Several additional small hypodense foci e.g. 1.2 cm lesion in the left hepatic lobe and inferior right hepatic lobe.  MRI abdo w wo contrast 11/20/21: LIVER: No global hepatic signal abnormality.  There is a  HISTORY OF PRESENT ILLNESS  Eliza Sommers is a 70 y.o. female. HPI   Had neg Head CT last visit for ? Head trauma  Last OV:  Acute care visit. was in bathtub 18d ago and found sitting in bathtub semialert. Her  carried her to the bed and she eventually awoke, she and  not sure how long she may have been asleep of LOC  C/o some bruising and pain right side of head and face-has pain on right side of her face  Some bruising of left knee and right buttocks-resolved  Takes zanaflex bid for cramps but not effective,s he is not sure if the med makes her feel sleepy  No cp or sob  C/o generalized pain and weakness chronic but getting worse  Sees Rheum MD for hx fibromyalgia  Had had a few lb weight loss    Pt here in f/u osteopenia and fibromyalgia  Referred to oral surgeon for lesion on right buccal mucosa last visit  Skelaxin prescribed for muscle spasms  On prolia for osteoporosis T scores improving--osteopenia range-Dr. Almanza Sos  Patient Active Problem List    Diagnosis Date Noted    HLD (hyperlipidemia) 03/10/2014    Epigastric pain 11/07/2013    Blood in stool 11/07/2013    Constipation 11/07/2013    Occult blood positive stool 11/07/2013    Interstitial cystitis 02/24/2011    Asthma 02/12/2010    Fibromyalgia 02/12/2010    Osteoporosis 02/12/2010    Vitamin D deficiency 02/12/2010     Current Outpatient Prescriptions   Medication Sig Dispense Refill    triamcinolone (ARISTOCORT) 0.5 % topical cream Apply  to affected area two (2) times a day for 7 days. use thin layer 15 g 0    tiZANidine (ZANAFLEX) 4 mg tablet TAKE 1 TABLET BY MOUTH EVERY SIX (6) HOURS AS NEEDED. 90 Tab 1    lidocaine (LIDODERM) 5 % 1 Patch by TransDERmal route every twelve (12) hours. Apply patch to the affected area for 12 hours a day and remove for 12 hours a day. 30 Each 3    metaxalone (SKELAXIN) 800 mg tablet Take 1 Tab by mouth three (3) times daily as needed for Pain.  30 Tab 3    baclofen (LIORESAL) 10 mg tablet 1.6 cm T2 hyperintense lesion in segment 7 which demonstrates discontinuous peripheral nodular arterial enhancement and gradual centripetal enhancement on delayed phases, compatible with a hemangioma.  Similar appearing lesions are seen in segments 2/3 and 5.  Scattered cysts measuring up to 8 mm in segment 7.  Portal and hepatic veins are patent.  CT chest 8/4/21: Ill-defined hypoattenuating hepatic lesion, suboptimally evaluated in the absence of intravenous contrast.  Consider further characterization with contrast enhanced abdominal MRI.     Labs 7/19/21: Tbil 0.4, ALT 39, AST 32, ALKP 40  Was on a statin 2017/18-stopped     The patient denies any symptoms of decompensated cirrhosis, including no ascites or edema, cognitive problems that would suggest hepatic encephalopathy, or GI bleeding from varices.    Interval History  Since Alana Beth's last visit:    Reviewed films in radiology conference 1/4/22:  Discussion/Plan: ?MRI shows 3 lesions c/w hemangiomas, 2 in the right lobe and 1 in the left. Small cyst also noted. No lesions c/f HCC. No further f/u warranted. These appear stable dating back to 12/2020    Outpatient Encounter Medications as of 2/1/2022   Medication Sig Dispense Refill    alirocumab (PRALUENT PEN) 150 mg/mL PnIj Inject 1 mL (150 mg total) into the skin every 14 (fourteen) days. (Patient taking differently: Inject 150 mg into the skin every 14 (fourteen) days.) 10 mL 10    amLODIPine (NORVASC) 10 MG tablet Take 1 tablet (10 mg total) by mouth once daily. 90 tablet 1    ascorbic acid, vitamin C, (VITAMIN C) 1000 MG tablet Take 1,000 mg by mouth once daily.      aspirin 81 MG Chew Take 81 mg by mouth once daily.      azelastine (ASTELIN) 137 mcg (0.1 %) nasal spray USE 1 TO 2 SPRAY(S) IN EACH NOSTRIL TWICE DAILY      budesonide 1 mg/2 mL NbSp EMPTY CONTENTS OF 1 RESPULE INTO NASAL IRRIGATION SYSTEM, ADD DISTILLED WATER, SALT PACK, MIX & IRRIGATE. PERFORM TWICE DAILY      carvediloL  Take 1 Tab by mouth three (3) times daily. 30 Tab 1    gabapentin (NEURONTIN) 100 mg capsule Take 3 Caps by mouth nightly. 90 Cap 3    budesonide (PULMICORT FLEXHALER) 180 mcg/actuation aepb inhaler Take  by inhalation.  montelukast (SINGULAIR) 10 mg tablet TK 1 T PO IN THE MAGDIEL  5    Magnesium Oxide 500 mg cap Take 300 mg by mouth. 1 tab a day      CALCIUM CARBONATE/VITAMIN D3 (CALCIUM 600 + D PO) Take  by mouth. No Known Allergies   Lab Results  Component Value Date/Time   WBC 5.1 07/31/2017 10:56 AM   HGB 13.5 07/31/2017 10:56 AM   HCT 41.3 07/31/2017 10:56 AM   PLATELET 729 39/33/0210 10:56 AM   MCV 95 07/31/2017 10:56 AM     Lab Results  Component Value Date/Time   Glucose 93 07/31/2017 10:56 AM   LDL, calculated 153 (H) 07/31/2017 10:56 AM   Creatinine 0.61 07/31/2017 10:56 AM      Lab Results  Component Value Date/Time   GFR est non-AA 90 07/31/2017 10:56 AM   GFR est  07/31/2017 10:56 AM   Creatinine 0.61 07/31/2017 10:56 AM   BUN 13 07/31/2017 10:56 AM   Sodium 143 07/31/2017 10:56 AM   Potassium 5.2 07/31/2017 10:56 AM   Chloride 101 07/31/2017 10:56 AM   CO2 28 07/31/2017 10:56 AM   PTH, Intact 50.6 02/12/2010 11:15 AM     Lab Results  Component Value Date/Time   TSH 2.430 07/31/2017 10:56 AM      Lab Results   Component Value Date/Time    Glucose 93 07/31/2017 10:56 AM         ROS    Physical Exam   Constitutional: She appears well-developed and well-nourished. Appears stated age   Cardiovascular: Normal rate, regular rhythm and normal heart sounds. Exam reveals no gallop and no friction rub. No murmur heard. Pulmonary/Chest: Effort normal and breath sounds normal. No respiratory distress. She has no wheezes. Abdominal: Soft. Bowel sounds are normal.   Musculoskeletal: She exhibits no edema. Neurological: She is alert. Psychiatric: She has a normal mood and affect. Nursing note and vitals reviewed.       ASSESSMENT and PLAN  {ASSESSMENT/PLAN:35917} (COREG) 6.25 MG tablet TAKE 1 TABLET BY MOUTH TWICE DAILY WITH MEALS 180 tablet 3    clopidogreL (PLAVIX) 75 mg tablet Take 1 tablet by mouth once daily 90 tablet 3    clorazepate (TRANXENE) 7.5 MG Tab Take 1 tablet (7.5 mg total) by mouth daily as needed (Anxiety). 90 tablet 2    co-enzyme Q-10 50 mg capsule Take 50 mg by mouth once daily.      diphenhydrAMINE (BENADRYL) 25 mg capsule Take 25 mg by mouth every 6 (six) hours as needed for Itching.      ezetimibe (ZETIA) 10 mg tablet TAKE 1 TABLET BY MOUTH ONCE DAILY IN THE EVENING 90 tablet 5    multivitamin (THERAGRAN) per tablet Take 1 tablet by mouth once daily.      NEILMED SINUS RINSE REFILL Pack use as directed      omega-3 fatty acids/fish oil (FISH OIL-OMEGA-3 FATTY ACIDS) 300-1,000 mg capsule Take 1 capsule by mouth once daily.      TURMERIC ORAL Take 1 packet by mouth once daily.      cetirizine (ZYRTEC) 10 MG tablet Take 10 mg by mouth as needed for Allergies. Patients states only when provider prescribe it.      clopidogreL (PLAVIX) 75 mg tablet Take 1 tablet (75 mg total) by mouth once daily. 90 tablet 3    diphenhydramine HCl (BENADRYL ORAL) Take by mouth.      GUAIFENESIN ORAL Take by mouth.      ketorolac 0.5% (ACULAR) 0.5 % Drop       pantoprazole (PROTONIX) 40 MG tablet pantoprazole 40 mg tablet,delayed release   TAKE 1 TABLET BY MOUTH TWICE DAILY      prednisoLONE acetate (PRED FORTE) 1 % DrpS        No facility-administered encounter medications on file as of 2/1/2022.     Review of patient's allergies indicates:   Allergen Reactions    Benazepril Other (See Comments)     cough    Chlorthalidone     Iodinated contrast media     Iodine and iodide containing products     Lipitor [atorvastatin]      States she is allergic to all statin medications    Sertraline      Medication caused patient to get sick.    Spironolactone      Past Medical History:   Diagnosis Date    Atherosclerotic peripheral vascular disease     Chronic  cystic mastitis     Essential hypertension     Hypercholesterolemia     Osteopenia     Shingles        Review of Systems   Constitutional: Negative.    HENT: Negative.    Eyes: Negative.    Respiratory: Negative.    Cardiovascular: Negative.    Gastrointestinal: Negative.    Genitourinary: Negative.    Musculoskeletal: Negative.    Skin: Negative.    Neurological: Negative.    Psychiatric/Behavioral: Negative.      Vitals:    02/01/22 0930   BP: 131/63   Pulse: 84   Resp: 17   Temp: 98.5 °F (36.9 °C)       Physical Exam  Vitals reviewed.   Constitutional:       Appearance: She is well-developed and well-nourished.   HENT:      Head: Normocephalic and atraumatic.   Eyes:      General: No scleral icterus.     Extraocular Movements: EOM normal.      Conjunctiva/sclera: Conjunctivae normal.      Pupils: Pupils are equal, round, and reactive to light.   Neck:      Thyroid: No thyromegaly.   Cardiovascular:      Rate and Rhythm: Normal rate and regular rhythm.      Heart sounds: Normal heart sounds.   Pulmonary:      Effort: Pulmonary effort is normal.      Breath sounds: Normal breath sounds. No rales.   Abdominal:      General: Bowel sounds are normal. There is no distension.      Palpations: Abdomen is soft. There is no mass.      Tenderness: There is no abdominal tenderness.   Musculoskeletal:         General: No edema. Normal range of motion.      Cervical back: Normal range of motion and neck supple.   Skin:     General: Skin is warm and dry.      Findings: No rash.   Neurological:      Mental Status: She is alert and oriented to person, place, and time.   Psychiatric:         Mood and Affect: Mood and affect normal.         Computed MELD-Na score unavailable. Necessary lab results were not found in the last year.  Computed MELD score unavailable. Necessary lab results were not found in the last year.    Lab Results   Component Value Date     (H) 07/19/2021    BUN 20 07/19/2021    CREATININE 1.1  07/19/2021    CALCIUM 9.9 07/19/2021     07/19/2021    K 4.3 07/19/2021     07/19/2021    PROT 7.3 07/19/2021    CO2 29 07/19/2021    ANIONGAP 7 (L) 07/19/2021    WBC 4.55 12/16/2020    RBC 4.28 12/16/2020    HGB 11.6 (L) 12/16/2020    HCT 36.1 (L) 12/16/2020    HCT 39 12/16/2020    MCV 84 12/16/2020    MCH 27.1 12/16/2020    MCHC 32.1 12/16/2020     Lab Results   Component Value Date    RDW 14.6 (H) 12/16/2020     12/16/2020    MPV 9.8 12/16/2020    GRAN 2.2 12/16/2020    GRAN 48.4 12/16/2020    LYMPH 1.7 12/16/2020    LYMPH 37.4 12/16/2020    MONO 0.6 12/16/2020    MONO 12.2 12/16/2020    EOSINOPHIL 0.9 12/16/2020    BASOPHIL 0.9 12/16/2020    EOS 0.0 12/16/2020    BASO 0.04 12/16/2020    GROUPTRH O POS 12/17/2020    BNP <10 08/15/2020    CHOL 241 (H) 11/01/2021    TRIG 55 11/01/2021    HDL 88 (H) 11/01/2021    CHOLHDL 36.5 11/01/2021    TOTALCHOLEST 2.7 11/01/2021    ALBUMIN 3.6 07/19/2021    AST 32 07/19/2021    ALT 39 07/19/2021    ALKPHOS 40 (L) 07/19/2021    MG 2.3 12/16/2020       Assessment and Plan:  Patient Active Problem List   Diagnosis    Essential hypertension    Atherosclerotic peripheral vascular disease with intermittent claudication    Anxiety    Palpitations    Sensitivity to medication    Solitary pulmonary nodule    Abnormal chest x-ray    Weight loss    Cough    Atherosclerosis of aortic arch    Hypercholesteremia    Aortic atherosclerosis    Granulomatous lung disease    Celiac artery atherosclerosis    PAD (peripheral artery disease)    Abdominal discomfort    Statin intolerance    Mesenteric ischemia    History of iron deficiency anemia    Mixed hyperlipidemia    Lung nodule    Chronic cough    Chronic throat clearing    Liver lesion     Alana Beth is a 81 y.o. female withAbnormal Abdominal/Liver Imaging  She appears to have a number of small liver lesions that are most c/w hemagiomas. They have been seen on imaging dating back to 2018. She  appears to have hepatic hemangiomas and liver cysts. No further evaluation is needed.    Return prn

## 2022-02-02 ENCOUNTER — LAB VISIT (OUTPATIENT)
Dept: LAB | Facility: HOSPITAL | Age: 82
End: 2022-02-02
Attending: INTERNAL MEDICINE
Payer: MEDICARE

## 2022-02-02 DIAGNOSIS — E78.2 MIXED HYPERLIPIDEMIA: ICD-10-CM

## 2022-02-02 LAB
CHOLEST SERPL-MCNC: 218 MG/DL (ref 120–199)
CHOLEST/HDLC SERPL: 2.5 {RATIO} (ref 2–5)
HDLC SERPL-MCNC: 86 MG/DL (ref 40–75)
HDLC SERPL: 39.4 % (ref 20–50)
LDLC SERPL CALC-MCNC: 120.6 MG/DL (ref 63–159)
NONHDLC SERPL-MCNC: 132 MG/DL
TRIGL SERPL-MCNC: 57 MG/DL (ref 30–150)

## 2022-02-02 PROCEDURE — 36415 COLL VENOUS BLD VENIPUNCTURE: CPT | Mod: PN | Performed by: INTERNAL MEDICINE

## 2022-02-02 PROCEDURE — 80061 LIPID PANEL: CPT | Performed by: INTERNAL MEDICINE

## 2022-02-05 ENCOUNTER — SPECIALTY PHARMACY (OUTPATIENT)
Dept: PHARMACY | Facility: CLINIC | Age: 82
End: 2022-02-05
Payer: MEDICARE

## 2022-02-05 NOTE — TELEPHONE ENCOUNTER
Specialty Pharmacy - Refill Coordination    Specialty Medication Orders Linked to Encounter    Flowsheet Row Most Recent Value   Medication #1 alirocumab (PRALUENT PEN) 150 mg/mL PnIj (Order#[id number], Rx#[id number])          Refill Questions - Documented Responses    Flowsheet Row Most Recent Value   Patient Availability and HIPAA Verification    Does patient want to proceed with activity? Yes   HIPAA/medical authority confirmed? Yes   Relationship to patient of person spoken to? Self   Refill Screening Questions    Would patient like to speak to a pharmacist? No   When does the patient need to receive the medication? 02/15/22   Refill Delivery Questions    How will the patient receive the medication? Delivery [facility]   When does the patient need to receive the medication? 02/15/22   Shipping Address Home   Address in [facility] confirmed and updated if neccessary? Yes   Expected Copay ($) 20   Is the patient able to afford the medication copay? Yes   Payment Method CC on file   Days supply of Refill 28   Supplies needed? No supplies needed   Refill activity completed? Yes   Refill activity plan Refill scheduled   Shipment/Pickup Date: 02/10/22          Current Outpatient Medications   Medication Sig    alirocumab (PRALUENT PEN) 150 mg/mL PnIj Inject 1 mL (150 mg total) into the skin every 14 (fourteen) days. (Patient taking differently: Inject 150 mg into the skin every 14 (fourteen) days.)    amLODIPine (NORVASC) 10 MG tablet Take 1 tablet (10 mg total) by mouth once daily.    ascorbic acid, vitamin C, (VITAMIN C) 1000 MG tablet Take 1,000 mg by mouth once daily.    aspirin 81 MG Chew Take 81 mg by mouth once daily.    azelastine (ASTELIN) 137 mcg (0.1 %) nasal spray USE 1 TO 2 SPRAY(S) IN EACH NOSTRIL TWICE DAILY    budesonide 1 mg/2 mL NbSp EMPTY CONTENTS OF 1 RESPULE INTO NASAL IRRIGATION SYSTEM, ADD DISTILLED WATER, SALT PACK, MIX & IRRIGATE. PERFORM TWICE DAILY    carvediloL (COREG) 6.25 MG  tablet TAKE 1 TABLET BY MOUTH TWICE DAILY WITH MEALS    cetirizine (ZYRTEC) 10 MG tablet Take 10 mg by mouth as needed for Allergies. Patients states only when provider prescribe it.    clopidogreL (PLAVIX) 75 mg tablet Take 1 tablet by mouth once daily    clopidogreL (PLAVIX) 75 mg tablet Take 1 tablet (75 mg total) by mouth once daily.    clorazepate (TRANXENE) 7.5 MG Tab Take 1 tablet (7.5 mg total) by mouth daily as needed (Anxiety).    co-enzyme Q-10 50 mg capsule Take 50 mg by mouth once daily.    diphenhydrAMINE (BENADRYL) 25 mg capsule Take 25 mg by mouth every 6 (six) hours as needed for Itching.    diphenhydramine HCl (BENADRYL ORAL) Take by mouth.    ezetimibe (ZETIA) 10 mg tablet TAKE 1 TABLET BY MOUTH ONCE DAILY IN THE EVENING    GUAIFENESIN ORAL Take by mouth.    ketorolac 0.5% (ACULAR) 0.5 % Drop     multivitamin (THERAGRAN) per tablet Take 1 tablet by mouth once daily.    NEILMED SINUS RINSE REFILL Pack use as directed    omega-3 fatty acids/fish oil (FISH OIL-OMEGA-3 FATTY ACIDS) 300-1,000 mg capsule Take 1 capsule by mouth once daily.    pantoprazole (PROTONIX) 40 MG tablet pantoprazole 40 mg tablet,delayed release   TAKE 1 TABLET BY MOUTH TWICE DAILY    prednisoLONE acetate (PRED FORTE) 1 % DrpS     TURMERIC ORAL Take 1 packet by mouth once daily.   Last reviewed on 2/1/2022  9:43 AM by Shannon Russell MD    Review of patient's allergies indicates:   Allergen Reactions    Benazepril Other (See Comments)     cough    Chlorthalidone     Iodinated contrast media     Iodine and iodide containing products     Lipitor [atorvastatin]      States she is allergic to all statin medications    Sertraline      Medication caused patient to get sick.    Spironolactone     Last reviewed on  2/1/2022 9:43 AM by Shannon Russell      Tasks added this encounter   3/8/2022 - Refill Call (Auto Added)   Tasks due within next 3 months   No tasks due.     [facility]  [id number] [facility] [address]  Phone: [phone]  Fax: [phone]

## 2022-02-09 ENCOUNTER — OFFICE VISIT (OUTPATIENT)
Dept: CARDIOLOGY | Facility: CLINIC | Age: 82
End: 2022-02-09
Payer: MEDICARE

## 2022-02-09 VITALS
SYSTOLIC BLOOD PRESSURE: 126 MMHG | HEIGHT: 63 IN | BODY MASS INDEX: 21.21 KG/M2 | WEIGHT: 119.69 LBS | DIASTOLIC BLOOD PRESSURE: 60 MMHG | HEART RATE: 75 BPM

## 2022-02-09 DIAGNOSIS — I10 ESSENTIAL HYPERTENSION: ICD-10-CM

## 2022-02-09 DIAGNOSIS — I70.0 ATHEROSCLEROSIS OF AORTIC ARCH: ICD-10-CM

## 2022-02-09 DIAGNOSIS — K55.9 MESENTERIC ISCHEMIA: ICD-10-CM

## 2022-02-09 DIAGNOSIS — E78.2 MIXED HYPERLIPIDEMIA: ICD-10-CM

## 2022-02-09 DIAGNOSIS — I70.0 AORTIC ATHEROSCLEROSIS: ICD-10-CM

## 2022-02-09 DIAGNOSIS — I70.219 ATHEROSCLEROTIC PERIPHERAL VASCULAR DISEASE WITH INTERMITTENT CLAUDICATION: ICD-10-CM

## 2022-02-09 DIAGNOSIS — Z78.9 STATIN INTOLERANCE: ICD-10-CM

## 2022-02-09 DIAGNOSIS — K76.9 LIVER LESION: ICD-10-CM

## 2022-02-09 DIAGNOSIS — R00.2 PALPITATIONS: ICD-10-CM

## 2022-02-09 DIAGNOSIS — E78.00 HYPERCHOLESTEREMIA: ICD-10-CM

## 2022-02-09 DIAGNOSIS — I73.9 PAD (PERIPHERAL ARTERY DISEASE): ICD-10-CM

## 2022-02-09 DIAGNOSIS — I70.8 CELIAC ARTERY ATHEROSCLEROSIS: Primary | ICD-10-CM

## 2022-02-09 PROCEDURE — 1101F PT FALLS ASSESS-DOCD LE1/YR: CPT | Mod: CPTII,S$GLB,, | Performed by: INTERNAL MEDICINE

## 2022-02-09 PROCEDURE — 3078F DIAST BP <80 MM HG: CPT | Mod: CPTII,S$GLB,, | Performed by: INTERNAL MEDICINE

## 2022-02-09 PROCEDURE — 99215 PR OFFICE/OUTPT VISIT, EST, LEVL V, 40-54 MIN: ICD-10-PCS | Mod: S$GLB,,, | Performed by: INTERNAL MEDICINE

## 2022-02-09 PROCEDURE — 1126F PR PAIN SEVERITY QUANTIFIED, NO PAIN PRESENT: ICD-10-PCS | Mod: CPTII,S$GLB,, | Performed by: INTERNAL MEDICINE

## 2022-02-09 PROCEDURE — 99999 PR PBB SHADOW E&M-EST. PATIENT-LVL IV: ICD-10-PCS | Mod: PBBFAC,,, | Performed by: INTERNAL MEDICINE

## 2022-02-09 PROCEDURE — 99215 OFFICE O/P EST HI 40 MIN: CPT | Mod: S$GLB,,, | Performed by: INTERNAL MEDICINE

## 2022-02-09 PROCEDURE — 3078F PR MOST RECENT DIASTOLIC BLOOD PRESSURE < 80 MM HG: ICD-10-PCS | Mod: CPTII,S$GLB,, | Performed by: INTERNAL MEDICINE

## 2022-02-09 PROCEDURE — 1159F MED LIST DOCD IN RCRD: CPT | Mod: CPTII,S$GLB,, | Performed by: INTERNAL MEDICINE

## 2022-02-09 PROCEDURE — 3288F PR FALLS RISK ASSESSMENT DOCUMENTED: ICD-10-PCS | Mod: CPTII,S$GLB,, | Performed by: INTERNAL MEDICINE

## 2022-02-09 PROCEDURE — 1101F PR PT FALLS ASSESS DOC 0-1 FALLS W/OUT INJ PAST YR: ICD-10-PCS | Mod: CPTII,S$GLB,, | Performed by: INTERNAL MEDICINE

## 2022-02-09 PROCEDURE — 3074F SYST BP LT 130 MM HG: CPT | Mod: CPTII,S$GLB,, | Performed by: INTERNAL MEDICINE

## 2022-02-09 PROCEDURE — 1126F AMNT PAIN NOTED NONE PRSNT: CPT | Mod: CPTII,S$GLB,, | Performed by: INTERNAL MEDICINE

## 2022-02-09 PROCEDURE — 3288F FALL RISK ASSESSMENT DOCD: CPT | Mod: CPTII,S$GLB,, | Performed by: INTERNAL MEDICINE

## 2022-02-09 PROCEDURE — 3074F PR MOST RECENT SYSTOLIC BLOOD PRESSURE < 130 MM HG: ICD-10-PCS | Mod: CPTII,S$GLB,, | Performed by: INTERNAL MEDICINE

## 2022-02-09 PROCEDURE — 99999 PR PBB SHADOW E&M-EST. PATIENT-LVL IV: CPT | Mod: PBBFAC,,, | Performed by: INTERNAL MEDICINE

## 2022-02-09 PROCEDURE — 1159F PR MEDICATION LIST DOCUMENTED IN MEDICAL RECORD: ICD-10-PCS | Mod: CPTII,S$GLB,, | Performed by: INTERNAL MEDICINE

## 2022-02-09 RX ORDER — CILOSTAZOL 100 MG/1
100 TABLET ORAL 2 TIMES DAILY
Qty: 60 TABLET | Refills: 11 | Status: SHIPPED | OUTPATIENT
Start: 2022-02-09 | End: 2024-03-06

## 2022-02-09 NOTE — PATIENT INSTRUCTIONS
Assessment/Plan:  Alana Beth is a 81 y.o. female with PAD, celiac artery stenosis s/p PTAS, HTN, HLD, palpitations, who presents for a follow up appointment.     1. Celiac Axis Stenosis- s/p IVUS guided celiac PTA on 12/18/2020.  Mrs. Beth has no abdominal pain, n/v, or anorexia. Continue ASA/Plavix/Praluent. Repeat mesenteric artery US.    2. PAD- Pt with known BLE PAD.  She has no claudication, rest pain or tissue loss to suggest CLI.  Continue ASA/Plavix. Start cilostazol 100mg BID. Encourage daily walking.       3. HLD- LDL remains not at goal despite using praluent. Will attempt switching to bempedoic acid and evaluate response with repeat lipid panel in 3 months.    4. HTN- Controlled. Continue current medications.      Follow up in 3 months

## 2022-02-09 NOTE — PROGRESS NOTES
"[facility]      CC: Celiac [place] Stenosis      Patient ID: Alana Beth is a 81 y.o. female with PAD, celiac artery stenosis s/p PTAS, HTN, HLD, palpitations, who presents for a follow up appointment.  Pertinent history/events are as follows:     -Pt presents for evaluation of PAD/weight loss.    -At our initial clinic visit on 9/4/2020, Mrs. Beth reported  "not feeling well since 11/2019".  Main complaint is weakness, fatigue and lack of appetite.  She has lost 25 pounds since 11/2019.  No definite abdominal pain.  No chest pain, SOB, or LE edema.  Exam with audible abdominal bruit.  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    Plan:   Celiac Axis Stenosis- Mrs. 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  Pertinent findings include 25 pound weight loss and abdominal bruit.  No definite abdominal pain, although mild abdominal tenderness noted on exam.  Chronic mesenteric ischemia is also a possibility in this pt with known PAD.  Given these findings, will refer to Vascular Surgery for evaluation.  Pt has several risk factors for CAD, hence, will check nuclear stress test and echo to evaluate further.  PAD- Pt with known BLE PAD.  Continue ASA.  Pt states she's not taking a statin due to issues with liver disease in the past.  Will check outside records before starting statin.  Check updated lipid panel.      -At follow up clinic visit on 9/25/2020, Mrs. Beth reported no new symptoms.  States she still does not feel well.  Pt evaluated by Vascular Surgery (Enrrique Preciado) on 9/16/2020, who recommend psych evaluation and continued GI workup.  Mesenteric Utrasound on 9/10/2020 revealed a velocity elevation of 378 cm/s is visualized near the Celiac artery origin within a region of focal narrowing, suggestive of a greater than 70% stenosis.  Nuclear Stress Test on " 9/23/2020 revealed no evidence from myocardial ischemia or injury.  The EKG portion of this study is abnormal but not diagnostic.  Echo on 9/10/2020 revealed normal left ventricular systolic function with EF of 60%; concentric left ventricular remodeling; normal LV diastolic function; normal right ventricular systolic function; mild left atrial enlargement; mild tricuspid regurgitation; estimated PA systolic pressure is 30 mmHg; normal central venous pressure (3 mmHg).  Labs from 9/10/2020 show total cholesterol of 348 with LDL of 234.   Plan:   Celiac Axis Stenosis- Mrs. 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  Pertinent findings include 25 pound weight loss and abdominal bruit.  No definite abdominal pain, although mild abdominal tenderness noted on exam.  Chronic mesenteric ischemia is also a possibility in this pt with known PAD.  Pt evaluated by Vascular Surgery (Enrrique Preciado) on Recommend psych and continued GI workup.  Mesenteric Utrasound on 9/10/2020 revealed a velocity elevation of 378 cm/s is visualized near the Celiac artery origin within a region of focal narrowing, suggestive of a greater than 70% stenosis.  Nuclear Stress Test on 9/23/2020 revealed no evidence from myocardial ischemia or injury.  The EKG portion of this study is abnormal but not diagnostic.  Echo on 9/10/2020 revealed normal left ventricular systolic function with EF of 60%; concentric left ventricular remodeling; normal LV diastolic function; normal right ventricular systolic function; mild left atrial enlargement; mild tricuspid regurgitation; estimated PA systolic pressure is 30 mmHg; normal central venous pressure (3 mmHg).  Given these findings, will refer to Dr. Junior and GI for evaluation.       PAD- Pt with known BLE PAD.  She has no claudication, rest pain or tissue loss to suggest CLI.  Start rosuvastatin 40 mg daily.  Continue ASA 81 mg daily.  Check carotid ultrasound  prior to next visit.   HLD-  Labs from 9/10/2020 show total cholesterol of 348 with LDL of 234.  Start rosuvastatin 40 mg daily. Monitor LFT's.      -At clinic visit on 10/26/2020, Mrs. Cole reports that she had not starting statin therapy due to insurance issues. She was prescribed alirocumab 75 mg injections and she has received one dose so far. She would prefer oral therapy for her hypercholesterolemia. She notes much improvement in her symptoms of sputum production after starting omeprazole. Patient was evaluated by GI for abdominal pain associated with celiac axis syndrome and increased amounts of sputum. EGD done in 8/2020 she was noted to have a submucosal esophageal nodule that path revealed chronic esophagitis. She was started on Omeprazole 20 mg BID and barium esophagram requested. Barium study was consistent with mild esophageal dysmotility.  Patient has not seen Dr. Junior yet for possible angiogram, she did not have an appointment set up yet.   Plan:  Plan:  -- Schedule pt to see Dr. Junior   -- continue omeprazole, GI follow up PRN for symptomatic care   --Start bempedoic acid 180 mg daily due to statin intolerance    -On 12/8/2020, pt underwent celiac artery intervention with Dr. Junior:  S/p aortogram with selective celiac and SMA angiogram                Initially started R radial then switched to L radial               Patent SMA and 80% celiac stenosis              IVUS guided celiac PTAS              6.0 x 18 and 6 x 14 mm Express SD dilated to 18 noemí     -On 12/16/2020, pt admitted at [facility] with abdominal pain.  Abd ultrasound revealed  patent stent in celiac artery and no significant changes with respiration to suggest extrinsic compression by median arcuate ligament.  Velocities suggest only moderate stenosis of the stent.   Symptoms improved and pt was discharged home on ASA/Plavix/Praluent.       -At clinic visit on 1/27/2021, Mrs. Beth reported feeling well with no abdominal  pain, nausea, vomiting, or anorexia.  Taking all medications as prescribed.  Labs from 12/19/2020 shows  vs 234 on 9/10/2020.    Plan:   Celiac Axis Stenosis-  Now s/p IVUS guided celiac PTAS with 6.0 x 18 and 6 x 14 mm Express SD on 12/18/2020.  Mrs. Beth reports feeling much better with no abdominal pain, n/v, or anorexia.  Abd ultrasound revealed  patent stent in celiac artery and no significant changes with respiration to suggest extrinsic compression by median arcuate ligament.  Velocities suggest only moderate stenosis of the stent.  Continue ASA/Plavix/Praluent.     PAD- Pt with known BLE PAD.  She has no claudication, rest pain or tissue loss to suggest CLI.  Continue ASA/Plavix/Praluent.      HLD-  Labs from 12/19/2020 shows  vs 234 on 9/10/2020.  Continue Praluent.       -At clinic visit on 4/28/2021, Mrs. Cole reported no abdominal pain, nausea, or anorexia.  Labs from 4/21/2021 show  vs 150 on 12/19/2021.  Plan:  Celiac Axis Stenosis-  Now s/p IVUS guided celiac artery PTAS with 6.0 x 18 and 6 x 14 mm Express SD on 12/18/2020.  Mrs. Beth continues to do well with no abdominal pain, n/v, or anorexia.  Abd ultrasound on 12/18/2020 revealed  patent stent in celiac artery and no significant changes with respiration to suggest extrinsic compression by median arcuate ligament.  Velocities suggest only moderate stenosis of the stent.  Continue ASA/Plavix/Praluent.     PAD- Pt with known BLE PAD.  She has no claudication, rest pain or tissue loss to suggest CLI.  Continue ASA/Plavix/Praluent.      HLD-  Labs from 4/21/2021 show  vs 150 on 12/19/2021.  Increase Praluent to 150 mg every 14 days.     -At clinic visit on 7/28/2021, Mrs. Cole reported no chest pain, SOB, weight loss, nausea, TIA symptoms or syncope.  States she's been injecting Praluent in her thigh instead of abdomen.  Labs from 7/19/2021 show LDL now 124 vs 145 on 4/21/2021.  Of note, pt states she did not fast  prior to the labs being drawn.   Plan:   Celiac Axis Stenosis-  Now s/p IVUS guided celiac PTAS with 6.0 x 18 and 6 x 14 mm Express SD on 12/18/2020.  Mrs. Beth has no abdominal pain, n/v, or anorexia.  Abd ultrasound on 12/18/2020 revealed  patent stent in celiac artery and no significant changes with respiration to suggest extrinsic compression by median arcuate ligament.  Velocities suggest only moderate stenosis of the stent.  Continue ASA/Plavix/Praluent.   Surveillance imaging scheduled by Dr. RENÉE Aguilar.   PAD- Pt with known BLE PAD.  She has no claudication, rest pain or tissue loss to suggest CLI.  Continue ASA/Plavix/Praluent.      HLD-  Labs from 7/19/2021 show LDL now 124 vs 145 on 4/21/2021.  The modest reduction in LDL despite increasing Praluent to 150 mg every 14 days may be due to the pt injecting Praluent in the thigh instead of abdomen.  Continue zetia and Praluent.  Before adding bempedoic acid, will have pt inject Praluent in the abdomen, and repeat lipids in 3 months.         HTN- Continue current medications.    History of Pulmonary Nodules- Check CT chest without contrast to evaluate further.     11/8/2021 clinic visit: Mrs. Cole reports no chest pain, abdominal pain, SOB, LE edema, TIA symptoms or syncope.  States she's still injecting praluent in the thigh and not the abdomen, as discussed at our previous clinic visit.  Labs from 11/2/2021 shows LDL   142 vs 124 on 7/19/2021.  CT Chest w/o Contrast on 8/4/2021 revealed no pulmonary abnormalities requiring continued surveillance.   There is a Ill-defined hypoattenuating hepatic lesion, suboptimally evaluated in the absence of intravenous contrast.     Plan:  Celiac Axis Stenosis-  Now s/p IVUS guided celiac PTAS with 6.0 x 18 and 6 x 14 mm Express SD on 12/18/2020.  Mrs. Beth has no abdominal pain, n/v, or anorexia.  Abd ultrasound on 12/18/2020 revealed  patent stent in celiac artery and no significant changes with respiration to  suggest extrinsic compression by median arcuate ligament.  Velocities suggest only moderate stenosis of the stent.  Continue ASA/Plavix/Praluent.   Surveillance imaging scheduled by Dr. RENÉE Aguilar.   PAD- Pt with known BLE PAD.  She has no claudication, rest pain or tissue loss to suggest CLI.  Continue ASA/Plavix/Praluent.      HLD- Mrs. Beth states she's still injecting praluent in the thigh and not the abdomen, as discussed at clinic visit on 7/28/2021.  Labs from 11/2/2021 shows LDL   142 vs 124 on 7/19/2021.  The increase in LDL despite increasing Praluent to 150 mg every 14 days may be due to the pt injecting Praluent in the thigh instead of abdomen.  Pt will try to do injection in abdomen.  Continue zetia and Praluent.  Before adding bempedoic acid, will have pt inject Praluent in the abdomen, and repeat lipids in 3 months.  HTN- Continue current medications.    History of Pulmonary Nodules-  CT Chest w/o Contrast on 8/4/2021 revealed no pulmonary abnormalities requiring continued surveillance.    Liver Lesion- Evaluate further with contrast enhanced abdominal MRI, and refer to Hepatology.    HPI:  Mrs. Beth reports feeling better. Her appetite has been okay but not as good as before. She denies abdominal pain. She lost about 2 pounds since November. She denies claudications but she does have pain in her legs when she starts walking which is relieved by continued walking.    Past Medical History:   Diagnosis Date    Atherosclerotic peripheral vascular disease     Chronic cystic mastitis     Essential hypertension     Hypercholesterolemia     Osteopenia     Shingles      Past Surgical History:   Procedure Laterality Date    BREAST BIOPSY      biopsies benign    CELIAC ARTERY STENT      ESOPHAGOGASTRODUODENOSCOPY  08/20/2020    PERCUTANEOUS TRANSLUMINAL ANGIOPLASTY N/A 12/8/2020    Procedure: Pta (Angioplasty, Percutaneous, Transluminal);  Surgeon: Hernando Junior MD;  Location: [facility] CATH LAB/EP;   Service: Cardiology;  Laterality: N/A;     Social History     Socioeconomic History    Marital status: Single   Occupational History    Occupation: retired student loan assistance    Tobacco Use    Smoking status: Never Smoker    Smokeless tobacco: Never Used   Substance and Sexual Activity    Alcohol use: No    Drug use: No    Sexual activity: Not Currently     Family History   Problem Relation Age of Onset    Hypertension Mother     Diabetes Brother     Diabetes Sister     Colon cancer Neg Hx     Esophageal cancer Neg Hx        Review of patient's allergies indicates:   Allergen Reactions    Benazepril Other (See Comments)     cough    Chlorthalidone     Iodinated contrast media     Iodine and iodide containing products     Lipitor [atorvastatin]      States she is allergic to all statin medications    Sertraline      Medication caused patient to get sick.    Spironolactone        Medication List with Changes/Refills   Current Medications    ALIROCUMAB (PRALUENT PEN) 150 MG/ML PNIJ    Inject 1 mL (150 mg total) into the skin every 14 (fourteen) days.    AMLODIPINE (NORVASC) 10 MG TABLET    Take 1 tablet (10 mg total) by mouth once daily.    ASCORBIC ACID, VITAMIN C, (VITAMIN C) 1000 MG TABLET    Take 1,000 mg by mouth once daily.    ASPIRIN 81 MG CHEW    Take 81 mg by mouth once daily.    AZELASTINE (ASTELIN) 137 MCG (0.1 %) NASAL SPRAY    USE 1 TO 2 SPRAY(S) IN EACH NOSTRIL TWICE DAILY    BUDESONIDE 1 MG/2 ML NBSP    EMPTY CONTENTS OF 1 RESPULE INTO NASAL IRRIGATION SYSTEM, ADD DISTILLED WATER, SALT PACK, MIX & IRRIGATE. PERFORM TWICE DAILY    CARVEDILOL (COREG) 6.25 MG TABLET    TAKE 1 TABLET BY MOUTH TWICE DAILY WITH MEALS    CETIRIZINE (ZYRTEC) 10 MG TABLET    Take 10 mg by mouth as needed for Allergies. Patients states only when provider prescribe it.    CLOPIDOGREL (PLAVIX) 75 MG TABLET    Take 1 tablet (75 mg total) by mouth once daily.    CLORAZEPATE (TRANXENE) 7.5 MG TAB    Take 1  "tablet (7.5 mg total) by mouth daily as needed (Anxiety).    CO-ENZYME Q-10 50 MG CAPSULE    Take 50 mg by mouth once daily.    DIPHENHYDRAMINE (BENADRYL) 25 MG CAPSULE    Take 25 mg by mouth every 6 (six) hours as needed for Itching.    EZETIMIBE (ZETIA) 10 MG TABLET    TAKE 1 TABLET BY MOUTH ONCE DAILY IN THE EVENING    MULTIVITAMIN (THERAGRAN) PER TABLET    Take 1 tablet by mouth once daily.    NEILMED SINUS RINSE REFILL PACK    use as directed    OMEGA-3 FATTY ACIDS/FISH OIL (FISH OIL-OMEGA-3 FATTY ACIDS) 300-1,000 MG CAPSULE    Take 1 capsule by mouth once daily.    TURMERIC ORAL    Take 1 packet by mouth once daily.   Discontinued Medications    CLOPIDOGREL (PLAVIX) 75 MG TABLET    Take 1 tablet by mouth once daily    DIPHENHYDRAMINE HCL (BENADRYL ORAL)    Take by mouth.    GUAIFENESIN ORAL    Take by mouth.    KETOROLAC 0.5% (ACULAR) 0.5 % DROP        PANTOPRAZOLE (PROTONIX) 40 MG TABLET    pantoprazole 40 mg tablet,delayed release   TAKE 1 TABLET BY MOUTH TWICE DAILY    PREDNISOLONE ACETATE (PRED FORTE) 1 % DRPS           Review of Systems  Constitution: Denies chills, fever, and sweats.  HENT: Denies headaches or blurry vision.  Cardiovascular: Denies chest pain or irregular heart beat.  Respiratory: Denies cough or shortness of breath.  Gastrointestinal: Positive for diffuse abdominal tenderness and unintentional 25 pound weight loss.  Musculoskeletal: Denies muscle cramps.  Neurological: Denies dizziness or focal weakness.  Psychiatric/Behavioral: Normal mental status.  Hematologic/Lymphatic: Denies bleeding problem or easy bruising/bleeding.  Skin: Denies rash or suspicious lesions    Physical Examination  /60   Pulse 75   Ht 5' 3" (1.6 m)   Wt 54.3 kg (119 lb 11.4 oz)   BMI 21.21 kg/m²     Constitutional: No acute distress, conversant  HEENT: Sclera anicteric, Pupils equal, round and reactive to light, extraocular motions intact, Oropharynx clear  Neck: No JVD, no carotid " bruits  Cardiovascular: regular rate and rhythm, no murmur, rubs or gallops, normal S1/S2  Pulmonary: Clear to auscultation bilaterally  Abdominal: Abdomen soft with mild diffuse abdominal tenderness, audible abdominal bruit noted, positive bowel sounds  Extremities: No lower extremity edema,   Pulses:  Carotid pulses are 2+ on the right side, and 2+ on the left side.  Radial pulses are 2+ on the right side, and 2+ on the left side.   Femoral pulses are 2+ on the right side, and 2+ on the left side.  Popliteal pulses are 2+ on the right side, and 2+ on the left side.   Dorsalis pedis pulses are 2+ on the right side, and 2+ on the left side.   Posterior tibial pulses are 2+ on the right side, and 2+ on the left side.    Skin: No ecchymosis, erythema, or ulcers  Psych: Alert and oriented x 3, appropriate affect  Neuro: CNII-XII intact, no focal deficits    Labs:  Most Recent Data  CBC:   Lab Results   Component Value Date    WBC 4.55 12/16/2020    HGB 11.6 (L) 12/16/2020    HCT 36.1 (L) 12/16/2020     12/16/2020    MCV 84 12/16/2020    RDW 14.6 (H) 12/16/2020     BMP:   Lab Results   Component Value Date     07/19/2021    K 4.3 07/19/2021     07/19/2021    CO2 29 07/19/2021    BUN 20 07/19/2021    CREATININE 1.1 07/19/2021     (H) 07/19/2021    CALCIUM 9.9 07/19/2021    MG 2.3 12/16/2020    PHOS 2.9 12/16/2020     LFTS;   Lab Results   Component Value Date    PROT 7.3 07/19/2021    ALBUMIN 3.6 07/19/2021    BILITOT 0.4 07/19/2021    AST 32 07/19/2021    ALKPHOS 40 (L) 07/19/2021    ALT 39 07/19/2021     COAGS: No results found for: INR, PROTIME, PTT  FLP:   Lab Results   Component Value Date    CHOL 218 (H) 02/02/2022    HDL 86 (H) 02/02/2022    LDLCALC 120.6 02/02/2022    TRIG 57 02/02/2022    CHOLHDL 39.4 02/02/2022     CARDIAC:   Lab Results   Component Value Date    TROPONINI <0.01 08/15/2020    BNP <10 08/15/2020       EKG 8/15/2020:  Normal sinus rhythm  Low voltage QRS  Anteroseptal  infarct (cited on or before 15-AUG-2020)    CT Chest w/o Contrast 8/4/2021:  No pulmonary abnormalities requiring continued surveillance.   Multi-vessel coronary artery atherosclerosis.   Ill-defined hypoattenuating hepatic lesion, suboptimally evaluated in the absence of intravenous contrast.  Consider further characterization with contrast enhanced abdominal MRI.     Mesenteric Ultrasound 12/18/2020:  Color flow duplex imaging reveals patent superior mesenteric and inferior mesenteric arteries with no evidence of a hemodynamically   significant stenosis. The Celiac artery stent is patent with and velocities are 168 cm/s at rest; 157 cm/s with inspiration; 153 cm/s   with expiration. No suggestion of MALS. An accelerated flow velocity of 282 cm/s is visualized at the distal edge on the Celiac stent.   However, this region appear widely patent.       Nuclear Stress Test 9/23/2020:  Normal myocardial perfusion scan.    The perfusion scan is free of evidence from myocardial ischemia or injury.    Visually estimated ejection fraction is normal at rest and normal at stress.    There is normal wall motion at rest and post stress.    LV cavity size is normal at rest and normal at stress.    The EKG portion of this study is abnormal but not diagnostic.    The patient reported no chest pain during the stress test.    There are no prior studies for comparison.       Echo 9/10/2020:  · Normal left ventricular systolic function. The estimated ejection fraction is 60%.  · Concentric left ventricular remodeling.  · Normal LV diastolic function.  · No wall motion abnormalities.  · Normal right ventricular systolic function.  · Mild left atrial enlargement.  · Mild tricuspid regurgitation.  · The estimated PA systolic pressure is 30 mmHg.  · Normal central venous pressure (3 mmHg).    Mesenteric Utrasound 9/10/2020:  Color flow duplex exam reveals a patent abdominal aorta, celiac artery, superior mesenteric artery and  inferior mesenteric artery. A   velocity elevation of 378 cm/s is visualized near the Celiac artery origin within a region of focal narrowing, suggestive of a greater than   70% stenosis.     CTA abd/pelvis with contrast (outside study from [facility]) 8/28/2020:  1. No evidence for gastrointestinal bleeding.    2. Scattered colonic diverticula without adjacent inflammatory changes.    3. Myomatous changes of the uterus with degenerating fibroids.     4. Hemangioma in segment VII.    5. Stenosis of the celiac access with poststenotic dilatation.    BLE Arterial Ultrasound 7/31/2020:  Multilevel disease in the left lower extremity. Moderate stenosis in the left superficial femoral artery. Severe stenosis in the distal popliteal artery.    Left lower extremity:  Ankle-brachial index is 0.73. Triphasic signals are seen in the common femoral and profunda arteries. There are biphasic superficial femoral, popliteal, posterior tibial, and anterior tibial arteries. There is a velocity increase from the mid SFA to the distal SFA from 1 /sec 2 m/s. Consistent with a moderate stenosis. There is another focal velocity increase in the distal popliteal from 2.5 m/sec to 5.0 m/s. Consistent with a severe stenosis. Distal to this there is monophasic waveforms.         Right lower extremity:  Ankle-brachial index is 0.93. Triphasic signals are seen in the common femoral and profunda artery. There are biphasic signals in the superficial femoral, popliteal, posterior tibial, and anterior tibial arteries. No focal areas of elevated velocity are seen.      Assessment/Plan:  Alana Beth is a 81 y.o. female with PAD, celiac artery stenosis s/p PTAS, HTN, HLD, palpitations, who presents for a follow up appointment.     1. Celiac Axis Stenosis- s/p IVUS guided celiac PTA on 12/18/2020.  Mrs. Beth has no abdominal pain, n/v, or anorexia. Continue ASA/Plavix/Praluent. Repeat mesenteric artery US.    2. PAD- Pt with known BLE PAD.  She has  no claudication, rest pain or tissue loss to suggest CLI.  Continue ASA/Plavix. Start cilostazol 100mg BID.       3. HLD- LDL remains not at goal despite using praluent. Will attempt switching to bempedoic acid and evaluate response with repeat lipid panel in 3 months.    4. HTN- Controlled. Continue current medications.      Follow up in 3 months    Total duration of face to face visit time 30 minutes.  Total time spent counseling greater than fifty percent of total visit time.  Counseling included discussion regarding imaging findings, diagnosis, possibilities, treatment options, risks and benefits.  The patient had many questions regarding the options and long-term effects.    Lisa Guerra MD  Vascular Medicine Fellow    Patient seen and discussed with Dr. Pink

## 2022-02-14 ENCOUNTER — PES CALL (OUTPATIENT)
Dept: ADMINISTRATIVE | Facility: CLINIC | Age: 82
End: 2022-02-14
Payer: MEDICARE

## 2022-03-08 ENCOUNTER — SPECIALTY PHARMACY (OUTPATIENT)
Dept: PHARMACY | Facility: CLINIC | Age: 82
End: 2022-03-08
Payer: MEDICARE

## 2022-03-08 NOTE — TELEPHONE ENCOUNTER
Specialty Pharmacy - Refill Coordination    Specialty Medication Orders Linked to Encounter    Flowsheet Row Most Recent Value   Medication #1 alirocumab (PRALUENT PEN) 150 mg/mL PnIj (Order#[id number], Rx#[id number])          Refill Questions - Documented Responses    Flowsheet Row Most Recent Value   Patient Availability and HIPAA Verification    Does patient want to proceed with activity? Yes   HIPAA/medical authority confirmed? Yes   Relationship to patient of person spoken to? Self   Refill Screening Questions    Would patient like to speak to a pharmacist? No   When does the patient need to receive the medication? 03/15/22   Refill Delivery Questions    How will the patient receive the medication? Delivery Shirley   When does the patient need to receive the medication? 03/15/22   Shipping Address Home   Address in [facility] confirmed and updated if neccessary? Yes   Expected Copay ($) 20   Is the patient able to afford the medication copay? Yes   Payment Method CC on file   Days supply of Refill 28   Refill activity completed? Yes   Refill activity plan Refill scheduled   Shipment/Pickup Date: 03/10/22          Current Outpatient Medications   Medication Sig    alirocumab (PRALUENT PEN) 150 mg/mL PnIj Inject 1 mL (150 mg total) into the skin every 14 (fourteen) days. (Patient taking differently: Inject 150 mg into the skin every 14 (fourteen) days.)    amLODIPine (NORVASC) 10 MG tablet Take 1 tablet (10 mg total) by mouth once daily.    ascorbic acid, vitamin C, (VITAMIN C) 1000 MG tablet Take 1,000 mg by mouth once daily.    aspirin 81 MG Chew Take 81 mg by mouth once daily.    azelastine (ASTELIN) 137 mcg (0.1 %) nasal spray USE 1 TO 2 SPRAY(S) IN EACH NOSTRIL TWICE DAILY    bempedoic acid 180 mg Tab Take 1 tablet (180 mg total) by mouth once daily.    budesonide 1 mg/2 mL NbSp EMPTY CONTENTS OF 1 RESPULE INTO NASAL IRRIGATION SYSTEM, ADD DISTILLED WATER, SALT PACK, MIX & IRRIGATE. PERFORM TWICE  DAILY    carvediloL (COREG) 6.25 MG tablet TAKE 1 TABLET BY MOUTH TWICE DAILY WITH MEALS    cetirizine (ZYRTEC) 10 MG tablet Take 10 mg by mouth as needed for Allergies. Patients states only when provider prescribe it.    cilostazoL (PLETAL) 100 MG Tab Take 1 tablet (100 mg total) by mouth 2 (two) times daily.    clopidogreL (PLAVIX) 75 mg tablet Take 1 tablet (75 mg total) by mouth once daily.    clorazepate (TRANXENE) 7.5 MG Tab Take 1 tablet (7.5 mg total) by mouth daily as needed (Anxiety).    co-enzyme Q-10 50 mg capsule Take 50 mg by mouth once daily.    diphenhydrAMINE (BENADRYL) 25 mg capsule Take 25 mg by mouth every 6 (six) hours as needed for Itching.    ezetimibe (ZETIA) 10 mg tablet TAKE 1 TABLET BY MOUTH ONCE DAILY IN THE EVENING    multivitamin (THERAGRAN) per tablet Take 1 tablet by mouth once daily.    NEILMED SINUS RINSE REFILL Pack use as directed    omega-3 fatty acids/fish oil (FISH OIL-OMEGA-3 FATTY ACIDS) 300-1,000 mg capsule Take 1 capsule by mouth once daily.    TURMERIC ORAL Take 1 packet by mouth once daily.   Last reviewed on 2/9/2022  8:39 AM by Diamond Real MA    Review of patient's allergies indicates:   Allergen Reactions    Benazepril Other (See Comments)     cough    Chlorthalidone     Iodinated contrast media     Iodine and iodide containing products     Lipitor [atorvastatin]      States she is allergic to all statin medications    Sertraline      Medication caused patient to get sick.    Spironolactone     Last reviewed on  2/9/2022 8:40 AM by Diamond Real      Tasks added this encounter   4/5/2022 - Refill Call (Auto Added)   Tasks due within next 3 months   No tasks due.     Romeo Castano [facility]  [address] [place] [id number]  Phone: [phone]  Fax: [phone]

## 2022-04-05 ENCOUNTER — SPECIALTY PHARMACY (OUTPATIENT)
Dept: PHARMACY | Facility: CLINIC | Age: 82
End: 2022-04-05
Payer: MEDICARE

## 2022-04-05 NOTE — TELEPHONE ENCOUNTER
Specialty Pharmacy - Refill Coordination    Specialty Medication Orders Linked to Encounter    Flowsheet Row Most Recent Value   Medication #1 alirocumab (PRALUENT PEN) 150 mg/mL PnIj (Order#[id number], Rx#[id number])          Refill Questions - Documented Responses    Flowsheet Row Most Recent Value   Patient Availability and HIPAA Verification    Does patient want to proceed with activity? Yes   HIPAA/medical authority confirmed? Yes   Relationship to patient of person spoken to? Self   Refill Screening Questions    Would patient like to speak to a pharmacist? No   When does the patient need to receive the medication? 04/15/22   Refill Delivery Questions    How will the patient receive the medication? Delivery Shirley   When does the patient need to receive the medication? 04/15/22   Shipping Address Home   Expected Copay ($) 20   Is the patient able to afford the medication copay? Yes   Payment Method CC on file   Days supply of Refill 28   Supplies needed? No supplies needed   Refill activity completed? Yes   Refill activity plan Refill scheduled   Shipment/Pickup Date: 04/08/22          Current Outpatient Medications   Medication Sig    alirocumab (PRALUENT PEN) 150 mg/mL PnIj Inject 1 mL (150 mg total) into the skin every 14 (fourteen) days. (Patient taking differently: Inject 150 mg into the skin every 14 (fourteen) days.)    amLODIPine (NORVASC) 10 MG tablet Take 1 tablet (10 mg total) by mouth once daily.    ascorbic acid, vitamin C, (VITAMIN C) 1000 MG tablet Take 1,000 mg by mouth once daily.    aspirin 81 MG Chew Take 81 mg by mouth once daily.    azelastine (ASTELIN) 137 mcg (0.1 %) nasal spray USE 1 TO 2 SPRAY(S) IN EACH NOSTRIL TWICE DAILY    bempedoic acid 180 mg Tab Take 1 tablet (180 mg total) by mouth once daily.    budesonide 1 mg/2 mL NbSp EMPTY CONTENTS OF 1 RESPULE INTO NASAL IRRIGATION SYSTEM, ADD DISTILLED WATER, SALT PACK, MIX & IRRIGATE. PERFORM TWICE DAILY    carvediloL (COREG) 6.25  MG tablet TAKE 1 TABLET BY MOUTH TWICE DAILY WITH MEALS    cetirizine (ZYRTEC) 10 MG tablet Take 10 mg by mouth as needed for Allergies. Patients states only when provider prescribe it.    cilostazoL (PLETAL) 100 MG Tab Take 1 tablet (100 mg total) by mouth 2 (two) times daily.    clopidogreL (PLAVIX) 75 mg tablet Take 1 tablet (75 mg total) by mouth once daily.    clorazepate (TRANXENE) 7.5 MG Tab Take 1 tablet (7.5 mg total) by mouth daily as needed (Anxiety).    co-enzyme Q-10 50 mg capsule Take 50 mg by mouth once daily.    diphenhydrAMINE (BENADRYL) 25 mg capsule Take 25 mg by mouth every 6 (six) hours as needed for Itching.    ezetimibe (ZETIA) 10 mg tablet TAKE 1 TABLET BY MOUTH ONCE DAILY IN THE EVENING    multivitamin (THERAGRAN) per tablet Take 1 tablet by mouth once daily.    NEILMED SINUS RINSE REFILL Pack use as directed    omega-3 fatty acids/fish oil (FISH OIL-OMEGA-3 FATTY ACIDS) 300-1,000 mg capsule Take 1 capsule by mouth once daily.    TURMERIC ORAL Take 1 packet by mouth once daily.   Last reviewed on 2/9/2022  8:39 AM by Diamond Real MA    Review of patient's allergies indicates:   Allergen Reactions    Benazepril Other (See Comments)     cough    Chlorthalidone     Iodinated contrast media     Iodine and iodide containing products     Lipitor [atorvastatin]      States she is allergic to all statin medications    Sertraline      Medication caused patient to get sick.    Spironolactone     Last reviewed on  2/9/2022 8:40 AM by Diamond Real      Tasks added this encounter   No tasks added.   Tasks due within next 3 months   4/5/2022 - Refill Call (Auto Added)     Janneth Shi, PharmD  [facility]  [address] [place] [address]  Phone: [phone]  Fax: [phone]

## 2022-04-06 NOTE — TELEPHONE ENCOUNTER
Care Due:                  Date            Visit Type   Department     Provider  --------------------------------------------------------------------------------                                EP -                              PRIMARY      MET INTERNAL  Last Visit: 12-      CARE ([facility])   MEDICINE       Nurys Bearden  Next Visit: None Scheduled  None         None Found                                                            Last  Test          Frequency    Reason                     Performed    Due Date  --------------------------------------------------------------------------------    CBC.........  12 months..  clopidogreL..............  12- 12-    Powered by [employer]. Reference number: [id number].   4/06/2022 5:30:47 AM CDT

## 2022-04-07 RX ORDER — EZETIMIBE 10 MG/1
TABLET ORAL
Qty: 90 TABLET | Refills: 1 | Status: SHIPPED | OUTPATIENT
Start: 2022-04-07 | End: 2023-02-02

## 2022-04-07 NOTE — TELEPHONE ENCOUNTER
Refill Authorization Note   Alana Beth  is requesting a refill authorization.  Brief Assessment and Rationale for Refill:  Approve    -Medication-Related Problems Identified: Requires labs  Medication Therapy Plan:  CDMR.LABS(CBC)    Medication Reconciliation Completed: No   Comments:     Provider Staff:     Action is required for this patient.   Please see care gap opportunities below in Care Due Message.     Thanks!  [facility]     Appointments      Date Provider   Last Visit   12/30/2021 Nurys Bearden MD   Next Visit   Visit date not found Nurys Bearden MD     Note composed:12:22 PM 04/07/2022           Note composed:12:22 PM 04/07/2022

## 2022-04-18 ENCOUNTER — PATIENT MESSAGE (OUTPATIENT)
Dept: ADMINISTRATIVE | Facility: OTHER | Age: 82
End: 2022-04-18
Payer: MEDICARE

## 2022-04-29 ENCOUNTER — TELEPHONE (OUTPATIENT)
Dept: CARDIOLOGY | Facility: CLINIC | Age: 82
End: 2022-04-29
Payer: MEDICARE

## 2022-04-29 NOTE — TELEPHONE ENCOUNTER
Patient called to schedule her Test prior to seeing  in June.  All was taken care of.  Patient appreciated the call.    Nw                        ----- Message from Iman Agarwal sent at 4/29/2022  1:06 PM CDT -----  Needs advice from nurse:      Who Called:pt  Regarding:patient has annual scheduled for 6/27 and needs testing beforehand  Would the patient rather a call back or VIA [facility]?  Best Call Back number:[phone]  Additional Info:

## 2022-05-05 ENCOUNTER — HOSPITAL ENCOUNTER (OUTPATIENT)
Dept: CARDIOLOGY | Facility: HOSPITAL | Age: 82
Discharge: HOME | End: 2022-05-05
Attending: STUDENT IN AN ORGANIZED HEALTH CARE EDUCATION/TRAINING PROGRAM
Payer: MEDICARE

## 2022-05-05 DIAGNOSIS — I70.8 CELIAC ARTERY ATHEROSCLEROSIS: ICD-10-CM

## 2022-05-05 LAB
AORTA PROX EDV: 8 CM/S
AORTA PROX PSV: 69 CM/S
CELIAC ORIGIN EDV: 16 CM/S
CELIAC ORIGIN PSV: 123 CM/S
CELIAC TRUNK EDV: 90 CM/S
CELIAC TRUNK PSV: 448 CM/S
COM HEPATIC EDV: 28 CM/S
COM HEPATIC PSV: 243 CM/S
DIST SMA EDV: 19 CM/S
DIST SMA PSV: 141 CM/S
IMA ORIGIN EDV: 26 CM/S
IMA ORIGIN PSV: 296 CM/S
MID IMA EDV: 12 CM/S
MID IMA PSV: 245 CM/S
MID SMA EDV: 40 CM/S
MID SMA PSV: 292 CM/S
PROX IMA EDV: 13 CM/S
PROX IMA PSV: 219 CM/S
PROX SMA EDV: 34 CM/S
PROX SMA PSV: 322 CM/S
SMA ORIGIN EDV: 45 CM/S
SMA ORIGIN PSV: 345 CM/S
SPLENIC EDV: 24 CM/S
SPLENIC PSV: 211 CM/S

## 2022-05-05 PROCEDURE — 93975 CV US MESENTERIC ARTERIAL: ICD-10-PCS | Mod: 26,,, | Performed by: INTERNAL MEDICINE

## 2022-05-05 PROCEDURE — 93975 VASCULAR STUDY: CPT | Mod: 26,,, | Performed by: INTERNAL MEDICINE

## 2022-05-06 RX ORDER — ALIROCUMAB 150 MG/ML
150 INJECTION, SOLUTION SUBCUTANEOUS
Qty: 10 ML | Refills: 10 | Status: SHIPPED | OUTPATIENT
Start: 2022-05-06 | End: 2023-05-08 | Stop reason: SDUPTHER

## 2022-05-09 ENCOUNTER — SPECIALTY PHARMACY (OUTPATIENT)
Dept: PHARMACY | Facility: CLINIC | Age: 82
End: 2022-05-09
Payer: MEDICARE

## 2022-05-09 ENCOUNTER — PATIENT MESSAGE (OUTPATIENT)
Dept: PHARMACY | Facility: CLINIC | Age: 82
End: 2022-05-09
Payer: MEDICARE

## 2022-05-09 NOTE — TELEPHONE ENCOUNTER
Specialty Pharmacy - Refill Coordination    Specialty Medication Orders Linked to Encounter    Flowsheet Row Most Recent Value   Medication #1 alirocumab (PRALUENT PEN) 150 mg/mL PnIj (Order#[id number], Rx#[id number])          Refill Questions - Documented Responses    Flowsheet Row Most Recent Value   Patient Availability and HIPAA Verification    Does patient want to proceed with activity? Yes   HIPAA/medical authority confirmed? Yes   Relationship to patient of person spoken to? Self   Refill Screening Questions    Would patient like to speak to a pharmacist? No   When does the patient need to receive the medication? 05/15/22   Refill Delivery Questions    How will the patient receive the medication? Delivery Shirley   When does the patient need to receive the medication? 05/15/22   Shipping Address Home   Address in [facility] confirmed and updated if neccessary? Yes   Expected Copay ($) 20   Is the patient able to afford the medication copay? Yes   Payment Method CC on file   Days supply of Refill 28   Supplies needed? No supplies needed   Refill activity completed? Yes   Refill activity plan Refill scheduled   Shipment/Pickup Date: 05/10/22          Current Outpatient Medications   Medication Sig    alirocumab (PRALUENT PEN) 150 mg/mL PnIj Inject 1 mL (150 mg total) into the skin every 14 (fourteen) days.    amLODIPine (NORVASC) 10 MG tablet Take 1 tablet (10 mg total) by mouth once daily.    ascorbic acid, vitamin C, (VITAMIN C) 1000 MG tablet Take 1,000 mg by mouth once daily.    aspirin 81 MG Chew Take 81 mg by mouth once daily.    azelastine (ASTELIN) 137 mcg (0.1 %) nasal spray USE 1 TO 2 SPRAY(S) IN EACH NOSTRIL TWICE DAILY    bempedoic acid 180 mg Tab Take 1 tablet (180 mg total) by mouth once daily.    budesonide 1 mg/2 mL NbSp EMPTY CONTENTS OF 1 RESPULE INTO NASAL IRRIGATION SYSTEM, ADD DISTILLED WATER, SALT PACK, MIX & IRRIGATE. PERFORM TWICE DAILY    carvediloL (COREG) 6.25 MG tablet  TAKE 1 TABLET BY MOUTH TWICE DAILY WITH MEALS    cetirizine (ZYRTEC) 10 MG tablet Take 10 mg by mouth as needed for Allergies. Patients states only when provider prescribe it.    cilostazoL (PLETAL) 100 MG Tab Take 1 tablet (100 mg total) by mouth 2 (two) times daily.    clopidogreL (PLAVIX) 75 mg tablet Take 1 tablet (75 mg total) by mouth once daily.    clorazepate (TRANXENE) 7.5 MG Tab Take 1 tablet (7.5 mg total) by mouth daily as needed (Anxiety).    co-enzyme Q-10 50 mg capsule Take 50 mg by mouth once daily.    diphenhydrAMINE (BENADRYL) 25 mg capsule Take 25 mg by mouth every 6 (six) hours as needed for Itching.    ezetimibe (ZETIA) 10 mg tablet TAKE 1 TABLET BY MOUTH ONCE DAILY IN THE EVENING    multivitamin (THERAGRAN) per tablet Take 1 tablet by mouth once daily.    NEILMED SINUS RINSE REFILL Pack use as directed    omega-3 fatty acids/fish oil (FISH OIL-OMEGA-3 FATTY ACIDS) 300-1,000 mg capsule Take 1 capsule by mouth once daily.    TURMERIC ORAL Take 1 packet by mouth once daily.   Last reviewed on 2/9/2022  8:39 AM by Diamond Real MA    Review of patient's allergies indicates:   Allergen Reactions    Benazepril Other (See Comments)     cough    Chlorthalidone     Iodinated contrast media     Iodine and iodide containing products     Lipitor [atorvastatin]      States she is allergic to all statin medications    Sertraline      Medication caused patient to get sick.    Spironolactone     Last reviewed on  2/9/2022 8:40 AM by Diamond Real      Tasks added this encounter   6/5/2022 - Refill Call (Auto Added)   Tasks due within next 3 months   No tasks due.     Ema Hidalgo, PharmD  [facility] - Specialty Pharm[facility]  [address] [place] [id number]  Phone: [phone]  Fax: [phone]

## 2022-05-10 ENCOUNTER — PATIENT OUTREACH (OUTPATIENT)
Dept: ADMINISTRATIVE | Facility: OTHER | Age: 82
End: 2022-05-10
Payer: MEDICARE

## 2022-05-11 ENCOUNTER — TELEPHONE (OUTPATIENT)
Dept: PHARMACY | Facility: CLINIC | Age: 82
End: 2022-05-11
Payer: MEDICARE

## 2022-05-11 ENCOUNTER — OFFICE VISIT (OUTPATIENT)
Dept: CARDIOLOGY | Facility: CLINIC | Age: 82
End: 2022-05-11
Payer: MEDICARE

## 2022-05-11 VITALS
WEIGHT: 116.19 LBS | HEIGHT: 62 IN | DIASTOLIC BLOOD PRESSURE: 56 MMHG | SYSTOLIC BLOOD PRESSURE: 117 MMHG | HEART RATE: 77 BPM | BODY MASS INDEX: 21.38 KG/M2

## 2022-05-11 DIAGNOSIS — I70.8 CELIAC ARTERY ATHEROSCLEROSIS: ICD-10-CM

## 2022-05-11 DIAGNOSIS — I70.0 AORTIC ATHEROSCLEROSIS: ICD-10-CM

## 2022-05-11 DIAGNOSIS — Z78.9 STATIN INTOLERANCE: ICD-10-CM

## 2022-05-11 DIAGNOSIS — I73.9 PAD (PERIPHERAL ARTERY DISEASE): ICD-10-CM

## 2022-05-11 DIAGNOSIS — E78.00 HYPERCHOLESTEREMIA: ICD-10-CM

## 2022-05-11 DIAGNOSIS — I10 ESSENTIAL HYPERTENSION: ICD-10-CM

## 2022-05-11 DIAGNOSIS — I70.219 ATHEROSCLEROTIC PERIPHERAL VASCULAR DISEASE WITH INTERMITTENT CLAUDICATION: ICD-10-CM

## 2022-05-11 DIAGNOSIS — E78.2 MIXED HYPERLIPIDEMIA: ICD-10-CM

## 2022-05-11 DIAGNOSIS — K55.9 MESENTERIC ISCHEMIA: ICD-10-CM

## 2022-05-11 PROCEDURE — 1159F MED LIST DOCD IN RCRD: CPT | Mod: CPTII,S$GLB,, | Performed by: INTERNAL MEDICINE

## 2022-05-11 PROCEDURE — 99215 PR OFFICE/OUTPT VISIT, EST, LEVL V, 40-54 MIN: ICD-10-PCS | Mod: S$GLB,,, | Performed by: INTERNAL MEDICINE

## 2022-05-11 PROCEDURE — 1101F PT FALLS ASSESS-DOCD LE1/YR: CPT | Mod: CPTII,S$GLB,, | Performed by: INTERNAL MEDICINE

## 2022-05-11 PROCEDURE — 99999 PR PBB SHADOW E&M-EST. PATIENT-LVL IV: CPT | Mod: PBBFAC,,, | Performed by: INTERNAL MEDICINE

## 2022-05-11 PROCEDURE — 1126F PR PAIN SEVERITY QUANTIFIED, NO PAIN PRESENT: ICD-10-PCS | Mod: CPTII,S$GLB,, | Performed by: INTERNAL MEDICINE

## 2022-05-11 PROCEDURE — 1159F PR MEDICATION LIST DOCUMENTED IN MEDICAL RECORD: ICD-10-PCS | Mod: CPTII,S$GLB,, | Performed by: INTERNAL MEDICINE

## 2022-05-11 PROCEDURE — 1126F AMNT PAIN NOTED NONE PRSNT: CPT | Mod: CPTII,S$GLB,, | Performed by: INTERNAL MEDICINE

## 2022-05-11 PROCEDURE — 3288F FALL RISK ASSESSMENT DOCD: CPT | Mod: CPTII,S$GLB,, | Performed by: INTERNAL MEDICINE

## 2022-05-11 PROCEDURE — 99215 OFFICE O/P EST HI 40 MIN: CPT | Mod: S$GLB,,, | Performed by: INTERNAL MEDICINE

## 2022-05-11 PROCEDURE — 3078F DIAST BP <80 MM HG: CPT | Mod: CPTII,S$GLB,, | Performed by: INTERNAL MEDICINE

## 2022-05-11 PROCEDURE — 99999 PR PBB SHADOW E&M-EST. PATIENT-LVL IV: ICD-10-PCS | Mod: PBBFAC,,, | Performed by: INTERNAL MEDICINE

## 2022-05-11 PROCEDURE — 3074F PR MOST RECENT SYSTOLIC BLOOD PRESSURE < 130 MM HG: ICD-10-PCS | Mod: CPTII,S$GLB,, | Performed by: INTERNAL MEDICINE

## 2022-05-11 PROCEDURE — 3078F PR MOST RECENT DIASTOLIC BLOOD PRESSURE < 80 MM HG: ICD-10-PCS | Mod: CPTII,S$GLB,, | Performed by: INTERNAL MEDICINE

## 2022-05-11 PROCEDURE — 3288F PR FALLS RISK ASSESSMENT DOCUMENTED: ICD-10-PCS | Mod: CPTII,S$GLB,, | Performed by: INTERNAL MEDICINE

## 2022-05-11 PROCEDURE — 1101F PR PT FALLS ASSESS DOC 0-1 FALLS W/OUT INJ PAST YR: ICD-10-PCS | Mod: CPTII,S$GLB,, | Performed by: INTERNAL MEDICINE

## 2022-05-11 PROCEDURE — 3074F SYST BP LT 130 MM HG: CPT | Mod: CPTII,S$GLB,, | Performed by: INTERNAL MEDICINE

## 2022-05-11 NOTE — PROGRESS NOTES
"[facility]      CC: Celiac [place] Stenosis      Patient ID: Alana Beth is a 81 y.o. female with PAD, celiac artery stenosis s/p PTAS, HTN, HLD, palpitations, who presents for a follow up appointment.  Pertinent history/events are as follows:     -Pt presents for evaluation of PAD/weight loss.    -At our initial clinic visit on 9/4/2020, Mrs. Beth reported  "not feeling well since 11/2019".  Main complaint is weakness, fatigue and lack of appetite.  She has lost 25 pounds since 11/2019.  No definite abdominal pain.  No chest pain, SOB, or LE edema.  Exam with audible abdominal bruit.  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    Plan:   Celiac Axis Stenosis- Mrs. 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  Pertinent findings include 25 pound weight loss and abdominal bruit.  No definite abdominal pain, although mild abdominal tenderness noted on exam.  Chronic mesenteric ischemia is also a possibility in this pt with known PAD.  Given these findings, will refer to Vascular Surgery for evaluation.  Pt has several risk factors for CAD, hence, will check nuclear stress test and echo to evaluate further.  PAD- Pt with known BLE PAD.  Continue ASA.  Pt states she's not taking a statin due to issues with liver disease in the past.  Will check outside records before starting statin.  Check updated lipid panel.      -At follow up clinic visit on 9/25/2020, Mrs. Beth reported no new symptoms.  States she still does not feel well.  Pt evaluated by Vascular Surgery (Enrrique Preciado) on 9/16/2020, who recommend psych evaluation and continued GI workup.  Mesenteric Utrasound on 9/10/2020 revealed a velocity elevation of 378 cm/s is visualized near the Celiac artery origin within a region of focal narrowing, suggestive of a greater than 70% stenosis.  Nuclear Stress Test on " 9/23/2020 revealed no evidence from myocardial ischemia or injury.  The EKG portion of this study is abnormal but not diagnostic.  Echo on 9/10/2020 revealed normal left ventricular systolic function with EF of 60%; concentric left ventricular remodeling; normal LV diastolic function; normal right ventricular systolic function; mild left atrial enlargement; mild tricuspid regurgitation; estimated PA systolic pressure is 30 mmHg; normal central venous pressure (3 mmHg).  Labs from 9/10/2020 show total cholesterol of 348 with LDL of 234.   Plan:   Celiac Axis Stenosis- Mrs. 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  Pertinent findings include 25 pound weight loss and abdominal bruit.  No definite abdominal pain, although mild abdominal tenderness noted on exam.  Chronic mesenteric ischemia is also a possibility in this pt with known PAD.  Pt evaluated by Vascular Surgery (Enrrique Preciado) on Recommend psych and continued GI workup.  Mesenteric Utrasound on 9/10/2020 revealed a velocity elevation of 378 cm/s is visualized near the Celiac artery origin within a region of focal narrowing, suggestive of a greater than 70% stenosis.  Nuclear Stress Test on 9/23/2020 revealed no evidence from myocardial ischemia or injury.  The EKG portion of this study is abnormal but not diagnostic.  Echo on 9/10/2020 revealed normal left ventricular systolic function with EF of 60%; concentric left ventricular remodeling; normal LV diastolic function; normal right ventricular systolic function; mild left atrial enlargement; mild tricuspid regurgitation; estimated PA systolic pressure is 30 mmHg; normal central venous pressure (3 mmHg).  Given these findings, will refer to Dr. Junior and GI for evaluation.       PAD- Pt with known BLE PAD.  She has no claudication, rest pain or tissue loss to suggest CLI.  Start rosuvastatin 40 mg daily.  Continue ASA 81 mg daily.  Check carotid ultrasound  prior to next visit.   HLD-  Labs from 9/10/2020 show total cholesterol of 348 with LDL of 234.  Start rosuvastatin 40 mg daily. Monitor LFT's.      -At clinic visit on 10/26/2020, Mrs. Cole reports that she had not starting statin therapy due to insurance issues. She was prescribed alirocumab 75 mg injections and she has received one dose so far. She would prefer oral therapy for her hypercholesterolemia. She notes much improvement in her symptoms of sputum production after starting omeprazole. Patient was evaluated by GI for abdominal pain associated with celiac axis syndrome and increased amounts of sputum. EGD done in 8/2020 she was noted to have a submucosal esophageal nodule that path revealed chronic esophagitis. She was started on Omeprazole 20 mg BID and barium esophagram requested. Barium study was consistent with mild esophageal dysmotility.  Patient has not seen Dr. Junior yet for possible angiogram, she did not have an appointment set up yet.   Plan:  Plan:  -- Schedule pt to see Dr. Junior   -- continue omeprazole, GI follow up PRN for symptomatic care   --Start bempedoic acid 180 mg daily due to statin intolerance    -On 12/8/2020, pt underwent celiac artery intervention with Dr. Junior:  S/p aortogram with selective celiac and SMA angiogram                Initially started R radial then switched to L radial               Patent SMA and 80% celiac stenosis              IVUS guided celiac PTAS              6.0 x 18 and 6 x 14 mm Express SD dilated to 18 noemí     -On 12/16/2020, pt admitted at [facility] with abdominal pain.  Abd ultrasound revealed  patent stent in celiac artery and no significant changes with respiration to suggest extrinsic compression by median arcuate ligament.  Velocities suggest only moderate stenosis of the stent.   Symptoms improved and pt was discharged home on ASA/Plavix/Praluent.       -At clinic visit on 1/27/2021, Mrs. Beth reported feeling well with no abdominal  pain, nausea, vomiting, or anorexia.  Taking all medications as prescribed.  Labs from 12/19/2020 shows  vs 234 on 9/10/2020.    Plan:   Celiac Axis Stenosis-  Now s/p IVUS guided celiac PTAS with 6.0 x 18 and 6 x 14 mm Express SD on 12/18/2020.  Mrs. Beth reports feeling much better with no abdominal pain, n/v, or anorexia.  Abd ultrasound revealed  patent stent in celiac artery and no significant changes with respiration to suggest extrinsic compression by median arcuate ligament.  Velocities suggest only moderate stenosis of the stent.  Continue ASA/Plavix/Praluent.     PAD- Pt with known BLE PAD.  She has no claudication, rest pain or tissue loss to suggest CLI.  Continue ASA/Plavix/Praluent.      HLD-  Labs from 12/19/2020 shows  vs 234 on 9/10/2020.  Continue Praluent.       -At clinic visit on 4/28/2021, Mrs. Cole reported no abdominal pain, nausea, or anorexia.  Labs from 4/21/2021 show  vs 150 on 12/19/2021.  Plan:  Celiac Axis Stenosis-  Now s/p IVUS guided celiac artery PTAS with 6.0 x 18 and 6 x 14 mm Express SD on 12/18/2020.  Mrs. Beth continues to do well with no abdominal pain, n/v, or anorexia.  Abd ultrasound on 12/18/2020 revealed  patent stent in celiac artery and no significant changes with respiration to suggest extrinsic compression by median arcuate ligament.  Velocities suggest only moderate stenosis of the stent.  Continue ASA/Plavix/Praluent.     PAD- Pt with known BLE PAD.  She has no claudication, rest pain or tissue loss to suggest CLI.  Continue ASA/Plavix/Praluent.      HLD-  Labs from 4/21/2021 show  vs 150 on 12/19/2021.  Increase Praluent to 150 mg every 14 days.     -At clinic visit on 7/28/2021, Mrs. Cole reported no chest pain, SOB, weight loss, nausea, TIA symptoms or syncope.  States she's been injecting Praluent in her thigh instead of abdomen.  Labs from 7/19/2021 show LDL now 124 vs 145 on 4/21/2021.  Of note, pt states she did not fast  prior to the labs being drawn.   Plan:   Celiac Axis Stenosis-  Now s/p IVUS guided celiac PTAS with 6.0 x 18 and 6 x 14 mm Express SD on 12/18/2020.  Mrs. Beth has no abdominal pain, n/v, or anorexia.  Abd ultrasound on 12/18/2020 revealed  patent stent in celiac artery and no significant changes with respiration to suggest extrinsic compression by median arcuate ligament.  Velocities suggest only moderate stenosis of the stent.  Continue ASA/Plavix/Praluent.   Surveillance imaging scheduled by Dr. RENÉE Aguilar.   PAD- Pt with known BLE PAD.  She has no claudication, rest pain or tissue loss to suggest CLI.  Continue ASA/Plavix/Praluent.      HLD-  Labs from 7/19/2021 show LDL now 124 vs 145 on 4/21/2021.  The modest reduction in LDL despite increasing Praluent to 150 mg every 14 days may be due to the pt injecting Praluent in the thigh instead of abdomen.  Continue zetia and Praluent.  Before adding bempedoic acid, will have pt inject Praluent in the abdomen, and repeat lipids in 3 months.         HTN- Continue current medications.    History of Pulmonary Nodules- Check CT chest without contrast to evaluate further.     11/8/2021 clinic visit: Mrs. Cole reports no chest pain, abdominal pain, SOB, LE edema, TIA symptoms or syncope.  States she's still injecting praluent in the thigh and not the abdomen, as discussed at our previous clinic visit.  Labs from 11/2/2021 shows LDL   142 vs 124 on 7/19/2021.  CT Chest w/o Contrast on 8/4/2021 revealed no pulmonary abnormalities requiring continued surveillance.   There is a Ill-defined hypoattenuating hepatic lesion, suboptimally evaluated in the absence of intravenous contrast.     Plan:  Celiac Axis Stenosis-  Now s/p IVUS guided celiac PTAS with 6.0 x 18 and 6 x 14 mm Express SD on 12/18/2020.  Mrs. Beth has no abdominal pain, n/v, or anorexia.  Abd ultrasound on 12/18/2020 revealed  patent stent in celiac artery and no significant changes with respiration to  suggest extrinsic compression by median arcuate ligament.  Velocities suggest only moderate stenosis of the stent.  Continue ASA/Plavix/Praluent.   Surveillance imaging scheduled by Dr. RENÉE Aguilar.   PAD- Pt with known BLE PAD.  She has no claudication, rest pain or tissue loss to suggest CLI.  Continue ASA/Plavix/Praluent.      HLD- Mrs. Beth states she's still injecting praluent in the thigh and not the abdomen, as discussed at clinic visit on 7/28/2021.  Labs from 11/2/2021 shows LDL   142 vs 124 on 7/19/2021.  The increase in LDL despite increasing Praluent to 150 mg every 14 days may be due to the pt injecting Praluent in the thigh instead of abdomen.  Pt will try to do injection in abdomen.  Continue zetia and Praluent.  Before adding bempedoic acid, will have pt inject Praluent in the abdomen, and repeat lipids in 3 months.  HTN- Continue current medications.    History of Pulmonary Nodules-  CT Chest w/o Contrast on 8/4/2021 revealed no pulmonary abnormalities requiring continued surveillance.    Liver Lesion- Evaluate further with contrast enhanced abdominal MRI, and refer to Hepatology.    2/9/2022: Mrs. Beth reports feeling better. Her appetite has been okay but not as good as before. She denies abdominal pain. She lost about 2 pounds since November. She denies claudications but she does have pain in her legs when she starts walking which is relieved by continued walking.  Plan:   Celiac Axis Stenosis- s/p IVUS guided celiac PTA on 12/18/2020.  Mrs. Beth has no abdominal pain, n/v, or anorexia. Continue ASA/Plavix/Praluent. Repeat mesenteric artery US.  PAD- Pt with known BLE PAD.  She has no claudication, rest pain or tissue loss to suggest CLI.  Continue ASA/Plavix. Start cilostazol 100mg BID.     HLD- LDL remains not at goal despite using praluent. Will attempt switching to bempedoic acid and evaluate response with repeat lipid panel in 3 months.  HTN- Controlled. Continue current medications.       HPI:  Mrs. Beth reports no abdominal pain, weight loss, claudication or tissue loss.  Pt states she did not start bempedoic acid as prescribed.  Mesenteric Ultrasound on 5/5/2022 revealed the celiac trunk has greater than 70% stenosis. In-stent restenosis noted.  The proximal superior mesenteric artery has greater than 70% stenosis.  The proximal inferior mesenteric artery has greater than 70% stenosis.    Past Medical History:   Diagnosis Date    Atherosclerotic peripheral vascular disease     Chronic cystic mastitis     Essential hypertension     Hypercholesterolemia     Osteopenia     Shingles      Past Surgical History:   Procedure Laterality Date    BREAST BIOPSY      biopsies benign    CELIAC ARTERY STENT      ESOPHAGOGASTRODUODENOSCOPY  08/20/2020    PERCUTANEOUS TRANSLUMINAL ANGIOPLASTY N/A 12/8/2020    Procedure: Pta (Angioplasty, Percutaneous, Transluminal);  Surgeon: Hernando Junior MD;  Location: [facility] CATH LAB/EP;  Service: Cardiology;  Laterality: N/A;     Social History     Socioeconomic History    Marital status: Single   Occupational History    Occupation: retired student loan assistance    Tobacco Use    Smoking status: Never Smoker    Smokeless tobacco: Never Used   Substance and Sexual Activity    Alcohol use: No    Drug use: No    Sexual activity: Not Currently     Family History   Problem Relation Age of Onset    Hypertension Mother     Diabetes Brother     Diabetes Sister     Colon cancer Neg Hx     Esophageal cancer Neg Hx        Review of patient's allergies indicates:   Allergen Reactions    Benazepril Other (See Comments)     cough    Chlorthalidone     Iodinated contrast media     Iodine and iodide containing products     Lipitor [atorvastatin]      States she is allergic to all statin medications    Sertraline      Medication caused patient to get sick.    Spironolactone        Medication List with Changes/Refills   Current Medications    ALIROCUMAB  (PRALUENT PEN) 150 MG/ML PNIJ    Inject 1 mL (150 mg total) into the skin every 14 (fourteen) days.    AMLODIPINE (NORVASC) 10 MG TABLET    Take 1 tablet (10 mg total) by mouth once daily.    ASCORBIC ACID, VITAMIN C, (VITAMIN C) 1000 MG TABLET    Take 1,000 mg by mouth once daily.    ASPIRIN 81 MG CHEW    Take 81 mg by mouth once daily.    AZELASTINE (ASTELIN) 137 MCG (0.1 %) NASAL SPRAY    USE 1 TO 2 SPRAY(S) IN EACH NOSTRIL TWICE DAILY    BEMPEDOIC ACID 180 MG TAB    Take 1 tablet (180 mg total) by mouth once daily.    BUDESONIDE 1 MG/2 ML NBSP    EMPTY CONTENTS OF 1 RESPULE INTO NASAL IRRIGATION SYSTEM, ADD DISTILLED WATER, SALT PACK, MIX & IRRIGATE. PERFORM TWICE DAILY    CARVEDILOL (COREG) 6.25 MG TABLET    TAKE 1 TABLET BY MOUTH TWICE DAILY WITH MEALS    CETIRIZINE (ZYRTEC) 10 MG TABLET    Take 10 mg by mouth as needed for Allergies. Patients states only when provider prescribe it.    CILOSTAZOL (PLETAL) 100 MG TAB    Take 1 tablet (100 mg total) by mouth 2 (two) times daily.    CLOPIDOGREL (PLAVIX) 75 MG TABLET    Take 1 tablet (75 mg total) by mouth once daily.    CLORAZEPATE (TRANXENE) 7.5 MG TAB    Take 1 tablet (7.5 mg total) by mouth daily as needed (Anxiety).    CO-ENZYME Q-10 50 MG CAPSULE    Take 50 mg by mouth once daily.    DIPHENHYDRAMINE (BENADRYL) 25 MG CAPSULE    Take 25 mg by mouth every 6 (six) hours as needed for Itching.    EZETIMIBE (ZETIA) 10 MG TABLET    TAKE 1 TABLET BY MOUTH ONCE DAILY IN THE EVENING    MULTIVITAMIN (THERAGRAN) PER TABLET    Take 1 tablet by mouth once daily.    NEILMED SINUS RINSE REFILL PACK    use as directed    OMEGA-3 FATTY ACIDS/FISH OIL (FISH OIL-OMEGA-3 FATTY ACIDS) 300-1,000 MG CAPSULE    Take 1 capsule by mouth once daily.    TURMERIC ORAL    Take 1 packet by mouth once daily.       Review of Systems  Constitution: Denies chills, fever, and sweats.  HENT: Denies headaches or blurry vision.  Cardiovascular: Denies chest pain or irregular heart  "beat.  Respiratory: Denies cough or shortness of breath.  Gastrointestinal: Negative for abdominal pain and weight loss.  Musculoskeletal: Denies muscle cramps.  Neurological: Denies dizziness or focal weakness.  Psychiatric/Behavioral: Normal mental status.  Hematologic/Lymphatic: Denies bleeding problem or easy bruising/bleeding.  Skin: Denies rash or suspicious lesions    Physical Examination  BP (!) 117/56   Pulse 77   Ht 5' 2" (1.575 m)   Wt 52.7 kg (116 lb 2.9 oz)   BMI 21.25 kg/m²     Constitutional: No acute distress, conversant  HEENT: Sclera anicteric, Pupils equal, round and reactive to light, extraocular motions intact, Oropharynx clear  Neck: No JVD, no carotid bruits  Cardiovascular: regular rate and rhythm, no murmur, rubs or gallops, normal S1/S2  Pulmonary: Clear to auscultation bilaterally  Abdominal: Abdomen soft with no TTP, positive bowel sounds  Extremities: No lower extremity edema   Pulses:  Carotid pulses are 2+ on the right side, and 2+ on the left side.  Radial pulses are 2+ on the right side, and 2+ on the left side.   Femoral pulses are 2+ on the right side, and 2+ on the left side.  Popliteal pulses are 2+ on the right side, and 2+ on the left side.   Dorsalis pedis pulses are 2+ on the right side, and 2+ on the left side.   Posterior tibial pulses are 2+ on the right side, and 2+ on the left side.    Skin: No ecchymosis, erythema, or ulcers  Psych: Alert and oriented x 3, appropriate affect  Neuro: CNII-XII intact, no focal deficits    Labs:  Most Recent Data  CBC:   Lab Results   Component Value Date    WBC 4.55 12/16/2020    HGB 11.6 (L) 12/16/2020    HCT 36.1 (L) 12/16/2020     12/16/2020    MCV 84 12/16/2020    RDW 14.6 (H) 12/16/2020     BMP:   Lab Results   Component Value Date     07/19/2021    K 4.3 07/19/2021     07/19/2021    CO2 29 07/19/2021    BUN 20 07/19/2021    CREATININE 1.1 07/19/2021     (H) 07/19/2021    CALCIUM 9.9 07/19/2021    MG 2.3 " 12/16/2020    PHOS 2.9 12/16/2020     LFTS;   Lab Results   Component Value Date    PROT 7.3 07/19/2021    ALBUMIN 3.6 07/19/2021    BILITOT 0.4 07/19/2021    AST 32 07/19/2021    ALKPHOS 40 (L) 07/19/2021    ALT 39 07/19/2021     COAGS: No results found for: INR, PROTIME, PTT  FLP:   Lab Results   Component Value Date    CHOL 216 (H) 05/05/2022    HDL 82 (H) 05/05/2022    LDLCALC 123.4 05/05/2022    TRIG 53 05/05/2022    CHOLHDL 38.0 05/05/2022     CARDIAC:   Lab Results   Component Value Date    TROPONINI <0.01 08/15/2020    BNP <10 08/15/2020       EKG 8/15/2020:  Normal sinus rhythm  Low voltage QRS  Anteroseptal infarct (cited on or before 15-AUG-2020)    Mesenteric Ultrasound 5/5/2022:  · The celiac trunk has greater than 70% stenosis. In-stent restenosis noted.  · The proximal superior mesenteric artery has greater than 70% stenosis.  · The proximal inferior mesenteric artery has greater than 70% stenosis.  · There is no evidence of an Abdominal Aortic Aneurysm.  · Aortic atherosclerosis.    CT Chest w/o Contrast 8/4/2021:  No pulmonary abnormalities requiring continued surveillance.   Multi-vessel coronary artery atherosclerosis.   Ill-defined hypoattenuating hepatic lesion, suboptimally evaluated in the absence of intravenous contrast.  Consider further characterization with contrast enhanced abdominal MRI.     Mesenteric Ultrasound 12/18/2020:  Color flow duplex imaging reveals patent superior mesenteric and inferior mesenteric arteries with no evidence of a hemodynamically   significant stenosis. The Celiac artery stent is patent with and velocities are 168 cm/s at rest; 157 cm/s with inspiration; 153 cm/s   with expiration. No suggestion of MALS. An accelerated flow velocity of 282 cm/s is visualized at the distal edge on the Celiac stent.   However, this region appear widely patent.       Nuclear Stress Test 9/23/2020:  Normal myocardial perfusion scan.    The perfusion scan is free of evidence from  myocardial ischemia or injury.    Visually estimated ejection fraction is normal at rest and normal at stress.    There is normal wall motion at rest and post stress.    LV cavity size is normal at rest and normal at stress.    The EKG portion of this study is abnormal but not diagnostic.    The patient reported no chest pain during the stress test.    There are no prior studies for comparison.       Echo 9/10/2020:  · Normal left ventricular systolic function. The estimated ejection fraction is 60%.  · Concentric left ventricular remodeling.  · Normal LV diastolic function.  · No wall motion abnormalities.  · Normal right ventricular systolic function.  · Mild left atrial enlargement.  · Mild tricuspid regurgitation.  · The estimated PA systolic pressure is 30 mmHg.  · Normal central venous pressure (3 mmHg).    Mesenteric Utrasound 9/10/2020:  Color flow duplex exam reveals a patent abdominal aorta, celiac artery, superior mesenteric artery and inferior mesenteric artery. A   velocity elevation of 378 cm/s is visualized near the Celiac artery origin within a region of focal narrowing, suggestive of a greater than   70% stenosis.     CTA abd/pelvis with contrast (outside study from [facility]) 8/28/2020:  1. No evidence for gastrointestinal bleeding.    2. Scattered colonic diverticula without adjacent inflammatory changes.    3. Myomatous changes of the uterus with degenerating fibroids.     4. Hemangioma in segment VII.    5. Stenosis of the celiac access with poststenotic dilatation.    BLE Arterial Ultrasound 7/31/2020:  Multilevel disease in the left lower extremity. Moderate stenosis in the left superficial femoral artery. Severe stenosis in the distal popliteal artery.    Left lower extremity:  Ankle-brachial index is 0.73. Triphasic signals are seen in the common femoral and profunda arteries. There are biphasic superficial femoral, popliteal, posterior tibial, and anterior tibial arteries. There is a  velocity increase from the mid SFA to the distal SFA from 1 /sec 2 m/s. Consistent with a moderate stenosis. There is another focal velocity increase in the distal popliteal from 2.5 m/sec to 5.0 m/s. Consistent with a severe stenosis. Distal to this there is monophasic waveforms.         Right lower extremity:  Ankle-brachial index is 0.93. Triphasic signals are seen in the common femoral and profunda artery. There are biphasic signals in the superficial femoral, popliteal, posterior tibial, and anterior tibial arteries. No focal areas of elevated velocity are seen.    Assessment/Plan:  Alana Beth is a 81 y.o. female with PAD, celiac artery stenosis s/p PTAS, HTN, HLD, palpitations, who presents for a follow up appointment.     1. 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 Beth reports no abdominal pain, weight loss, claudication or tissue loss.  Mesenteric Ultrasound on 5/5/2022 revealed the celiac trunk has greater than 70% stenosis. In-stent restenosis noted.  The proximal superior mesenteric artery has greater than 70% stenosis.  The proximal inferior mesenteric artery has greater than 70% stenosis.  Continue ASA/Plavix/Praluent.    2. PAD- Pt with known BLE PAD.  She has no claudication, rest pain or tissue loss to suggest CLI.  Continue ASA/Plavix and cilostazol 100mg BID.       3. HLD- LDL remains not at goal despite using max dose praluent. Pt states she did not start bempedoic acid as prescribed.  Unclear if pt is compliant with these medications.  Check Lipo A.  Continue current medications.      4. HTN- Controlled. Continue current medications.      Follow up in 3 months with lipids prior    Total duration of face to face visit time 30 minutes.  Total time spent counseling greater than fifty percent of total visit time.  Counseling included discussion regarding imaging findings, diagnosis, possibilities, treatment options, risks and benefits.  The patient had many  questions regarding the options and long-term effects.

## 2022-05-11 NOTE — PATIENT INSTRUCTIONS
Assessment/Plan:  Alana Beth is a 81 y.o. female with PAD, celiac artery stenosis s/p PTAS, HTN, HLD, palpitations, who presents for a follow up appointment.     1. 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 Beth reports no abdominal pain, weight loss, claudication or tissue loss.  Mesenteric Ultrasound on 5/5/2022 revealed the celiac trunk has greater than 70% stenosis. In-stent restenosis noted.  The proximal superior mesenteric artery has greater than 70% stenosis.  The proximal inferior mesenteric artery has greater than 70% stenosis.  Continue ASA/Plavix/Praluent.    2. PAD- Pt with known BLE PAD.  She has no claudication, rest pain or tissue loss to suggest CLI.  Continue ASA/Plavix. Start cilostazol 100mg BID.       3. HLD- LDL remains not at goal despite using max dose praluent. Pt states she did not start bempedoic acid as prescribed.  Unclear if pt is compliant with these medications.  Check Lipo A.  Continue current medications.      4. HTN- Controlled. Continue current medications.      Follow up in 3 months with lipids prior

## 2022-05-18 ENCOUNTER — PES CALL (OUTPATIENT)
Dept: ADMINISTRATIVE | Facility: CLINIC | Age: 82
End: 2022-05-18
Payer: MEDICARE

## 2022-05-21 NOTE — TELEPHONE ENCOUNTER
Refill Routing Note   Medication(s) are not appropriate for processing by [facility] for the following reason(s):      - Drug-Disease Interaction (amLODIPine and Liver lesion)    [facility] action(s):  Defer Medication-related problems identified:   Requires labs  Drug-disease interaction     Medication Therapy Plan: CDMR.LABS(CMP)  Medication reconciliation completed: No     Appointments  past 12m or future 3m with PCP    Date Provider   Last Visit   12/30/2021 Nurys Bearden MD   Next Visit   Visit date not found Nurys Bearden MD   ED visits in past 90 days: 0        Note composed:5:42 PM 05/21/2022

## 2022-05-21 NOTE — TELEPHONE ENCOUNTER
Care Due:                  Date            Visit Type   Department     Provider  --------------------------------------------------------------------------------                                EP -                              PRIMARY      MET INTERNAL  Last Visit: 12-      CARE ([facility])   MEDICINE       Nurys Bearden  Next Visit: None Scheduled  None         None Found                                                            Last  Test          Frequency    Reason                     Performed    Due Date  --------------------------------------------------------------------------------    CMP.........  12 months..  ezetimibe................  07-   07-    [facility] Embedded Care Gaps. Reference number: [id number]. 5/21/2022   10:08:45 AM CDT

## 2022-05-22 RX ORDER — AMLODIPINE BESYLATE 10 MG/1
TABLET ORAL
Qty: 90 TABLET | Refills: 2 | Status: SHIPPED | OUTPATIENT
Start: 2022-05-22 | End: 2024-01-18 | Stop reason: SDUPTHER

## 2022-06-06 ENCOUNTER — SPECIALTY PHARMACY (OUTPATIENT)
Dept: PHARMACY | Facility: CLINIC | Age: 82
End: 2022-06-06
Payer: MEDICARE

## 2022-06-06 NOTE — TELEPHONE ENCOUNTER
Specialty Pharmacy - Refill Coordination    Specialty Medication Orders Linked to Encounter    Flowsheet Row Most Recent Value   Medication #1 alirocumab (PRALUENT PEN) 150 mg/mL PnIj (Order#[id number], Rx#[id number])          Refill Questions - Documented Responses    Flowsheet Row Most Recent Value   Patient Availability and HIPAA Verification    Does patient want to proceed with activity? Yes   HIPAA/medical authority confirmed? Yes   Relationship to patient of person spoken to? Self   Refill Screening Questions    Would patient like to speak to a pharmacist? No   When does the patient need to receive the medication? 06/15/22   Refill Delivery Questions    How will the patient receive the medication? Delivery Shirley   When does the patient need to receive the medication? 06/15/22   Shipping Address Home   Address in [facility] confirmed and updated if neccessary? Yes   Expected Copay ($) 20   Is the patient able to afford the medication copay? Yes   Payment Method CC on file   Days supply of Refill 28   Refill activity completed? Yes   Refill activity plan Refill scheduled   Shipment/Pickup Date: 06/07/22          Current Outpatient Medications   Medication Sig    alirocumab (PRALUENT PEN) 150 mg/mL PnIj Inject 1 mL (150 mg total) into the skin every 14 (fourteen) days.    amLODIPine (NORVASC) 10 MG tablet Take 1 tablet by mouth once daily    ascorbic acid, vitamin C, (VITAMIN C) 1000 MG tablet Take 1,000 mg by mouth once daily.    aspirin 81 MG Chew Take 81 mg by mouth once daily.    azelastine (ASTELIN) 137 mcg (0.1 %) nasal spray USE 1 TO 2 SPRAY(S) IN EACH NOSTRIL TWICE DAILY    bempedoic acid 180 mg Tab Take 1 tablet (180 mg total) by mouth once daily. (Patient not taking: Reported on 5/11/2022)    budesonide 1 mg/2 mL NbSp EMPTY CONTENTS OF 1 RESPULE INTO NASAL IRRIGATION SYSTEM, ADD DISTILLED WATER, SALT PACK, MIX & IRRIGATE. PERFORM TWICE DAILY    carvediloL (COREG) 6.25 MG tablet TAKE 1  TABLET BY MOUTH TWICE DAILY WITH MEALS    cetirizine (ZYRTEC) 10 MG tablet Take 10 mg by mouth as needed for Allergies. Patients states only when provider prescribe it.    cilostazoL (PLETAL) 100 MG Tab Take 1 tablet (100 mg total) by mouth 2 (two) times daily. (Patient not taking: Reported on 5/11/2022)    clopidogreL (PLAVIX) 75 mg tablet Take 1 tablet (75 mg total) by mouth once daily.    clorazepate (TRANXENE) 7.5 MG Tab Take 1 tablet (7.5 mg total) by mouth daily as needed (Anxiety).    co-enzyme Q-10 50 mg capsule Take 50 mg by mouth once daily.    diphenhydrAMINE (BENADRYL) 25 mg capsule Take 25 mg by mouth every 6 (six) hours as needed for Itching.    ezetimibe (ZETIA) 10 mg tablet TAKE 1 TABLET BY MOUTH ONCE DAILY IN THE EVENING    multivitamin (THERAGRAN) per tablet Take 1 tablet by mouth once daily.    NEILMED SINUS RINSE REFILL Pack use as directed    omega-3 fatty acids/fish oil (FISH OIL-OMEGA-3 FATTY ACIDS) 300-1,000 mg capsule Take 1 capsule by mouth once daily.    TURMERIC ORAL Take 1 packet by mouth once daily.   Last reviewed on 5/11/2022  8:32 AM by Olivia Duran MA    Review of patient's allergies indicates:   Allergen Reactions    Benazepril Other (See Comments)     cough    Chlorthalidone     Iodinated contrast media     Iodine and iodide containing products     Lipitor [atorvastatin]      States she is allergic to all statin medications    Sertraline      Medication caused patient to get sick.    Spironolactone     Last reviewed on  5/11/2022 8:29 AM by Olivia Duran      Tasks added this encounter   7/6/2022 - Refill Call (Auto Added)   Tasks due within next 3 months   No tasks due.     Rylee Taylor, PharmD  [facility]  [facility] [address]  [facility] [address]  Phone: [phone]  Fax: [phone]

## 2022-06-27 ENCOUNTER — OFFICE VISIT (OUTPATIENT)
Dept: CARDIOLOGY | Facility: CLINIC | Age: 82
End: 2022-06-27
Payer: MEDICARE

## 2022-06-27 VITALS
DIASTOLIC BLOOD PRESSURE: 69 MMHG | WEIGHT: 110.25 LBS | BODY MASS INDEX: 20.16 KG/M2 | OXYGEN SATURATION: 99 % | SYSTOLIC BLOOD PRESSURE: 128 MMHG | HEART RATE: 75 BPM

## 2022-06-27 DIAGNOSIS — I70.8 CELIAC ARTERY ATHEROSCLEROSIS: ICD-10-CM

## 2022-06-27 DIAGNOSIS — E78.00 HYPERCHOLESTEREMIA: ICD-10-CM

## 2022-06-27 DIAGNOSIS — I10 ESSENTIAL HYPERTENSION: ICD-10-CM

## 2022-06-27 DIAGNOSIS — I70.0 ATHEROSCLEROSIS OF AORTIC ARCH: ICD-10-CM

## 2022-06-27 DIAGNOSIS — I70.219 ATHEROSCLEROTIC PERIPHERAL VASCULAR DISEASE WITH INTERMITTENT CLAUDICATION: Primary | ICD-10-CM

## 2022-06-27 DIAGNOSIS — I73.9 PAD (PERIPHERAL ARTERY DISEASE): ICD-10-CM

## 2022-06-27 PROCEDURE — 99499 RISK ADDL DX/OHS AUDIT: ICD-10-PCS | Mod: S$GLB,,, | Performed by: INTERNAL MEDICINE

## 2022-06-27 PROCEDURE — 99215 OFFICE O/P EST HI 40 MIN: CPT | Mod: S$GLB,,, | Performed by: INTERNAL MEDICINE

## 2022-06-27 PROCEDURE — 3078F PR MOST RECENT DIASTOLIC BLOOD PRESSURE < 80 MM HG: ICD-10-PCS | Mod: CPTII,S$GLB,, | Performed by: INTERNAL MEDICINE

## 2022-06-27 PROCEDURE — 1159F MED LIST DOCD IN RCRD: CPT | Mod: CPTII,S$GLB,, | Performed by: INTERNAL MEDICINE

## 2022-06-27 PROCEDURE — 3074F SYST BP LT 130 MM HG: CPT | Mod: CPTII,S$GLB,, | Performed by: INTERNAL MEDICINE

## 2022-06-27 PROCEDURE — 99499 UNLISTED E&M SERVICE: CPT | Mod: S$GLB,,, | Performed by: INTERNAL MEDICINE

## 2022-06-27 PROCEDURE — 3074F PR MOST RECENT SYSTOLIC BLOOD PRESSURE < 130 MM HG: ICD-10-PCS | Mod: CPTII,S$GLB,, | Performed by: INTERNAL MEDICINE

## 2022-06-27 PROCEDURE — 1126F AMNT PAIN NOTED NONE PRSNT: CPT | Mod: CPTII,S$GLB,, | Performed by: INTERNAL MEDICINE

## 2022-06-27 PROCEDURE — 99999 PR PBB SHADOW E&M-EST. PATIENT-LVL V: ICD-10-PCS | Mod: PBBFAC,,, | Performed by: INTERNAL MEDICINE

## 2022-06-27 PROCEDURE — 99215 PR OFFICE/OUTPT VISIT, EST, LEVL V, 40-54 MIN: ICD-10-PCS | Mod: S$GLB,,, | Performed by: INTERNAL MEDICINE

## 2022-06-27 PROCEDURE — 99999 PR PBB SHADOW E&M-EST. PATIENT-LVL V: CPT | Mod: PBBFAC,,, | Performed by: INTERNAL MEDICINE

## 2022-06-27 PROCEDURE — 3078F DIAST BP <80 MM HG: CPT | Mod: CPTII,S$GLB,, | Performed by: INTERNAL MEDICINE

## 2022-06-27 PROCEDURE — 1159F PR MEDICATION LIST DOCUMENTED IN MEDICAL RECORD: ICD-10-PCS | Mod: CPTII,S$GLB,, | Performed by: INTERNAL MEDICINE

## 2022-06-27 PROCEDURE — 1126F PR PAIN SEVERITY QUANTIFIED, NO PAIN PRESENT: ICD-10-PCS | Mod: CPTII,S$GLB,, | Performed by: INTERNAL MEDICINE

## 2022-06-27 NOTE — PROGRESS NOTES
Subjective:   Patient ID:  Alana Beth is a 81 y.o. female who presents for follow up of celiac artery stenosis, Hyperlipidemia, Peripheral Arterial Disease, and Hypertension      HPI:     She is here today for a one year follow up s/p celiac PTAS. Since then she has been feeling fine. Her weight was 55kg last November, down to 54kg in February, down to 52.7kg in May and today down to 50kg. BMI is 20. She attributes this weight loss to change in her diet. She denies any pain while eating, although states this was never a symptom in the past either. She denies early satiety or food fear. She changed her diet because her cholesterol is too high despite using praluent. She was started on bempedoic acid and encouraged to exercise. She has also noticed bruising of her skin while on aspirin and plavix.    She was last seen 6/2021 s/p celiac PTAS with 6.0 x 18 and 6.0 x 14 Express SD dilated to 18 noemí. She complained of abdominal post procedure that intermittently improved then got worse. A CTA was ordered but unfortunately she ended in the ED because of pain. CTA revealed a patent CEDRICK, SMA and celiac stent with a mild kink. She was worked up for arcuate ligament related pain but the ultrasound with inspiration + expiration revealed no PSV changes consistent with MALS. She denies any pain. She is able to eat without any issues. Reviewed consultations noted from ED, CARD, and VASC Sx.       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 She lost close to 30-35 lb over the past year. She did not have the classic post prandial angina seen with mesenteric ischemia. No fear of meals. She has HTN, HLP, intolerance to statin, PAD, and granulomatous lung disease.       CTA ([place]) 8/2020     Severe stenosis of the celiac axis with poststenotic dilatation.        Non contrast CT 8/2020     0.5 cm calcified nodule in the medial segment of the right middle  lobe    Aortic and coronary artery calcific atherosclerosis.        US 9/2020     Celiac  cm/sec with ratio 6.2   SMA  cm/sec with ratio 3.8   CEDRICK 152 cm/sec with ratio 2.6      Esophagram 9/2020     Mild esophageal dysmotility    No evidence of cricopharyngeal      Echo 9/2020     Normal EF   Mild LAE   Mild TR   PASP 30 mmHg    Nuclear stress test 9/2020     No ischemia       Mesenteric US 3/2021     Celiac  cm/sec; stent edge 282 cm/sec, distal stent 208 cm/sec    cm/sec   CEDRICK 210 cm/sec      Mesenteric US 5/2022      Celiac proximal 448 cm/s     cm/s   CEDRICK 219 cm/s            Patient Active Problem List    Diagnosis Date Noted    Celiac artery atherosclerosis 09/04/2020     Priority: Low    Liver lesion 11/08/2021    Chronic cough 09/30/2021    Chronic throat clearing 09/30/2021    Lung nodule 07/28/2021    Mixed hyperlipidemia 04/28/2021    History of iron deficiency anemia 01/21/2021    Mesenteric ischemia 12/16/2020    Statin intolerance 09/29/2020    PAD (peripheral artery disease) 09/25/2020    Abdominal discomfort 09/25/2020    Aortic atherosclerosis 08/21/2020     Seen on CT 08/21/2020.  Discussed with patient to follow-up with PCP      Granulomatous lung disease 08/21/2020      Seen on CT of chest 08/21/2020- 0.5 cm calcified nodule in the medial segment of the right middle lobe.  This is a benign finding.  No further workup needed      Hypercholesteremia 08/19/2020    Solitary pulmonary nodule 08/18/2020     Patient had emergency room visit on 08/15/2020, chest x-ray revealed medial right lung base 4 mm indeterminate nodule.  Further evaluation with CT was recommended.  Ms. Beth denies smoking history.  She does report having weight loss since November.    Plan:  -obtain designated CT of chest to further assess lung nodule.      Abnormal chest x-ray 08/18/2020     Patient had chest x-ray on 08/15/2020, findings were consistent with underlying  COPD/emphysema.  Also, noted to have right medial lung base 4 mm indeterminate nodule.  She denies smoking history.  Denies secondhand smoke.  Denies history of lung disease.  Does endorse having weight loss since November.    Plan:  Obtain designated CT of chest  Obtain spirometry  Obtain TSH      Weight loss 08/18/2020    Cough 08/18/2020     Cough is likely from upper airway disease.   She is following with ENT.   On Flonase and sinus regimen.     Plan:  -Obtain spirometry.       Atherosclerosis of aortic arch 08/18/2020     Noted on CXR 8/15/2020  Discussed with patient to follow up with PCP or Cardiology.       Palpitations 01/13/2020    Sensitivity to medication 01/13/2020    Essential hypertension 12/30/2019    Atherosclerotic peripheral vascular disease with intermittent claudication 12/30/2019    Anxiety 12/30/2019                    LABS      Lipid panel  Lab Results   Component Value Date    CHOL 216 (H) 05/05/2022    CHOL 218 (H) 02/02/2022    CHOL 241 (H) 11/01/2021     Lab Results   Component Value Date    HDL 82 (H) 05/05/2022    HDL 86 (H) 02/02/2022    HDL 88 (H) 11/01/2021     Lab Results   Component Value Date    LDLCALC 123.4 05/05/2022    LDLCALC 120.6 02/02/2022    LDLCALC 142.0 11/01/2021     Lab Results   Component Value Date    TRIG 53 05/05/2022    TRIG 57 02/02/2022    TRIG 55 11/01/2021     Lab Results   Component Value Date    CHOLHDL 38.0 05/05/2022    CHOLHDL 39.4 02/02/2022    CHOLHDL 36.5 11/01/2021            Review of Systems   Constitution: . Negative for diaphoresis, night sweats and weight gain.   HENT: Negative for congestion.    Eyes: Negative for blurred vision, discharge and double vision.   Cardiovascular: Negative for chest pain, claudication, cyanosis, dyspnea on exertion, irregular heartbeat, leg swelling, near-syncope, orthopnea, palpitations, paroxysmal nocturnal dyspnea and syncope.   Respiratory: Negative for cough, shortness of breath and wheezing.     Endocrine: Negative for cold intolerance, heat intolerance and polyphagia.   Hematologic/Lymphatic: Negative for adenopathy and bleeding problem. Does not bruise/bleed easily.   Skin: Negative for dry skin and nail changes.   Musculoskeletal: Negative for arthritis, back pain, falls, joint pain, myalgias and neck pain.   Gastrointestinal: Negative for bloating, abdominal pain, change in bowel habit and constipation.   Genitourinary: Negative for bladder incontinence, dysuria, flank pain, genital sores and missed menses.   Neurological: Negative for aphonia, brief paralysis, difficulty with concentration, dizziness and weakness.   Psychiatric/Behavioral: Negative for altered mental status and memory loss. The patient does not have insomnia.    Allergic/Immunologic: Negative for environmental allergies.       Objective:   Physical Exam   Constitutional: She is oriented to person, place, and time. She appears well-developed and well-nourished. She is not intubated.   HENT:   Head: Normocephalic and atraumatic.   Right Ear: External ear normal.   Left Ear: External ear normal.   Mouth/Throat: Oropharynx is clear and moist.   Eyes: Pupils are equal, round, and reactive to light. Conjunctivae and EOM are normal. Right eye exhibits no discharge. Left eye exhibits no discharge. No scleral icterus.   Neck: Normal range of motion. Neck supple. Normal carotid pulses, no hepatojugular reflux and no JVD present. Carotid bruit is not present. No tracheal deviation present. No thyromegaly present.   Cardiovascular: Normal rate, regular rhythm, S1 normal and S2 normal.  No extrasystoles are present. PMI is not displaced. Exam reveals no gallop, no S3, no distant heart sounds, no friction rub and no midsystolic click.   No murmur heard.  Pulses:       Carotid pulses are 2+ on the right side and 2+ on the left side.       Radial pulses are 2+ on the right side and 2+ on the left side.        Femoral pulses are 2+ on the right side  and 2+ on the left side.       Popliteal pulses are 2+ on the right side and 2+ on the left side.        Dorsalis pedis pulses are 1+ on the right side and 1+ on the left side.        Posterior tibial pulses are 1+ on the right side and 1+ on the left side.   Pulmonary/Chest: Effort normal and breath sounds normal. No accessory muscle usage or stridor. No apnea, no tachypnea and no bradypnea. She is not intubated. No respiratory distress. She has no decreased breath sounds. She has no wheezes. She has no rales. She exhibits no tenderness and no bony tenderness.   Abdominal: She exhibits no distension, no pulsatile liver, no abdominal bruit, no ascites, no pulsatile midline mass and no mass. There is no abdominal tenderness. There is no rebound and no guarding.   Musculoskeletal: Normal range of motion.         General: No tenderness or edema.   Lymphadenopathy:     She has no cervical adenopathy.   Neurological: She is alert and oriented to person, place, and time. She has normal reflexes. No cranial nerve deficit. Coordination normal.   Skin: Skin is warm. No rash noted. No erythema. No pallor.   Psychiatric: She has a normal mood and affect. Her behavior is normal. Judgment and thought content normal.       Assessment:     1. Atherosclerotic peripheral vascular disease with intermittent claudication    2. Celiac artery atherosclerosis    3. PAD (peripheral artery disease)    4. Atherosclerosis of aortic arch    5. Essential hypertension    6. Hypercholesteremia        Plan:       Weight loss likely due to voluntary diet modification to reduce cholesterol rather than worsening chronic mesenteric ischemia. BMI remains in healthy range.  If skin bruising becomes intolerable we may stop aspirin and continue plavix alone.  Follow up in 1 year.      Continue with current medical plan and lifestyle changes.  Return sooner for concerns or questions. If symptoms persist go to the ED  I have reviewed all pertinent data on  this patient       I have reviewed the patient's medical history in detail and updated the computerized patient record.    Orders Placed This Encounter   Procedures    CV Ultrasound Mesenteric Arterial (Cupid Only)     Standing Status:   Future     Standing Expiration Date:   6/27/2023     Order Specific Question:   Release to patient     Answer:   Immediate       Return sooner for concerns or questions      She expressed verbal understanding and agreed with the plan      -In today's visit, at least 4 established conditions that pose a risk to life or bodily function have been addressed and the conditions are severe.    -In today's visit, monitoring for drug toxicity was accomplished.        Patient's Medications   New Prescriptions    No medications on file   Previous Medications    ALIROCUMAB (PRALUENT PEN) 150 MG/ML PNIJ    Inject 1 mL (150 mg total) into the skin every 14 (fourteen) days.    AMLODIPINE (NORVASC) 10 MG TABLET    Take 1 tablet by mouth once daily    ASCORBIC ACID, VITAMIN C, (VITAMIN C) 1000 MG TABLET    Take 1,000 mg by mouth once daily.    ASPIRIN 81 MG CHEW    Take 81 mg by mouth once daily.    AZELASTINE (ASTELIN) 137 MCG (0.1 %) NASAL SPRAY    USE 1 TO 2 SPRAY(S) IN EACH NOSTRIL TWICE DAILY    BEMPEDOIC ACID 180 MG TAB    Take 1 tablet (180 mg total) by mouth once daily.    BUDESONIDE 1 MG/2 ML NBSP    EMPTY CONTENTS OF 1 RESPULE INTO NASAL IRRIGATION SYSTEM, ADD DISTILLED WATER, SALT PACK, MIX & IRRIGATE. PERFORM TWICE DAILY    CARVEDILOL (COREG) 6.25 MG TABLET    TAKE 1 TABLET BY MOUTH TWICE DAILY WITH MEALS    CETIRIZINE (ZYRTEC) 10 MG TABLET    Take 10 mg by mouth as needed for Allergies. Patients states only when provider prescribe it.    CILOSTAZOL (PLETAL) 100 MG TAB    Take 1 tablet (100 mg total) by mouth 2 (two) times daily.    CLOPIDOGREL (PLAVIX) 75 MG TABLET    Take 1 tablet (75 mg total) by mouth once daily.    CLORAZEPATE (TRANXENE) 7.5 MG TAB    Take 1 tablet (7.5 mg total)  by mouth daily as needed (Anxiety).    CO-ENZYME Q-10 50 MG CAPSULE    Take 50 mg by mouth once daily.    DIPHENHYDRAMINE (BENADRYL) 25 MG CAPSULE    Take 25 mg by mouth every 6 (six) hours as needed for Itching.    EZETIMIBE (ZETIA) 10 MG TABLET    TAKE 1 TABLET BY MOUTH ONCE DAILY IN THE EVENING    MULTIVITAMIN (THERAGRAN) PER TABLET    Take 1 tablet by mouth once daily.    NEILMED SINUS RINSE REFILL PACK    use as directed    OMEGA-3 FATTY ACIDS/FISH OIL (FISH OIL-OMEGA-3 FATTY ACIDS) 300-1,000 MG CAPSULE    Take 1 capsule by mouth once daily.    TURMERIC ORAL    Take 1 packet by mouth once daily.   Modified Medications    No medications on file   Discontinued Medications    No medications on file

## 2022-07-06 ENCOUNTER — SPECIALTY PHARMACY (OUTPATIENT)
Dept: PHARMACY | Facility: CLINIC | Age: 82
End: 2022-07-06
Payer: MEDICARE

## 2022-07-06 NOTE — TELEPHONE ENCOUNTER
Specialty Pharmacy - Refill Coordination    Specialty Medication Orders Linked to Encounter    Flowsheet Row Most Recent Value   Medication #1 alirocumab (PRALUENT PEN) 150 mg/mL PnIj (Order#[id number], Rx#[id number])          Refill Questions - Documented Responses    Flowsheet Row Most Recent Value   Patient Availability and HIPAA Verification    Does patient want to proceed with activity? Yes   HIPAA/medical authority confirmed? Yes   Relationship to patient of person spoken to? Self   Refill Screening Questions    Would patient like to speak to a pharmacist? No   When does the patient need to receive the medication? 07/15/22   Refill Delivery Questions    How will the patient receive the medication? Delivery Shirley   When does the patient need to receive the medication? 07/15/22   Shipping Address Home   Address in [facility] confirmed and updated if neccessary? Yes   Expected Copay ($) 20   Is the patient able to afford the medication copay? Yes   Payment Method CC on file   Days supply of Refill 28   Supplies needed? No supplies needed   Refill activity completed? Yes   Refill activity plan Refill scheduled   Shipment/Pickup Date: 07/07/22          Current Outpatient Medications   Medication Sig    alirocumab (PRALUENT PEN) 150 mg/mL PnIj Inject 1 mL (150 mg total) into the skin every 14 (fourteen) days.    amLODIPine (NORVASC) 10 MG tablet Take 1 tablet by mouth once daily    ascorbic acid, vitamin C, (VITAMIN C) 1000 MG tablet Take 1,000 mg by mouth once daily.    aspirin 81 MG Chew Take 81 mg by mouth once daily.    azelastine (ASTELIN) 137 mcg (0.1 %) nasal spray USE 1 TO 2 SPRAY(S) IN EACH NOSTRIL TWICE DAILY    bempedoic acid 180 mg Tab Take 1 tablet (180 mg total) by mouth once daily.    budesonide 1 mg/2 mL NbSp EMPTY CONTENTS OF 1 RESPULE INTO NASAL IRRIGATION SYSTEM, ADD DISTILLED WATER, SALT PACK, MIX & IRRIGATE. PERFORM TWICE DAILY    carvediloL (COREG) 6.25 MG tablet TAKE 1 TABLET BY  MOUTH TWICE DAILY WITH MEALS    cetirizine (ZYRTEC) 10 MG tablet Take 10 mg by mouth as needed for Allergies. Patients states only when provider prescribe it.    cilostazoL (PLETAL) 100 MG Tab Take 1 tablet (100 mg total) by mouth 2 (two) times daily.    clopidogreL (PLAVIX) 75 mg tablet Take 1 tablet (75 mg total) by mouth once daily.    clorazepate (TRANXENE) 7.5 MG Tab Take 1 tablet (7.5 mg total) by mouth daily as needed (Anxiety).    co-enzyme Q-10 50 mg capsule Take 50 mg by mouth once daily.    diphenhydrAMINE (BENADRYL) 25 mg capsule Take 25 mg by mouth every 6 (six) hours as needed for Itching.    ezetimibe (ZETIA) 10 mg tablet TAKE 1 TABLET BY MOUTH ONCE DAILY IN THE EVENING    multivitamin (THERAGRAN) per tablet Take 1 tablet by mouth once daily.    NEILMED SINUS RINSE REFILL Pack use as directed    omega-3 fatty acids/fish oil (FISH OIL-OMEGA-3 FATTY ACIDS) 300-1,000 mg capsule Take 1 capsule by mouth once daily.    TURMERIC ORAL Take 1 packet by mouth once daily.   Last reviewed on 6/27/2022  9:44 AM by Nicole Hawk MA    Review of patient's allergies indicates:   Allergen Reactions    Benazepril Other (See Comments)     cough    Chlorthalidone     Iodinated contrast media     Iodine and iodide containing products     Lipitor [atorvastatin]      States she is allergic to all statin medications    Sertraline      Medication caused patient to get sick.    Spironolactone     Last reviewed on  6/27/2022 9:48 AM by Nicole Hawk      Tasks added this encounter   8/5/2022 - Refill Call (Auto Added)   Tasks due within next 3 months   No tasks due.     Colleen Castano [facility]  [address] [facility] [address]  Phone: [phone]  Fax: [phone]

## 2022-07-22 ENCOUNTER — OFFICE VISIT (OUTPATIENT)
Dept: OBSTETRICS AND GYNECOLOGY | Facility: CLINIC | Age: 82
End: 2022-07-22
Payer: MEDICARE

## 2022-07-22 ENCOUNTER — TELEPHONE (OUTPATIENT)
Dept: CARDIOLOGY | Facility: CLINIC | Age: 82
End: 2022-07-22
Payer: MEDICARE

## 2022-07-22 VITALS — BODY MASS INDEX: 19.75 KG/M2 | DIASTOLIC BLOOD PRESSURE: 60 MMHG | SYSTOLIC BLOOD PRESSURE: 120 MMHG | WEIGHT: 108 LBS

## 2022-07-22 DIAGNOSIS — Z01.419 WELL WOMAN EXAM WITH ROUTINE GYNECOLOGICAL EXAM: Primary | ICD-10-CM

## 2022-07-22 PROCEDURE — 1126F PR PAIN SEVERITY QUANTIFIED, NO PAIN PRESENT: ICD-10-PCS | Mod: CPTII,S$GLB,, | Performed by: OBSTETRICS & GYNECOLOGY

## 2022-07-22 PROCEDURE — 99999 PR PBB SHADOW E&M-EST. PATIENT-LVL III: ICD-10-PCS | Mod: PBBFAC,,, | Performed by: OBSTETRICS & GYNECOLOGY

## 2022-07-22 PROCEDURE — 1159F PR MEDICATION LIST DOCUMENTED IN MEDICAL RECORD: ICD-10-PCS | Mod: CPTII,S$GLB,, | Performed by: OBSTETRICS & GYNECOLOGY

## 2022-07-22 PROCEDURE — 3078F DIAST BP <80 MM HG: CPT | Mod: CPTII,S$GLB,, | Performed by: OBSTETRICS & GYNECOLOGY

## 2022-07-22 PROCEDURE — 1160F RVW MEDS BY RX/DR IN RCRD: CPT | Mod: CPTII,S$GLB,, | Performed by: OBSTETRICS & GYNECOLOGY

## 2022-07-22 PROCEDURE — 3074F SYST BP LT 130 MM HG: CPT | Mod: CPTII,S$GLB,, | Performed by: OBSTETRICS & GYNECOLOGY

## 2022-07-22 PROCEDURE — 3078F PR MOST RECENT DIASTOLIC BLOOD PRESSURE < 80 MM HG: ICD-10-PCS | Mod: CPTII,S$GLB,, | Performed by: OBSTETRICS & GYNECOLOGY

## 2022-07-22 PROCEDURE — 1160F PR REVIEW ALL MEDS BY PRESCRIBER/CLIN PHARMACIST DOCUMENTED: ICD-10-PCS | Mod: CPTII,S$GLB,, | Performed by: OBSTETRICS & GYNECOLOGY

## 2022-07-22 PROCEDURE — 1126F AMNT PAIN NOTED NONE PRSNT: CPT | Mod: CPTII,S$GLB,, | Performed by: OBSTETRICS & GYNECOLOGY

## 2022-07-22 PROCEDURE — G0101 CA SCREEN;PELVIC/BREAST EXAM: HCPCS | Mod: S$GLB,,, | Performed by: OBSTETRICS & GYNECOLOGY

## 2022-07-22 PROCEDURE — 1101F PR PT FALLS ASSESS DOC 0-1 FALLS W/OUT INJ PAST YR: ICD-10-PCS | Mod: CPTII,S$GLB,, | Performed by: OBSTETRICS & GYNECOLOGY

## 2022-07-22 PROCEDURE — G0101 PR CA SCREEN;PELVIC/BREAST EXAM: ICD-10-PCS | Mod: S$GLB,,, | Performed by: OBSTETRICS & GYNECOLOGY

## 2022-07-22 PROCEDURE — 88175 CYTOPATH C/V AUTO FLUID REDO: CPT | Performed by: OBSTETRICS & GYNECOLOGY

## 2022-07-22 PROCEDURE — 99999 PR PBB SHADOW E&M-EST. PATIENT-LVL III: CPT | Mod: PBBFAC,,, | Performed by: OBSTETRICS & GYNECOLOGY

## 2022-07-22 PROCEDURE — 3288F PR FALLS RISK ASSESSMENT DOCUMENTED: ICD-10-PCS | Mod: CPTII,S$GLB,, | Performed by: OBSTETRICS & GYNECOLOGY

## 2022-07-22 PROCEDURE — 3288F FALL RISK ASSESSMENT DOCD: CPT | Mod: CPTII,S$GLB,, | Performed by: OBSTETRICS & GYNECOLOGY

## 2022-07-22 PROCEDURE — 1101F PT FALLS ASSESS-DOCD LE1/YR: CPT | Mod: CPTII,S$GLB,, | Performed by: OBSTETRICS & GYNECOLOGY

## 2022-07-22 PROCEDURE — 3074F PR MOST RECENT SYSTOLIC BLOOD PRESSURE < 130 MM HG: ICD-10-PCS | Mod: CPTII,S$GLB,, | Performed by: OBSTETRICS & GYNECOLOGY

## 2022-07-22 PROCEDURE — 1159F MED LIST DOCD IN RCRD: CPT | Mod: CPTII,S$GLB,, | Performed by: OBSTETRICS & GYNECOLOGY

## 2022-07-22 RX ORDER — AZITHROMYCIN 250 MG/1
TABLET, FILM COATED ORAL
COMMUNITY
Start: 2022-04-13 | End: 2022-08-15

## 2022-07-22 RX ORDER — BENZONATATE 100 MG/1
100 CAPSULE ORAL EVERY 6 HOURS
COMMUNITY
Start: 2022-06-08 | End: 2023-02-15

## 2022-07-22 RX ORDER — CEFDINIR 300 MG/1
300 CAPSULE ORAL 2 TIMES DAILY
COMMUNITY
Start: 2022-07-21 | End: 2022-08-15

## 2022-07-22 RX ORDER — MONTELUKAST SODIUM 10 MG/1
TABLET ORAL
COMMUNITY
Start: 2022-06-08 | End: 2022-08-15

## 2022-07-22 NOTE — PROGRESS NOTES
HPI:   81 y.o.   OB History        2    Para        Term                AB   2    Living           SAB   2    IAB        Ectopic        Multiple        Live Births                  No LMP recorded. Patient is postmenopausal.    Patient is  here for her annual gynecologic exam.  She has no complaints.     ROS:  GENERAL: No fever, chills, fatigability or weight loss.  SKIN: No rashes, itching or changes in color or texture of skin.  HEAD: No headaches or recent head trauma.  EYES: Visual acuity fine. No photophobia, ocular pain or diplopia.  EARS: Denies ear pain, discharge or vertigo.  NOSE: No loss of smell, no epistaxis or postnasal drip.  MOUTH & THROAT: No hoarseness or change in voice. No excessive gum bleeding.  NODES: Denies swollen glands.  CHEST: Denies HUSTON, cyanosis, wheezing, cough and sputum production.  CARDIOVASCULAR: Denies chest pain, PND, orthopnea or reduced exercise tolerance.  ABDOMEN: Appetite fine. No weight loss. Denies diarrhea, abdominal pain, hematemesis or blood in stool.  URINARY: No flank pain, dysuria or hematuria.  PERIPHERAL VASCULAR: No claudication or cyanosis.  MUSCULOSKELETAL: No joint stiffness or swelling. Denies back pain.  NEUROLOGIC: No history of seizures, paralysis, alteration of gait or coordination.    PE:   /60   Wt 49 kg (108 lb)   BMI 19.75 kg/m²   APPEARANCE: Well nourished, well developed, in no acute distress.  NECK: Neck symmetric without masses or thyromegaly.  BREASTS: Symmetrical, no skin changes or visible lesions. No palpable masses, nipple discharge or adenopathy bilaterally.  ABDOMEN: Flat. Soft. No tenderness or masses. No hepatosplenomegaly. No hernias. No CVA tenderness.  VULVA: No lesions. Normal female genitalia.  URETHRAL MEATUS: Normal size and location, no lesions, no prolapse.  URETHRA: No masses, tenderness, prolapse or scarring.  VAGINA: Moist and well rugated, no discharge, no significant cystocele or rectocele.  CERVIX: No  lesions and discharge. PAP done.  UTERUS: Normal size, regular shape, mobile, non-tender, bladder base nontender.  ADNEXA: No masses, tenderness or CDS nodularity.  ANUS PERINEUM: Normal.    PROCEDURES:  Pap smear    Assessment:  Normal Gynecologic Exam    Plan:  Mammogram and Colonoscopy if indicated by current recommendations.  Return to clinic in one year or for any problems or complaints.  No bleeding, some L side pelvic pain, groin  Did start excercising, may be ligaments    Plan, set up pelvic us

## 2022-07-22 NOTE — TELEPHONE ENCOUNTER
Pt states she weighed 117lbs when she saw Dr. Pink on 5/11/2022. She dropped to 110lb when she saw Dr. Junior on 6/27 and taday she's 108lb  At her gyn appt. Pt would like a call back from Dr. Pink to see if the medicine Bempedoic acid 180mg is causing her to lose weight.     ----- Message from Brandy Whitaker sent at 7/22/2022 10:59 AM CDT -----  Regarding: weight loss  The pt is calling to see if the new medication is making her lose weight. Please call her back @ [phone]. Thanks, Brandy

## 2022-07-25 ENCOUNTER — TELEPHONE (OUTPATIENT)
Dept: OBSTETRICS AND GYNECOLOGY | Facility: CLINIC | Age: 82
End: 2022-07-25
Payer: MEDICARE

## 2022-07-25 DIAGNOSIS — R10.2 PELVIC PAIN IN FEMALE: Primary | ICD-10-CM

## 2022-07-25 NOTE — TELEPHONE ENCOUNTER
----- Message from Cydney Griffin sent at 7/25/2022  3:54 PM CDT -----  Contact: [phone]  Type:  Patient Returning Call    Who Called:Pt  Who Left Message for Patient: Dr Wiedemann  Does the patient know what this is regarding?: US  Would the patient rather a call back or a response via [facility]? Call back  Best Call Back Number:[phone]

## 2022-07-27 ENCOUNTER — TELEPHONE (OUTPATIENT)
Dept: OBSTETRICS AND GYNECOLOGY | Facility: CLINIC | Age: 82
End: 2022-07-27
Payer: MEDICARE

## 2022-07-27 DIAGNOSIS — R10.32 GROIN PAIN, CHRONIC, LEFT: Primary | ICD-10-CM

## 2022-07-27 DIAGNOSIS — M60.272 FOREIGN BODY GRANULOMA OF SOFT TISSUE, NOT ELSEWHERE CLASSIFIED, LEFT ANKLE AND FOOT: ICD-10-CM

## 2022-07-27 DIAGNOSIS — G89.29 GROIN PAIN, CHRONIC, LEFT: Primary | ICD-10-CM

## 2022-07-28 ENCOUNTER — TELEPHONE (OUTPATIENT)
Dept: OBSTETRICS AND GYNECOLOGY | Facility: CLINIC | Age: 82
End: 2022-07-28
Payer: MEDICARE

## 2022-07-28 NOTE — TELEPHONE ENCOUNTER
Tell her the ultrasounds were both ok, a small fibroid on uterus of no concern  So her discomfort may be from tendon or muscle,  Thanks  yay

## 2022-08-08 ENCOUNTER — PATIENT MESSAGE (OUTPATIENT)
Dept: PHARMACY | Facility: CLINIC | Age: 82
End: 2022-08-08
Payer: MEDICARE

## 2022-08-08 ENCOUNTER — LAB VISIT (OUTPATIENT)
Dept: LAB | Facility: HOSPITAL | Age: 82
End: 2022-08-08
Attending: INTERNAL MEDICINE
Payer: MEDICARE

## 2022-08-08 ENCOUNTER — SPECIALTY PHARMACY (OUTPATIENT)
Dept: PHARMACY | Facility: CLINIC | Age: 82
End: 2022-08-08
Payer: MEDICARE

## 2022-08-08 DIAGNOSIS — Z78.9 STATIN INTOLERANCE: ICD-10-CM

## 2022-08-08 DIAGNOSIS — E78.00 HYPERCHOLESTEREMIA: ICD-10-CM

## 2022-08-08 LAB
CHOLEST SERPL-MCNC: 175 MG/DL (ref 120–199)
CHOLEST/HDLC SERPL: 1.9 {RATIO} (ref 2–5)
HDLC SERPL-MCNC: 90 MG/DL (ref 40–75)
HDLC SERPL: 51.4 % (ref 20–50)
LDLC SERPL CALC-MCNC: 78.2 MG/DL (ref 63–159)
NONHDLC SERPL-MCNC: 85 MG/DL
TRIGL SERPL-MCNC: 34 MG/DL (ref 30–150)

## 2022-08-08 PROCEDURE — 80061 LIPID PANEL: CPT | Performed by: INTERNAL MEDICINE

## 2022-08-08 PROCEDURE — 36415 COLL VENOUS BLD VENIPUNCTURE: CPT | Mod: PN | Performed by: INTERNAL MEDICINE

## 2022-08-08 PROCEDURE — 83695 ASSAY OF LIPOPROTEIN(A): CPT | Performed by: INTERNAL MEDICINE

## 2022-08-08 NOTE — TELEPHONE ENCOUNTER
Specialty Pharmacy - Refill Coordination    Specialty Medication Orders Linked to Encounter    Flowsheet Row Most Recent Value   Medication #1 alirocumab (PRALUENT PEN) 150 mg/mL PnIj (Order#[id number], Rx#[id number])          Refill Questions - Documented Responses    Flowsheet Row Most Recent Value   Patient Availability and HIPAA Verification    Does patient want to proceed with activity? Yes   HIPAA/medical authority confirmed? Yes   Relationship to patient of person spoken to? Self   Refill Screening Questions    Would patient like to speak to a pharmacist? No   When does the patient need to receive the medication? 08/15/22   Refill Delivery Questions    How will the patient receive the medication? Delivery [facility]   When does the patient need to receive the medication? 08/15/22   Shipping Address Home   Address in [facility] confirmed and updated if neccessary? Yes   Expected Copay ($) 20   Is the patient able to afford the medication copay? Yes   Payment Method CC on file   Days supply of Refill 28   Supplies needed? Alcohol Swabs   Refill activity completed? Yes   Refill activity plan Refill scheduled   Shipment/Pickup Date: 08/09/22          Current Outpatient Medications   Medication Sig    alirocumab (PRALUENT PEN) 150 mg/mL PnIj Inject 1 mL (150 mg total) into the skin every 14 (fourteen) days. (Patient not taking: Reported on 7/22/2022)    amLODIPine (NORVASC) 10 MG tablet Take 1 tablet by mouth once daily    ascorbic acid, vitamin C, (VITAMIN C) 1000 MG tablet Take 1,000 mg by mouth once daily.    aspirin 81 MG Chew Take 81 mg by mouth once daily.    azelastine (ASTELIN) 137 mcg (0.1 %) nasal spray USE 1 TO 2 SPRAY(S) IN EACH NOSTRIL TWICE DAILY    azithromycin (Z-LAVON) 250 MG tablet Take by mouth.    bempedoic acid 180 mg Tab Take 1 tablet (180 mg total) by mouth once daily. (Patient not taking: Reported on 7/22/2022)    benzonatate (TESSALON) 100 MG capsule Take 100 mg by mouth every 6  (six) hours.    budesonide 1 mg/2 mL NbSp EMPTY CONTENTS OF 1 RESPULE INTO NASAL IRRIGATION SYSTEM, ADD DISTILLED WATER, SALT PACK, MIX & IRRIGATE. PERFORM TWICE DAILY    carvediloL (COREG) 6.25 MG tablet TAKE 1 TABLET BY MOUTH TWICE DAILY WITH MEALS    cefdinir (OMNICEF) 300 MG capsule Take 300 mg by mouth 2 (two) times daily.    cetirizine (ZYRTEC) 10 MG tablet Take 10 mg by mouth as needed for Allergies. Patients states only when provider prescribe it.    cilostazoL (PLETAL) 100 MG Tab Take 1 tablet (100 mg total) by mouth 2 (two) times daily. (Patient not taking: Reported on 2022)    clopidogreL (PLAVIX) 75 mg tablet Take 1 tablet (75 mg total) by mouth once daily.    clorazepate (TRANXENE) 7.5 MG Tab Take 1 tablet (7.5 mg total) by mouth daily as needed (Anxiety).    co-enzyme Q-10 50 mg capsule Take 50 mg by mouth once daily.    diphenhydrAMINE (BENADRYL) 25 mg capsule Take 25 mg by mouth every 6 (six) hours as needed for Itching.    ezetimibe (ZETIA) 10 mg tablet TAKE 1 TABLET BY MOUTH ONCE DAILY IN THE EVENING    montelukast (SINGULAIR) 10 mg tablet SMARTSI Tablet(s) By Mouth Every Evening    multivitamin (THERAGRAN) per tablet Take 1 tablet by mouth once daily.    NEILMED SINUS RINSE REFILL Pack use as directed    omega-3 fatty acids/fish oil (FISH OIL-OMEGA-3 FATTY ACIDS) 300-1,000 mg capsule Take 1 capsule by mouth once daily.    TURMERIC ORAL Take 1 packet by mouth once daily.   Last reviewed on 2022 11:05 AM by Michael A. Wiedemann, MD    Review of patient's allergies indicates:   Allergen Reactions    Benazepril Other (See Comments)     cough    Chlorthalidone     Iodinated contrast media     Iodine and iodide containing products     Lipitor [atorvastatin]      States she is allergic to all statin medications    Sertraline      Medication caused patient to get sick.    Spironolactone     Last reviewed on  2022 11:05 AM by Michael A Wiedemann      Tasks added this  encounter   9/5/2022 - Refill Call (Auto Added)   Tasks due within next 3 months   No tasks due.     Sendy Duque, PharmD  [facility]  [address]  [facility] [address]  Phone: [phone]  Fax: [phone]

## 2022-08-09 NOTE — TELEPHONE ENCOUNTER
Patient stated she had trouble using pen injector, suggested Repatha Pushtronex if patient would like a device that doesn't require as much thumb dexterity, patient declined the offer to switch formulations.

## 2022-08-11 ENCOUNTER — TELEPHONE (OUTPATIENT)
Dept: OBSTETRICS AND GYNECOLOGY | Facility: CLINIC | Age: 82
End: 2022-08-11

## 2022-08-11 LAB — LPA SERPL-MCNC: 324 MG/DL (ref 0–30)

## 2022-08-11 NOTE — TELEPHONE ENCOUNTER
----- Message from Felix Ny sent at 8/11/2022  2:02 PM CDT -----  Type:  Needs Medical Advice    Who Called: pt   Symptoms (please be specific): brown discharge coming out   Irritation   How long has patient had these symptoms:  July  Pharmacy name and phone #:  [id number] [place] [place] - [address]  Would the patient rather a call back or a response via [facility]? Call back   Best Call Back Number:  [phone]  Additional Information:     Pt would like a call back regarding this   Pt thinks she has an infection   Pt stated she isn't feeling better since visit

## 2022-08-15 ENCOUNTER — OFFICE VISIT (OUTPATIENT)
Dept: CARDIOLOGY | Facility: CLINIC | Age: 82
End: 2022-08-15
Payer: MEDICARE

## 2022-08-15 VITALS
HEART RATE: 73 BPM | HEIGHT: 63 IN | WEIGHT: 113.13 LBS | BODY MASS INDEX: 20.04 KG/M2 | SYSTOLIC BLOOD PRESSURE: 144 MMHG | DIASTOLIC BLOOD PRESSURE: 68 MMHG

## 2022-08-15 DIAGNOSIS — I70.0 ATHEROSCLEROSIS OF AORTIC ARCH: ICD-10-CM

## 2022-08-15 DIAGNOSIS — I70.219 ATHEROSCLEROTIC PERIPHERAL VASCULAR DISEASE WITH INTERMITTENT CLAUDICATION: ICD-10-CM

## 2022-08-15 DIAGNOSIS — E78.2 MIXED HYPERLIPIDEMIA: ICD-10-CM

## 2022-08-15 DIAGNOSIS — R10.9 ABDOMINAL DISCOMFORT: ICD-10-CM

## 2022-08-15 DIAGNOSIS — Z78.9 STATIN INTOLERANCE: Primary | ICD-10-CM

## 2022-08-15 DIAGNOSIS — K55.9 MESENTERIC ISCHEMIA: ICD-10-CM

## 2022-08-15 DIAGNOSIS — I70.8 CELIAC ARTERY ATHEROSCLEROSIS: ICD-10-CM

## 2022-08-15 DIAGNOSIS — I73.9 PAD (PERIPHERAL ARTERY DISEASE): ICD-10-CM

## 2022-08-15 DIAGNOSIS — E78.00 HYPERCHOLESTEREMIA: ICD-10-CM

## 2022-08-15 DIAGNOSIS — I70.0 AORTIC ATHEROSCLEROSIS: ICD-10-CM

## 2022-08-15 DIAGNOSIS — I10 ESSENTIAL HYPERTENSION: ICD-10-CM

## 2022-08-15 PROCEDURE — 99999 PR PBB SHADOW E&M-EST. PATIENT-LVL IV: ICD-10-PCS | Mod: PBBFAC,,, | Performed by: INTERNAL MEDICINE

## 2022-08-15 PROCEDURE — 3077F SYST BP >= 140 MM HG: CPT | Mod: CPTII,S$GLB,, | Performed by: INTERNAL MEDICINE

## 2022-08-15 PROCEDURE — 3288F FALL RISK ASSESSMENT DOCD: CPT | Mod: CPTII,S$GLB,, | Performed by: INTERNAL MEDICINE

## 2022-08-15 PROCEDURE — 1126F AMNT PAIN NOTED NONE PRSNT: CPT | Mod: CPTII,S$GLB,, | Performed by: INTERNAL MEDICINE

## 2022-08-15 PROCEDURE — 99999 PR PBB SHADOW E&M-EST. PATIENT-LVL IV: CPT | Mod: PBBFAC,,, | Performed by: INTERNAL MEDICINE

## 2022-08-15 PROCEDURE — 1159F MED LIST DOCD IN RCRD: CPT | Mod: CPTII,S$GLB,, | Performed by: INTERNAL MEDICINE

## 2022-08-15 PROCEDURE — 1101F PR PT FALLS ASSESS DOC 0-1 FALLS W/OUT INJ PAST YR: ICD-10-PCS | Mod: CPTII,S$GLB,, | Performed by: INTERNAL MEDICINE

## 2022-08-15 PROCEDURE — 3288F PR FALLS RISK ASSESSMENT DOCUMENTED: ICD-10-PCS | Mod: CPTII,S$GLB,, | Performed by: INTERNAL MEDICINE

## 2022-08-15 PROCEDURE — 99215 PR OFFICE/OUTPT VISIT, EST, LEVL V, 40-54 MIN: ICD-10-PCS | Mod: S$GLB,,, | Performed by: INTERNAL MEDICINE

## 2022-08-15 PROCEDURE — 3078F PR MOST RECENT DIASTOLIC BLOOD PRESSURE < 80 MM HG: ICD-10-PCS | Mod: CPTII,S$GLB,, | Performed by: INTERNAL MEDICINE

## 2022-08-15 PROCEDURE — 1159F PR MEDICATION LIST DOCUMENTED IN MEDICAL RECORD: ICD-10-PCS | Mod: CPTII,S$GLB,, | Performed by: INTERNAL MEDICINE

## 2022-08-15 PROCEDURE — 1101F PT FALLS ASSESS-DOCD LE1/YR: CPT | Mod: CPTII,S$GLB,, | Performed by: INTERNAL MEDICINE

## 2022-08-15 PROCEDURE — 3078F DIAST BP <80 MM HG: CPT | Mod: CPTII,S$GLB,, | Performed by: INTERNAL MEDICINE

## 2022-08-15 PROCEDURE — 1126F PR PAIN SEVERITY QUANTIFIED, NO PAIN PRESENT: ICD-10-PCS | Mod: CPTII,S$GLB,, | Performed by: INTERNAL MEDICINE

## 2022-08-15 PROCEDURE — 99215 OFFICE O/P EST HI 40 MIN: CPT | Mod: S$GLB,,, | Performed by: INTERNAL MEDICINE

## 2022-08-15 PROCEDURE — 3077F PR MOST RECENT SYSTOLIC BLOOD PRESSURE >= 140 MM HG: ICD-10-PCS | Mod: CPTII,S$GLB,, | Performed by: INTERNAL MEDICINE

## 2022-08-15 NOTE — PROGRESS NOTES
"[facility]      CC: Celiac [place] Stenosis      Patient ID: Alana Beth is a 81 y.o. female with PAD, celiac artery stenosis s/p PTAS, HTN, HLD, palpitations, who presents for a follow up appointment.  Pertinent history/events are as follows:     -Pt presents for evaluation of PAD/weight loss.    -At our initial clinic visit on 9/4/2020, Mrs. Beth reported  "not feeling well since 11/2019".  Main complaint is weakness, fatigue and lack of appetite.  She has lost 25 pounds since 11/2019.  No definite abdominal pain.  No chest pain, SOB, or LE edema.  Exam with audible abdominal bruit.  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    Plan:   Celiac Axis Stenosis- Mrs. 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  Pertinent findings include 25 pound weight loss and abdominal bruit.  No definite abdominal pain, although mild abdominal tenderness noted on exam.  Chronic mesenteric ischemia is also a possibility in this pt with known PAD.  Given these findings, will refer to Vascular Surgery for evaluation.  Pt has several risk factors for CAD, hence, will check nuclear stress test and echo to evaluate further.  PAD- Pt with known BLE PAD.  Continue ASA.  Pt states she's not taking a statin due to issues with liver disease in the past.  Will check outside records before starting statin.  Check updated lipid panel.      -At follow up clinic visit on 9/25/2020, Mrs. Beth reported no new symptoms.  States she still does not feel well.  Pt evaluated by Vascular Surgery (Enrrique Preciado) on 9/16/2020, who recommend psych evaluation and continued GI workup.  Mesenteric Utrasound on 9/10/2020 revealed a velocity elevation of 378 cm/s is visualized near the Celiac artery origin within a region of focal narrowing, suggestive of a greater than 70% stenosis.  Nuclear Stress Test on " 9/23/2020 revealed no evidence from myocardial ischemia or injury.  The EKG portion of this study is abnormal but not diagnostic.  Echo on 9/10/2020 revealed normal left ventricular systolic function with EF of 60%; concentric left ventricular remodeling; normal LV diastolic function; normal right ventricular systolic function; mild left atrial enlargement; mild tricuspid regurgitation; estimated PA systolic pressure is 30 mmHg; normal central venous pressure (3 mmHg).  Labs from 9/10/2020 show total cholesterol of 348 with LDL of 234.   Plan:   Celiac Axis Stenosis- Mrs. 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  Pertinent findings include 25 pound weight loss and abdominal bruit.  No definite abdominal pain, although mild abdominal tenderness noted on exam.  Chronic mesenteric ischemia is also a possibility in this pt with known PAD.  Pt evaluated by Vascular Surgery (Enrrique Preciado) on Recommend psych and continued GI workup.  Mesenteric Utrasound on 9/10/2020 revealed a velocity elevation of 378 cm/s is visualized near the Celiac artery origin within a region of focal narrowing, suggestive of a greater than 70% stenosis.  Nuclear Stress Test on 9/23/2020 revealed no evidence from myocardial ischemia or injury.  The EKG portion of this study is abnormal but not diagnostic.  Echo on 9/10/2020 revealed normal left ventricular systolic function with EF of 60%; concentric left ventricular remodeling; normal LV diastolic function; normal right ventricular systolic function; mild left atrial enlargement; mild tricuspid regurgitation; estimated PA systolic pressure is 30 mmHg; normal central venous pressure (3 mmHg).  Given these findings, will refer to Dr. Junior and GI for evaluation.       PAD- Pt with known BLE PAD.  She has no claudication, rest pain or tissue loss to suggest CLI.  Start rosuvastatin 40 mg daily.  Continue ASA 81 mg daily.  Check carotid ultrasound  prior to next visit.   HLD-  Labs from 9/10/2020 show total cholesterol of 348 with LDL of 234.  Start rosuvastatin 40 mg daily. Monitor LFT's.      -At clinic visit on 10/26/2020, Mrs. Cole reports that she had not starting statin therapy due to insurance issues. She was prescribed alirocumab 75 mg injections and she has received one dose so far. She would prefer oral therapy for her hypercholesterolemia. She notes much improvement in her symptoms of sputum production after starting omeprazole. Patient was evaluated by GI for abdominal pain associated with celiac axis syndrome and increased amounts of sputum. EGD done in 8/2020 she was noted to have a submucosal esophageal nodule that path revealed chronic esophagitis. She was started on Omeprazole 20 mg BID and barium esophagram requested. Barium study was consistent with mild esophageal dysmotility.  Patient has not seen Dr. Junior yet for possible angiogram, she did not have an appointment set up yet.   Plan:  Plan:  -- Schedule pt to see Dr. Junior   -- continue omeprazole, GI follow up PRN for symptomatic care   --Start bempedoic acid 180 mg daily due to statin intolerance    -On 12/8/2020, pt underwent celiac artery intervention with Dr. Junior:  S/p aortogram with selective celiac and SMA angiogram                Initially started R radial then switched to L radial               Patent SMA and 80% celiac stenosis              IVUS guided celiac PTAS              6.0 x 18 and 6 x 14 mm Express SD dilated to 18 noemí     -On 12/16/2020, pt admitted at [facility] with abdominal pain.  Abd ultrasound revealed  patent stent in celiac artery and no significant changes with respiration to suggest extrinsic compression by median arcuate ligament.  Velocities suggest only moderate stenosis of the stent.   Symptoms improved and pt was discharged home on ASA/Plavix/Praluent.       -At clinic visit on 1/27/2021, Mrs. Beth reported feeling well with no abdominal  pain, nausea, vomiting, or anorexia.  Taking all medications as prescribed.  Labs from 12/19/2020 shows  vs 234 on 9/10/2020.    Plan:   Celiac Axis Stenosis-  Now s/p IVUS guided celiac PTAS with 6.0 x 18 and 6 x 14 mm Express SD on 12/18/2020.  Mrs. Beth reports feeling much better with no abdominal pain, n/v, or anorexia.  Abd ultrasound revealed  patent stent in celiac artery and no significant changes with respiration to suggest extrinsic compression by median arcuate ligament.  Velocities suggest only moderate stenosis of the stent.  Continue ASA/Plavix/Praluent.     PAD- Pt with known BLE PAD.  She has no claudication, rest pain or tissue loss to suggest CLI.  Continue ASA/Plavix/Praluent.      HLD-  Labs from 12/19/2020 shows  vs 234 on 9/10/2020.  Continue Praluent.       -At clinic visit on 4/28/2021, Mrs. Cole reported no abdominal pain, nausea, or anorexia.  Labs from 4/21/2021 show  vs 150 on 12/19/2021.  Plan:  Celiac Axis Stenosis-  Now s/p IVUS guided celiac artery PTAS with 6.0 x 18 and 6 x 14 mm Express SD on 12/18/2020.  Mrs. Beth continues to do well with no abdominal pain, n/v, or anorexia.  Abd ultrasound on 12/18/2020 revealed  patent stent in celiac artery and no significant changes with respiration to suggest extrinsic compression by median arcuate ligament.  Velocities suggest only moderate stenosis of the stent.  Continue ASA/Plavix/Praluent.     PAD- Pt with known BLE PAD.  She has no claudication, rest pain or tissue loss to suggest CLI.  Continue ASA/Plavix/Praluent.      HLD-  Labs from 4/21/2021 show  vs 150 on 12/19/2021.  Increase Praluent to 150 mg every 14 days.     -At clinic visit on 7/28/2021, Mrs. Cole reported no chest pain, SOB, weight loss, nausea, TIA symptoms or syncope.  States she's been injecting Praluent in her thigh instead of abdomen.  Labs from 7/19/2021 show LDL now 124 vs 145 on 4/21/2021.  Of note, pt states she did not fast  prior to the labs being drawn.   Plan:   Celiac Axis Stenosis-  Now s/p IVUS guided celiac PTAS with 6.0 x 18 and 6 x 14 mm Express SD on 12/18/2020.  Mrs. Beth has no abdominal pain, n/v, or anorexia.  Abd ultrasound on 12/18/2020 revealed  patent stent in celiac artery and no significant changes with respiration to suggest extrinsic compression by median arcuate ligament.  Velocities suggest only moderate stenosis of the stent.  Continue ASA/Plavix/Praluent.   Surveillance imaging scheduled by Dr. RENÉE Aguilar.   PAD- Pt with known BLE PAD.  She has no claudication, rest pain or tissue loss to suggest CLI.  Continue ASA/Plavix/Praluent.      HLD-  Labs from 7/19/2021 show LDL now 124 vs 145 on 4/21/2021.  The modest reduction in LDL despite increasing Praluent to 150 mg every 14 days may be due to the pt injecting Praluent in the thigh instead of abdomen.  Continue zetia and Praluent.  Before adding bempedoic acid, will have pt inject Praluent in the abdomen, and repeat lipids in 3 months.         HTN- Continue current medications.    History of Pulmonary Nodules- Check CT chest without contrast to evaluate further.     -At clinic visit on 11/8/2021, Mrs. Cole reported no chest pain, abdominal pain, SOB, LE edema, TIA symptoms or syncope.  States she's still injecting praluent in the thigh and not the abdomen, as discussed at our previous clinic visit.  Labs from 11/2/2021 shows LDL   142 vs 124 on 7/19/2021.  CT Chest w/o Contrast on 8/4/2021 revealed no pulmonary abnormalities requiring continued surveillance.   There is a Ill-defined hypoattenuating hepatic lesion, suboptimally evaluated in the absence of intravenous contrast.     Plan:  Celiac Axis Stenosis-  Now s/p IVUS guided celiac PTAS with 6.0 x 18 and 6 x 14 mm Express SD on 12/18/2020.  Mrs. Beth has no abdominal pain, n/v, or anorexia.  Abd ultrasound on 12/18/2020 revealed  patent stent in celiac artery and no significant changes with respiration  to suggest extrinsic compression by median arcuate ligament.  Velocities suggest only moderate stenosis of the stent.  Continue ASA/Plavix/Praluent.   Surveillance imaging scheduled by Dr. RENÉE Aguilar.   PAD- Pt with known BLE PAD.  She has no claudication, rest pain or tissue loss to suggest CLI.  Continue ASA/Plavix/Praluent.      HLD- Mrs. Beth states she's still injecting praluent in the thigh and not the abdomen, as discussed at clinic visit on 7/28/2021.  Labs from 11/2/2021 shows LDL   142 vs 124 on 7/19/2021.  The increase in LDL despite increasing Praluent to 150 mg every 14 days may be due to the pt injecting Praluent in the thigh instead of abdomen.  Pt will try to do injection in abdomen.  Continue zetia and Praluent.  Before adding bempedoic acid, will have pt inject Praluent in the abdomen, and repeat lipids in 3 months.  HTN- Continue current medications.    History of Pulmonary Nodules-  CT Chest w/o Contrast on 8/4/2021 revealed no pulmonary abnormalities requiring continued surveillance.    Liver Lesion- Evaluate further with contrast enhanced abdominal MRI, and refer to Hepatology.    -At clinic visit on 2/9/2022, Mrs. Beth reported feeling better. Her appetite has been okay but not as good as before. She denies abdominal pain. She lost about 2 pounds since November. She denies claudications but she does have pain in her legs when she starts walking which is relieved by continued walking.  Plan:   Celiac Axis Stenosis- s/p IVUS guided celiac PTA on 12/18/2020.  Mrs. Beth has no abdominal pain, n/v, or anorexia. Continue ASA/Plavix/Praluent. Repeat mesenteric artery US.  PAD- Pt with known BLE PAD.  She has no claudication, rest pain or tissue loss to suggest CLI.  Continue ASA/Plavix. Start cilostazol 100mg BID.     HLD- LDL remains not at goal despite using praluent. Will attempt switching to bempedoic acid and evaluate response with repeat lipid panel in 3 months.  HTN- Controlled.  Continue current medications.      -At clinic visit on 5/11/2022, Mrs. Beth reports no abdominal pain, weight loss, claudication or tissue loss.  Pt states she did not start bempedoic acid as prescribed.  Mesenteric Ultrasound on 5/5/2022 revealed the celiac trunk has greater than 70% stenosis. In-stent restenosis noted.  The proximal superior mesenteric artery has greater than 70% stenosis.  The proximal inferior mesenteric artery has greater than 70% stenosis.  Plan:   Celiac Axis Stenosis s/p IVUS guided celiac PTA on 12/18/2020- Mrs. Beth reports no abdominal pain, weight loss, claudication or tissue loss.  Mesenteric Ultrasound on 5/5/2022 revealed the celiac trunk has greater than 70% stenosis. In-stent restenosis noted.  The proximal superior mesenteric artery has greater than 70% stenosis.  The proximal inferior mesenteric artery has greater than 70% stenosis.  Continue ASA/Plavix/Praluent.  PAD- Pt with known BLE PAD.  She has no claudication, rest pain or tissue loss to suggest CLI.  Continue ASA/Plavix and cilostazol 100mg BID.     HLD- LDL remains not at goal despite using max dose praluent. Pt states she did not start bempedoic acid as prescribed.  Unclear if pt is compliant with these medications.  Check Lipo (a).  Continue current medications.    HTN- Controlled. Continue current medications.      HPI:  Mrs. Beth reports doing well with no abdominal pain, weight loss, claudication or tissue loss.  Pt started taking bempedoic acid as prescribed.  LDL 78 on 8/8/2022 vs 123 on 5/5/2022.  Lipoprotein (a) is elevated at 324.      Past Medical History:   Diagnosis Date    Atherosclerotic peripheral vascular disease     Chronic cystic mastitis     Essential hypertension     Hypercholesterolemia     Osteopenia     Shingles      Past Surgical History:   Procedure Laterality Date    BREAST BIOPSY      biopsies benign    CELIAC ARTERY STENT      ESOPHAGOGASTRODUODENOSCOPY  08/20/2020     PERCUTANEOUS TRANSLUMINAL ANGIOPLASTY N/A 12/8/2020    Procedure: Pta (Angioplasty, Percutaneous, Transluminal);  Surgeon: Hernando Junior MD;  Location: [facility] CATH LAB/EP;  Service: Cardiology;  Laterality: N/A;     Social History     Socioeconomic History    Marital status: Single   Occupational History    Occupation: retired student loan assistance    Tobacco Use    Smoking status: Never Smoker    Smokeless tobacco: Never Used   Substance and Sexual Activity    Alcohol use: No    Drug use: No    Sexual activity: Not Currently     Family History   Problem Relation Age of Onset    Hypertension Mother     Diabetes Brother     Diabetes Sister     Colon cancer Neg Hx     Esophageal cancer Neg Hx        Review of patient's allergies indicates:   Allergen Reactions    Benazepril Other (See Comments)     cough    Chlorthalidone     Iodinated contrast media     Iodine and iodide containing products     Lipitor [atorvastatin]      States she is allergic to all statin medications    Sertraline      Medication caused patient to get sick.    Spironolactone        Medication List with Changes/Refills   Current Medications    ALIROCUMAB (PRALUENT PEN) 150 MG/ML PNIJ    Inject 1 mL (150 mg total) into the skin every 14 (fourteen) days.    AMLODIPINE (NORVASC) 10 MG TABLET    Take 1 tablet by mouth once daily    ASCORBIC ACID, VITAMIN C, (VITAMIN C) 1000 MG TABLET    Take 1,000 mg by mouth once daily.    ASPIRIN 81 MG CHEW    Take 81 mg by mouth once daily.    AZELASTINE (ASTELIN) 137 MCG (0.1 %) NASAL SPRAY    USE 1 TO 2 SPRAY(S) IN EACH NOSTRIL TWICE DAILY    BEMPEDOIC ACID 180 MG TAB    Take 1 tablet (180 mg total) by mouth once daily.    BENZONATATE (TESSALON) 100 MG CAPSULE    Take 100 mg by mouth every 6 (six) hours.    BUDESONIDE 1 MG/2 ML NBSP    EMPTY CONTENTS OF 1 RESPULE INTO NASAL IRRIGATION SYSTEM, ADD DISTILLED WATER, SALT PACK, MIX & IRRIGATE. PERFORM TWICE DAILY    CARVEDILOL (COREG) 6.25 MG  "TABLET    TAKE 1 TABLET BY MOUTH TWICE DAILY WITH MEALS    CETIRIZINE (ZYRTEC) 10 MG TABLET    Take 10 mg by mouth as needed for Allergies. Patients states only when provider prescribe it.    CILOSTAZOL (PLETAL) 100 MG TAB    Take 1 tablet (100 mg total) by mouth 2 (two) times daily.    CLOPIDOGREL (PLAVIX) 75 MG TABLET    Take 1 tablet (75 mg total) by mouth once daily.    CLORAZEPATE (TRANXENE) 7.5 MG TAB    Take 1 tablet (7.5 mg total) by mouth daily as needed (Anxiety).    CO-ENZYME Q-10 50 MG CAPSULE    Take 50 mg by mouth once daily.    DIPHENHYDRAMINE (BENADRYL) 25 MG CAPSULE    Take 25 mg by mouth every 6 (six) hours as needed for Itching.    EZETIMIBE (ZETIA) 10 MG TABLET    TAKE 1 TABLET BY MOUTH ONCE DAILY IN THE EVENING    MULTIVITAMIN (THERAGRAN) PER TABLET    Take 1 tablet by mouth once daily.    NEILMED SINUS RINSE REFILL PACK    use as directed    OMEGA-3 FATTY ACIDS/FISH OIL (FISH OIL-OMEGA-3 FATTY ACIDS) 300-1,000 MG CAPSULE    Take 1 capsule by mouth once daily.    TURMERIC ORAL    Take 1 packet by mouth once daily.   Discontinued Medications    AZITHROMYCIN (Z-LAVON) 250 MG TABLET    Take by mouth.    CEFDINIR (OMNICEF) 300 MG CAPSULE    Take 300 mg by mouth 2 (two) times daily.    MONTELUKAST (SINGULAIR) 10 MG TABLET    SMARTSI Tablet(s) By Mouth Every Evening       Review of Systems  Constitution: Denies chills, fever, and sweats.  HENT: Denies headaches or blurry vision.  Cardiovascular: Denies chest pain or irregular heart beat.  Respiratory: Denies cough or shortness of breath.  Gastrointestinal: Negative for abdominal pain and weight loss.  Musculoskeletal: Denies muscle cramps.  Neurological: Denies dizziness or focal weakness.  Psychiatric/Behavioral: Normal mental status.  Hematologic/Lymphatic: Denies bleeding problem or easy bruising/bleeding.  Skin: Denies rash or suspicious lesions    Physical Examination  BP (!) 144/68   Pulse 73   Ht 5' 2.5" (1.588 m)   Wt 51.3 kg (113 lb 1.5 " oz)   BMI 20.36 kg/m²     Constitutional: No acute distress, conversant  HEENT: Sclera anicteric, Pupils equal, round and reactive to light, extraocular motions intact, Oropharynx clear  Neck: No JVD, no carotid bruits  Cardiovascular: regular rate and rhythm, no murmur, rubs or gallops, normal S1/S2  Pulmonary: Clear to auscultation bilaterally  Abdominal: Abdomen soft with no TTP, positive bowel sounds  Extremities: No lower extremity edema   Pulses:  Carotid pulses are 2+ on the right side, and 2+ on the left side.  Radial pulses are 2+ on the right side, and 2+ on the left side.   Femoral pulses are 2+ on the right side, and 2+ on the left side.  Popliteal pulses are 2+ on the right side, and 2+ on the left side.   Dorsalis pedis pulses are 2+ on the right side, and 2+ on the left side.   Posterior tibial pulses are 2+ on the right side, and 2+ on the left side.    Skin: No ecchymosis, erythema, or ulcers  Psych: Alert and oriented x 3, appropriate affect  Neuro: CNII-XII intact, no focal deficits    Labs:  Most Recent Data  CBC:   Lab Results   Component Value Date    WBC 4.55 12/16/2020    HGB 11.6 (L) 12/16/2020    HCT 36.1 (L) 12/16/2020     12/16/2020    MCV 84 12/16/2020    RDW 14.6 (H) 12/16/2020     BMP:   Lab Results   Component Value Date     07/19/2021    K 4.3 07/19/2021     07/19/2021    CO2 29 07/19/2021    BUN 20 07/19/2021    CREATININE 1.1 07/19/2021     (H) 07/19/2021    CALCIUM 9.9 07/19/2021    MG 2.3 12/16/2020    PHOS 2.9 12/16/2020     LFTS;   Lab Results   Component Value Date    PROT 7.3 07/19/2021    ALBUMIN 3.6 07/19/2021    BILITOT 0.4 07/19/2021    AST 32 07/19/2021    ALKPHOS 40 (L) 07/19/2021    ALT 39 07/19/2021     COAGS: No results found for: INR, PROTIME, PTT  FLP:   Lab Results   Component Value Date    CHOL 175 08/08/2022    HDL 90 (H) 08/08/2022    LDLCALC 78.2 08/08/2022    TRIG 34 08/08/2022    CHOLHDL 51.4 (H) 08/08/2022     CARDIAC:   Lab  Results   Component Value Date    TROPONINI <0.01 08/15/2020    BNP <10 08/15/2020       EKG 8/15/2020:  Normal sinus rhythm  Low voltage QRS  Anteroseptal infarct (cited on or before 15-AUG-2020)    Mesenteric Ultrasound 5/5/2022:  · The celiac trunk has greater than 70% stenosis. In-stent restenosis noted.  · The proximal superior mesenteric artery has greater than 70% stenosis.  · The proximal inferior mesenteric artery has greater than 70% stenosis.  · There is no evidence of an Abdominal Aortic Aneurysm.  · Aortic atherosclerosis.    CT Chest w/o Contrast 8/4/2021:  No pulmonary abnormalities requiring continued surveillance.   Multi-vessel coronary artery atherosclerosis.   Ill-defined hypoattenuating hepatic lesion, suboptimally evaluated in the absence of intravenous contrast.  Consider further characterization with contrast enhanced abdominal MRI.     Mesenteric Ultrasound 12/18/2020:  Color flow duplex imaging reveals patent superior mesenteric and inferior mesenteric arteries with no evidence of a hemodynamically   significant stenosis. The Celiac artery stent is patent with and velocities are 168 cm/s at rest; 157 cm/s with inspiration; 153 cm/s   with expiration. No suggestion of MALS. An accelerated flow velocity of 282 cm/s is visualized at the distal edge on the Celiac stent.   However, this region appear widely patent.       Nuclear Stress Test 9/23/2020:  Normal myocardial perfusion scan.    The perfusion scan is free of evidence from myocardial ischemia or injury.    Visually estimated ejection fraction is normal at rest and normal at stress.    There is normal wall motion at rest and post stress.    LV cavity size is normal at rest and normal at stress.    The EKG portion of this study is abnormal but not diagnostic.    The patient reported no chest pain during the stress test.    There are no prior studies for comparison.       Echo 9/10/2020:  · Normal left ventricular systolic function.  The estimated ejection fraction is 60%.  · Concentric left ventricular remodeling.  · Normal LV diastolic function.  · No wall motion abnormalities.  · Normal right ventricular systolic function.  · Mild left atrial enlargement.  · Mild tricuspid regurgitation.  · The estimated PA systolic pressure is 30 mmHg.  · Normal central venous pressure (3 mmHg).    Mesenteric Utrasound 9/10/2020:  Color flow duplex exam reveals a patent abdominal aorta, celiac artery, superior mesenteric artery and inferior mesenteric artery. A   velocity elevation of 378 cm/s is visualized near the Celiac artery origin within a region of focal narrowing, suggestive of a greater than   70% stenosis.     CTA abd/pelvis with contrast (outside study from [facility]) 8/28/2020:  1. No evidence for gastrointestinal bleeding.    2. Scattered colonic diverticula without adjacent inflammatory changes.    3. Myomatous changes of the uterus with degenerating fibroids.     4. Hemangioma in segment VII.    5. Stenosis of the celiac access with poststenotic dilatation.    BLE Arterial Ultrasound 7/31/2020:  Multilevel disease in the left lower extremity. Moderate stenosis in the left superficial femoral artery. Severe stenosis in the distal popliteal artery.    Left lower extremity:  Ankle-brachial index is 0.73. Triphasic signals are seen in the common femoral and profunda arteries. There are biphasic superficial femoral, popliteal, posterior tibial, and anterior tibial arteries. There is a velocity increase from the mid SFA to the distal SFA from 1 /sec 2 m/s. Consistent with a moderate stenosis. There is another focal velocity increase in the distal popliteal from 2.5 m/sec to 5.0 m/s. Consistent with a severe stenosis. Distal to this there is monophasic waveforms.         Right lower extremity:  Ankle-brachial index is 0.93. Triphasic signals are seen in the common femoral and profunda artery. There are biphasic signals in the superficial femoral,  popliteal, posterior tibial, and anterior tibial arteries. No focal areas of elevated velocity are seen.    Assessment/Plan:  Alana Beth is a 81 y.o. female with PAD, celiac artery stenosis s/p PTAS, HTN, HLD, palpitations, who presents for a follow up appointment.     1. 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 Beth reports no abdominal pain, weight loss, claudication or tissue loss.  Mesenteric Ultrasound on 5/5/2022 revealed the celiac trunk has greater than 70% stenosis. In-stent restenosis noted.  The proximal superior mesenteric artery has greater than 70% stenosis.  The proximal inferior mesenteric artery has greater than 70% stenosis.  Continue ASA/Plavix/Praluent/Bempedoic acid.    2. PAD- Pt with known BLE PAD.  She has no claudication, rest pain or tissue loss to suggest CLI.  Continue ASA/Plavix/Praluent/Bempedoic acid, and cilostazol 100mg BID.       3. HLD- LDL 78 on 8/8/2022 vs 123 on 5/5/2022.  Lipoprotein (a) is elevated at 324.  Continue Praluent 150 mg every 14 days and bempedoic acid 180 mg daily.        4. HTN- Controlled. Continue current medications.      Follow up in 6 months     Total duration of face to face visit time 30 minutes.  Total time spent counseling greater than fifty percent of total visit time.  Counseling included discussion regarding imaging findings, diagnosis, possibilities, treatment options, risks and benefits.  The patient had many questions regarding the options and long-term effects.

## 2022-08-15 NOTE — PATIENT INSTRUCTIONS
Assessment/Plan:  Alana Bteh is a 81 y.o. female with PAD, celiac artery stenosis s/p PTAS, HTN, HLD, palpitations, who presents for a follow up appointment.     1. 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 Beth reports no abdominal pain, weight loss, claudication or tissue loss.  Mesenteric Ultrasound on 5/5/2022 revealed the celiac trunk has greater than 70% stenosis. In-stent restenosis noted.  The proximal superior mesenteric artery has greater than 70% stenosis.  The proximal inferior mesenteric artery has greater than 70% stenosis.  Continue ASA/Plavix/Praluent/Bempedoic acid.    2. PAD- Pt with known BLE PAD.  She has no claudication, rest pain or tissue loss to suggest CLI.  Continue ASA/Plavix/Praluent/Bempedoic acid, and cilostazol 100mg BID.       3. HLD- LDL 78 on 8/8/2022 vs 123 on 5/5/2022.  Lipoprotein (a) is elevated at 324.  Continue Praluent 150 mg every 14 days and bempedoic acid 180 mg daily.        4. HTN- Controlled. Continue current medications.      Follow up in 6 months

## 2022-08-25 ENCOUNTER — TELEPHONE (OUTPATIENT)
Dept: OBSTETRICS AND GYNECOLOGY | Facility: CLINIC | Age: 82
End: 2022-08-25
Payer: MEDICARE

## 2022-08-25 NOTE — TELEPHONE ENCOUNTER
----- Message from Cal Salinas sent at 8/24/2022  9:22 AM CDT -----  Type:  Needs Medical Advice     Who Called: pt   Symptoms (please be specific): brown discharge coming out   Irritation their is no odor , but their is itching.  How long has patient had these symptoms:  July  Pharmacy name and phone #:  [id number] [id number] - [address]  Would the patient rather a call back or a response via [facility]? Call back   Best Call Back Number:  [phone]  Additional Information: pt would like a call back not a message in portal      Pt would like a call back regarding this   Pt thinks she has an infection   Pt stated she isn't feeling better since visit

## 2022-09-06 ENCOUNTER — SPECIALTY PHARMACY (OUTPATIENT)
Dept: PHARMACY | Facility: CLINIC | Age: 82
End: 2022-09-06
Payer: MEDICARE

## 2022-09-06 NOTE — TELEPHONE ENCOUNTER
Specialty Pharmacy - Refill Coordination    Specialty Medication Orders Linked to Encounter      Flowsheet Row Most Recent Value   Medication #1 alirocumab (PRALUENT PEN) 150 mg/mL PnIj (Order#[id number], Rx#[id number])            Refill Questions - Documented Responses      Flowsheet Row Most Recent Value   Patient Availability and HIPAA Verification    Does patient want to proceed with activity? Yes   HIPAA/medical authority confirmed? Yes   Relationship to patient of person spoken to? Self   Refill Screening Questions    Would patient like to speak to a pharmacist? No   When does the patient need to receive the medication? 09/15/22   Refill Delivery Questions    How will the patient receive the medication? Delivery [facility]   When does the patient need to receive the medication? 09/15/22   Shipping Address Home   Address in [facility] confirmed and updated if neccessary? Yes   Expected Copay ($) 20   Is the patient able to afford the medication copay? Yes   Payment Method CC on file   Days supply of Refill 28   Supplies needed? No supplies needed   Refill activity completed? Yes   Refill activity plan Refill scheduled   Shipment/Pickup Date: 09/08/22            Current Outpatient Medications   Medication Sig    alirocumab (PRALUENT PEN) 150 mg/mL PnIj Inject 1 mL (150 mg total) into the skin every 14 (fourteen) days.    amLODIPine (NORVASC) 10 MG tablet Take 1 tablet by mouth once daily    ascorbic acid, vitamin C, (VITAMIN C) 1000 MG tablet Take 1,000 mg by mouth once daily.    aspirin 81 MG Chew Take 81 mg by mouth once daily.    azelastine (ASTELIN) 137 mcg (0.1 %) nasal spray USE 1 TO 2 SPRAY(S) IN EACH NOSTRIL TWICE DAILY    bempedoic acid 180 mg Tab Take 1 tablet (180 mg total) by mouth once daily.    benzonatate (TESSALON) 100 MG capsule Take 100 mg by mouth every 6 (six) hours.    budesonide 1 mg/2 mL NbSp EMPTY CONTENTS OF 1 RESPULE INTO NASAL IRRIGATION SYSTEM, ADD DISTILLED WATER, SALT PACK, MIX &  IRRIGATE. PERFORM TWICE DAILY    carvediloL (COREG) 6.25 MG tablet TAKE 1 TABLET BY MOUTH TWICE DAILY WITH MEALS    cetirizine (ZYRTEC) 10 MG tablet Take 10 mg by mouth as needed for Allergies. Patients states only when provider prescribe it.    cilostazoL (PLETAL) 100 MG Tab Take 1 tablet (100 mg total) by mouth 2 (two) times daily. (Patient taking differently: Take 100 mg by mouth 2 (two) times daily. Pt states she does not take everyday)    clopidogreL (PLAVIX) 75 mg tablet Take 1 tablet (75 mg total) by mouth once daily.    clorazepate (TRANXENE) 7.5 MG Tab Take 1 tablet (7.5 mg total) by mouth daily as needed (Anxiety).    co-enzyme Q-10 50 mg capsule Take 50 mg by mouth once daily.    diphenhydrAMINE (BENADRYL) 25 mg capsule Take 25 mg by mouth every 6 (six) hours as needed for Itching.    ezetimibe (ZETIA) 10 mg tablet TAKE 1 TABLET BY MOUTH ONCE DAILY IN THE EVENING    multivitamin (THERAGRAN) per tablet Take 1 tablet by mouth once daily.    NEILMED SINUS RINSE REFILL Pack use as directed    omega-3 fatty acids/fish oil (FISH OIL-OMEGA-3 FATTY ACIDS) 300-1,000 mg capsule Take 1 capsule by mouth once daily.    TURMERIC ORAL Take 1 packet by mouth once daily.   Last reviewed on 8/15/2022  9:34 AM by Diamond Real MA    Review of patient's allergies indicates:   Allergen Reactions    Benazepril Other (See Comments)     cough    Chlorthalidone     Iodinated contrast media     Iodine and iodide containing products     Lipitor [atorvastatin]      States she is allergic to all statin medications    Sertraline      Medication caused patient to get sick.    Spironolactone     Last reviewed on  8/15/2022 9:29 AM by Diamond Real      Tasks added this encounter   No tasks added.   Tasks due within next 3 months   9/5/2022 - Refill Call (Auto Added)     Indu Castano [facility]  [address] [facility] [address]  Phone: [phone]  Fax: [phone]

## 2022-10-11 ENCOUNTER — SPECIALTY PHARMACY (OUTPATIENT)
Dept: PHARMACY | Facility: CLINIC | Age: 82
End: 2022-10-11
Payer: MEDICARE

## 2022-10-11 NOTE — TELEPHONE ENCOUNTER
Specialty Pharmacy - Refill Coordination    Specialty Medication Orders Linked to Encounter      Flowsheet Row Most Recent Value   Medication #1 alirocumab (PRALUENT PEN) 150 mg/mL PnIj (Order#[id number], Rx#[id number])            Refill Questions - Documented Responses      Flowsheet Row Most Recent Value   Patient Availability and HIPAA Verification    Does patient want to proceed with activity? Yes   HIPAA/medical authority confirmed? Yes   Relationship to patient of person spoken to? Self   Refill Screening Questions    Would patient like to speak to a pharmacist? No   When does the patient need to receive the medication? 10/15/22   Refill Delivery Questions    How will the patient receive the medication? MEDRx   When does the patient need to receive the medication? 10/15/22   Shipping Address Home   Address in [facility] confirmed and updated if neccessary? Yes   Expected Copay ($) 20   Is the patient able to afford the medication copay? Yes   Payment Method CC on file   Days supply of Refill 28   Supplies needed? No supplies needed   Refill activity completed? Yes   Refill activity plan Refill scheduled   Shipment/Pickup Date: 10/12/22            Current Outpatient Medications   Medication Sig    alirocumab (PRALUENT PEN) 150 mg/mL PnIj Inject 1 mL (150 mg total) into the skin every 14 (fourteen) days.    amLODIPine (NORVASC) 10 MG tablet Take 1 tablet by mouth once daily    ascorbic acid, vitamin C, (VITAMIN C) 1000 MG tablet Take 1,000 mg by mouth once daily.    aspirin 81 MG Chew Take 81 mg by mouth once daily.    azelastine (ASTELIN) 137 mcg (0.1 %) nasal spray USE 1 TO 2 SPRAY(S) IN EACH NOSTRIL TWICE DAILY    bempedoic acid 180 mg Tab Take 1 tablet (180 mg total) by mouth once daily.    benzonatate (TESSALON) 100 MG capsule Take 100 mg by mouth every 6 (six) hours.    budesonide 1 mg/2 mL NbSp EMPTY CONTENTS OF 1 RESPULE INTO NASAL IRRIGATION SYSTEM, ADD DISTILLED WATER, SALT PACK, MIX & IRRIGATE.  PERFORM TWICE DAILY    carvediloL (COREG) 6.25 MG tablet TAKE 1 TABLET BY MOUTH TWICE DAILY WITH MEALS    cetirizine (ZYRTEC) 10 MG tablet Take 10 mg by mouth as needed for Allergies. Patients states only when provider prescribe it.    cilostazoL (PLETAL) 100 MG Tab Take 1 tablet (100 mg total) by mouth 2 (two) times daily. (Patient taking differently: Take 100 mg by mouth 2 (two) times daily. Pt states she does not take everyday)    clopidogreL (PLAVIX) 75 mg tablet Take 1 tablet (75 mg total) by mouth once daily.    clorazepate (TRANXENE) 7.5 MG Tab Take 1 tablet (7.5 mg total) by mouth daily as needed (Anxiety).    co-enzyme Q-10 50 mg capsule Take 50 mg by mouth once daily.    diphenhydrAMINE (BENADRYL) 25 mg capsule Take 25 mg by mouth every 6 (six) hours as needed for Itching.    ezetimibe (ZETIA) 10 mg tablet TAKE 1 TABLET BY MOUTH ONCE DAILY IN THE EVENING    multivitamin (THERAGRAN) per tablet Take 1 tablet by mouth once daily.    NEILMED SINUS RINSE REFILL Pack use as directed    omega-3 fatty acids/fish oil (FISH OIL-OMEGA-3 FATTY ACIDS) 300-1,000 mg capsule Take 1 capsule by mouth once daily.    TURMERIC ORAL Take 1 packet by mouth once daily.   Last reviewed on 8/15/2022  9:34 AM by Diamond Real MA    Review of patient's allergies indicates:   Allergen Reactions    Benazepril Other (See Comments)     cough    Chlorthalidone     Iodinated contrast media     Iodine and iodide containing products     Lipitor [atorvastatin]      States she is allergic to all statin medications    Sertraline      Medication caused patient to get sick.    Spironolactone     Last reviewed on  8/15/2022 9:29 AM by Diamond Real      Tasks added this encounter   11/5/2022 - Refill Call (Auto Added)   Tasks due within next 3 months   No tasks due.     Mary Castano [facility] - [facility]  [address] [place] [id number]  Phone: [phone]  Fax: [phone]

## 2022-10-27 ENCOUNTER — PATIENT MESSAGE (OUTPATIENT)
Dept: INTERNAL MEDICINE | Facility: CLINIC | Age: 82
End: 2022-10-27
Payer: MEDICARE

## 2022-10-27 NOTE — TELEPHONE ENCOUNTER
Pt attached her medical POA to a VBOX message.  Papers were printed and will be sent to HIM department for scanning

## 2022-10-28 ENCOUNTER — TELEPHONE (OUTPATIENT)
Dept: INTERNAL MEDICINE | Facility: CLINIC | Age: 82
End: 2022-10-28
Payer: MEDICARE

## 2022-10-28 NOTE — TELEPHONE ENCOUNTER
----- Message from Ellyn Arias sent at 10/28/2022  3:40 PM CDT -----  Contact: [phone]  Please call patient next week to schedule her phys and lab please.

## 2022-11-07 ENCOUNTER — SPECIALTY PHARMACY (OUTPATIENT)
Dept: PHARMACY | Facility: CLINIC | Age: 82
End: 2022-11-07
Payer: MEDICARE

## 2022-11-07 NOTE — TELEPHONE ENCOUNTER
Specialty Pharmacy - Refill Coordination    Specialty Medication Orders Linked to Encounter      Flowsheet Row Most Recent Value   Medication #1 alirocumab (PRALUENT PEN) 150 mg/mL PnIj (Order#[id number], Rx#[id number])            Refill Questions - Documented Responses      Flowsheet Row Most Recent Value   Patient Availability and HIPAA Verification    Does patient want to proceed with activity? Yes   HIPAA/medical authority confirmed? Yes   Relationship to patient of person spoken to? Self   Refill Screening Questions    Would patient like to speak to a pharmacist? No   When does the patient need to receive the medication? 11/15/22   Refill Delivery Questions    How will the patient receive the medication? MEDRx   When does the patient need to receive the medication? 11/15/22   Shipping Address Home   Address in [facility] confirmed and updated if neccessary? Yes   Expected Copay ($) 20   Is the patient able to afford the medication copay? Yes   Payment Method CC on file   Days supply of Refill 28   Supplies needed? No supplies needed   Refill activity completed? Yes   Refill activity plan Refill scheduled   Shipment/Pickup Date: 11/11/22            Current Outpatient Medications   Medication Sig    alirocumab (PRALUENT PEN) 150 mg/mL PnIj Inject 1 mL (150 mg total) into the skin every 14 (fourteen) days.    amLODIPine (NORVASC) 10 MG tablet Take 1 tablet by mouth once daily    ascorbic acid, vitamin C, (VITAMIN C) 1000 MG tablet Take 1,000 mg by mouth once daily.    aspirin 81 MG Chew Take 81 mg by mouth once daily.    azelastine (ASTELIN) 137 mcg (0.1 %) nasal spray USE 1 TO 2 SPRAY(S) IN EACH NOSTRIL TWICE DAILY    bempedoic acid 180 mg Tab Take 1 tablet (180 mg total) by mouth once daily.    benzonatate (TESSALON) 100 MG capsule Take 100 mg by mouth every 6 (six) hours.    budesonide 1 mg/2 mL NbSp EMPTY CONTENTS OF 1 RESPULE INTO NASAL IRRIGATION SYSTEM, ADD DISTILLED WATER, SALT PACK, MIX & IRRIGATE.  PERFORM TWICE DAILY    carvediloL (COREG) 6.25 MG tablet TAKE 1 TABLET BY MOUTH TWICE DAILY WITH MEALS    cetirizine (ZYRTEC) 10 MG tablet Take 10 mg by mouth as needed for Allergies. Patients states only when provider prescribe it.    cilostazoL (PLETAL) 100 MG Tab Take 1 tablet (100 mg total) by mouth 2 (two) times daily. (Patient taking differently: Take 100 mg by mouth 2 (two) times daily. Pt states she does not take everyday)    clopidogreL (PLAVIX) 75 mg tablet Take 1 tablet (75 mg total) by mouth once daily.    clorazepate (TRANXENE) 7.5 MG Tab Take 1 tablet (7.5 mg total) by mouth daily as needed (Anxiety).    co-enzyme Q-10 50 mg capsule Take 50 mg by mouth once daily.    diphenhydrAMINE (BENADRYL) 25 mg capsule Take 25 mg by mouth every 6 (six) hours as needed for Itching.    ezetimibe (ZETIA) 10 mg tablet TAKE 1 TABLET BY MOUTH ONCE DAILY IN THE EVENING    multivitamin (THERAGRAN) per tablet Take 1 tablet by mouth once daily.    NEILMED SINUS RINSE REFILL Pack use as directed    omega-3 fatty acids/fish oil (FISH OIL-OMEGA-3 FATTY ACIDS) 300-1,000 mg capsule Take 1 capsule by mouth once daily.    TURMERIC ORAL Take 1 packet by mouth once daily.   Last reviewed on 8/15/2022  9:34 AM by Diamond Real MA    Review of patient's allergies indicates:   Allergen Reactions    Benazepril Other (See Comments)     cough    Chlorthalidone     Iodinated contrast media     Iodine and iodide containing products     Lipitor [atorvastatin]      States she is allergic to all statin medications    Sertraline      Medication caused patient to get sick.    Spironolactone     Last reviewed on  8/15/2022 9:29 AM by Diamond Real      Tasks added this encounter   12/6/2022 - Refill Call (Auto Added)   Tasks due within next 3 months   No tasks due.     [facility]  [address] [facility] [address]  Phone: [phone]  Fax: [phone]

## 2022-11-09 NOTE — TELEPHONE ENCOUNTER
Patient called [facility] back and wants Praluent delivery on Friday, Nov. 11th versus previous note of Monday, Nov. 14th. Updated in wamb and fulfillment team informed.

## 2022-12-06 ENCOUNTER — SPECIALTY PHARMACY (OUTPATIENT)
Dept: PHARMACY | Facility: CLINIC | Age: 82
End: 2022-12-06
Payer: MEDICARE

## 2022-12-06 NOTE — TELEPHONE ENCOUNTER
Specialty Pharmacy - Refill Coordination    Specialty Medication Orders Linked to Encounter      Flowsheet Row Most Recent Value   Medication #1 alirocumab (PRALUENT PEN) 150 mg/mL PnIj (Order#[id number], Rx#[id number])            Refill Questions - Documented Responses      Flowsheet Row Most Recent Value   Patient Availability and HIPAA Verification    Does patient want to proceed with activity? Yes   HIPAA/medical authority confirmed? Yes   Relationship to patient of person spoken to? Self   Refill Screening Questions    Would patient like to speak to a pharmacist? No   When does the patient need to receive the medication? 12/15/22   Refill Delivery Questions    How will the patient receive the medication? MEDRx   When does the patient need to receive the medication? 12/15/22   Shipping Address Home   Address in [facility] confirmed and updated if neccessary? Yes   Expected Copay ($) 20   Is the patient able to afford the medication copay? Yes   Payment Method zero copay   Days supply of Refill 28   Supplies needed? No supplies needed   Refill activity completed? Yes   Refill activity plan Refill scheduled   Shipment/Pickup Date: 12/13/22            Current Outpatient Medications   Medication Sig    alirocumab (PRALUENT PEN) 150 mg/mL PnIj Inject 1 mL (150 mg total) into the skin every 14 (fourteen) days.    amLODIPine (NORVASC) 10 MG tablet Take 1 tablet by mouth once daily    ascorbic acid, vitamin C, (VITAMIN C) 1000 MG tablet Take 1,000 mg by mouth once daily.    aspirin 81 MG Chew Take 81 mg by mouth once daily.    azelastine (ASTELIN) 137 mcg (0.1 %) nasal spray USE 1 TO 2 SPRAY(S) IN EACH NOSTRIL TWICE DAILY    bempedoic acid 180 mg Tab Take 1 tablet (180 mg total) by mouth once daily.    benzonatate (TESSALON) 100 MG capsule Take 100 mg by mouth every 6 (six) hours.    budesonide 1 mg/2 mL NbSp EMPTY CONTENTS OF 1 RESPULE INTO NASAL IRRIGATION SYSTEM, ADD DISTILLED WATER, SALT PACK, MIX & IRRIGATE.  PERFORM TWICE DAILY    carvediloL (COREG) 6.25 MG tablet TAKE 1 TABLET BY MOUTH TWICE DAILY WITH MEALS    cetirizine (ZYRTEC) 10 MG tablet Take 10 mg by mouth as needed for Allergies. Patients states only when provider prescribe it.    cilostazoL (PLETAL) 100 MG Tab Take 1 tablet (100 mg total) by mouth 2 (two) times daily. (Patient taking differently: Take 100 mg by mouth 2 (two) times daily. Pt states she does not take everyday)    clopidogreL (PLAVIX) 75 mg tablet Take 1 tablet (75 mg total) by mouth once daily.    clorazepate (TRANXENE) 7.5 MG Tab Take 1 tablet (7.5 mg total) by mouth daily as needed (Anxiety).    co-enzyme Q-10 50 mg capsule Take 50 mg by mouth once daily.    diphenhydrAMINE (BENADRYL) 25 mg capsule Take 25 mg by mouth every 6 (six) hours as needed for Itching.    ezetimibe (ZETIA) 10 mg tablet TAKE 1 TABLET BY MOUTH ONCE DAILY IN THE EVENING    multivitamin (THERAGRAN) per tablet Take 1 tablet by mouth once daily.    NEILMED SINUS RINSE REFILL Pack use as directed    omega-3 fatty acids/fish oil (FISH OIL-OMEGA-3 FATTY ACIDS) 300-1,000 mg capsule Take 1 capsule by mouth once daily.    TURMERIC ORAL Take 1 packet by mouth once daily.   Last reviewed on 8/15/2022  9:34 AM by Diamond Real MA    Review of patient's allergies indicates:   Allergen Reactions    Benazepril Other (See Comments)     cough    Chlorthalidone     Iodinated contrast media     Iodine and iodide containing products     Lipitor [atorvastatin]      States she is allergic to all statin medications    Sertraline      Medication caused patient to get sick.    Spironolactone     Last reviewed on  8/15/2022 9:29 AM by Diamond Real      Tasks added this encounter   1/5/2023 - Refill Call (Auto Added)   Tasks due within next 3 months   No tasks due.     Carmelita Castano [facility] - [facility]ty Pharm[facility]  [address] [place] [id number]  Phone: [phone]  Fax: [phone]

## 2022-12-12 NOTE — TELEPHONE ENCOUNTER
Incoming call from pt. Patient reports she expected medication to arrive 12/9. Informed that shipment was set-up to arrive 12/14. pt would not like the medication of 12/14. Changed delivery date to arrive 12/13 (delivery set-up 12/12). Confirmed address.

## 2022-12-27 DIAGNOSIS — R73.01 IMPAIRED FASTING BLOOD SUGAR: Primary | ICD-10-CM

## 2022-12-27 DIAGNOSIS — I10 ESSENTIAL HYPERTENSION: Primary | ICD-10-CM

## 2022-12-27 DIAGNOSIS — I10 ESSENTIAL HYPERTENSION: ICD-10-CM

## 2022-12-27 DIAGNOSIS — E78.00 HYPERCHOLESTEREMIA: ICD-10-CM

## 2022-12-27 DIAGNOSIS — R73.01 IMPAIRED FASTING BLOOD SUGAR: ICD-10-CM

## 2022-12-29 ENCOUNTER — LAB VISIT (OUTPATIENT)
Dept: LAB | Facility: HOSPITAL | Age: 82
End: 2022-12-29
Attending: STUDENT IN AN ORGANIZED HEALTH CARE EDUCATION/TRAINING PROGRAM
Payer: MEDICARE

## 2022-12-29 DIAGNOSIS — I10 ESSENTIAL HYPERTENSION: ICD-10-CM

## 2022-12-29 DIAGNOSIS — R73.01 IMPAIRED FASTING BLOOD SUGAR: ICD-10-CM

## 2022-12-29 DIAGNOSIS — E78.00 HYPERCHOLESTEREMIA: ICD-10-CM

## 2022-12-29 LAB
ALBUMIN SERPL BCP-MCNC: 3.9 G/DL (ref 3.5–5.2)
ALP SERPL-CCNC: 31 U/L (ref 55–135)
ALT SERPL W/O P-5'-P-CCNC: 12 U/L (ref 10–44)
ANION GAP SERPL CALC-SCNC: 8 MMOL/L (ref 8–16)
AST SERPL-CCNC: 23 U/L (ref 10–40)
BASOPHILS # BLD AUTO: 0.05 K/UL (ref 0–0.2)
BASOPHILS NFR BLD: 1.6 % (ref 0–1.9)
BILIRUB SERPL-MCNC: 0.5 MG/DL (ref 0.1–1)
BUN SERPL-MCNC: 20 MG/DL (ref 8–23)
CALCIUM SERPL-MCNC: 9.9 MG/DL (ref 8.7–10.5)
CHLORIDE SERPL-SCNC: 103 MMOL/L (ref 95–110)
CHOLEST SERPL-MCNC: 190 MG/DL (ref 120–199)
CHOLEST/HDLC SERPL: 2.1 {RATIO} (ref 2–5)
CO2 SERPL-SCNC: 30 MMOL/L (ref 23–29)
CREAT SERPL-MCNC: 1.1 MG/DL (ref 0.5–1.4)
DIFFERENTIAL METHOD: ABNORMAL
EOSINOPHIL # BLD AUTO: 0 K/UL (ref 0–0.5)
EOSINOPHIL NFR BLD: 1 % (ref 0–8)
ERYTHROCYTE [DISTWIDTH] IN BLOOD BY AUTOMATED COUNT: 14.6 % (ref 11.5–14.5)
EST. GFR  (NO RACE VARIABLE): 50.8 ML/MIN/1.73 M^2
ESTIMATED AVG GLUCOSE: 117 MG/DL (ref 68–131)
GLUCOSE SERPL-MCNC: 91 MG/DL (ref 70–110)
HBA1C MFR BLD: 5.7 % (ref 4–5.6)
HCT VFR BLD AUTO: 33.3 % (ref 37–48.5)
HDLC SERPL-MCNC: 91 MG/DL (ref 40–75)
HDLC SERPL: 47.9 % (ref 20–50)
HGB BLD-MCNC: 10.6 G/DL (ref 12–16)
IMM GRANULOCYTES # BLD AUTO: 0 K/UL (ref 0–0.04)
IMM GRANULOCYTES NFR BLD AUTO: 0 % (ref 0–0.5)
LDLC SERPL CALC-MCNC: 90.6 MG/DL (ref 63–159)
LYMPHOCYTES # BLD AUTO: 1.4 K/UL (ref 1–4.8)
LYMPHOCYTES NFR BLD: 46.2 % (ref 18–48)
MCH RBC QN AUTO: 27.2 PG (ref 27–31)
MCHC RBC AUTO-ENTMCNC: 31.8 G/DL (ref 32–36)
MCV RBC AUTO: 85 FL (ref 82–98)
MONOCYTES # BLD AUTO: 0.3 K/UL (ref 0.3–1)
MONOCYTES NFR BLD: 10.5 % (ref 4–15)
NEUTROPHILS # BLD AUTO: 1.2 K/UL (ref 1.8–7.7)
NEUTROPHILS NFR BLD: 40.7 % (ref 38–73)
NONHDLC SERPL-MCNC: 99 MG/DL
NRBC BLD-RTO: 0 /100 WBC
PLATELET # BLD AUTO: 176 K/UL (ref 150–450)
PMV BLD AUTO: 10.6 FL (ref 9.2–12.9)
POTASSIUM SERPL-SCNC: 4.5 MMOL/L (ref 3.5–5.1)
PROT SERPL-MCNC: 7.5 G/DL (ref 6–8.4)
RBC # BLD AUTO: 3.9 M/UL (ref 4–5.4)
SODIUM SERPL-SCNC: 141 MMOL/L (ref 136–145)
TRIGL SERPL-MCNC: 42 MG/DL (ref 30–150)
TSH SERPL DL<=0.005 MIU/L-ACNC: 1.19 UIU/ML (ref 0.4–4)
WBC # BLD AUTO: 3.05 K/UL (ref 3.9–12.7)

## 2022-12-29 PROCEDURE — 83036 HEMOGLOBIN GLYCOSYLATED A1C: CPT | Performed by: STUDENT IN AN ORGANIZED HEALTH CARE EDUCATION/TRAINING PROGRAM

## 2022-12-29 PROCEDURE — 85025 COMPLETE CBC W/AUTO DIFF WBC: CPT | Performed by: STUDENT IN AN ORGANIZED HEALTH CARE EDUCATION/TRAINING PROGRAM

## 2022-12-29 PROCEDURE — 80061 LIPID PANEL: CPT | Performed by: STUDENT IN AN ORGANIZED HEALTH CARE EDUCATION/TRAINING PROGRAM

## 2022-12-29 PROCEDURE — 80053 COMPREHEN METABOLIC PANEL: CPT | Performed by: STUDENT IN AN ORGANIZED HEALTH CARE EDUCATION/TRAINING PROGRAM

## 2022-12-29 PROCEDURE — 36415 COLL VENOUS BLD VENIPUNCTURE: CPT | Mod: PN | Performed by: STUDENT IN AN ORGANIZED HEALTH CARE EDUCATION/TRAINING PROGRAM

## 2022-12-29 PROCEDURE — 84443 ASSAY THYROID STIM HORMONE: CPT | Performed by: STUDENT IN AN ORGANIZED HEALTH CARE EDUCATION/TRAINING PROGRAM

## 2023-01-03 NOTE — PROGRESS NOTES
Subjective:      Chief Complaint: Annual Exam and Medication Refill    HPI  Ms. Alana Beth is a [age] yo F with Anxiety, benign recently identified solitary pulmonary nodule, severe PAD/celiac artery atherosclerosis (leading to gastroparesis) presenting for annual physical:    Annual screening labs gathered in preparation for this appointment:   -TSH: Within normal limits   -lipid panel:  Exceptionally well suppressed   -CMP: Persistent CKD 3A  -CBC: Persistent/indolent worsening of normocytic anemia (colonoscopy to be done later this year)   -A1c:  5.7    Family, social, surgical Hx reviewed     Health Maintenance:  Will be due for colonoscopy in August of this year (F/U colonoscopy Aug 2023 depending on overall health at that time via patient's gastroenterologist)      Past Medical History:   Diagnosis Date    Atherosclerotic peripheral vascular disease     Chronic cystic mastitis     Essential hypertension     Hypercholesterolemia     Osteopenia     Shingles      Past Surgical History:   Procedure Laterality Date    BREAST BIOPSY      biopsies benign    CELIAC ARTERY STENT      ESOPHAGOGASTRODUODENOSCOPY  08/20/2020    PERCUTANEOUS TRANSLUMINAL ANGIOPLASTY N/A 12/8/2020    Procedure: Pta (Angioplasty, Percutaneous, Transluminal);  Surgeon: Hernando Junior MD;  Location: [facility] CATH LAB/EP;  Service: Cardiology;  Laterality: N/A;     Family History   Problem Relation Age of Onset    Hypertension Mother     Diabetes Brother     Diabetes Sister     Colon cancer Neg Hx     Esophageal cancer Neg Hx      Social History     Socioeconomic History    Marital status: Single   Occupational History    Occupation: retired student loan assistance    Tobacco Use    Smoking status: Never    Smokeless tobacco: Never   Substance and Sexual Activity    Alcohol use: No    Drug use: No    Sexual activity: Not Currently     Review of patient's allergies indicates:   Allergen Reactions    Benazepril Other (See Comments)      cough    Chlorthalidone     Iodinated contrast media     Iodine and iodide containing products     Lipitor [atorvastatin]      States she is allergic to all statin medications    Sertraline      Medication caused patient to get sick.    Spironolactone      Alana Beth had no medications administered during this visit.      Review of Systems   Constitutional:  Negative for appetite change, chills and fever.   HENT: Negative.     Respiratory:  Negative for cough, chest tightness and shortness of breath.    Cardiovascular:  Negative for chest pain, palpitations and leg swelling.   Gastrointestinal:  Negative for abdominal distention, abdominal pain, blood in stool, constipation, diarrhea, nausea and vomiting.   Endocrine: Negative.    Genitourinary:  Negative for difficulty urinating, dysuria, frequency and hematuria.   Musculoskeletal: Negative.    Integumentary:  Negative.   Neurological: Negative.    Psychiatric/Behavioral: Negative.         Objective:      Vitals:    01/04/23 1001   BP: (!) 118/52   Pulse: 91   Resp: 18   Temp: 97.9 °F (36.6 °C)      Physical Exam  Vitals reviewed.   Constitutional:       General: She is not in acute distress.     Appearance: Normal appearance.   HENT:      Head: Normocephalic and atraumatic.      Comments: Facial features are symmetric      Nose: Nose normal. No congestion or rhinorrhea.      Mouth/Throat:      Mouth: Mucous membranes are moist.      Pharynx: Oropharynx is clear. No oropharyngeal exudate or posterior oropharyngeal erythema.   Eyes:      General: No scleral icterus.     Extraocular Movements: Extraocular movements intact.      Conjunctiva/sclera: Conjunctivae normal.   Cardiovascular:      Rate and Rhythm: Normal rate and regular rhythm.      Pulses: Normal pulses.      Heart sounds: Normal heart sounds.   Pulmonary:      Effort: Pulmonary effort is normal. No respiratory distress.      Breath sounds: Normal breath sounds.   Musculoskeletal:         General:  No deformity or signs of injury. Normal range of motion.      Cervical back: Normal range of motion.      Comments: Gait normal    Skin:     General: Skin is warm and dry.      Findings: No rash.   Neurological:      General: No focal deficit present.      Mental Status: She is alert and oriented to person, place, and time. Mental status is at baseline.   Psychiatric:         Mood and Affect: Mood normal.         Behavior: Behavior normal.         Thought Content: Thought content normal.     Current Outpatient Medications on File Prior to Visit   Medication Sig Dispense Refill    alirocumab (PRALUENT PEN) 150 mg/mL PnIj Inject 1 mL (150 mg total) into the skin every 14 (fourteen) days. 10 mL 10    amLODIPine (NORVASC) 10 MG tablet Take 1 tablet by mouth once daily 90 tablet 2    ascorbic acid, vitamin C, (VITAMIN C) 1000 MG tablet Take 1,000 mg by mouth once daily.      aspirin 81 MG Chew Take 81 mg by mouth once daily.      azelastine (ASTELIN) 137 mcg (0.1 %) nasal spray USE 1 TO 2 SPRAY(S) IN EACH NOSTRIL TWICE DAILY      bempedoic acid 180 mg Tab Take 1 tablet (180 mg total) by mouth once daily. 30 tablet 11    budesonide 1 mg/2 mL NbSp EMPTY CONTENTS OF 1 RESPULE INTO NASAL IRRIGATION SYSTEM, ADD DISTILLED WATER, SALT PACK, MIX & IRRIGATE. PERFORM TWICE DAILY      carvediloL (COREG) 6.25 MG tablet TAKE 1 TABLET BY MOUTH TWICE DAILY WITH MEALS 180 tablet 0    cetirizine (ZYRTEC) 10 MG tablet Take 10 mg by mouth as needed for Allergies. Patients states only when provider prescribe it.      clopidogreL (PLAVIX) 75 mg tablet Take 1 tablet (75 mg total) by mouth once daily. 90 tablet 3    clorazepate (TRANXENE) 7.5 MG Tab Take 1 tablet (7.5 mg total) by mouth daily as needed (Anxiety). 90 tablet 2    co-enzyme Q-10 50 mg capsule Take 50 mg by mouth once daily.      diphenhydrAMINE (BENADRYL) 25 mg capsule Take 25 mg by mouth every 6 (six) hours as needed for Itching.      ezetimibe (ZETIA) 10 mg tablet TAKE 1 TABLET  BY MOUTH ONCE DAILY IN THE EVENING 90 tablet 1    FLUZONE HIGHDOSE QUAD 22-23  mcg/0.7 mL Syrg       multivitamin (THERAGRAN) per tablet Take 1 tablet by mouth once daily.      NEILMED SINUS RINSE REFILL Pack use as directed      omega-3 fatty acids/fish oil (FISH OIL-OMEGA-3 FATTY ACIDS) 300-1,000 mg capsule Take 1 capsule by mouth once daily.      TURMERIC ORAL Take 1 packet by mouth once daily.      benzonatate (TESSALON) 100 MG capsule Take 100 mg by mouth every 6 (six) hours.      cilostazoL (PLETAL) 100 MG Tab Take 1 tablet (100 mg total) by mouth 2 (two) times daily. (Patient not taking: Reported on 1/4/2023) 60 tablet 11     No current facility-administered medications on file prior to visit.         Assessment:       1. Physical exam, annual    2. Screening for colorectal cancer    3. Chronic kidney disease, stage 3a    4. Aortic atherosclerosis    5. Mixed hyperlipidemia    6. Celiac artery atherosclerosis    7. Essential hypertension    8. Mesenteric ischemia    9. PAD (peripheral artery disease)        Plan:       Physical exam, annual   - screening labs discussed    - health maintenance otherwise up-to-date    Screening for colorectal cancer  -     Ambulatory referral/consult to Endo Procedure ; Future; Expected date: 08/31/2023   - per the recommendation of the patient's gastroenterologist (and due to underlying concern for chronic blood loss given elevated RDW and indolently worsening normocytic anemia) will repeat colonoscopy in August (in excess of age 82 due to adequacy of overall health)     Chronic kidney disease, stage 3a   - stable; will continue to monitor     Aortic atherosclerosis  Mixed hyperlipidemia  Celiac artery atherosclerosis  Essential hypertension  Mesenteric ischemia  PAD (peripheral artery disease)   - all modifiable risk factors are aggressively/effectively controlled at present

## 2023-01-04 ENCOUNTER — OFFICE VISIT (OUTPATIENT)
Dept: INTERNAL MEDICINE | Facility: CLINIC | Age: 83
End: 2023-01-04
Payer: MEDICARE

## 2023-01-04 VITALS
HEIGHT: 63 IN | WEIGHT: 113.31 LBS | OXYGEN SATURATION: 99 % | HEART RATE: 91 BPM | RESPIRATION RATE: 18 BRPM | TEMPERATURE: 98 F | SYSTOLIC BLOOD PRESSURE: 118 MMHG | DIASTOLIC BLOOD PRESSURE: 52 MMHG | BODY MASS INDEX: 20.08 KG/M2

## 2023-01-04 DIAGNOSIS — Z00.00 PHYSICAL EXAM, ANNUAL: Primary | ICD-10-CM

## 2023-01-04 DIAGNOSIS — Z12.11 SCREENING FOR COLORECTAL CANCER: ICD-10-CM

## 2023-01-04 DIAGNOSIS — Z12.12 SCREENING FOR COLORECTAL CANCER: ICD-10-CM

## 2023-01-04 DIAGNOSIS — E78.2 MIXED HYPERLIPIDEMIA: ICD-10-CM

## 2023-01-04 DIAGNOSIS — I10 ESSENTIAL HYPERTENSION: ICD-10-CM

## 2023-01-04 DIAGNOSIS — N18.31 CHRONIC KIDNEY DISEASE, STAGE 3A: ICD-10-CM

## 2023-01-04 DIAGNOSIS — I70.8 CELIAC ARTERY ATHEROSCLEROSIS: ICD-10-CM

## 2023-01-04 DIAGNOSIS — K55.9 MESENTERIC ISCHEMIA: ICD-10-CM

## 2023-01-04 DIAGNOSIS — I73.9 PAD (PERIPHERAL ARTERY DISEASE): ICD-10-CM

## 2023-01-04 DIAGNOSIS — I70.0 AORTIC ATHEROSCLEROSIS: ICD-10-CM

## 2023-01-04 PROCEDURE — 1101F PT FALLS ASSESS-DOCD LE1/YR: CPT | Mod: CPTII,S$GLB,, | Performed by: STUDENT IN AN ORGANIZED HEALTH CARE EDUCATION/TRAINING PROGRAM

## 2023-01-04 PROCEDURE — 1126F PR PAIN SEVERITY QUANTIFIED, NO PAIN PRESENT: ICD-10-PCS | Mod: CPTII,S$GLB,, | Performed by: STUDENT IN AN ORGANIZED HEALTH CARE EDUCATION/TRAINING PROGRAM

## 2023-01-04 PROCEDURE — 1159F PR MEDICATION LIST DOCUMENTED IN MEDICAL RECORD: ICD-10-PCS | Mod: CPTII,S$GLB,, | Performed by: STUDENT IN AN ORGANIZED HEALTH CARE EDUCATION/TRAINING PROGRAM

## 2023-01-04 PROCEDURE — 1101F PR PT FALLS ASSESS DOC 0-1 FALLS W/OUT INJ PAST YR: ICD-10-PCS | Mod: CPTII,S$GLB,, | Performed by: STUDENT IN AN ORGANIZED HEALTH CARE EDUCATION/TRAINING PROGRAM

## 2023-01-04 PROCEDURE — 1157F ADVNC CARE PLAN IN RCRD: CPT | Mod: CPTII,S$GLB,, | Performed by: STUDENT IN AN ORGANIZED HEALTH CARE EDUCATION/TRAINING PROGRAM

## 2023-01-04 PROCEDURE — 3074F SYST BP LT 130 MM HG: CPT | Mod: CPTII,S$GLB,, | Performed by: STUDENT IN AN ORGANIZED HEALTH CARE EDUCATION/TRAINING PROGRAM

## 2023-01-04 PROCEDURE — 3078F DIAST BP <80 MM HG: CPT | Mod: CPTII,S$GLB,, | Performed by: STUDENT IN AN ORGANIZED HEALTH CARE EDUCATION/TRAINING PROGRAM

## 2023-01-04 PROCEDURE — 99999 PR PBB SHADOW E&M-EST. PATIENT-LVL V: CPT | Mod: PBBFAC,,, | Performed by: STUDENT IN AN ORGANIZED HEALTH CARE EDUCATION/TRAINING PROGRAM

## 2023-01-04 PROCEDURE — 99999 PR PBB SHADOW E&M-EST. PATIENT-LVL V: ICD-10-PCS | Mod: PBBFAC,,, | Performed by: STUDENT IN AN ORGANIZED HEALTH CARE EDUCATION/TRAINING PROGRAM

## 2023-01-04 PROCEDURE — 3074F PR MOST RECENT SYSTOLIC BLOOD PRESSURE < 130 MM HG: ICD-10-PCS | Mod: CPTII,S$GLB,, | Performed by: STUDENT IN AN ORGANIZED HEALTH CARE EDUCATION/TRAINING PROGRAM

## 2023-01-04 PROCEDURE — 99214 OFFICE O/P EST MOD 30 MIN: CPT | Mod: S$GLB,,, | Performed by: STUDENT IN AN ORGANIZED HEALTH CARE EDUCATION/TRAINING PROGRAM

## 2023-01-04 PROCEDURE — 1157F PR ADVANCE CARE PLAN OR EQUIV PRESENT IN MEDICAL RECORD: ICD-10-PCS | Mod: CPTII,S$GLB,, | Performed by: STUDENT IN AN ORGANIZED HEALTH CARE EDUCATION/TRAINING PROGRAM

## 2023-01-04 PROCEDURE — 3078F PR MOST RECENT DIASTOLIC BLOOD PRESSURE < 80 MM HG: ICD-10-PCS | Mod: CPTII,S$GLB,, | Performed by: STUDENT IN AN ORGANIZED HEALTH CARE EDUCATION/TRAINING PROGRAM

## 2023-01-04 PROCEDURE — 99499 UNLISTED E&M SERVICE: CPT | Mod: S$GLB,,, | Performed by: STUDENT IN AN ORGANIZED HEALTH CARE EDUCATION/TRAINING PROGRAM

## 2023-01-04 PROCEDURE — 3288F PR FALLS RISK ASSESSMENT DOCUMENTED: ICD-10-PCS | Mod: CPTII,S$GLB,, | Performed by: STUDENT IN AN ORGANIZED HEALTH CARE EDUCATION/TRAINING PROGRAM

## 2023-01-04 PROCEDURE — 99214 PR OFFICE/OUTPT VISIT, EST, LEVL IV, 30-39 MIN: ICD-10-PCS | Mod: S$GLB,,, | Performed by: STUDENT IN AN ORGANIZED HEALTH CARE EDUCATION/TRAINING PROGRAM

## 2023-01-04 PROCEDURE — 1159F MED LIST DOCD IN RCRD: CPT | Mod: CPTII,S$GLB,, | Performed by: STUDENT IN AN ORGANIZED HEALTH CARE EDUCATION/TRAINING PROGRAM

## 2023-01-04 PROCEDURE — 99499 RISK ADDL DX/OHS AUDIT: ICD-10-PCS | Mod: S$GLB,,, | Performed by: STUDENT IN AN ORGANIZED HEALTH CARE EDUCATION/TRAINING PROGRAM

## 2023-01-04 PROCEDURE — 1126F AMNT PAIN NOTED NONE PRSNT: CPT | Mod: CPTII,S$GLB,, | Performed by: STUDENT IN AN ORGANIZED HEALTH CARE EDUCATION/TRAINING PROGRAM

## 2023-01-04 PROCEDURE — 3288F FALL RISK ASSESSMENT DOCD: CPT | Mod: CPTII,S$GLB,, | Performed by: STUDENT IN AN ORGANIZED HEALTH CARE EDUCATION/TRAINING PROGRAM

## 2023-01-04 RX ORDER — CLORAZEPATE DIPOTASSIUM 7.5 MG/1
7.5 TABLET ORAL DAILY PRN
Qty: 90 TABLET | Refills: 3 | Status: SHIPPED | OUTPATIENT
Start: 2023-01-04 | End: 2023-11-10

## 2023-01-04 RX ORDER — INFLUENZA A VIRUS A/VICTORIA/2570/2019 IVR-215 (H1N1) ANTIGEN (FORMALDEHYDE INACTIVATED), INFLUENZA A VIRUS A/DARWIN/9/2021 SAN-010 (H3N2) ANTIGEN (FORMALDEHYDE INACTIVATED), INFLUENZA B VIRUS B/PHUKET/3073/2013 ANTIGEN (FORMALDEHYDE INACTIVATED), AND INFLUENZA B VIRUS B/MICHIGAN/01/2021 ANTIGEN (FORMALDEHYDE INACTIVATED) 60; 60; 60; 60 UG/.7ML; UG/.7ML; UG/.7ML; UG/.7ML
INJECTION, SUSPENSION INTRAMUSCULAR
COMMUNITY
Start: 2022-10-19 | End: 2023-02-15 | Stop reason: ALTCHOICE

## 2023-01-05 ENCOUNTER — OFFICE VISIT (OUTPATIENT)
Dept: OTOLARYNGOLOGY | Facility: CLINIC | Age: 83
End: 2023-01-05
Payer: MEDICARE

## 2023-01-05 VITALS — DIASTOLIC BLOOD PRESSURE: 72 MMHG | HEART RATE: 76 BPM | SYSTOLIC BLOOD PRESSURE: 137 MMHG

## 2023-01-05 DIAGNOSIS — J35.1 LINGUAL TONSIL HYPERTROPHY: ICD-10-CM

## 2023-01-05 DIAGNOSIS — R13.11 ORAL PHASE DYSPHAGIA: ICD-10-CM

## 2023-01-05 DIAGNOSIS — H92.02 EAR DISCOMFORT, LEFT: Primary | ICD-10-CM

## 2023-01-05 DIAGNOSIS — J39.2 THROAT IRRITATION: ICD-10-CM

## 2023-01-05 DIAGNOSIS — Z87.898 HISTORY OF DYSPHAGIA: ICD-10-CM

## 2023-01-05 DIAGNOSIS — R09.82 POST-NASAL DRIP: ICD-10-CM

## 2023-01-05 PROCEDURE — 3078F PR MOST RECENT DIASTOLIC BLOOD PRESSURE < 80 MM HG: ICD-10-PCS | Mod: CPTII,S$GLB,, | Performed by: OTOLARYNGOLOGY

## 2023-01-05 PROCEDURE — 3078F DIAST BP <80 MM HG: CPT | Mod: CPTII,S$GLB,, | Performed by: OTOLARYNGOLOGY

## 2023-01-05 PROCEDURE — 3075F SYST BP GE 130 - 139MM HG: CPT | Mod: CPTII,S$GLB,, | Performed by: OTOLARYNGOLOGY

## 2023-01-05 PROCEDURE — 31575 DIAGNOSTIC LARYNGOSCOPY: CPT | Mod: S$GLB,,, | Performed by: OTOLARYNGOLOGY

## 2023-01-05 PROCEDURE — 1157F ADVNC CARE PLAN IN RCRD: CPT | Mod: CPTII,S$GLB,, | Performed by: OTOLARYNGOLOGY

## 2023-01-05 PROCEDURE — 3288F FALL RISK ASSESSMENT DOCD: CPT | Mod: CPTII,S$GLB,, | Performed by: OTOLARYNGOLOGY

## 2023-01-05 PROCEDURE — 3288F PR FALLS RISK ASSESSMENT DOCUMENTED: ICD-10-PCS | Mod: CPTII,S$GLB,, | Performed by: OTOLARYNGOLOGY

## 2023-01-05 PROCEDURE — 31575 PR LARYNGOSCOPY, FLEXIBLE; DIAGNOSTIC: ICD-10-PCS | Mod: S$GLB,,, | Performed by: OTOLARYNGOLOGY

## 2023-01-05 PROCEDURE — 99999 PR PBB SHADOW E&M-EST. PATIENT-LVL III: ICD-10-PCS | Mod: PBBFAC,,, | Performed by: OTOLARYNGOLOGY

## 2023-01-05 PROCEDURE — 1126F AMNT PAIN NOTED NONE PRSNT: CPT | Mod: CPTII,S$GLB,, | Performed by: OTOLARYNGOLOGY

## 2023-01-05 PROCEDURE — 1101F PR PT FALLS ASSESS DOC 0-1 FALLS W/OUT INJ PAST YR: ICD-10-PCS | Mod: CPTII,S$GLB,, | Performed by: OTOLARYNGOLOGY

## 2023-01-05 PROCEDURE — 1159F MED LIST DOCD IN RCRD: CPT | Mod: CPTII,S$GLB,, | Performed by: OTOLARYNGOLOGY

## 2023-01-05 PROCEDURE — 99213 OFFICE O/P EST LOW 20 MIN: CPT | Mod: 25,S$GLB,, | Performed by: OTOLARYNGOLOGY

## 2023-01-05 PROCEDURE — 3075F PR MOST RECENT SYSTOLIC BLOOD PRESS GE 130-139MM HG: ICD-10-PCS | Mod: CPTII,S$GLB,, | Performed by: OTOLARYNGOLOGY

## 2023-01-05 PROCEDURE — 1159F PR MEDICATION LIST DOCUMENTED IN MEDICAL RECORD: ICD-10-PCS | Mod: CPTII,S$GLB,, | Performed by: OTOLARYNGOLOGY

## 2023-01-05 PROCEDURE — 1101F PT FALLS ASSESS-DOCD LE1/YR: CPT | Mod: CPTII,S$GLB,, | Performed by: OTOLARYNGOLOGY

## 2023-01-05 PROCEDURE — 1126F PR PAIN SEVERITY QUANTIFIED, NO PAIN PRESENT: ICD-10-PCS | Mod: CPTII,S$GLB,, | Performed by: OTOLARYNGOLOGY

## 2023-01-05 PROCEDURE — 1157F PR ADVANCE CARE PLAN OR EQUIV PRESENT IN MEDICAL RECORD: ICD-10-PCS | Mod: CPTII,S$GLB,, | Performed by: OTOLARYNGOLOGY

## 2023-01-05 PROCEDURE — 99999 PR PBB SHADOW E&M-EST. PATIENT-LVL III: CPT | Mod: PBBFAC,,, | Performed by: OTOLARYNGOLOGY

## 2023-01-05 PROCEDURE — 99213 PR OFFICE/OUTPT VISIT, EST, LEVL III, 20-29 MIN: ICD-10-PCS | Mod: 25,S$GLB,, | Performed by: OTOLARYNGOLOGY

## 2023-01-05 NOTE — PATIENT INSTRUCTIONS
Office endoscopy performed  Mucinex BID + hydration may help  May use AYR nasal mist prn  Vocal hygiene/anti -GERD instructions provided  Monitor throat status  HPV assays may be helpful  Consider audiometry on return prn  Weight gain encouraged  Consider neck CT pending course  RTC 3 mo/prn

## 2023-01-05 NOTE — PROGRESS NOTES
Subjective:       Patient ID: Alana Beth is a 82 y.o. female.    Chief Complaint: Cerumen Impaction, Sinus Problem (Post nasal drip), and Ear Drainage (B/l)    HPI: Ms. Beth is a thin  female with several ENT related complaints today.  She says that the sound of my voice hurts her left ear when I talk.    She indicates having swallowing problems with a specific feeling of discomfort in her throat perhaps related to her tongue/ back of tongue area.    She indicates frequent and recurrent mucus collection in her throat.  She dislodges a thick glob of mucus from her throat most mornings.    She, later on during the visit, indicates a wide/ open feeling in has lateral mouth/oropharynx.  She denies any change in her voicing.  She denies kyrie GERD type symptoms.  She is a lifelong nonsmoker.  She indicates a significant history of weight  loss since undergoing an abdominal tested [place] in 2019.  She used to weigh 140 lb and now weighs 112.   She completed a barium swallow study in September 2021 which indicated no penetration or aspiration but there was evidence of delayed oral transport.  She underwent a tonsillectomy procedure in the 1960s.  She has a history of peripheral artery disease.  She is status post a celiac artery stenting procedure.  She takes Plavix.   She denies any history of shingles or shingles vaccination.      Past Medical History:   Diagnosis Date    Atherosclerotic peripheral vascular disease     Chronic cystic mastitis     Essential hypertension     Hypercholesterolemia     Osteopenia     Shingles      Past Surgical History:   Procedure Laterality Date    BREAST BIOPSY      biopsies benign    CELIAC ARTERY STENT      ESOPHAGOGASTRODUODENOSCOPY  08/20/2020    PERCUTANEOUS TRANSLUMINAL ANGIOPLASTY N/A 12/8/2020    Procedure: Pta (Angioplasty, Percutaneous, Transluminal);  Surgeon: Hernando Junior MD;  Location: [facility] CATH LAB/EP;  Service: Cardiology;  Laterality: N/A;    Allergies:  Benazepril, iodinated contrast Lipitor, sertraline, spironolactone, chlorthalidone      Review of Systems        Objective:    General:  Alert and oriented bespectacled AAF in no acute distress; she does not appear ill and she is easily able to swallow her own secretions  Blood pressure 137/72 pulse 76 ht 5 ft 2-1/2 inches weight 113 lb  Both ears were examined under the microscope.    The patient has consented to an office endoscopic procedure today which has been explained to her in detail. Scope # [id number] was used.  Procedure:  Burke-Synephrine was sprayed in each nasal passage x2.  4% lidocaine was applied to cotton pledgets which were placed in the anterior nasal passages x2 minutes and removed.  4% lidocaine was sprayed in the posterior aspect of each nasal passage x2.  After waiting several minutes scoping is performed.    There is no evidence of mucoid material or purulence in either nasal passage.  Both are narrow posteriorly but there is no evidence of polypoid disease in either passage.  The nasopharyngeal tissues appear within normal limits.    There is evidence of lingual tonsil hypertrophy perhaps left-sided > at the base of the tongue.  There is no evidence of ulceration or exudate there.  The epiglottis is slightly elongated but not erythematous nor edematous.    Laryngeal tissues appear within normal limits given the patient's age( presbylarynges) .  There is no evidence of true vocal cord paresis or paralysis.  Piriform sinuses are clear.  The oral cavity is inspected with the scope.    The scope was withdrawn.  Physical Exam  HENT:      Ears:     Neck:         Assessment:       1. Ear discomfort, left    2. Post-nasal drip    3. Throat irritation    4. Oral phase dysphagia    5. History of dysphagia    6. Lingual tonsil hypertrophy        7.     Anti- coagulated on Plavix ( s/p celiac stenting)    Plan:     Office endoscopy performed  Mucinex BID + hydration may help  May use AYR nasal  mist prn  Vocal hygiene/anti -GERD instructions provided  Monitor throat status  HPV assays may be helpful  Consider audiometry on return prn  Weight gain encouraged  RTC 3 months; consider neck CT pending course

## 2023-01-09 ENCOUNTER — SPECIALTY PHARMACY (OUTPATIENT)
Dept: PHARMACY | Facility: CLINIC | Age: 83
End: 2023-01-09
Payer: MEDICARE

## 2023-01-09 NOTE — TELEPHONE ENCOUNTER
Specialty Pharmacy - Refill Coordination    Specialty Medication Orders Linked to Encounter      Flowsheet Row Most Recent Value   Medication #1 alirocumab (PRALUENT PEN) 150 mg/mL PnIj (Order#[id number], Rx#[id number])            Refill Questions - Documented Responses      Flowsheet Row Most Recent Value   Refill Screening Questions    Would patient like to speak to a pharmacist? No   When does the patient need to receive the medication? 01/15/23   Refill Delivery Questions    How will the patient receive the medication? MEDRx   When does the patient need to receive the medication? 01/15/23   Shipping Address Home   Address in [facility] confirmed and updated if neccessary? Yes   Expected Copay ($) 20   Is the patient able to afford the medication copay? Yes   Payment Method CC on file   Days supply of Refill 28   Supplies needed? No supplies needed   Refill activity completed? Yes   Refill activity plan Refill scheduled   Shipment/Pickup Date: 01/10/23            Current Outpatient Medications   Medication Sig    alirocumab (PRALUENT PEN) 150 mg/mL PnIj Inject 1 mL (150 mg total) into the skin every 14 (fourteen) days.    amLODIPine (NORVASC) 10 MG tablet Take 1 tablet by mouth once daily    ascorbic acid, vitamin C, (VITAMIN C) 1000 MG tablet Take 1,000 mg by mouth once daily.    aspirin 81 MG Chew Take 81 mg by mouth once daily.    azelastine (ASTELIN) 137 mcg (0.1 %) nasal spray USE 1 TO 2 SPRAY(S) IN EACH NOSTRIL TWICE DAILY    bempedoic acid 180 mg Tab Take 1 tablet (180 mg total) by mouth once daily.    benzonatate (TESSALON) 100 MG capsule Take 100 mg by mouth every 6 (six) hours.    budesonide 1 mg/2 mL NbSp EMPTY CONTENTS OF 1 RESPULE INTO NASAL IRRIGATION SYSTEM, ADD DISTILLED WATER, SALT PACK, MIX & IRRIGATE. PERFORM TWICE DAILY    carvediloL (COREG) 6.25 MG tablet TAKE 1 TABLET BY MOUTH TWICE DAILY WITH MEALS    cetirizine (ZYRTEC) 10 MG tablet Take 10 mg by mouth as needed for Allergies.  Patients states only when provider prescribe it.    cilostazoL (PLETAL) 100 MG Tab Take 1 tablet (100 mg total) by mouth 2 (two) times daily.    clopidogreL (PLAVIX) 75 mg tablet Take 1 tablet (75 mg total) by mouth once daily.    clorazepate (TRANXENE) 7.5 MG Tab Take 1 tablet (7.5 mg total) by mouth daily as needed (Anxiety).    co-enzyme Q-10 50 mg capsule Take 50 mg by mouth once daily.    diphenhydrAMINE (BENADRYL) 25 mg capsule Take 25 mg by mouth every 6 (six) hours as needed for Itching.    ezetimibe (ZETIA) 10 mg tablet TAKE 1 TABLET BY MOUTH ONCE DAILY IN THE EVENING    FLUZONE HIGHDOSE QUAD 22-23  mcg/0.7 mL Syrg     multivitamin (THERAGRAN) per tablet Take 1 tablet by mouth once daily.    NEILMED SINUS RINSE REFILL Pack use as directed    omega-3 fatty acids/fish oil (FISH OIL-OMEGA-3 FATTY ACIDS) 300-1,000 mg capsule Take 1 capsule by mouth once daily.    TURMERIC ORAL Take 1 packet by mouth once daily.   Last reviewed on 1/5/2023 11:10 AM by Cait Cobb MA    Review of patient's allergies indicates:   Allergen Reactions    Benazepril Other (See Comments)     cough    Chlorthalidone     Iodinated contrast media     Iodine and iodide containing products     Lipitor [atorvastatin]      States she is allergic to all statin medications    Sertraline      Medication caused patient to get sick.    Spironolactone     Last reviewed on  1/5/2023 11:09 AM by Cait Cobb      Tasks added this encounter   2/5/2023 - Refill Call (Auto Added)   Tasks due within next 3 months   No tasks due.     Bob Titus, PharmD  [facility]  [address] [facility] [address]  Phone: [phone]  Fax: [phone]

## 2023-01-17 ENCOUNTER — SPECIALTY PHARMACY (OUTPATIENT)
Dept: PHARMACY | Facility: CLINIC | Age: 83
End: 2023-01-17
Payer: MEDICARE

## 2023-01-17 NOTE — TELEPHONE ENCOUNTER
Contacted Praluent  replacement rep TJ to request replacement. Provided required information to  and contacted patient to inform request was made.  will be contacting [facility] to set up delivery date. [facility] will follow up with patient once replacement received to set up shipment. Req #: [id number].

## 2023-01-17 NOTE — TELEPHONE ENCOUNTER
Incoming call from pt reporting praulent misfire. Pt would like a replacement and call her back with delivery information. Pt expressed understanding, routing to [facility].

## 2023-01-19 NOTE — TELEPHONE ENCOUNTER
Incoming call from kahlil Chawla setting up replacement shipment to [facility] in the next 3-5 days. Case #[id number]. Provided [facility] address, can call with further questions at [phone] [facility]. Routing to [place] for awareness and informed fulfillment.

## 2023-01-24 ENCOUNTER — TELEPHONE (OUTPATIENT)
Dept: CARDIOLOGY | Facility: CLINIC | Age: 83
End: 2023-01-24
Payer: MEDICARE

## 2023-01-24 NOTE — TELEPHONE ENCOUNTER
Patient inquiring if she needs blood work prior to her appointment. She had a lipid panel in late December. Please advise.

## 2023-01-24 NOTE — TELEPHONE ENCOUNTER
Specialty Pharmacy - Clinical Intervention  Specialty Pharmacy - Refill Coordination    Specialty Medication Orders Linked to Encounter      Flowsheet Row Most Recent Value   Medication #1 alirocumab (PRALUENT PEN) 150 mg/mL PnIj (Order#[id number], Rx#[id number])        Praluent replacement refill. Patient had 1 pen on hand and will receive 2 pens in replacement. Will need refill on 3/15. Pended call to 3/8    Refill Questions - Documented Responses      Flowsheet Row Most Recent Value   Patient Availability and HIPAA Verification    Does patient want to proceed with activity? Yes   HIPAA/medical authority confirmed? Yes   Relationship to patient of person spoken to? Self   Refill Screening Questions    Would patient like to speak to a pharmacist? No   When does the patient need to receive the medication? 01/26/23   Refill Delivery Questions    How will the patient receive the medication? MEDRx   When does the patient need to receive the medication? 01/26/23   Shipping Address Home   Address in [facility] confirmed and updated if neccessary? Yes   Expected Copay ($) 0   Is the patient able to afford the medication copay? Yes   Payment Method zero copay   Days supply of Refill 28   Supplies needed? No supplies needed   Refill activity completed? Yes   Refill activity plan Refill scheduled   Shipment/Pickup Date: 01/25/23            Current Outpatient Medications   Medication Sig    alirocumab (PRALUENT PEN) 150 mg/mL PnIj Inject 1 mL (150 mg total) into the skin every 14 (fourteen) days.    amLODIPine (NORVASC) 10 MG tablet Take 1 tablet by mouth once daily    ascorbic acid, vitamin C, (VITAMIN C) 1000 MG tablet Take 1,000 mg by mouth once daily.    aspirin 81 MG Chew Take 81 mg by mouth once daily.    azelastine (ASTELIN) 137 mcg (0.1 %) nasal spray USE 1 TO 2 SPRAY(S) IN EACH NOSTRIL TWICE DAILY    bempedoic acid 180 mg Tab Take 1 tablet (180 mg total) by mouth once daily.    benzonatate (TESSALON) 100 MG  capsule Take 100 mg by mouth every 6 (six) hours.    budesonide 1 mg/2 mL NbSp EMPTY CONTENTS OF 1 RESPULE INTO NASAL IRRIGATION SYSTEM, ADD DISTILLED WATER, SALT PACK, MIX & IRRIGATE. PERFORM TWICE DAILY    carvediloL (COREG) 6.25 MG tablet TAKE 1 TABLET BY MOUTH TWICE DAILY WITH MEALS    cetirizine (ZYRTEC) 10 MG tablet Take 10 mg by mouth as needed for Allergies. Patients states only when provider prescribe it.    cilostazoL (PLETAL) 100 MG Tab Take 1 tablet (100 mg total) by mouth 2 (two) times daily.    clopidogreL (PLAVIX) 75 mg tablet Take 1 tablet (75 mg total) by mouth once daily.    clorazepate (TRANXENE) 7.5 MG Tab Take 1 tablet (7.5 mg total) by mouth daily as needed (Anxiety).    co-enzyme Q-10 50 mg capsule Take 50 mg by mouth once daily.    diphenhydrAMINE (BENADRYL) 25 mg capsule Take 25 mg by mouth every 6 (six) hours as needed for Itching.    ezetimibe (ZETIA) 10 mg tablet TAKE 1 TABLET BY MOUTH ONCE DAILY IN THE EVENING    FLUZONE HIGHDOSE QUAD 22-23  mcg/0.7 mL Syrg     multivitamin (THERAGRAN) per tablet Take 1 tablet by mouth once daily.    NEILMED SINUS RINSE REFILL Pack use as directed    omega-3 fatty acids/fish oil (FISH OIL-OMEGA-3 FATTY ACIDS) 300-1,000 mg capsule Take 1 capsule by mouth once daily.    TURMERIC ORAL Take 1 packet by mouth once daily.   Last reviewed on 1/5/2023 11:10 AM by Cait Cobb MA    Review of patient's allergies indicates:   Allergen Reactions    Benazepril Other (See Comments)     cough    Chlorthalidone     Iodinated contrast media     Iodine and iodide containing products     Lipitor [atorvastatin]      States she is allergic to all statin medications    Sertraline      Medication caused patient to get sick.    Spironolactone     Last reviewed on  1/5/2023 11:09 AM by Cait Cobb      Tasks added this encounter   No tasks added.   Tasks due within next 3 months   2/5/2023 - Refill Call (Auto Added)     Cinda Harrison, PharmD  [facility]  [address] [facility] [address]  Phone: [phone]  Fax: [phone]

## 2023-01-24 NOTE — TELEPHONE ENCOUNTER
----- Message from Martha Nicholas sent at 1/24/2023 12:55 PM CST -----  Regarding: LABS  Pt has a f/u appt on 2/15/23 and says she always has labs before she comes in and there are no orders.  Pls confirm with pt [phone].    Thank you

## 2023-02-02 RX ORDER — EZETIMIBE 10 MG/1
TABLET ORAL
Qty: 90 TABLET | Refills: 3 | Status: SHIPPED | OUTPATIENT
Start: 2023-02-02 | End: 2024-01-18 | Stop reason: SDUPTHER

## 2023-02-03 NOTE — TELEPHONE ENCOUNTER
Refill Decision Note   Alana Kirit  is requesting a refill authorization.  Brief Assessment and Rationale for Refill:  Approve     Medication Therapy Plan:       Medication Reconciliation Completed: No   Comments:     No Care Gaps recommended.     Note composed:8:09 PM 02/02/2023

## 2023-02-03 NOTE — TELEPHONE ENCOUNTER
No new care gaps identified.  [facility] Embedded Care Gaps. Reference number: [id number]. 2/02/2023   8:08:22 PM CST

## 2023-02-15 ENCOUNTER — OFFICE VISIT (OUTPATIENT)
Dept: CARDIOLOGY | Facility: CLINIC | Age: 83
End: 2023-02-15
Payer: MEDICARE

## 2023-02-15 VITALS
WEIGHT: 112.44 LBS | HEIGHT: 63 IN | HEART RATE: 74 BPM | SYSTOLIC BLOOD PRESSURE: 137 MMHG | BODY MASS INDEX: 19.92 KG/M2 | DIASTOLIC BLOOD PRESSURE: 66 MMHG

## 2023-02-15 DIAGNOSIS — I70.0 AORTIC ATHEROSCLEROSIS: ICD-10-CM

## 2023-02-15 DIAGNOSIS — I70.0 ATHEROSCLEROSIS OF AORTIC ARCH: ICD-10-CM

## 2023-02-15 DIAGNOSIS — I73.9 PAD (PERIPHERAL ARTERY DISEASE): ICD-10-CM

## 2023-02-15 DIAGNOSIS — I10 ESSENTIAL HYPERTENSION: ICD-10-CM

## 2023-02-15 DIAGNOSIS — E78.00 HYPERCHOLESTEREMIA: ICD-10-CM

## 2023-02-15 DIAGNOSIS — K55.9 MESENTERIC ISCHEMIA: ICD-10-CM

## 2023-02-15 DIAGNOSIS — Z78.9 STATIN INTOLERANCE: ICD-10-CM

## 2023-02-15 DIAGNOSIS — I70.8 CELIAC ARTERY ATHEROSCLEROSIS: ICD-10-CM

## 2023-02-15 DIAGNOSIS — E78.2 MIXED HYPERLIPIDEMIA: ICD-10-CM

## 2023-02-15 DIAGNOSIS — I70.219 ATHEROSCLEROTIC PERIPHERAL VASCULAR DISEASE WITH INTERMITTENT CLAUDICATION: ICD-10-CM

## 2023-02-15 DIAGNOSIS — N18.31 CHRONIC KIDNEY DISEASE, STAGE 3A: Primary | ICD-10-CM

## 2023-02-15 DIAGNOSIS — R10.9 ABDOMINAL DISCOMFORT: ICD-10-CM

## 2023-02-15 PROCEDURE — 99215 PR OFFICE/OUTPT VISIT, EST, LEVL V, 40-54 MIN: ICD-10-PCS | Mod: S$GLB,,, | Performed by: INTERNAL MEDICINE

## 2023-02-15 PROCEDURE — 99215 OFFICE O/P EST HI 40 MIN: CPT | Mod: S$GLB,,, | Performed by: INTERNAL MEDICINE

## 2023-02-15 PROCEDURE — 3288F PR FALLS RISK ASSESSMENT DOCUMENTED: ICD-10-PCS | Mod: CPTII,S$GLB,, | Performed by: INTERNAL MEDICINE

## 2023-02-15 PROCEDURE — 3078F DIAST BP <80 MM HG: CPT | Mod: CPTII,S$GLB,, | Performed by: INTERNAL MEDICINE

## 2023-02-15 PROCEDURE — 1159F PR MEDICATION LIST DOCUMENTED IN MEDICAL RECORD: ICD-10-PCS | Mod: CPTII,S$GLB,, | Performed by: INTERNAL MEDICINE

## 2023-02-15 PROCEDURE — 3288F FALL RISK ASSESSMENT DOCD: CPT | Mod: CPTII,S$GLB,, | Performed by: INTERNAL MEDICINE

## 2023-02-15 PROCEDURE — 1126F PR PAIN SEVERITY QUANTIFIED, NO PAIN PRESENT: ICD-10-PCS | Mod: CPTII,S$GLB,, | Performed by: INTERNAL MEDICINE

## 2023-02-15 PROCEDURE — 1157F PR ADVANCE CARE PLAN OR EQUIV PRESENT IN MEDICAL RECORD: ICD-10-PCS | Mod: CPTII,S$GLB,, | Performed by: INTERNAL MEDICINE

## 2023-02-15 PROCEDURE — 99999 PR PBB SHADOW E&M-EST. PATIENT-LVL IV: ICD-10-PCS | Mod: PBBFAC,,, | Performed by: INTERNAL MEDICINE

## 2023-02-15 PROCEDURE — 1157F ADVNC CARE PLAN IN RCRD: CPT | Mod: CPTII,S$GLB,, | Performed by: INTERNAL MEDICINE

## 2023-02-15 PROCEDURE — 1101F PR PT FALLS ASSESS DOC 0-1 FALLS W/OUT INJ PAST YR: ICD-10-PCS | Mod: CPTII,S$GLB,, | Performed by: INTERNAL MEDICINE

## 2023-02-15 PROCEDURE — 3075F SYST BP GE 130 - 139MM HG: CPT | Mod: CPTII,S$GLB,, | Performed by: INTERNAL MEDICINE

## 2023-02-15 PROCEDURE — 1159F MED LIST DOCD IN RCRD: CPT | Mod: CPTII,S$GLB,, | Performed by: INTERNAL MEDICINE

## 2023-02-15 PROCEDURE — 99999 PR PBB SHADOW E&M-EST. PATIENT-LVL IV: CPT | Mod: PBBFAC,,, | Performed by: INTERNAL MEDICINE

## 2023-02-15 PROCEDURE — 3078F PR MOST RECENT DIASTOLIC BLOOD PRESSURE < 80 MM HG: ICD-10-PCS | Mod: CPTII,S$GLB,, | Performed by: INTERNAL MEDICINE

## 2023-02-15 PROCEDURE — 1101F PT FALLS ASSESS-DOCD LE1/YR: CPT | Mod: CPTII,S$GLB,, | Performed by: INTERNAL MEDICINE

## 2023-02-15 PROCEDURE — 3075F PR MOST RECENT SYSTOLIC BLOOD PRESS GE 130-139MM HG: ICD-10-PCS | Mod: CPTII,S$GLB,, | Performed by: INTERNAL MEDICINE

## 2023-02-15 PROCEDURE — 1126F AMNT PAIN NOTED NONE PRSNT: CPT | Mod: CPTII,S$GLB,, | Performed by: INTERNAL MEDICINE

## 2023-02-15 RX ORDER — BEMPEDOIC ACID 180 MG/1
180 TABLET, FILM COATED ORAL DAILY
COMMUNITY
End: 2023-03-15 | Stop reason: SDUPTHER

## 2023-02-15 NOTE — PATIENT INSTRUCTIONS
Assessment/Plan:  Alana Beth is a 82 y.o. female with PAD, celiac artery stenosis s/p PTAS, HTN, HLD, palpitations, who presents for a follow up appointment.     1. 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 Beth reports no abdominal pain, weight loss, claudication or tissue loss.  Mesenteric Ultrasound on 5/5/2022 revealed the celiac trunk has greater than 70% stenosis. In-stent restenosis noted.  The proximal superior mesenteric artery has greater than 70% stenosis.  The proximal inferior mesenteric artery has greater than 70% stenosis.  Continue ASA/Plavix/Praluent/Bempedoic acid.    2. PAD- Pt with known BLE PAD.  She has no claudication, rest pain or tissue loss to suggest CLI.  Continue ASA/Plavix/Praluent/Bempedoic acid, and cilostazol 100mg BID.       3. HLD- LDL 91 on 12/29/2022.  Continue Praluent 150 mg every 14 days, bempedoic acid 180 mg daily, and zetia 10 mg daily.          4. HTN- Controlled. Continue current medications.      Follow up in 6 months

## 2023-02-15 NOTE — PROGRESS NOTES
"[facility]      CC: Celiac [place] Stenosis      Patient ID: Alana Beth is a 82 y.o. female with PAD, celiac artery stenosis s/p PTAS, HTN, HLD, palpitations, who presents for a follow up appointment.  Pertinent history/events are as follows:     -Pt presents for evaluation of PAD/weight loss.    -At our initial clinic visit on 9/4/2020, Mrs. Beth reported  "not feeling well since 11/2019".  Main complaint is weakness, fatigue and lack of appetite.  She has lost 25 pounds since 11/2019.  No definite abdominal pain.  No chest pain, SOB, or LE edema.  Exam with audible abdominal bruit.  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    Plan:   Celiac Axis Stenosis- Mrs. 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  Pertinent findings include 25 pound weight loss and abdominal bruit.  No definite abdominal pain, although mild abdominal tenderness noted on exam.  Chronic mesenteric ischemia is also a possibility in this pt with known PAD.  Given these findings, will refer to Vascular Surgery for evaluation.  Pt has several risk factors for CAD, hence, will check nuclear stress test and echo to evaluate further.  PAD- Pt with known BLE PAD.  Continue ASA.  Pt states she's not taking a statin due to issues with liver disease in the past.  Will check outside records before starting statin.  Check updated lipid panel.      -At follow up clinic visit on 9/25/2020, Mrs. Beth reported no new symptoms.  States she still does not feel well.  Pt evaluated by Vascular Surgery (Enrrique Preciado) on 9/16/2020, who recommend psych evaluation and continued GI workup.  Mesenteric Utrasound on 9/10/2020 revealed a velocity elevation of 378 cm/s is visualized near the Celiac artery origin within a region of focal narrowing, suggestive of a greater than 70% stenosis.  Nuclear Stress Test on " 9/23/2020 revealed no evidence from myocardial ischemia or injury.  The EKG portion of this study is abnormal but not diagnostic.  Echo on 9/10/2020 revealed normal left ventricular systolic function with EF of 60%; concentric left ventricular remodeling; normal LV diastolic function; normal right ventricular systolic function; mild left atrial enlargement; mild tricuspid regurgitation; estimated PA systolic pressure is 30 mmHg; normal central venous pressure (3 mmHg).  Labs from 9/10/2020 show total cholesterol of 348 with LDL of 234.   Plan:   Celiac Axis Stenosis- Mrs. 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  Pertinent findings include 25 pound weight loss and abdominal bruit.  No definite abdominal pain, although mild abdominal tenderness noted on exam.  Chronic mesenteric ischemia is also a possibility in this pt with known PAD.  Pt evaluated by Vascular Surgery (Enrrique Preciado) on Recommend psych and continued GI workup.  Mesenteric Utrasound on 9/10/2020 revealed a velocity elevation of 378 cm/s is visualized near the Celiac artery origin within a region of focal narrowing, suggestive of a greater than 70% stenosis.  Nuclear Stress Test on 9/23/2020 revealed no evidence from myocardial ischemia or injury.  The EKG portion of this study is abnormal but not diagnostic.  Echo on 9/10/2020 revealed normal left ventricular systolic function with EF of 60%; concentric left ventricular remodeling; normal LV diastolic function; normal right ventricular systolic function; mild left atrial enlargement; mild tricuspid regurgitation; estimated PA systolic pressure is 30 mmHg; normal central venous pressure (3 mmHg).  Given these findings, will refer to Dr. Junior and GI for evaluation.       PAD- Pt with known BLE PAD.  She has no claudication, rest pain or tissue loss to suggest CLI.  Start rosuvastatin 40 mg daily.  Continue ASA 81 mg daily.  Check carotid ultrasound  prior to next visit.   HLD-  Labs from 9/10/2020 show total cholesterol of 348 with LDL of 234.  Start rosuvastatin 40 mg daily. Monitor LFT's.      -At clinic visit on 10/26/2020, Mrs. Cole reports that she had not starting statin therapy due to insurance issues. She was prescribed alirocumab 75 mg injections and she has received one dose so far. She would prefer oral therapy for her hypercholesterolemia. She notes much improvement in her symptoms of sputum production after starting omeprazole. Patient was evaluated by GI for abdominal pain associated with celiac axis syndrome and increased amounts of sputum. EGD done in 8/2020 she was noted to have a submucosal esophageal nodule that path revealed chronic esophagitis. She was started on Omeprazole 20 mg BID and barium esophagram requested. Barium study was consistent with mild esophageal dysmotility.  Patient has not seen Dr. Junior yet for possible angiogram, she did not have an appointment set up yet.   Plan:  Plan:  -- Schedule pt to see Dr. Junior   -- continue omeprazole, GI follow up PRN for symptomatic care   --Start bempedoic acid 180 mg daily due to statin intolerance    -On 12/8/2020, pt underwent celiac artery intervention with Dr. Junior:  S/p aortogram with selective celiac and SMA angiogram                Initially started R radial then switched to L radial               Patent SMA and 80% celiac stenosis              IVUS guided celiac PTAS              6.0 x 18 and 6 x 14 mm Express SD dilated to 18 noemí     -On 12/16/2020, pt admitted at [facility] with abdominal pain.  Abd ultrasound revealed  patent stent in celiac artery and no significant changes with respiration to suggest extrinsic compression by median arcuate ligament.  Velocities suggest only moderate stenosis of the stent.   Symptoms improved and pt was discharged home on ASA/Plavix/Praluent.       -At clinic visit on 1/27/2021, Mrs. Beth reported feeling well with no abdominal  pain, nausea, vomiting, or anorexia.  Taking all medications as prescribed.  Labs from 12/19/2020 shows  vs 234 on 9/10/2020.    Plan:   Celiac Axis Stenosis-  Now s/p IVUS guided celiac PTAS with 6.0 x 18 and 6 x 14 mm Express SD on 12/18/2020.  Mrs. Beth reports feeling much better with no abdominal pain, n/v, or anorexia.  Abd ultrasound revealed  patent stent in celiac artery and no significant changes with respiration to suggest extrinsic compression by median arcuate ligament.  Velocities suggest only moderate stenosis of the stent.  Continue ASA/Plavix/Praluent.     PAD- Pt with known BLE PAD.  She has no claudication, rest pain or tissue loss to suggest CLI.  Continue ASA/Plavix/Praluent.      HLD-  Labs from 12/19/2020 shows  vs 234 on 9/10/2020.  Continue Praluent.       -At clinic visit on 4/28/2021, Mrs. Cole reported no abdominal pain, nausea, or anorexia.  Labs from 4/21/2021 show  vs 150 on 12/19/2021.  Plan:  Celiac Axis Stenosis-  Now s/p IVUS guided celiac artery PTAS with 6.0 x 18 and 6 x 14 mm Express SD on 12/18/2020.  Mrs. Beth continues to do well with no abdominal pain, n/v, or anorexia.  Abd ultrasound on 12/18/2020 revealed  patent stent in celiac artery and no significant changes with respiration to suggest extrinsic compression by median arcuate ligament.  Velocities suggest only moderate stenosis of the stent.  Continue ASA/Plavix/Praluent.     PAD- Pt with known BLE PAD.  She has no claudication, rest pain or tissue loss to suggest CLI.  Continue ASA/Plavix/Praluent.      HLD-  Labs from 4/21/2021 show  vs 150 on 12/19/2021.  Increase Praluent to 150 mg every 14 days.     -At clinic visit on 7/28/2021, Mrs. Cole reported no chest pain, SOB, weight loss, nausea, TIA symptoms or syncope.  States she's been injecting Praluent in her thigh instead of abdomen.  Labs from 7/19/2021 show LDL now 124 vs 145 on 4/21/2021.  Of note, pt states she did not fast  prior to the labs being drawn.   Plan:   Celiac Axis Stenosis-  Now s/p IVUS guided celiac PTAS with 6.0 x 18 and 6 x 14 mm Express SD on 12/18/2020.  Mrs. Beth has no abdominal pain, n/v, or anorexia.  Abd ultrasound on 12/18/2020 revealed  patent stent in celiac artery and no significant changes with respiration to suggest extrinsic compression by median arcuate ligament.  Velocities suggest only moderate stenosis of the stent.  Continue ASA/Plavix/Praluent.   Surveillance imaging scheduled by Dr. RENÉE Aguilar.   PAD- Pt with known BLE PAD.  She has no claudication, rest pain or tissue loss to suggest CLI.  Continue ASA/Plavix/Praluent.      HLD-  Labs from 7/19/2021 show LDL now 124 vs 145 on 4/21/2021.  The modest reduction in LDL despite increasing Praluent to 150 mg every 14 days may be due to the pt injecting Praluent in the thigh instead of abdomen.  Continue zetia and Praluent.  Before adding bempedoic acid, will have pt inject Praluent in the abdomen, and repeat lipids in 3 months.         HTN- Continue current medications.    History of Pulmonary Nodules- Check CT chest without contrast to evaluate further.     -At clinic visit on 11/8/2021, Mrs. Cole reported no chest pain, abdominal pain, SOB, LE edema, TIA symptoms or syncope.  States she's still injecting praluent in the thigh and not the abdomen, as discussed at our previous clinic visit.  Labs from 11/2/2021 shows LDL   142 vs 124 on 7/19/2021.  CT Chest w/o Contrast on 8/4/2021 revealed no pulmonary abnormalities requiring continued surveillance.   There is a Ill-defined hypoattenuating hepatic lesion, suboptimally evaluated in the absence of intravenous contrast.     Plan:  Celiac Axis Stenosis-  Now s/p IVUS guided celiac PTAS with 6.0 x 18 and 6 x 14 mm Express SD on 12/18/2020.  Mrs. Beth has no abdominal pain, n/v, or anorexia.  Abd ultrasound on 12/18/2020 revealed  patent stent in celiac artery and no significant changes with respiration  to suggest extrinsic compression by median arcuate ligament.  Velocities suggest only moderate stenosis of the stent.  Continue ASA/Plavix/Praluent.   Surveillance imaging scheduled by Dr. RENÉE Aguilar.   PAD- Pt with known BLE PAD.  She has no claudication, rest pain or tissue loss to suggest CLI.  Continue ASA/Plavix/Praluent.      HLD- Mrs. Beth states she's still injecting praluent in the thigh and not the abdomen, as discussed at clinic visit on 7/28/2021.  Labs from 11/2/2021 shows LDL   142 vs 124 on 7/19/2021.  The increase in LDL despite increasing Praluent to 150 mg every 14 days may be due to the pt injecting Praluent in the thigh instead of abdomen.  Pt will try to do injection in abdomen.  Continue zetia and Praluent.  Before adding bempedoic acid, will have pt inject Praluent in the abdomen, and repeat lipids in 3 months.  HTN- Continue current medications.    History of Pulmonary Nodules-  CT Chest w/o Contrast on 8/4/2021 revealed no pulmonary abnormalities requiring continued surveillance.    Liver Lesion- Evaluate further with contrast enhanced abdominal MRI, and refer to Hepatology.    -At clinic visit on 2/9/2022, Mrs. Beth reported feeling better. Her appetite has been okay but not as good as before. She denies abdominal pain. She lost about 2 pounds since November. She denies claudications but she does have pain in her legs when she starts walking which is relieved by continued walking.  Plan:   Celiac Axis Stenosis- s/p IVUS guided celiac PTA on 12/18/2020.  Mrs. Beth has no abdominal pain, n/v, or anorexia. Continue ASA/Plavix/Praluent. Repeat mesenteric artery US.  PAD- Pt with known BLE PAD.  She has no claudication, rest pain or tissue loss to suggest CLI.  Continue ASA/Plavix. Start cilostazol 100mg BID.     HLD- LDL remains not at goal despite using praluent. Will attempt switching to bempedoic acid and evaluate response with repeat lipid panel in 3 months.  HTN- Controlled.  Continue current medications.      -At clinic visit on 5/11/2022, Mrs. Beth reports no abdominal pain, weight loss, claudication or tissue loss.  Pt states she did not start bempedoic acid as prescribed.  Mesenteric Ultrasound on 5/5/2022 revealed the celiac trunk has greater than 70% stenosis. In-stent restenosis noted.  The proximal superior mesenteric artery has greater than 70% stenosis.  The proximal inferior mesenteric artery has greater than 70% stenosis.  Plan:   Celiac Axis Stenosis s/p IVUS guided celiac PTA on 12/18/2020- Mrs. Beth reports no abdominal pain, weight loss, claudication or tissue loss.  Mesenteric Ultrasound on 5/5/2022 revealed the celiac trunk has greater than 70% stenosis. In-stent restenosis noted.  The proximal superior mesenteric artery has greater than 70% stenosis.  The proximal inferior mesenteric artery has greater than 70% stenosis.  Continue ASA/Plavix/Praluent.  PAD- Pt with known BLE PAD.  She has no claudication, rest pain or tissue loss to suggest CLI.  Continue ASA/Plavix and cilostazol 100mg BID.     HLD- LDL remains not at goal despite using max dose praluent. Pt states she did not start bempedoic acid as prescribed.  Unclear if pt is compliant with these medications.  Check Lipo (a).  Continue current medications.    HTN- Controlled. Continue current medications.      -At clinic visit on 8/15/2022, Mrs. Beth reports doing well with no abdominal pain, weight loss, claudication or tissue loss.  Pt started taking bempedoic acid as prescribed.  LDL 78 on 8/8/2022 vs 123 on 5/5/2022.  Lipoprotein (a) is elevated at 324.  Plan:   Celiac Axis Stenosis s/p IVUS guided celiac PTA on 12/18/2020- Mrs. Beth reports no abdominal pain, weight loss, claudication or tissue loss.  Mesenteric Ultrasound on 5/5/2022 revealed the celiac trunk has greater than 70% stenosis. In-stent restenosis noted.  The proximal superior mesenteric artery has greater than 70% stenosis.  The  proximal inferior mesenteric artery has greater than 70% stenosis.  Continue ASA/Plavix/Praluent/Bempedoic acid.  PAD- Pt with known BLE PAD.  She has no claudication, rest pain or tissue loss to suggest CLI.  Continue ASA/Plavix/Praluent/Bempedoic acid, and cilostazol 100mg BID.     HLD- LDL 78 on 8/8/2022 vs 123 on 5/5/2022.  Lipoprotein (a) is elevated at 324.  Continue Praluent 150 mg every 14 days and bempedoic acid 180 mg daily.      HTN- Controlled. Continue current medications.      HPI:  Mrs. Beth reports no abdominal pain, claudication or tissue loss. Weight is stable at 112 pounds.  LDL 91 on 12/29/2022.        Past Medical History:   Diagnosis Date    Atherosclerotic peripheral vascular disease     Chronic cystic mastitis     Essential hypertension     Hypercholesterolemia     Osteopenia     Shingles      Past Surgical History:   Procedure Laterality Date    BREAST BIOPSY      biopsies benign    CELIAC ARTERY STENT      ESOPHAGOGASTRODUODENOSCOPY  08/20/2020    PERCUTANEOUS TRANSLUMINAL ANGIOPLASTY N/A 12/8/2020    Procedure: Pta (Angioplasty, Percutaneous, Transluminal);  Surgeon: Hernando Junior MD;  Location: [facility] CATH LAB/EP;  Service: Cardiology;  Laterality: N/A;     Social History     Socioeconomic History    Marital status: Single   Occupational History    Occupation: retired student loan assistance    Tobacco Use    Smoking status: Never    Smokeless tobacco: Never   Substance and Sexual Activity    Alcohol use: No    Drug use: No    Sexual activity: Not Currently     Family History   Problem Relation Age of Onset    Hypertension Mother     Diabetes Brother     Diabetes Sister     Colon cancer Neg Hx     Esophageal cancer Neg Hx        Review of patient's allergies indicates:   Allergen Reactions    Benazepril Other (See Comments)     cough    Chlorthalidone     Iodinated contrast media     Iodine and iodide containing products     Lipitor [atorvastatin]      States she is allergic to all  statin medications    Sertraline      Medication caused patient to get sick.    Spironolactone        Medication List with Changes/Refills   Current Medications    ALIROCUMAB (PRALUENT PEN) 150 MG/ML PNIJ    Inject 1 mL (150 mg total) into the skin every 14 (fourteen) days.    AMLODIPINE (NORVASC) 10 MG TABLET    Take 1 tablet by mouth once daily    ASCORBIC ACID, VITAMIN C, (VITAMIN C) 1000 MG TABLET    Take 1,000 mg by mouth once daily.    ASPIRIN 81 MG CHEW    Take 81 mg by mouth once daily.    AZELASTINE (ASTELIN) 137 MCG (0.1 %) NASAL SPRAY    USE 1 TO 2 SPRAY(S) IN EACH NOSTRIL TWICE DAILY    BEMPEDOIC ACID (NEXLETOL) 180 MG TAB    Take 150 mg by mouth once daily.    BUDESONIDE 1 MG/2 ML NBSP    EMPTY CONTENTS OF 1 RESPULE INTO NASAL IRRIGATION SYSTEM, ADD DISTILLED WATER, SALT PACK, MIX & IRRIGATE. PERFORM TWICE DAILY    CARVEDILOL (COREG) 6.25 MG TABLET    TAKE 1 TABLET BY MOUTH TWICE DAILY WITH MEALS    CETIRIZINE (ZYRTEC) 10 MG TABLET    Take 10 mg by mouth as needed for Allergies. Patients states only when provider prescribe it.    CILOSTAZOL (PLETAL) 100 MG TAB    Take 1 tablet (100 mg total) by mouth 2 (two) times daily.    CLOPIDOGREL (PLAVIX) 75 MG TABLET    Take 1 tablet (75 mg total) by mouth once daily.    CLORAZEPATE (TRANXENE) 7.5 MG TAB    Take 1 tablet (7.5 mg total) by mouth daily as needed (Anxiety).    CO-ENZYME Q-10 50 MG CAPSULE    Take 50 mg by mouth once daily.    DIPHENHYDRAMINE (BENADRYL) 25 MG CAPSULE    Take 25 mg by mouth every 6 (six) hours as needed for Itching.    EZETIMIBE (ZETIA) 10 MG TABLET    TAKE 1 TABLET BY MOUTH ONCE DAILY IN THE EVENING    MULTIVITAMIN (THERAGRAN) PER TABLET    Take 1 tablet by mouth once daily.    NEILMED SINUS RINSE REFILL PACK    use as directed    OMEGA-3 FATTY ACIDS/FISH OIL (FISH OIL-OMEGA-3 FATTY ACIDS) 300-1,000 MG CAPSULE    Take 1 capsule by mouth once daily.    TURMERIC ORAL    Take 1 packet by mouth once daily.   Discontinued Medications     "BENZONATATE (TESSALON) 100 MG CAPSULE    Take 100 mg by mouth every 6 (six) hours.    FLUZONE HIGHDOSE QUAD 22-23  MCG/0.7 ML SYRG           Review of Systems  Constitution: Denies chills, fever, and sweats.  HENT: Denies headaches or blurry vision.  Cardiovascular: Denies chest pain or irregular heart beat.  Respiratory: Denies cough or shortness of breath.  Gastrointestinal: Negative for abdominal pain and weight loss.  Musculoskeletal: Denies muscle cramps.  Neurological: Denies dizziness or focal weakness.  Psychiatric/Behavioral: Normal mental status.  Hematologic/Lymphatic: Denies bleeding problem or easy bruising/bleeding.  Skin: Denies rash or suspicious lesions    Physical Examination  /66   Pulse 74   Ht 5' 2.5" (1.588 m)   Wt 51 kg (112 lb 7 oz)   BMI 20.24 kg/m²     Constitutional: No acute distress, conversant  HEENT: Sclera anicteric, Pupils equal, round and reactive to light, extraocular motions intact, Oropharynx clear  Neck: No JVD, no carotid bruits  Cardiovascular: regular rate and rhythm, no murmur, rubs or gallops, normal S1/S2  Pulmonary: Clear to auscultation bilaterally  Abdominal: Abdomen soft with no TTP, positive bowel sounds  Extremities: No lower extremity edema   Pulses:  Carotid pulses are 2+ on the right side, and 2+ on the left side.  Radial pulses are 2+ on the right side, and 2+ on the left side.   Femoral pulses are 2+ on the right side, and 2+ on the left side.  Popliteal pulses are 2+ on the right side, and 2+ on the left side.   Dorsalis pedis pulses are 2+ on the right side, and 2+ on the left side.   Posterior tibial pulses are 2+ on the right side, and 2+ on the left side.    Skin: No ecchymosis, erythema, or ulcers  Psych: Alert and oriented x 3, appropriate affect  Neuro: CNII-XII intact, no focal deficits    Labs:  Most Recent Data  CBC:   Lab Results   Component Value Date    WBC 3.05 (L) 12/29/2022    HGB 10.6 (L) 12/29/2022    HCT 33.3 (L) 12/29/2022    "  12/29/2022    MCV 85 12/29/2022    RDW 14.6 (H) 12/29/2022     BMP:   Lab Results   Component Value Date     12/29/2022    K 4.5 12/29/2022     12/29/2022    CO2 30 (H) 12/29/2022    BUN 20 12/29/2022    CREATININE 1.1 12/29/2022    GLU 91 12/29/2022    CALCIUM 9.9 12/29/2022    MG 2.3 12/16/2020    PHOS 2.9 12/16/2020     LFTS;   Lab Results   Component Value Date    PROT 7.5 12/29/2022    ALBUMIN 3.9 12/29/2022    BILITOT 0.5 12/29/2022    AST 23 12/29/2022    ALKPHOS 31 (L) 12/29/2022    ALT 12 12/29/2022     COAGS: No results found for: INR, PROTIME, PTT  FLP:   Lab Results   Component Value Date    CHOL 190 12/29/2022    HDL 91 (H) 12/29/2022    LDLCALC 90.6 12/29/2022    TRIG 42 12/29/2022    CHOLHDL 47.9 12/29/2022     CARDIAC:   Lab Results   Component Value Date    TROPONINI <0.01 08/15/2020    BNP <10 08/15/2020       EKG 8/15/2020:  Normal sinus rhythm  Low voltage QRS  Anteroseptal infarct (cited on or before 15-AUG-2020)    Mesenteric Ultrasound 5/5/2022:  The celiac trunk has greater than 70% stenosis. In-stent restenosis noted.  The proximal superior mesenteric artery has greater than 70% stenosis.  The proximal inferior mesenteric artery has greater than 70% stenosis.  There is no evidence of an Abdominal Aortic Aneurysm.  Aortic atherosclerosis.    CT Chest w/o Contrast 8/4/2021:  No pulmonary abnormalities requiring continued surveillance.   Multi-vessel coronary artery atherosclerosis.   Ill-defined hypoattenuating hepatic lesion, suboptimally evaluated in the absence of intravenous contrast.  Consider further characterization with contrast enhanced abdominal MRI.     Mesenteric Ultrasound 12/18/2020:  Color flow duplex imaging reveals patent superior mesenteric and inferior mesenteric arteries with no evidence of a hemodynamically   significant stenosis. The Celiac artery stent is patent with and velocities are 168 cm/s at rest; 157 cm/s with inspiration; 153 cm/s   with  expiration. No suggestion of MALS. An accelerated flow velocity of 282 cm/s is visualized at the distal edge on the Celiac stent.   However, this region appear widely patent.       Nuclear Stress Test 9/23/2020:  Normal myocardial perfusion scan.    The perfusion scan is free of evidence from myocardial ischemia or injury.    Visually estimated ejection fraction is normal at rest and normal at stress.    There is normal wall motion at rest and post stress.    LV cavity size is normal at rest and normal at stress.    The EKG portion of this study is abnormal but not diagnostic.    The patient reported no chest pain during the stress test.    There are no prior studies for comparison.       Echo 9/10/2020:  Normal left ventricular systolic function. The estimated ejection fraction is 60%.  Concentric left ventricular remodeling.  Normal LV diastolic function.  No wall motion abnormalities.  Normal right ventricular systolic function.  Mild left atrial enlargement.  Mild tricuspid regurgitation.  The estimated PA systolic pressure is 30 mmHg.  Normal central venous pressure (3 mmHg).    Mesenteric Utrasound 9/10/2020:  Color flow duplex exam reveals a patent abdominal aorta, celiac artery, superior mesenteric artery and inferior mesenteric artery. A   velocity elevation of 378 cm/s is visualized near the Celiac artery origin within a region of focal narrowing, suggestive of a greater than   70% stenosis.     CTA abd/pelvis with contrast (outside study from [facility]) 8/28/2020:  1. No evidence for gastrointestinal bleeding.    2. Scattered colonic diverticula without adjacent inflammatory changes.    3. Myomatous changes of the uterus with degenerating fibroids.     4. Hemangioma in segment VII.    5. Stenosis of the celiac access with poststenotic dilatation.    BLE Arterial Ultrasound 7/31/2020:  Multilevel disease in the left lower extremity. Moderate stenosis in the left superficial femoral artery. Severe stenosis in  the distal popliteal artery.    Left lower extremity:  Ankle-brachial index is 0.73. Triphasic signals are seen in the common femoral and profunda arteries. There are biphasic superficial femoral, popliteal, posterior tibial, and anterior tibial arteries. There is a velocity increase from the mid SFA to the distal SFA from 1 /sec 2 m/s. Consistent with a moderate stenosis. There is another focal velocity increase in the distal popliteal from 2.5 m/sec to 5.0 m/s. Consistent with a severe stenosis. Distal to this there is monophasic waveforms.         Right lower extremity:  Ankle-brachial index is 0.93. Triphasic signals are seen in the common femoral and profunda artery. There are biphasic signals in the superficial femoral, popliteal, posterior tibial, and anterior tibial arteries. No focal areas of elevated velocity are seen.    Assessment/Plan:  Alana Beth is a 82 y.o. female with PAD, celiac artery stenosis s/p PTAS, HTN, HLD, palpitations, who presents for a follow up appointment.     1. 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 Beth reports no abdominal pain, weight loss, claudication or tissue loss.  Mesenteric Ultrasound on 5/5/2022 revealed the celiac trunk has greater than 70% stenosis. In-stent restenosis noted.  The proximal superior mesenteric artery has greater than 70% stenosis.  The proximal inferior mesenteric artery has greater than 70% stenosis.  Continue ASA/Plavix/Praluent/Bempedoic acid.    2. PAD- Pt with known BLE PAD.  She has no claudication, rest pain or tissue loss to suggest CLI.  Continue ASA/Plavix/Praluent/Bempedoic acid, and cilostazol 100mg BID.       3. HLD- LDL 91 on 12/29/2022.  Continue Praluent 150 mg every 14 days, bempedoic acid 180 mg daily, and zetia 10 mg daily.          4. HTN- Controlled. Continue current medications.      Follow up in 6 months     Total duration of face to face visit time 30 minutes.  Total time spent counseling  greater than fifty percent of total visit time.  Counseling included discussion regarding imaging findings, diagnosis, possibilities, treatment options, risks and benefits.  The patient had many questions regarding the options and long-term effects.

## 2023-03-06 ENCOUNTER — TELEPHONE (OUTPATIENT)
Dept: INTERNAL MEDICINE | Facility: CLINIC | Age: 83
End: 2023-03-06
Payer: MEDICARE

## 2023-03-06 NOTE — TELEPHONE ENCOUNTER
----- Message from Colleen Javier sent at 3/6/2023 10:54 AM CST -----  Contact: [phone]  Requesting an RX refill or new RX.  Is this a refill or new RX: refill  RX name and strength (copy/paste from chart):  clopidogreL (PLAVIX) 75 mg tablet  Is this a 30 day or 90 day RX: 90  Pharmacy name and phone # (copy/paste from chart):    [facility]  [place] - [address]  [address] [place] [address]  Phone: [phone] Fax: [phone]  The doctors have asked that we provide their patients with the following 2 reminders -- prescription refills can take up to 72 hours, and a friendly reminder that in the future you can use your [facility] account to request refills: yes

## 2023-03-06 NOTE — TELEPHONE ENCOUNTER
Last OV 1-4-23    I spoke to pt, she is requesting Rx's some prescribed by Dr. Bearden and Dr. Brown.  Also received a Rx request from pt's pharmacy.    Pt was advised to send a message to her cardiologist.  She verbalized understanding

## 2023-03-06 NOTE — TELEPHONE ENCOUNTER
----- Message from Ellyn Arias sent at 3/6/2023 10:43 AM CST -----  Contact: [phone]  Requesting an RX refill or new RX.  Is this a refill or new RX: REFILL  RX name and strength   carvediloL (COREG) 6.25 MG tablet 180 tablet   Is this a 30 day or 90 day RX:   Pharmacy name and phone #   [facility] - [place] - [address]   Phone:  [phone]  Fax:  [phone]   Patient is out of her medication, please call to confirm.     Requesting an RX refill or new RX.  Is this a refill or new RX: REFILL  RX name and strength :  bempedoic acid (NEXLETOL) 180 mg Tab  Is this a 30 day or 90 day RX:   Pharmacy name and phone #   [facility] - [place] - [address]   Phone:  [phone]  Fax:  [phone]  The doctors have asked that we provide their patients with the following 2 reminders -- prescription refills can take up to 72 hours, and a friendly reminder that in the future you can use your [facility] account to request refills: yes

## 2023-03-08 ENCOUNTER — SPECIALTY PHARMACY (OUTPATIENT)
Dept: PHARMACY | Facility: CLINIC | Age: 83
End: 2023-03-08
Payer: MEDICARE

## 2023-03-08 NOTE — TELEPHONE ENCOUNTER
Specialty Pharmacy - Refill Coordination    Specialty Medication Orders Linked to Encounter      Flowsheet Row Most Recent Value   Medication #1 alirocumab (PRALUENT PEN) 150 mg/mL PnIj (Order#[id number], Rx#[id number])            Refill Questions - Documented Responses      Flowsheet Row Most Recent Value   Patient Availability and HIPAA Verification    Does patient want to proceed with activity? Yes   HIPAA/medical authority confirmed? Yes   Relationship to patient of person spoken to? Self   Refill Screening Questions    Would patient like to speak to a pharmacist? No   When does the patient need to receive the medication? 03/15/23   Refill Delivery Questions    How will the patient receive the medication? MEDRx   When does the patient need to receive the medication? 03/15/23   Shipping Address Home   Address in [facility] confirmed and updated if neccessary? Yes   Expected Copay ($) 20   Is the patient able to afford the medication copay? Yes   Payment Method CC on file   Days supply of Refill 28   Supplies needed? No supplies needed   Refill activity completed? Yes   Refill activity plan Refill scheduled   Shipment/Pickup Date: 03/09/23            Current Outpatient Medications   Medication Sig    alirocumab (PRALUENT PEN) 150 mg/mL PnIj Inject 1 mL (150 mg total) into the skin every 14 (fourteen) days.    amLODIPine (NORVASC) 10 MG tablet Take 1 tablet by mouth once daily    ascorbic acid, vitamin C, (VITAMIN C) 1000 MG tablet Take 1,000 mg by mouth once daily.    aspirin 81 MG Chew Take 81 mg by mouth once daily.    azelastine (ASTELIN) 137 mcg (0.1 %) nasal spray USE 1 TO 2 SPRAY(S) IN EACH NOSTRIL TWICE DAILY    bempedoic acid (NEXLETOL) 180 mg Tab Take 180 mg by mouth once daily.    budesonide 1 mg/2 mL NbSp EMPTY CONTENTS OF 1 RESPULE INTO NASAL IRRIGATION SYSTEM, ADD DISTILLED WATER, SALT PACK, MIX & IRRIGATE. PERFORM TWICE DAILY    carvediloL (COREG) 6.25 MG tablet TAKE 1 TABLET BY MOUTH TWICE  DAILY WITH MEALS    cetirizine (ZYRTEC) 10 MG tablet Take 10 mg by mouth as needed for Allergies. Patients states only when provider prescribe it.    cilostazoL (PLETAL) 100 MG Tab Take 1 tablet (100 mg total) by mouth 2 (two) times daily. (Patient not taking: Reported on 2/15/2023)    clopidogreL (PLAVIX) 75 mg tablet Take 1 tablet (75 mg total) by mouth once daily.    clorazepate (TRANXENE) 7.5 MG Tab Take 1 tablet (7.5 mg total) by mouth daily as needed (Anxiety).    co-enzyme Q-10 50 mg capsule Take 50 mg by mouth once daily.    diphenhydrAMINE (BENADRYL) 25 mg capsule Take 25 mg by mouth every 6 (six) hours as needed for Itching.    ezetimibe (ZETIA) 10 mg tablet TAKE 1 TABLET BY MOUTH ONCE DAILY IN THE EVENING    multivitamin (THERAGRAN) per tablet Take 1 tablet by mouth once daily.    NEILMED SINUS RINSE REFILL Pack use as directed    omega-3 fatty acids/fish oil (FISH OIL-OMEGA-3 FATTY ACIDS) 300-1,000 mg capsule Take 1 capsule by mouth once daily.    TURMERIC ORAL Take 1 packet by mouth once daily.   Last reviewed on 2/15/2023  9:36 AM by Andreina Burt MA    Review of patient's allergies indicates:   Allergen Reactions    Benazepril Other (See Comments)     cough    Chlorthalidone     Iodinated contrast media     Iodine and iodide containing products     Lipitor [atorvastatin]      States she is allergic to all statin medications    Sertraline      Medication caused patient to get sick.    Spironolactone     Last reviewed on  2/15/2023 9:25 AM by Andreina Burt      Tasks added this encounter   4/5/2023 - Refill Call (Auto Added)   Tasks due within next 3 months   No tasks due.     Mary Castano [facility] - Specialty Pharmacy  [facility] [facility] [address]  Phone: [phone]  Fax: [phone]

## 2023-03-15 NOTE — TELEPHONE ENCOUNTER
----- Message from Tracie Cobb sent at 3/15/2023  4:51 PM CDT -----  Regarding: Refill  [facility] requesting refill on the pt Nexletol 180 mg    LOV 2/15/23 [facility] - [place], [place] - [address]   Phone:  [phone]  Fax:  [phone]      Thanks

## 2023-03-16 RX ORDER — BEMPEDOIC ACID 180 MG/1
180 TABLET, FILM COATED ORAL DAILY
Qty: 30 TABLET | Refills: 1 | Status: SHIPPED | OUTPATIENT
Start: 2023-03-16 | End: 2023-03-21 | Stop reason: SDUPTHER

## 2023-03-21 RX ORDER — BEMPEDOIC ACID 180 MG/1
180 TABLET, FILM COATED ORAL DAILY
Qty: 30 TABLET | Refills: 1 | Status: SHIPPED | OUTPATIENT
Start: 2023-03-21 | End: 2023-03-23 | Stop reason: SDUPTHER

## 2023-03-21 NOTE — TELEPHONE ENCOUNTER
----- Message from Martha Harvey sent at 3/21/2023  8:29 AM CDT -----  Regarding: rx  bempedoic acid (NEXLETOL) 180 mg Tab    [facility] - [place]), [place] - [address]   Phone:  [phone]  Fax:  [phone]      PLS RESEND FOR A 90 DAY SUPPLY.  IT IS CHEAPER FOR PT    Thank you

## 2023-04-04 ENCOUNTER — PES CALL (OUTPATIENT)
Dept: ADMINISTRATIVE | Facility: CLINIC | Age: 83
End: 2023-04-04
Payer: MEDICARE

## 2023-04-05 ENCOUNTER — PATIENT MESSAGE (OUTPATIENT)
Dept: PHARMACY | Facility: CLINIC | Age: 83
End: 2023-04-05
Payer: MEDICARE

## 2023-04-05 ENCOUNTER — SPECIALTY PHARMACY (OUTPATIENT)
Dept: PHARMACY | Facility: CLINIC | Age: 83
End: 2023-04-05
Payer: MEDICARE

## 2023-04-05 NOTE — TELEPHONE ENCOUNTER
Specialty Pharmacy - Refill Coordination    Specialty Medication Orders Linked to Encounter      Flowsheet Row Most Recent Value   Medication #1 alirocumab (PRALUENT PEN) 150 mg/mL PnIj (Order#[id number], Rx#[id number])            Refill Questions - Documented Responses      Flowsheet Row Most Recent Value   Patient Availability and HIPAA Verification    Does patient want to proceed with activity? Yes   HIPAA/medical authority confirmed? Yes   Relationship to patient of person spoken to? Self   Refill Screening Questions    Would patient like to speak to a pharmacist? No   When does the patient need to receive the medication? 04/13/23   Refill Delivery Questions    How will the patient receive the medication? MEDRx   When does the patient need to receive the medication? 04/13/23   Shipping Address Home   Address in [facility] confirmed and updated if neccessary? Yes   Expected Copay ($) 20   Is the patient able to afford the medication copay? Yes   Payment Method CC on file   Days supply of Refill 28   Refill activity completed? Yes   Refill activity plan Refill scheduled   Shipment/Pickup Date: 04/10/23            Current Outpatient Medications   Medication Sig    alirocumab (PRALUENT PEN) 150 mg/mL PnIj Inject 1 mL (150 mg total) into the skin every 14 (fourteen) days.    amLODIPine (NORVASC) 10 MG tablet Take 1 tablet by mouth once daily    ascorbic acid, vitamin C, (VITAMIN C) 1000 MG tablet Take 1,000 mg by mouth once daily.    aspirin 81 MG Chew Take 81 mg by mouth once daily.    azelastine (ASTELIN) 137 mcg (0.1 %) nasal spray USE 1 TO 2 SPRAY(S) IN EACH NOSTRIL TWICE DAILY    bempedoic acid (NEXLETOL) 180 mg Tab Take 1 tablet (180 mg total) by mouth once daily.    budesonide 1 mg/2 mL NbSp EMPTY CONTENTS OF 1 RESPULE INTO NASAL IRRIGATION SYSTEM, ADD DISTILLED WATER, SALT PACK, MIX & IRRIGATE. PERFORM TWICE DAILY    carvediloL (COREG) 6.25 MG tablet TAKE 1 TABLET BY MOUTH TWICE DAILY WITH MEALS     cetirizine (ZYRTEC) 10 MG tablet Take 10 mg by mouth as needed for Allergies. Patients states only when provider prescribe it.    cilostazoL (PLETAL) 100 MG Tab Take 1 tablet (100 mg total) by mouth 2 (two) times daily. (Patient not taking: Reported on 2/15/2023)    clopidogreL (PLAVIX) 75 mg tablet Take 1 tablet by mouth once daily    clorazepate (TRANXENE) 7.5 MG Tab Take 1 tablet (7.5 mg total) by mouth daily as needed (Anxiety).    co-enzyme Q-10 50 mg capsule Take 50 mg by mouth once daily.    diphenhydrAMINE (BENADRYL) 25 mg capsule Take 25 mg by mouth every 6 (six) hours as needed for Itching.    ezetimibe (ZETIA) 10 mg tablet TAKE 1 TABLET BY MOUTH ONCE DAILY IN THE EVENING    multivitamin (THERAGRAN) per tablet Take 1 tablet by mouth once daily.    NEILMED SINUS RINSE REFILL Pack use as directed    omega-3 fatty acids/fish oil (FISH OIL-OMEGA-3 FATTY ACIDS) 300-1,000 mg capsule Take 1 capsule by mouth once daily.    TURMERIC ORAL Take 1 packet by mouth once daily.   Last reviewed on 2/15/2023  9:36 AM by Andreina Burt MA    Review of patient's allergies indicates:   Allergen Reactions    Benazepril Other (See Comments)     cough    Chlorthalidone     Iodinated contrast media     Iodine and iodide containing products     Lipitor [atorvastatin]      States she is allergic to all statin medications    Sertraline      Medication caused patient to get sick.    Spironolactone     Last reviewed on  2/15/2023 9:25 AM by Andreina Burt      Tasks added this encounter   5/4/2023 - Refill Call (Auto Added)   Tasks due within next 3 months   No tasks due.     [facility]  [address] [facility] [address]  Phone: [phone]  Fax: [phone]

## 2023-04-12 RX ORDER — BEMPEDOIC ACID 180 MG/1
180 TABLET, FILM COATED ORAL DAILY
Qty: 90 TABLET | Refills: 3 | Status: SHIPPED | OUTPATIENT
Start: 2023-04-12 | End: 2024-03-26

## 2023-04-12 NOTE — TELEPHONE ENCOUNTER
----- Message from Chaparro Acosta sent at 4/12/2023  2:30 PM CDT -----  Regarding: Rx Refill  Pt was prescribed a 90 day supply of med's because it would be cost effective.    90 day supply cost $80  30 day supply cost $40  [facility] told her they can only fill Rx for 30 day supply.     Pt has 3 pills left out of her 30 day supply.     bempedoic acid (NEXLETOL) 180 mg Tab    Please contact pt @ [phone]    Thanks

## 2023-04-21 ENCOUNTER — PES CALL (OUTPATIENT)
Dept: ADMINISTRATIVE | Facility: CLINIC | Age: 83
End: 2023-04-21
Payer: MEDICARE

## 2023-05-01 ENCOUNTER — CLINICAL SUPPORT (OUTPATIENT)
Dept: ENDOSCOPY | Facility: HOSPITAL | Age: 83
End: 2023-05-01
Attending: STUDENT IN AN ORGANIZED HEALTH CARE EDUCATION/TRAINING PROGRAM
Payer: MEDICARE

## 2023-05-01 ENCOUNTER — TELEPHONE (OUTPATIENT)
Dept: ENDOSCOPY | Facility: HOSPITAL | Age: 83
End: 2023-05-01

## 2023-05-01 DIAGNOSIS — Z12.11 SCREENING FOR COLORECTAL CANCER: ICD-10-CM

## 2023-05-01 DIAGNOSIS — Z12.12 SCREENING FOR COLORECTAL CANCER: ICD-10-CM

## 2023-05-01 NOTE — TELEPHONE ENCOUNTER
Contacted patient to schedule a screening colonoscopy.  Informed patient that since she is 83 y/o she may need to be evaluated prior to scheduling colonoscopy due to age.  Patient does not want to be seen for evaluation of needing another colonoscopy  She stated she had a colonoscopy a few years ago at another facility and was told she probably didn't need another one.  She will talk to her PCP should she change her mind.

## 2023-05-01 NOTE — PLAN OF CARE
Informed patient that since she is 83 y/o she may need to be evaluated prior to scheduling colonoscopy due to age.  Patient does not want to be seen by GI for evaluation of needing another colonoscopy  She stated she had a colonoscopy a few years ago at another facility and was told she probably didn't need another one.  She will talk to her PCP should she change her mind.

## 2023-05-04 ENCOUNTER — PATIENT MESSAGE (OUTPATIENT)
Dept: PHARMACY | Facility: CLINIC | Age: 83
End: 2023-05-04
Payer: MEDICARE

## 2023-05-08 ENCOUNTER — SPECIALTY PHARMACY (OUTPATIENT)
Dept: PHARMACY | Facility: CLINIC | Age: 83
End: 2023-05-08
Payer: MEDICARE

## 2023-05-08 RX ORDER — ALIROCUMAB 150 MG/ML
150 INJECTION, SOLUTION SUBCUTANEOUS
Qty: 10 ML | Refills: 10 | Status: ACTIVE | OUTPATIENT
Start: 2023-05-08

## 2023-05-08 NOTE — TELEPHONE ENCOUNTER
Incoming call from patient to request Praluent refill. Dose due on 5/15. Informed Rx is  and request faxed to [facility]. Patient understands we will call back to schedule once received.

## 2023-05-11 NOTE — TELEPHONE ENCOUNTER
Specialty Pharmacy - Refill Coordination    Specialty Medication Orders Linked to Encounter      Flowsheet Row Most Recent Value   Medication #1 alirocumab (PRALUENT PEN) 150 mg/mL PnIj (Order#[id number], Rx#[id number])            Refill Questions - Documented Responses      Flowsheet Row Most Recent Value   Patient Availability and HIPAA Verification    Does patient want to proceed with activity? Yes   HIPAA/medical authority confirmed? Yes   Relationship to patient of person spoken to? Self   Refill Screening Questions    Would patient like to speak to a pharmacist? Yes  [Patient reported she is having swelling at the injection site, the swelling does goes away after a few days. Recommended the patient ice the injection site if she has swelling. Recommended patient rotate the injection sites. Patient verbalized understanding.]   When does the patient need to receive the medication? 05/15/23   Refill Delivery Questions    How will the patient receive the medication? MEDRx   When does the patient need to receive the medication? 05/15/23   Shipping Address Home   Address in [facility] confirmed and updated if neccessary? Yes   Expected Copay ($) 20   Is the patient able to afford the medication copay? Yes   Payment Method CC on file   Days supply of Refill 28   Supplies needed? No supplies needed   Refill activity completed? Yes   Refill activity plan Refill scheduled   Shipment/Pickup Date: 05/11/23            Current Outpatient Medications   Medication Sig    alirocumab (PRALUENT PEN) 150 mg/mL PnIj Inject 1 mL (150 mg total) into the skin every 14 (fourteen) days.    amLODIPine (NORVASC) 10 MG tablet Take 1 tablet by mouth once daily    ascorbic acid, vitamin C, (VITAMIN C) 1000 MG tablet Take 1,000 mg by mouth once daily.    aspirin 81 MG Chew Take 81 mg by mouth once daily.    azelastine (ASTELIN) 137 mcg (0.1 %) nasal spray USE 1 TO 2 SPRAY(S) IN EACH NOSTRIL TWICE DAILY    bempedoic acid (NEXLETOL) 180  mg Tab Take 1 tablet (180 mg total) by mouth once daily.    budesonide 1 mg/2 mL NbSp EMPTY CONTENTS OF 1 RESPULE INTO NASAL IRRIGATION SYSTEM, ADD DISTILLED WATER, SALT PACK, MIX & IRRIGATE. PERFORM TWICE DAILY    carvediloL (COREG) 6.25 MG tablet TAKE 1 TABLET BY MOUTH TWICE DAILY WITH MEALS    cetirizine (ZYRTEC) 10 MG tablet Take 10 mg by mouth as needed for Allergies. Patients states only when provider prescribe it.    cilostazoL (PLETAL) 100 MG Tab Take 1 tablet (100 mg total) by mouth 2 (two) times daily. (Patient not taking: Reported on 2/15/2023)    clopidogreL (PLAVIX) 75 mg tablet Take 1 tablet by mouth once daily    clorazepate (TRANXENE) 7.5 MG Tab Take 1 tablet (7.5 mg total) by mouth daily as needed (Anxiety).    co-enzyme Q-10 50 mg capsule Take 50 mg by mouth once daily.    diphenhydrAMINE (BENADRYL) 25 mg capsule Take 25 mg by mouth every 6 (six) hours as needed for Itching.    ezetimibe (ZETIA) 10 mg tablet TAKE 1 TABLET BY MOUTH ONCE DAILY IN THE EVENING    multivitamin (THERAGRAN) per tablet Take 1 tablet by mouth once daily.    NEILMED SINUS RINSE REFILL Pack use as directed    omega-3 fatty acids/fish oil (FISH OIL-OMEGA-3 FATTY ACIDS) 300-1,000 mg capsule Take 1 capsule by mouth once daily.    TURMERIC ORAL Take 1 packet by mouth once daily.   Last reviewed on 2/15/2023  9:36 AM by Andreina Burt MA    Review of patient's allergies indicates:   Allergen Reactions    Benazepril Other (See Comments)     cough    Chlorthalidone     Iodinated contrast media     Iodine and iodide containing products     Lipitor [atorvastatin]      States she is allergic to all statin medications    Sertraline      Medication caused patient to get sick.    Spironolactone     Last reviewed on  2/15/2023 9:25 AM by Andreina Burt      Tasks added this encounter   No tasks added.   Tasks due within next 3 months   5/14/2023 - Refill Coordination Outreach (1 time occurrence)     Mckenna Sales, PharmD  [facility]  [address] [facility] [address]  Phone: [phone]  Fax: [phone]

## 2023-05-16 ENCOUNTER — TELEPHONE (OUTPATIENT)
Dept: CARDIOLOGY | Facility: CLINIC | Age: 83
End: 2023-05-16
Payer: MEDICARE

## 2023-05-16 DIAGNOSIS — E78.2 MIXED HYPERLIPIDEMIA: Primary | ICD-10-CM

## 2023-05-16 NOTE — TELEPHONE ENCOUNTER
----- Message from Aubree Friedman sent at 5/16/2023 12:13 PM CDT -----  Regarding: call back  Contact: [phone]  Who Called: PT     Patient called in requesting to speak with you. Did not specify why. Please advise.

## 2023-05-17 NOTE — TELEPHONE ENCOUNTER
Patient inquiring if she needs blood work before August appointment. Last lipid panel December 2022.

## 2023-06-01 ENCOUNTER — PATIENT MESSAGE (OUTPATIENT)
Dept: PHARMACY | Facility: CLINIC | Age: 83
End: 2023-06-01
Payer: MEDICARE

## 2023-06-08 ENCOUNTER — SPECIALTY PHARMACY (OUTPATIENT)
Dept: PHARMACY | Facility: CLINIC | Age: 83
End: 2023-06-08
Payer: MEDICARE

## 2023-06-08 NOTE — TELEPHONE ENCOUNTER
Specialty Pharmacy - Refill Coordination    Specialty Medication Orders Linked to Encounter      Flowsheet Row Most Recent Value   Medication #1 alirocumab (PRALUENT PEN) 150 mg/mL PnIj (Order#[id number], Rx#[id number])            Refill Questions - Documented Responses      Flowsheet Row Most Recent Value   Patient Availability and HIPAA Verification    Does patient want to proceed with activity? Yes   HIPAA/medical authority confirmed? Yes   Relationship to patient of person spoken to? Self   Refill Screening Questions    Would patient like to speak to a pharmacist? No   When does the patient need to receive the medication? 06/15/23   Refill Delivery Questions    How will the patient receive the medication? MEDRx   When does the patient need to receive the medication? 06/15/23   Shipping Address Home   Address in [facility] confirmed and updated if neccessary? Yes   Expected Copay ($) 20   Is the patient able to afford the medication copay? Yes   Payment Method CC on file   Days supply of Refill 28   Supplies needed? No supplies needed   Refill activity completed? Yes   Refill activity plan Refill scheduled   Shipment/Pickup Date: 06/12/23            Current Outpatient Medications   Medication Sig    alirocumab (PRALUENT PEN) 150 mg/mL PnIj Inject 1 mL (150 mg total) into the skin every 14 (fourteen) days.    amLODIPine (NORVASC) 10 MG tablet Take 1 tablet by mouth once daily    ascorbic acid, vitamin C, (VITAMIN C) 1000 MG tablet Take 1,000 mg by mouth once daily.    aspirin 81 MG Chew Take 81 mg by mouth once daily.    azelastine (ASTELIN) 137 mcg (0.1 %) nasal spray USE 1 TO 2 SPRAY(S) IN EACH NOSTRIL TWICE DAILY    bempedoic acid (NEXLETOL) 180 mg Tab Take 1 tablet (180 mg total) by mouth once daily.    budesonide 1 mg/2 mL NbSp EMPTY CONTENTS OF 1 RESPULE INTO NASAL IRRIGATION SYSTEM, ADD DISTILLED WATER, SALT PACK, MIX & IRRIGATE. PERFORM TWICE DAILY    carvediloL (COREG) 6.25 MG tablet TAKE 1 TABLET  BY MOUTH TWICE DAILY WITH MEALS    cetirizine (ZYRTEC) 10 MG tablet Take 10 mg by mouth as needed for Allergies. Patients states only when provider prescribe it.    cilostazoL (PLETAL) 100 MG Tab Take 1 tablet (100 mg total) by mouth 2 (two) times daily. (Patient not taking: Reported on 2/15/2023)    clopidogreL (PLAVIX) 75 mg tablet Take 1 tablet by mouth once daily    clorazepate (TRANXENE) 7.5 MG Tab Take 1 tablet (7.5 mg total) by mouth daily as needed (Anxiety).    co-enzyme Q-10 50 mg capsule Take 50 mg by mouth once daily.    diphenhydrAMINE (BENADRYL) 25 mg capsule Take 25 mg by mouth every 6 (six) hours as needed for Itching.    ezetimibe (ZETIA) 10 mg tablet TAKE 1 TABLET BY MOUTH ONCE DAILY IN THE EVENING    multivitamin (THERAGRAN) per tablet Take 1 tablet by mouth once daily.    NEILMED SINUS RINSE REFILL Pack use as directed    omega-3 fatty acids/fish oil (FISH OIL-OMEGA-3 FATTY ACIDS) 300-1,000 mg capsule Take 1 capsule by mouth once daily.    TURMERIC ORAL Take 1 packet by mouth once daily.   Last reviewed on 2/15/2023  9:36 AM by Andreina Burt MA    Review of patient's allergies indicates:   Allergen Reactions    Benazepril Other (See Comments)     cough    Chlorthalidone     Iodinated contrast media     Iodine and iodide containing products     Lipitor [atorvastatin]      States she is allergic to all statin medications    Sertraline      Medication caused patient to get sick.    Spironolactone     Last reviewed on  2/15/2023 9:25 AM by Andreina Burt      Tasks added this encounter   No tasks added.   Tasks due within next 3 months   No tasks due.     Indu Rowe, Patient Care Assistant  [facility]  [address]  [facility] [address]  Phone: [phone]  Fax: [phone]

## 2023-07-03 ENCOUNTER — SPECIALTY PHARMACY (OUTPATIENT)
Dept: PHARMACY | Facility: CLINIC | Age: 83
End: 2023-07-03
Payer: MEDICARE

## 2023-07-03 NOTE — TELEPHONE ENCOUNTER
Specialty Pharmacy - Refill Coordination    Specialty Medication Orders Linked to Encounter      Flowsheet Row Most Recent Value   Medication #1 alirocumab (PRALUENT PEN) 150 mg/mL PnIj (Order#[id number], Rx#[id number])            Refill Questions - Documented Responses      Flowsheet Row Most Recent Value   Refill Screening Questions    Would patient like to speak to a pharmacist? No   When does the patient need to receive the medication? 07/13/23   Refill Delivery Questions    How will the patient receive the medication? [facility]   When does the patient need to receive the medication? 07/13/23   Shipping Address Home   Address in [facility] confirmed and updated if neccessary? Yes   Expected Copay ($) 20   Is the patient able to afford the medication copay? Yes   Payment Method CC on file   Days supply of Refill 28   Supplies needed? No supplies needed   Refill activity completed? Yes   Refill activity plan Refill scheduled   Shipment/Pickup Date: 07/10/23            Current Outpatient Medications   Medication Sig    alirocumab (PRALUENT PEN) 150 mg/mL PnIj Inject 1 mL (150 mg total) into the skin every 14 (fourteen) days.    amLODIPine (NORVASC) 10 MG tablet Take 1 tablet by mouth once daily    ascorbic acid, vitamin C, (VITAMIN C) 1000 MG tablet Take 1,000 mg by mouth once daily.    aspirin 81 MG Chew Take 81 mg by mouth once daily.    azelastine (ASTELIN) 137 mcg (0.1 %) nasal spray USE 1 TO 2 SPRAY(S) IN EACH NOSTRIL TWICE DAILY    bempedoic acid (NEXLETOL) 180 mg Tab Take 1 tablet (180 mg total) by mouth once daily.    budesonide 1 mg/2 mL NbSp EMPTY CONTENTS OF 1 RESPULE INTO NASAL IRRIGATION SYSTEM, ADD DISTILLED WATER, SALT PACK, MIX & IRRIGATE. PERFORM TWICE DAILY    carvediloL (COREG) 6.25 MG tablet TAKE 1 TABLET BY MOUTH TWICE DAILY WITH MEALS    cetirizine (ZYRTEC) 10 MG tablet Take 10 mg by mouth as needed for Allergies. Patients states only when provider prescribe it.    cilostazoL (PLETAL) 100  MG Tab Take 1 tablet (100 mg total) by mouth 2 (two) times daily. (Patient not taking: Reported on 2/15/2023)    clopidogreL (PLAVIX) 75 mg tablet Take 1 tablet by mouth once daily    clorazepate (TRANXENE) 7.5 MG Tab Take 1 tablet (7.5 mg total) by mouth daily as needed (Anxiety).    co-enzyme Q-10 50 mg capsule Take 50 mg by mouth once daily.    diphenhydrAMINE (BENADRYL) 25 mg capsule Take 25 mg by mouth every 6 (six) hours as needed for Itching.    ezetimibe (ZETIA) 10 mg tablet TAKE 1 TABLET BY MOUTH ONCE DAILY IN THE EVENING    multivitamin (THERAGRAN) per tablet Take 1 tablet by mouth once daily.    NEILMED SINUS RINSE REFILL Pack use as directed    omega-3 fatty acids/fish oil (FISH OIL-OMEGA-3 FATTY ACIDS) 300-1,000 mg capsule Take 1 capsule by mouth once daily.    TURMERIC ORAL Take 1 packet by mouth once daily.   Last reviewed on 2/15/2023  9:36 AM by Andreina Burt MA    Review of patient's allergies indicates:   Allergen Reactions    Benazepril Other (See Comments)     cough    Chlorthalidone     Iodinated contrast media     Iodine and iodide containing products     Lipitor [atorvastatin]      States she is allergic to all statin medications    Sertraline      Medication caused patient to get sick.    Spironolactone     Last reviewed on  2/15/2023 9:25 AM by Andreina Burt      Tasks added this encounter   No tasks added.   Tasks due within next 3 months   7/3/2023 - Refill Coordination Outreach (1 time occurrence)     Hallie Castano [facility]  [address] [place] [id number]  Phone: [phone]  Fax: [phone]

## 2023-07-28 ENCOUNTER — PATIENT MESSAGE (OUTPATIENT)
Dept: CARDIOLOGY | Facility: CLINIC | Age: 83
End: 2023-07-28
Payer: MEDICARE

## 2023-08-07 ENCOUNTER — SPECIALTY PHARMACY (OUTPATIENT)
Dept: PHARMACY | Facility: CLINIC | Age: 83
End: 2023-08-07
Payer: MEDICARE

## 2023-08-07 ENCOUNTER — LAB VISIT (OUTPATIENT)
Dept: LAB | Facility: HOSPITAL | Age: 83
End: 2023-08-07
Attending: INTERNAL MEDICINE
Payer: MEDICARE

## 2023-08-07 DIAGNOSIS — E78.2 MIXED HYPERLIPIDEMIA: ICD-10-CM

## 2023-08-07 LAB
CHOLEST SERPL-MCNC: 169 MG/DL (ref 120–199)
CHOLEST/HDLC SERPL: 2 {RATIO} (ref 2–5)
HDLC SERPL-MCNC: 83 MG/DL (ref 40–75)
HDLC SERPL: 49.1 % (ref 20–50)
LDLC SERPL CALC-MCNC: 79.2 MG/DL (ref 63–159)
NONHDLC SERPL-MCNC: 86 MG/DL
TRIGL SERPL-MCNC: 34 MG/DL (ref 30–150)

## 2023-08-07 PROCEDURE — 36415 COLL VENOUS BLD VENIPUNCTURE: CPT | Mod: PN | Performed by: INTERNAL MEDICINE

## 2023-08-07 PROCEDURE — 80061 LIPID PANEL: CPT | Performed by: INTERNAL MEDICINE

## 2023-08-07 NOTE — TELEPHONE ENCOUNTER
Specialty Pharmacy - Refill Coordination    Specialty Medication Orders Linked to Encounter      Flowsheet Row Most Recent Value   Medication #1 alirocumab (PRALUENT PEN) 150 mg/mL PnIj (Order#[id number], Rx#[id number])            Refill Questions - Documented Responses      Flowsheet Row Most Recent Value   Patient Availability and HIPAA Verification    Does patient want to proceed with activity? Yes   HIPAA/medical authority confirmed? Yes   Relationship to patient of person spoken to? Self   Refill Screening Questions    Would patient like to speak to a pharmacist? No   When does the patient need to receive the medication? 08/15/23   Refill Delivery Questions    How will the patient receive the medication? [facility]   When does the patient need to receive the medication? 08/15/23   Shipping Address Home   Address in [facility] confirmed and updated if neccessary? Yes   Expected Copay ($) 20   Is the patient able to afford the medication copay? Yes   Payment Method CC on file   Days supply of Refill 28   Supplies needed? No supplies needed   Refill activity completed? Yes   Refill activity plan Refill scheduled   Shipment/Pickup Date: 08/10/23            Current Outpatient Medications   Medication Sig    alirocumab (PRALUENT PEN) 150 mg/mL PnIj Inject 1 mL (150 mg total) into the skin every 14 (fourteen) days.    amLODIPine (NORVASC) 10 MG tablet Take 1 tablet by mouth once daily    ascorbic acid, vitamin C, (VITAMIN C) 1000 MG tablet Take 1,000 mg by mouth once daily.    aspirin 81 MG Chew Take 81 mg by mouth once daily.    azelastine (ASTELIN) 137 mcg (0.1 %) nasal spray USE 1 TO 2 SPRAY(S) IN EACH NOSTRIL TWICE DAILY    bempedoic acid (NEXLETOL) 180 mg Tab Take 1 tablet (180 mg total) by mouth once daily.    budesonide 1 mg/2 mL NbSp EMPTY CONTENTS OF 1 RESPULE INTO NASAL IRRIGATION SYSTEM, ADD DISTILLED WATER, SALT PACK, MIX & IRRIGATE. PERFORM TWICE DAILY    carvediloL (COREG) 6.25 MG tablet TAKE 1 TABLET  BY MOUTH TWICE DAILY WITH MEALS    cetirizine (ZYRTEC) 10 MG tablet Take 10 mg by mouth as needed for Allergies. Patients states only when provider prescribe it.    cilostazoL (PLETAL) 100 MG Tab Take 1 tablet (100 mg total) by mouth 2 (two) times daily. (Patient not taking: Reported on 2/15/2023)    clopidogreL (PLAVIX) 75 mg tablet Take 1 tablet by mouth once daily    clorazepate (TRANXENE) 7.5 MG Tab Take 1 tablet (7.5 mg total) by mouth daily as needed (Anxiety).    co-enzyme Q-10 50 mg capsule Take 50 mg by mouth once daily.    diphenhydrAMINE (BENADRYL) 25 mg capsule Take 25 mg by mouth every 6 (six) hours as needed for Itching.    ezetimibe (ZETIA) 10 mg tablet TAKE 1 TABLET BY MOUTH ONCE DAILY IN THE EVENING    multivitamin (THERAGRAN) per tablet Take 1 tablet by mouth once daily.    NEILMED SINUS RINSE REFILL Pack use as directed    omega-3 fatty acids/fish oil (FISH OIL-OMEGA-3 FATTY ACIDS) 300-1,000 mg capsule Take 1 capsule by mouth once daily.    TURMERIC ORAL Take 1 packet by mouth once daily.   Last reviewed on 2/15/2023  9:36 AM by Andreina Burt MA    Review of patient's allergies indicates:   Allergen Reactions    Benazepril Other (See Comments)     cough    Chlorthalidone     Iodinated contrast media     Iodine and iodide containing products     Lipitor [atorvastatin]      States she is allergic to all statin medications    Sertraline      Medication caused patient to get sick.    Spironolactone     Last reviewed on  2/15/2023 9:25 AM by Andreina Burt      Tasks added this encounter   No tasks added.   Tasks due within next 3 months   No tasks due.     Cinda Harrison, PharmD  [facility]  [address] [facility] [address]  Phone: [phone]  Fax: [phone]

## 2023-08-14 ENCOUNTER — OFFICE VISIT (OUTPATIENT)
Dept: CARDIOLOGY | Facility: CLINIC | Age: 83
End: 2023-08-14
Payer: MEDICARE

## 2023-08-14 VITALS
WEIGHT: 119.5 LBS | BODY MASS INDEX: 21.17 KG/M2 | SYSTOLIC BLOOD PRESSURE: 125 MMHG | HEART RATE: 82 BPM | HEIGHT: 63 IN | DIASTOLIC BLOOD PRESSURE: 53 MMHG

## 2023-08-14 DIAGNOSIS — I70.0 ATHEROSCLEROSIS OF AORTIC ARCH: ICD-10-CM

## 2023-08-14 DIAGNOSIS — I10 ESSENTIAL HYPERTENSION: ICD-10-CM

## 2023-08-14 DIAGNOSIS — I70.8 CELIAC ARTERY ATHEROSCLEROSIS: ICD-10-CM

## 2023-08-14 DIAGNOSIS — K55.9 MESENTERIC ISCHEMIA: ICD-10-CM

## 2023-08-14 DIAGNOSIS — E78.2 MIXED HYPERLIPIDEMIA: ICD-10-CM

## 2023-08-14 DIAGNOSIS — R10.9 ABDOMINAL DISCOMFORT: ICD-10-CM

## 2023-08-14 DIAGNOSIS — I70.219 ATHEROSCLEROTIC PERIPHERAL VASCULAR DISEASE WITH INTERMITTENT CLAUDICATION: ICD-10-CM

## 2023-08-14 DIAGNOSIS — I70.0 AORTIC ATHEROSCLEROSIS: Primary | ICD-10-CM

## 2023-08-14 DIAGNOSIS — E78.00 HYPERCHOLESTEREMIA: ICD-10-CM

## 2023-08-14 DIAGNOSIS — Z78.9 STATIN INTOLERANCE: ICD-10-CM

## 2023-08-14 DIAGNOSIS — I73.9 PAD (PERIPHERAL ARTERY DISEASE): ICD-10-CM

## 2023-08-14 PROCEDURE — 3074F PR MOST RECENT SYSTOLIC BLOOD PRESSURE < 130 MM HG: ICD-10-PCS | Mod: CPTII,S$GLB,, | Performed by: INTERNAL MEDICINE

## 2023-08-14 PROCEDURE — 1157F ADVNC CARE PLAN IN RCRD: CPT | Mod: CPTII,S$GLB,, | Performed by: INTERNAL MEDICINE

## 2023-08-14 PROCEDURE — 99215 OFFICE O/P EST HI 40 MIN: CPT | Mod: S$GLB,,, | Performed by: INTERNAL MEDICINE

## 2023-08-14 PROCEDURE — 1159F MED LIST DOCD IN RCRD: CPT | Mod: CPTII,S$GLB,, | Performed by: INTERNAL MEDICINE

## 2023-08-14 PROCEDURE — 3074F SYST BP LT 130 MM HG: CPT | Mod: CPTII,S$GLB,, | Performed by: INTERNAL MEDICINE

## 2023-08-14 PROCEDURE — 99999 PR PBB SHADOW E&M-EST. PATIENT-LVL IV: CPT | Mod: PBBFAC,,, | Performed by: INTERNAL MEDICINE

## 2023-08-14 PROCEDURE — 1101F PT FALLS ASSESS-DOCD LE1/YR: CPT | Mod: CPTII,S$GLB,, | Performed by: INTERNAL MEDICINE

## 2023-08-14 PROCEDURE — 1157F PR ADVANCE CARE PLAN OR EQUIV PRESENT IN MEDICAL RECORD: ICD-10-PCS | Mod: CPTII,S$GLB,, | Performed by: INTERNAL MEDICINE

## 2023-08-14 PROCEDURE — 3288F FALL RISK ASSESSMENT DOCD: CPT | Mod: CPTII,S$GLB,, | Performed by: INTERNAL MEDICINE

## 2023-08-14 PROCEDURE — 3078F DIAST BP <80 MM HG: CPT | Mod: CPTII,S$GLB,, | Performed by: INTERNAL MEDICINE

## 2023-08-14 PROCEDURE — 3078F PR MOST RECENT DIASTOLIC BLOOD PRESSURE < 80 MM HG: ICD-10-PCS | Mod: CPTII,S$GLB,, | Performed by: INTERNAL MEDICINE

## 2023-08-14 PROCEDURE — 99215 PR OFFICE/OUTPT VISIT, EST, LEVL V, 40-54 MIN: ICD-10-PCS | Mod: S$GLB,,, | Performed by: INTERNAL MEDICINE

## 2023-08-14 PROCEDURE — 1101F PR PT FALLS ASSESS DOC 0-1 FALLS W/OUT INJ PAST YR: ICD-10-PCS | Mod: CPTII,S$GLB,, | Performed by: INTERNAL MEDICINE

## 2023-08-14 PROCEDURE — 1126F PR PAIN SEVERITY QUANTIFIED, NO PAIN PRESENT: ICD-10-PCS | Mod: CPTII,S$GLB,, | Performed by: INTERNAL MEDICINE

## 2023-08-14 PROCEDURE — 1126F AMNT PAIN NOTED NONE PRSNT: CPT | Mod: CPTII,S$GLB,, | Performed by: INTERNAL MEDICINE

## 2023-08-14 PROCEDURE — 1159F PR MEDICATION LIST DOCUMENTED IN MEDICAL RECORD: ICD-10-PCS | Mod: CPTII,S$GLB,, | Performed by: INTERNAL MEDICINE

## 2023-08-14 PROCEDURE — 99999 PR PBB SHADOW E&M-EST. PATIENT-LVL IV: ICD-10-PCS | Mod: PBBFAC,,, | Performed by: INTERNAL MEDICINE

## 2023-08-14 PROCEDURE — 3288F PR FALLS RISK ASSESSMENT DOCUMENTED: ICD-10-PCS | Mod: CPTII,S$GLB,, | Performed by: INTERNAL MEDICINE

## 2023-08-14 RX ORDER — FERROUS SULFATE 325(65) MG
1 TABLET ORAL DAILY
COMMUNITY
End: 2024-01-18 | Stop reason: SDUPTHER

## 2023-08-14 NOTE — PROGRESS NOTES
"[facility]      CC: Celiac [place] Stenosis      Patient ID: Alana Beth is a 82 y.o. female with PAD, celiac artery stenosis s/p PTAS, HTN, HLD, palpitations, who presents for a follow up appointment.  Pertinent history/events are as follows:     -Pt presents for evaluation of PAD/weight loss.    -At our initial clinic visit on 9/4/2020, Mrs. Beth reported  "not feeling well since 11/2019".  Main complaint is weakness, fatigue and lack of appetite.  She has lost 25 pounds since 11/2019.  No definite abdominal pain.  No chest pain, SOB, or LE edema.  Exam with audible abdominal bruit.  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    Plan:   Celiac Axis Stenosis- Mrs. 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  Pertinent findings include 25 pound weight loss and abdominal bruit.  No definite abdominal pain, although mild abdominal tenderness noted on exam.  Chronic mesenteric ischemia is also a possibility in this pt with known PAD.  Given these findings, will refer to Vascular Surgery for evaluation.  Pt has several risk factors for CAD, hence, will check nuclear stress test and echo to evaluate further.  PAD- Pt with known BLE PAD.  Continue ASA.  Pt states she's not taking a statin due to issues with liver disease in the past.  Will check outside records before starting statin.  Check updated lipid panel.      -At follow up clinic visit on 9/25/2020, Mrs. Beth reported no new symptoms.  States she still does not feel well.  Pt evaluated by Vascular Surgery (Enrrique Preciado) on 9/16/2020, who recommend psych evaluation and continued GI workup.  Mesenteric Utrasound on 9/10/2020 revealed a velocity elevation of 378 cm/s is visualized near the Celiac artery origin within a region of focal narrowing, suggestive of a greater than 70% stenosis.  Nuclear Stress Test on " 9/23/2020 revealed no evidence from myocardial ischemia or injury.  The EKG portion of this study is abnormal but not diagnostic.  Echo on 9/10/2020 revealed normal left ventricular systolic function with EF of 60%; concentric left ventricular remodeling; normal LV diastolic function; normal right ventricular systolic function; mild left atrial enlargement; mild tricuspid regurgitation; estimated PA systolic pressure is 30 mmHg; normal central venous pressure (3 mmHg).  Labs from 9/10/2020 show total cholesterol of 348 with LDL of 234.   Plan:   Celiac Axis Stenosis- Mrs. 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  Pertinent findings include 25 pound weight loss and abdominal bruit.  No definite abdominal pain, although mild abdominal tenderness noted on exam.  Chronic mesenteric ischemia is also a possibility in this pt with known PAD.  Pt evaluated by Vascular Surgery (Enrrique Preciado) on Recommend psych and continued GI workup.  Mesenteric Utrasound on 9/10/2020 revealed a velocity elevation of 378 cm/s is visualized near the Celiac artery origin within a region of focal narrowing, suggestive of a greater than 70% stenosis.  Nuclear Stress Test on 9/23/2020 revealed no evidence from myocardial ischemia or injury.  The EKG portion of this study is abnormal but not diagnostic.  Echo on 9/10/2020 revealed normal left ventricular systolic function with EF of 60%; concentric left ventricular remodeling; normal LV diastolic function; normal right ventricular systolic function; mild left atrial enlargement; mild tricuspid regurgitation; estimated PA systolic pressure is 30 mmHg; normal central venous pressure (3 mmHg).  Given these findings, will refer to Dr. Junior and GI for evaluation.       PAD- Pt with known BLE PAD.  She has no claudication, rest pain or tissue loss to suggest CLI.  Start rosuvastatin 40 mg daily.  Continue ASA 81 mg daily.  Check carotid ultrasound  prior to next visit.   HLD-  Labs from 9/10/2020 show total cholesterol of 348 with LDL of 234.  Start rosuvastatin 40 mg daily. Monitor LFT's.      -At clinic visit on 10/26/2020, Mrs. Cole reports that she had not starting statin therapy due to insurance issues. She was prescribed alirocumab 75 mg injections and she has received one dose so far. She would prefer oral therapy for her hypercholesterolemia. She notes much improvement in her symptoms of sputum production after starting omeprazole. Patient was evaluated by GI for abdominal pain associated with celiac axis syndrome and increased amounts of sputum. EGD done in 8/2020 she was noted to have a submucosal esophageal nodule that path revealed chronic esophagitis. She was started on Omeprazole 20 mg BID and barium esophagram requested. Barium study was consistent with mild esophageal dysmotility.  Patient has not seen Dr. Junior yet for possible angiogram, she did not have an appointment set up yet.   Plan:  Plan:  -- Schedule pt to see Dr. Junior   -- continue omeprazole, GI follow up PRN for symptomatic care   --Start bempedoic acid 180 mg daily due to statin intolerance    -On 12/8/2020, pt underwent celiac artery intervention with Dr. Junior:  S/p aortogram with selective celiac and SMA angiogram                Initially started R radial then switched to L radial               Patent SMA and 80% celiac stenosis              IVUS guided celiac PTAS              6.0 x 18 and 6 x 14 mm Express SD dilated to 18 noemí     -On 12/16/2020, pt admitted at [facility] with abdominal pain.  Abd ultrasound revealed  patent stent in celiac artery and no significant changes with respiration to suggest extrinsic compression by median arcuate ligament.  Velocities suggest only moderate stenosis of the stent.   Symptoms improved and pt was discharged home on ASA/Plavix/Praluent.       -At clinic visit on 1/27/2021, Mrs. Beth reported feeling well with no abdominal  pain, nausea, vomiting, or anorexia.  Taking all medications as prescribed.  Labs from 12/19/2020 shows  vs 234 on 9/10/2020.    Plan:   Celiac Axis Stenosis-  Now s/p IVUS guided celiac PTAS with 6.0 x 18 and 6 x 14 mm Express SD on 12/18/2020.  Mrs. Beth reports feeling much better with no abdominal pain, n/v, or anorexia.  Abd ultrasound revealed  patent stent in celiac artery and no significant changes with respiration to suggest extrinsic compression by median arcuate ligament.  Velocities suggest only moderate stenosis of the stent.  Continue ASA/Plavix/Praluent.     PAD- Pt with known BLE PAD.  She has no claudication, rest pain or tissue loss to suggest CLI.  Continue ASA/Plavix/Praluent.      HLD-  Labs from 12/19/2020 shows  vs 234 on 9/10/2020.  Continue Praluent.       -At clinic visit on 4/28/2021, Mrs. Cole reported no abdominal pain, nausea, or anorexia.  Labs from 4/21/2021 show  vs 150 on 12/19/2021.  Plan:  Celiac Axis Stenosis-  Now s/p IVUS guided celiac artery PTAS with 6.0 x 18 and 6 x 14 mm Express SD on 12/18/2020.  Mrs. Beth continues to do well with no abdominal pain, n/v, or anorexia.  Abd ultrasound on 12/18/2020 revealed  patent stent in celiac artery and no significant changes with respiration to suggest extrinsic compression by median arcuate ligament.  Velocities suggest only moderate stenosis of the stent.  Continue ASA/Plavix/Praluent.     PAD- Pt with known BLE PAD.  She has no claudication, rest pain or tissue loss to suggest CLI.  Continue ASA/Plavix/Praluent.      HLD-  Labs from 4/21/2021 show  vs 150 on 12/19/2021.  Increase Praluent to 150 mg every 14 days.     -At clinic visit on 7/28/2021, Mrs. Cole reported no chest pain, SOB, weight loss, nausea, TIA symptoms or syncope.  States she's been injecting Praluent in her thigh instead of abdomen.  Labs from 7/19/2021 show LDL now 124 vs 145 on 4/21/2021.  Of note, pt states she did not fast  prior to the labs being drawn.   Plan:   Celiac Axis Stenosis-  Now s/p IVUS guided celiac PTAS with 6.0 x 18 and 6 x 14 mm Express SD on 12/18/2020.  Mrs. Beth has no abdominal pain, n/v, or anorexia.  Abd ultrasound on 12/18/2020 revealed  patent stent in celiac artery and no significant changes with respiration to suggest extrinsic compression by median arcuate ligament.  Velocities suggest only moderate stenosis of the stent.  Continue ASA/Plavix/Praluent.   Surveillance imaging scheduled by Dr. RENÉE Aguilar.   PAD- Pt with known BLE PAD.  She has no claudication, rest pain or tissue loss to suggest CLI.  Continue ASA/Plavix/Praluent.      HLD-  Labs from 7/19/2021 show LDL now 124 vs 145 on 4/21/2021.  The modest reduction in LDL despite increasing Praluent to 150 mg every 14 days may be due to the pt injecting Praluent in the thigh instead of abdomen.  Continue zetia and Praluent.  Before adding bempedoic acid, will have pt inject Praluent in the abdomen, and repeat lipids in 3 months.         HTN- Continue current medications.    History of Pulmonary Nodules- Check CT chest without contrast to evaluate further.     -At clinic visit on 11/8/2021, Mrs. Cole reported no chest pain, abdominal pain, SOB, LE edema, TIA symptoms or syncope.  States she's still injecting praluent in the thigh and not the abdomen, as discussed at our previous clinic visit.  Labs from 11/2/2021 shows LDL   142 vs 124 on 7/19/2021.  CT Chest w/o Contrast on 8/4/2021 revealed no pulmonary abnormalities requiring continued surveillance.   There is a Ill-defined hypoattenuating hepatic lesion, suboptimally evaluated in the absence of intravenous contrast.     Plan:  Celiac Axis Stenosis-  Now s/p IVUS guided celiac PTAS with 6.0 x 18 and 6 x 14 mm Express SD on 12/18/2020.  Mrs. Beth has no abdominal pain, n/v, or anorexia.  Abd ultrasound on 12/18/2020 revealed  patent stent in celiac artery and no significant changes with respiration  to suggest extrinsic compression by median arcuate ligament.  Velocities suggest only moderate stenosis of the stent.  Continue ASA/Plavix/Praluent.   Surveillance imaging scheduled by Dr. RENÉE Aguilar.   PAD- Pt with known BLE PAD.  She has no claudication, rest pain or tissue loss to suggest CLI.  Continue ASA/Plavix/Praluent.      HLD- Mrs. Beth states she's still injecting praluent in the thigh and not the abdomen, as discussed at clinic visit on 7/28/2021.  Labs from 11/2/2021 shows LDL   142 vs 124 on 7/19/2021.  The increase in LDL despite increasing Praluent to 150 mg every 14 days may be due to the pt injecting Praluent in the thigh instead of abdomen.  Pt will try to do injection in abdomen.  Continue zetia and Praluent.  Before adding bempedoic acid, will have pt inject Praluent in the abdomen, and repeat lipids in 3 months.  HTN- Continue current medications.    History of Pulmonary Nodules-  CT Chest w/o Contrast on 8/4/2021 revealed no pulmonary abnormalities requiring continued surveillance.    Liver Lesion- Evaluate further with contrast enhanced abdominal MRI, and refer to Hepatology.    -At clinic visit on 2/9/2022, Mrs. Beth reported feeling better. Her appetite has been okay but not as good as before. She denies abdominal pain. She lost about 2 pounds since November. She denies claudications but she does have pain in her legs when she starts walking which is relieved by continued walking.  Plan:   Celiac Axis Stenosis- s/p IVUS guided celiac PTA on 12/18/2020.  Mrs. Beth has no abdominal pain, n/v, or anorexia. Continue ASA/Plavix/Praluent. Repeat mesenteric artery US.  PAD- Pt with known BLE PAD.  She has no claudication, rest pain or tissue loss to suggest CLI.  Continue ASA/Plavix. Start cilostazol 100mg BID.     HLD- LDL remains not at goal despite using praluent. Will attempt switching to bempedoic acid and evaluate response with repeat lipid panel in 3 months.  HTN- Controlled.  Continue current medications.      -At clinic visit on 5/11/2022, Mrs. Beth reports no abdominal pain, weight loss, claudication or tissue loss.  Pt states she did not start bempedoic acid as prescribed.  Mesenteric Ultrasound on 5/5/2022 revealed the celiac trunk has greater than 70% stenosis. In-stent restenosis noted.  The proximal superior mesenteric artery has greater than 70% stenosis.  The proximal inferior mesenteric artery has greater than 70% stenosis.  Plan:   Celiac Axis Stenosis s/p IVUS guided celiac PTA on 12/18/2020- Mrs. Beth reports no abdominal pain, weight loss, claudication or tissue loss.  Mesenteric Ultrasound on 5/5/2022 revealed the celiac trunk has greater than 70% stenosis. In-stent restenosis noted.  The proximal superior mesenteric artery has greater than 70% stenosis.  The proximal inferior mesenteric artery has greater than 70% stenosis.  Continue ASA/Plavix/Praluent.  PAD- Pt with known BLE PAD.  She has no claudication, rest pain or tissue loss to suggest CLI.  Continue ASA/Plavix and cilostazol 100mg BID.     HLD- LDL remains not at goal despite using max dose praluent. Pt states she did not start bempedoic acid as prescribed.  Unclear if pt is compliant with these medications.  Check Lipo (a).  Continue current medications.    HTN- Controlled. Continue current medications.      -At clinic visit on 8/15/2022, Mrs. Beth reports doing well with no abdominal pain, weight loss, claudication or tissue loss.  Pt started taking bempedoic acid as prescribed.  LDL 78 on 8/8/2022 vs 123 on 5/5/2022.  Lipoprotein (a) is elevated at 324.  Plan:   Celiac Axis Stenosis s/p IVUS guided celiac PTA on 12/18/2020- Mrs. Beth reports no abdominal pain, weight loss, claudication or tissue loss.  Mesenteric Ultrasound on 5/5/2022 revealed the celiac trunk has greater than 70% stenosis. In-stent restenosis noted.  The proximal superior mesenteric artery has greater than 70% stenosis.  The  proximal inferior mesenteric artery has greater than 70% stenosis.  Continue ASA/Plavix/Praluent/Bempedoic acid.  PAD- Pt with known BLE PAD.  She has no claudication, rest pain or tissue loss to suggest CLI.  Continue ASA/Plavix/Praluent/Bempedoic acid, and cilostazol 100mg BID.     HLD- LDL 78 on 8/8/2022 vs 123 on 5/5/2022.  Lipoprotein (a) is elevated at 324.  Continue Praluent 150 mg every 14 days and bempedoic acid 180 mg daily.      HTN- Controlled. Continue current medications.      -At clinic visit on 2/15/2023, Mrs. Beth reports no abdominal pain, claudication or tissue loss. Weight is stable at 112 pounds.  LDL 91 on 12/29/2022.   Plan:   Celiac Axis Stenosis s/p IVUS guided celiac PTA on 12/18/2020- Mrs. Beth reports no abdominal pain, weight loss, claudication or tissue loss.  Mesenteric Ultrasound on 5/5/2022 revealed the celiac trunk has greater than 70% stenosis. In-stent restenosis noted.  The proximal superior mesenteric artery has greater than 70% stenosis.  The proximal inferior mesenteric artery has greater than 70% stenosis.  Continue ASA/Plavix/Praluent/Bempedoic acid.  PAD- Pt with known BLE PAD.  She has no claudication, rest pain or tissue loss to suggest CLI.  Continue ASA/Plavix/Praluent/Bempedoic acid, and cilostazol 100mg BID.     HLD- LDL 91 on 12/29/2022.  Continue Praluent 150 mg every 14 days, bempedoic acid 180 mg daily, and zetia 10 mg daily.        HTN- Controlled. Continue current medications.      HPI:  Mrs. Beth reports doing well with no chest pain, abdominal pain, SOB, TIA symptoms or syncope.  LDL 79 on 8/7/2023.      Past Medical History:   Diagnosis Date    Atherosclerotic peripheral vascular disease     Chronic cystic mastitis     Essential hypertension     Hypercholesterolemia     Osteopenia     Shingles      Past Surgical History:   Procedure Laterality Date    BREAST BIOPSY      biopsies benign    CELIAC ARTERY STENT      ESOPHAGOGASTRODUODENOSCOPY   08/20/2020    PERCUTANEOUS TRANSLUMINAL ANGIOPLASTY N/A 12/8/2020    Procedure: Pta (Angioplasty, Percutaneous, Transluminal);  Surgeon: Hernando Junior MD;  Location: [facility] CATH LAB/EP;  Service: Cardiology;  Laterality: N/A;     Social History     Socioeconomic History    Marital status: Single   Occupational History    Occupation: retired student loan assistance    Tobacco Use    Smoking status: Never    Smokeless tobacco: Never   Substance and Sexual Activity    Alcohol use: No    Drug use: No    Sexual activity: Not Currently     Social Determinants of Health     Stress: No Stress Concern Present (12/30/2019)    [facility] [place] [facility] [facility] - Occupational Stress Questionnaire     Feeling of Stress : Not at all     Family History   Problem Relation Age of Onset    Hypertension Mother     Diabetes Brother     Diabetes Sister     Colon cancer Neg Hx     Esophageal cancer Neg Hx        Review of patient's allergies indicates:   Allergen Reactions    Benazepril Other (See Comments)     cough    Chlorthalidone     Iodinated contrast media     Iodine and iodide containing products     Lipitor [atorvastatin]      States she is allergic to all statin medications    Sertraline      Medication caused patient to get sick.    Spironolactone        Medication List with Changes/Refills   Current Medications    ALIROCUMAB (PRALUENT PEN) 150 MG/ML PNIJ    Inject 1 mL (150 mg total) into the skin every 14 (fourteen) days.    AMLODIPINE (NORVASC) 10 MG TABLET    Take 1 tablet by mouth once daily    ASCORBIC ACID, VITAMIN C, (VITAMIN C) 1000 MG TABLET    Take 1,000 mg by mouth once daily.    ASPIRIN 81 MG CHEW    Take 81 mg by mouth once daily.    AZELASTINE (ASTELIN) 137 MCG (0.1 %) NASAL SPRAY    USE 1 TO 2 SPRAY(S) IN EACH NOSTRIL TWICE DAILY    BEMPEDOIC ACID (NEXLETOL) 180 MG TAB    Take 1 tablet (180 mg total) by mouth once daily.    BUDESONIDE 1 MG/2 ML NBSP    EMPTY CONTENTS OF 1 RESPULE INTO NASAL IRRIGATION  "SYSTEM, ADD DISTILLED WATER, SALT PACK, MIX & IRRIGATE. PERFORM TWICE DAILY    CARVEDILOL (COREG) 6.25 MG TABLET    TAKE 1 TABLET BY MOUTH TWICE DAILY WITH MEALS    CETIRIZINE (ZYRTEC) 10 MG TABLET    Take 10 mg by mouth as needed for Allergies. Patients states only when provider prescribe it.    CILOSTAZOL (PLETAL) 100 MG TAB    Take 1 tablet (100 mg total) by mouth 2 (two) times daily.    CLOPIDOGREL (PLAVIX) 75 MG TABLET    Take 1 tablet by mouth once daily    CLORAZEPATE (TRANXENE) 7.5 MG TAB    Take 1 tablet (7.5 mg total) by mouth daily as needed (Anxiety).    CO-ENZYME Q-10 50 MG CAPSULE    Take 50 mg by mouth once daily.    DIPHENHYDRAMINE (BENADRYL) 25 MG CAPSULE    Take 25 mg by mouth every 6 (six) hours as needed for Itching.    EZETIMIBE (ZETIA) 10 MG TABLET    TAKE 1 TABLET BY MOUTH ONCE DAILY IN THE EVENING    FERROUS SULFATE (FEOSOL) 325 MG (65 MG IRON) TAB TABLET    Take 1 tablet by mouth once daily.    MULTIVITAMIN (THERAGRAN) PER TABLET    Take 1 tablet by mouth once daily.    NEILMED SINUS RINSE REFILL PACK    use as directed    OMEGA-3 FATTY ACIDS/FISH OIL (FISH OIL-OMEGA-3 FATTY ACIDS) 300-1,000 MG CAPSULE    Take 1 capsule by mouth once daily.    TURMERIC ORAL    Take 1 packet by mouth once daily.       Review of Systems  Constitution: Denies chills, fever, and sweats.  HENT: Denies headaches or blurry vision.  Cardiovascular: Denies chest pain or irregular heart beat.  Respiratory: Denies cough or shortness of breath.  Gastrointestinal: Negative for abdominal pain and weight loss.  Musculoskeletal: Denies muscle cramps.  Neurological: Denies dizziness or focal weakness.  Psychiatric/Behavioral: Normal mental status.  Hematologic/Lymphatic: Denies bleeding problem or easy bruising/bleeding.  Skin: Denies rash or suspicious lesions    Physical Examination  BP (!) 125/53   Pulse 82   Ht 5' 2.5" (1.588 m)   Wt 54.2 kg (119 lb 7.8 oz)   BMI 21.51 kg/m²     Constitutional: No acute distress, " "conversant  HEENT: Sclera anicteric, Pupils equal, round and reactive to light, extraocular motions intact, Oropharynx clear  Neck: No JVD, no carotid bruits  Cardiovascular: regular rate and rhythm, no murmur, rubs or gallops, normal S1/S2  Pulmonary: Clear to auscultation bilaterally  Abdominal: Abdomen soft with no TTP, positive bowel sounds  Extremities: No lower extremity edema   Pulses:  Carotid pulses are 2+ on the right side, and 2+ on the left side.  Radial pulses are 2+ on the right side, and 2+ on the left side.   Femoral pulses are 2+ on the right side, and 2+ on the left side.  Popliteal pulses are 2+ on the right side, and 2+ on the left side.   Dorsalis pedis pulses are 2+ on the right side, and 2+ on the left side.   Posterior tibial pulses are 2+ on the right side, and 2+ on the left side.    Skin: No ecchymosis, erythema, or ulcers  Psych: Alert and oriented x 3, appropriate affect  Neuro: CNII-XII intact, no focal deficits    Labs:  Most Recent Data  CBC:   Lab Results   Component Value Date    WBC 3.05 (L) 12/29/2022    HGB 10.6 (L) 12/29/2022    HCT 33.3 (L) 12/29/2022     12/29/2022    MCV 85 12/29/2022    RDW 14.6 (H) 12/29/2022     BMP:   Lab Results   Component Value Date     12/29/2022    K 4.5 12/29/2022     12/29/2022    CO2 30 (H) 12/29/2022    BUN 20 12/29/2022    CREATININE 1.1 12/29/2022    GLU 91 12/29/2022    CALCIUM 9.9 12/29/2022    MG 2.3 12/16/2020    PHOS 2.9 12/16/2020     LFTS;   Lab Results   Component Value Date    PROT 7.5 12/29/2022    ALBUMIN 3.9 12/29/2022    BILITOT 0.5 12/29/2022    AST 23 12/29/2022    ALKPHOS 31 (L) 12/29/2022    ALT 12 12/29/2022     COAGS: No results found for: "INR", "PROTIME", "PTT"  FLP:   Lab Results   Component Value Date    CHOL 169 08/07/2023    HDL 83 (H) 08/07/2023    LDLCALC 79.2 08/07/2023    TRIG 34 08/07/2023    CHOLHDL 49.1 08/07/2023     CARDIAC:   Lab Results   Component Value Date    TROPONINI <0.01 09/06/2021 "    BNP <10 08/15/2020       EKG 8/15/2020:  Normal sinus rhythm  Low voltage QRS  Anteroseptal infarct (cited on or before 15-AUG-2020)    Mesenteric Ultrasound 5/5/2022:  The celiac trunk has greater than 70% stenosis. In-stent restenosis noted.  The proximal superior mesenteric artery has greater than 70% stenosis.  The proximal inferior mesenteric artery has greater than 70% stenosis.  There is no evidence of an Abdominal Aortic Aneurysm.  Aortic atherosclerosis.    CT Chest w/o Contrast 8/4/2021:  No pulmonary abnormalities requiring continued surveillance.   Multi-vessel coronary artery atherosclerosis.   Ill-defined hypoattenuating hepatic lesion, suboptimally evaluated in the absence of intravenous contrast.  Consider further characterization with contrast enhanced abdominal MRI.     Mesenteric Ultrasound 12/18/2020:  Color flow duplex imaging reveals patent superior mesenteric and inferior mesenteric arteries with no evidence of a hemodynamically   significant stenosis. The Celiac artery stent is patent with and velocities are 168 cm/s at rest; 157 cm/s with inspiration; 153 cm/s   with expiration. No suggestion of MALS. An accelerated flow velocity of 282 cm/s is visualized at the distal edge on the Celiac stent.   However, this region appear widely patent.       Nuclear Stress Test 9/23/2020:  Normal myocardial perfusion scan.    The perfusion scan is free of evidence from myocardial ischemia or injury.    Visually estimated ejection fraction is normal at rest and normal at stress.    There is normal wall motion at rest and post stress.    LV cavity size is normal at rest and normal at stress.    The EKG portion of this study is abnormal but not diagnostic.    The patient reported no chest pain during the stress test.    There are no prior studies for comparison.       Echo 9/10/2020:  Normal left ventricular systolic function. The estimated ejection fraction is 60%.  Concentric left ventricular  remodeling.  Normal LV diastolic function.  No wall motion abnormalities.  Normal right ventricular systolic function.  Mild left atrial enlargement.  Mild tricuspid regurgitation.  The estimated PA systolic pressure is 30 mmHg.  Normal central venous pressure (3 mmHg).    Mesenteric Utrasound 9/10/2020:  Color flow duplex exam reveals a patent abdominal aorta, celiac artery, superior mesenteric artery and inferior mesenteric artery. A   velocity elevation of 378 cm/s is visualized near the Celiac artery origin within a region of focal narrowing, suggestive of a greater than   70% stenosis.     CTA abd/pelvis with contrast (outside study from [facility]) 8/28/2020:  1. No evidence for gastrointestinal bleeding.    2. Scattered colonic diverticula without adjacent inflammatory changes.    3. Myomatous changes of the uterus with degenerating fibroids.     4. Hemangioma in segment VII.    5. Stenosis of the celiac access with poststenotic dilatation.    BLE Arterial Ultrasound 7/31/2020:  Multilevel disease in the left lower extremity. Moderate stenosis in the left superficial femoral artery. Severe stenosis in the distal popliteal artery.    Left lower extremity:  Ankle-brachial index is 0.73. Triphasic signals are seen in the common femoral and profunda arteries. There are biphasic superficial femoral, popliteal, posterior tibial, and anterior tibial arteries. There is a velocity increase from the mid SFA to the distal SFA from 1 /sec 2 m/s. Consistent with a moderate stenosis. There is another focal velocity increase in the distal popliteal from 2.5 m/sec to 5.0 m/s. Consistent with a severe stenosis. Distal to this there is monophasic waveforms.         Right lower extremity:  Ankle-brachial index is 0.93. Triphasic signals are seen in the common femoral and profunda artery. There are biphasic signals in the superficial femoral, popliteal, posterior tibial, and anterior tibial arteries. No focal areas of elevated  velocity are seen.    Assessment/Plan:  Alana Beth is a 82 y.o. female with PAD, celiac artery stenosis s/p PTAS, HTN, HLD, palpitations, who presents for a follow up appointment.     1. 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 Beth is doing well with no abdominal pain, weight loss, claudication or tissue loss.  Continue ASA/Plavix/Praluent/Bempedoic acid.    2. PAD- Pt with known BLE PAD.  She has no claudication, rest pain or tissue loss to suggest CLI.  Continue ASA/Plavix/Praluent/Bempedoic acid, and cilostazol 100mg BID.       3. HLD- LDL 79 on 8/7/2023.  Continue Praluent 150 mg every 14 days, bempedoic acid 180 mg daily, and zetia 10 mg daily.          4. HTN- Controlled. Continue current medications.      Follow up in 6 months     Total duration of face to face visit time 30 minutes.  Total time spent counseling greater than fifty percent of total visit time.  Counseling included discussion regarding imaging findings, diagnosis, possibilities, treatment options, risks and benefits.  The patient had many questions regarding the options and long-term effects.

## 2023-08-14 NOTE — PATIENT INSTRUCTIONS
Assessment/Plan:  Alana Beth is a 82 y.o. female with PAD, celiac artery stenosis s/p PTAS, HTN, HLD, palpitations, who presents for a follow up appointment.     1. 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 Beth is doing well with no abdominal pain, weight loss, claudication or tissue loss.  Continue ASA/Plavix/Praluent/Bempedoic acid.    2. PAD- Pt with known BLE PAD.  She has no claudication, rest pain or tissue loss to suggest CLI.  Continue ASA/Plavix/Praluent/Bempedoic acid, and cilostazol 100mg BID.       3. HLD- LDL 79 on 8/7/2023.  Continue Praluent 150 mg every 14 days, bempedoic acid 180 mg daily, and zetia 10 mg daily.          4. HTN- Controlled. Continue current medications.      Follow up in 6 months

## 2023-08-15 NOTE — TELEPHONE ENCOUNTER
This study was ordered by her Cardiologist; please ask her to reach out to his office with specific questions regarding the procedure. Thank you for your time.   Aklief Pregnancy And Lactation Text: It is unknown if this medication is safe to use during pregnancy.  It is unknown if this medication is excreted in breast milk.  Breastfeeding women should use the topical cream on the smallest area of the skin for the shortest time needed while breastfeeding.  Do not apply to nipple and areola.

## 2023-08-23 ENCOUNTER — TELEPHONE (OUTPATIENT)
Dept: CARDIOLOGY | Facility: CLINIC | Age: 83
End: 2023-08-23
Payer: MEDICARE

## 2023-08-23 NOTE — TELEPHONE ENCOUNTER
The pt would like to speak to provider about  sending her to a different physician , because Dr. Junior is leaving his office . Informed pt that  is out the until Monday but will forward message.pt verbalized understanding

## 2023-08-23 NOTE — TELEPHONE ENCOUNTER
----- Message from Chaparro Acosta sent at 8/23/2023 11:12 AM CDT -----  Regarding: Call Back  Pt would like a call back, she a former Dr. Junior pt.     Contact @ [phone]    Thanks

## 2023-08-23 NOTE — TELEPHONE ENCOUNTER
Reached back out to Mrs. eBth and she just wanted     To know what the Web site to go to see [facility].    You can expect to continue receiving the highest quality     care when you choose your next [facility] provider. Visit     [email or web address] to learn about each providers unique     qualifications and special areas of interest.        Nw                           ----- Message from Kellen Vera MA sent at 8/22/2023  3:40 PM CDT -----  [facility] Staff  Caller: Unspecified (Today,  1:06 PM)  Type:  Patient Returning Call     Who Called:pt   Who Left Message for Patient:nurse   Does the patient know what this is regarding?:returning a call   Would the patient rather a call back or a response via [facility]? Call   Best Call Back Number:[phone]   Additional Information:

## 2023-09-05 ENCOUNTER — TELEPHONE (OUTPATIENT)
Dept: OTOLARYNGOLOGY | Facility: CLINIC | Age: 83
End: 2023-09-05
Payer: MEDICARE

## 2023-09-05 NOTE — TELEPHONE ENCOUNTER
----- Message from Monserrat Gomez sent at 9/5/2023  4:06 PM CDT -----  Type:  Sooner Appointment Request    Caller is requesting a sooner appointment.  Caller declined first available appointment listed below.  Caller will not accept being placed on the waitlist and is requesting a message be sent to doctor.    Name of Caller:  Pt    When is the first available appointment?  11/06    Symptoms:  throat is burning where pallets are at    Would the patient rather a call back or a response via [facility]?  Call back    Best Call Back Number:  [phone]    Additional Information:   Pt is wanting to get seen in [place], she has issues with postnasal drip, but she is currently on antibiotics and her throat has a lot of mucus. Dr. Efren Mathias, Urgent Care. Please call back to advise. Thanks!

## 2023-09-07 ENCOUNTER — TELEPHONE (OUTPATIENT)
Dept: OTOLARYNGOLOGY | Facility: CLINIC | Age: 83
End: 2023-09-07
Payer: MEDICARE

## 2023-09-12 ENCOUNTER — TELEPHONE (OUTPATIENT)
Dept: INTERNAL MEDICINE | Facility: CLINIC | Age: 83
End: 2023-09-12
Payer: MEDICARE

## 2023-09-12 ENCOUNTER — TELEPHONE (OUTPATIENT)
Dept: ENDOSCOPY | Facility: HOSPITAL | Age: 83
End: 2023-09-12
Payer: MEDICARE

## 2023-09-12 NOTE — TELEPHONE ENCOUNTER
----- Message from Iman Agarwal sent at 9/12/2023  9:26 AM CDT -----  Needs advice from nurse:      Who Called:pt  Regarding:would like to know why colonoscopy was cancelled/doesn't know the name, but states PCP wanted test done  Would the patient rather a call back or VIA [facility]?  Best Call Back number:[phone]  Additional Info:

## 2023-09-12 NOTE — TELEPHONE ENCOUNTER
I spoke to pt, she was scheduled for colonoscopy but the procedure was canceled.Note in chart 5-1-23 staff spoke to pt and informed her an eval would be needed prior to scheduling colonoscopy and pt refused the eval.  Pt was given the number for colonoscopy dept at [facility].

## 2023-09-12 NOTE — TELEPHONE ENCOUNTER
----- Message from Jagruti Duran sent at 9/12/2023 10:29 AM CDT -----  Contact: Mobile [phone]  Patient is returning a phone call.  Who left a message for the patient: Porsha Carr LPN  Does patient know what this is regarding:  A message from Porsha Carr LPN  Would you like a call back, or a response through your [facility] portal?:   Call

## 2023-09-12 NOTE — TELEPHONE ENCOUNTER
Received call from patient. Patient states she wants to have a colonoscopy scheduled. Explained to patient that she will need to get evaluated in clinic prior to schedule due to age. Patient agreeable. Will send message to GI clinic. Patient verbalized an understanding.

## 2023-09-12 NOTE — TELEPHONE ENCOUNTER
Hello,     This patient is 82 yrs old and has a colonoscopy referral for a screening colonoscopy.  Patient is interested in scheduling for a colonoscopy. Per protocol, this referral has been closed.  Please contact them for follow-up clinic appointment.    Thanks,  Deborah

## 2023-10-24 ENCOUNTER — OFFICE VISIT (OUTPATIENT)
Dept: OTOLARYNGOLOGY | Facility: CLINIC | Age: 83
End: 2023-10-24
Payer: MEDICARE

## 2023-10-24 VITALS
HEART RATE: 87 BPM | HEIGHT: 63 IN | DIASTOLIC BLOOD PRESSURE: 77 MMHG | WEIGHT: 118.63 LBS | BODY MASS INDEX: 21.02 KG/M2 | SYSTOLIC BLOOD PRESSURE: 155 MMHG

## 2023-10-24 DIAGNOSIS — K11.7 EXCESSIVE SALIVATION: Primary | ICD-10-CM

## 2023-10-24 PROCEDURE — 3078F DIAST BP <80 MM HG: CPT | Mod: CPTII,S$GLB,, | Performed by: OTOLARYNGOLOGY

## 2023-10-24 PROCEDURE — 99214 OFFICE O/P EST MOD 30 MIN: CPT | Mod: S$GLB,,, | Performed by: OTOLARYNGOLOGY

## 2023-10-24 PROCEDURE — 3077F PR MOST RECENT SYSTOLIC BLOOD PRESSURE >= 140 MM HG: ICD-10-PCS | Mod: CPTII,S$GLB,, | Performed by: OTOLARYNGOLOGY

## 2023-10-24 PROCEDURE — 1157F ADVNC CARE PLAN IN RCRD: CPT | Mod: CPTII,S$GLB,, | Performed by: OTOLARYNGOLOGY

## 2023-10-24 PROCEDURE — 1160F RVW MEDS BY RX/DR IN RCRD: CPT | Mod: CPTII,S$GLB,, | Performed by: OTOLARYNGOLOGY

## 2023-10-24 PROCEDURE — 3077F SYST BP >= 140 MM HG: CPT | Mod: CPTII,S$GLB,, | Performed by: OTOLARYNGOLOGY

## 2023-10-24 PROCEDURE — 1157F PR ADVANCE CARE PLAN OR EQUIV PRESENT IN MEDICAL RECORD: ICD-10-PCS | Mod: CPTII,S$GLB,, | Performed by: OTOLARYNGOLOGY

## 2023-10-24 PROCEDURE — 99999 PR PBB SHADOW E&M-EST. PATIENT-LVL IV: ICD-10-PCS | Mod: PBBFAC,,, | Performed by: OTOLARYNGOLOGY

## 2023-10-24 PROCEDURE — 1101F PT FALLS ASSESS-DOCD LE1/YR: CPT | Mod: CPTII,S$GLB,, | Performed by: OTOLARYNGOLOGY

## 2023-10-24 PROCEDURE — 99999 PR PBB SHADOW E&M-EST. PATIENT-LVL IV: CPT | Mod: PBBFAC,,, | Performed by: OTOLARYNGOLOGY

## 2023-10-24 PROCEDURE — 1126F PR PAIN SEVERITY QUANTIFIED, NO PAIN PRESENT: ICD-10-PCS | Mod: CPTII,S$GLB,, | Performed by: OTOLARYNGOLOGY

## 2023-10-24 PROCEDURE — 1159F MED LIST DOCD IN RCRD: CPT | Mod: CPTII,S$GLB,, | Performed by: OTOLARYNGOLOGY

## 2023-10-24 PROCEDURE — 1126F AMNT PAIN NOTED NONE PRSNT: CPT | Mod: CPTII,S$GLB,, | Performed by: OTOLARYNGOLOGY

## 2023-10-24 PROCEDURE — 99214 PR OFFICE/OUTPT VISIT, EST, LEVL IV, 30-39 MIN: ICD-10-PCS | Mod: S$GLB,,, | Performed by: OTOLARYNGOLOGY

## 2023-10-24 PROCEDURE — 3288F PR FALLS RISK ASSESSMENT DOCUMENTED: ICD-10-PCS | Mod: CPTII,S$GLB,, | Performed by: OTOLARYNGOLOGY

## 2023-10-24 PROCEDURE — 1160F PR REVIEW ALL MEDS BY PRESCRIBER/CLIN PHARMACIST DOCUMENTED: ICD-10-PCS | Mod: CPTII,S$GLB,, | Performed by: OTOLARYNGOLOGY

## 2023-10-24 PROCEDURE — 1101F PR PT FALLS ASSESS DOC 0-1 FALLS W/OUT INJ PAST YR: ICD-10-PCS | Mod: CPTII,S$GLB,, | Performed by: OTOLARYNGOLOGY

## 2023-10-24 PROCEDURE — 3078F PR MOST RECENT DIASTOLIC BLOOD PRESSURE < 80 MM HG: ICD-10-PCS | Mod: CPTII,S$GLB,, | Performed by: OTOLARYNGOLOGY

## 2023-10-24 PROCEDURE — 3288F FALL RISK ASSESSMENT DOCD: CPT | Mod: CPTII,S$GLB,, | Performed by: OTOLARYNGOLOGY

## 2023-10-24 PROCEDURE — 1159F PR MEDICATION LIST DOCUMENTED IN MEDICAL RECORD: ICD-10-PCS | Mod: CPTII,S$GLB,, | Performed by: OTOLARYNGOLOGY

## 2023-10-24 RX ORDER — FLUTICASONE PROPIONATE 50 MCG
1 SPRAY, SUSPENSION (ML) NASAL
COMMUNITY

## 2023-10-24 RX ORDER — GLYCOPYRROLATE 1 MG/1
1 TABLET ORAL 3 TIMES DAILY PRN
Qty: 90 TABLET | Refills: 0 | Status: SHIPPED | OUTPATIENT
Start: 2023-10-24 | End: 2023-11-23

## 2023-10-24 NOTE — PROGRESS NOTES
[facility]    Subjective:      Patient: Alana Beth Patient PCP: Nurys Bearden MD         :  1940     Sex:  female      MRN:  [id number]          Date of Visit: 10/24/2023      Chief Complaint: Other (Seen Urgent care on  for Sialoadenitis, patient states that when she swollows's she can feel mucus down both ears and goes down throat. When she lays down at night she can feel the fluid moving to the back of her throat, patient has been spitting up mucus since Dec. 2019. Patient states can feel sensations in her cheek area and top of the mouth, even top of lip feels different. Also noticed sores on tongue, and back of tongue feels swollen. RSI 5/45)      Patient ID: Alana Beth is a 83 y.o. female lifelong NON-smoker with a previous visit with Dr. Sinha with multiple complaints related to postnasal drip, dryness, and vocal strain with a generally normal exam and reportedly normal endoscopy is seen today self-referred/referred by urgent care after evaluation and diagnosis of sialadenitis 2023.  Her complaints are ongoing and feeling of mucus in the ears and throat that will not go down and fluid in the back of the throat bringing up mucus bothersome mucus complaints since almost 4 years ago with a sensation of swelling in the tongue and throat.  RSI is not elevated at 5.  No hoarseness hemoptysis dysphagia or voice change.  Patient has polypharmacy with several potentially drying medications including Flonase, Benadryl, transient seen, Zyrtec and Astelin.  She does not take any angiotensin receptor blocker angiotensin-converting enzyme therapy.  Seen by speech therapy 10/1/2021 (modified barium swallow) no speech therapy intervention.    10/01/2021 modified barium swallow with a decreased oral transit time but no penetration or aspiration reported.    2021 CT head without contrast from [facility] reported as normal.  No more recent head and neck imaging.    10/05/2020  barium esophagram with mild esophageal dysmotility and no evidence of cricopharyngeal dysfunction.    Review of Systems     Past Medical History  She has a past medical history of Atherosclerotic peripheral vascular disease, Chronic cystic mastitis, Essential hypertension, Hypercholesterolemia, Osteopenia, and Shingles.    Family / Surgical / Social History  Her family history includes Diabetes in her brother and sister; Hypertension in her mother.    Past Surgical History:   Procedure Laterality Date    BREAST BIOPSY      biopsies benign    CELIAC ARTERY STENT      ESOPHAGOGASTRODUODENOSCOPY  08/20/2020    PERCUTANEOUS TRANSLUMINAL ANGIOPLASTY N/A 12/8/2020    Procedure: Pta (Angioplasty, Percutaneous, Transluminal);  Surgeon: Hernando Junior MD;  Location: [facility] CATH LAB/EP;  Service: Cardiology;  Laterality: N/A;       Social History     Tobacco Use    Smoking status: Never    Smokeless tobacco: Never   Substance and Sexual Activity    Alcohol use: No    Drug use: No    Sexual activity: Not Currently       Medications  She has a current medication list which includes the following prescription(s): praluent pen, amlodipine, ascorbic acid (vitamin c), aspirin, azelastine, nexletol, budesonide, carvedilol, cetirizine, clopidogrel, clorazepate, co-enzyme q-10, diphenhydramine, ezetimibe, ferrous sulfate, fluticasone propionate, multivitamin, neilmed sinus rinse refill, fish oil-omega-3 fatty acids, turmeric, and cilostazol.      Allergies  Review of patient's allergies indicates:   Allergen Reactions    Benazepril Other (See Comments)     cough    Chlorthalidone     Iodinated contrast media     Iodine and iodide containing products     Lipitor [atorvastatin]      States she is allergic to all statin medications    Sertraline      Medication caused patient to get sick.    Spironolactone        All medications, allergies, and past history have been reviewed.    Objective:      Vitals:      2/15/2023     9:24 AM  "8/14/2023    10:25 AM 10/24/2023    11:21 AM   Vitals - 1 value per visit   SYSTOLIC 137 125 155   DIASTOLIC 66 53 77   Pulse 74 82 87   Weight (lb) 112.43 119.49 118.61   Weight (kg) 51 54.2 53.8   Height 5' 2.5" (1.588 m) 5' 2.5" (1.588 m) 5' 2.5" (1.588 m)   BMI (Calculated) 20.2 21.5 21.3   Pain Score Zero Zero Zero       Body surface area is 1.54 meters squared.    Physical Exam:    GENERAL  APPEARANCE -  alert, appears stated age, and cooperative  BARRIER(S) TO COMMUNICATION -  none VOICE - appropriate for age and gender    INTEGUMENTARY  no suspicious head and neck lesions    HEENT  HEAD: Normocephalic, without obvious abnormality, atraumatic  FACE: INSPECTION - Symmetric, no signs of trauma, no suspicious lesion(s)  PALPATION -  No masses SALIVARY GLANDS - non-tender with no appreciable mass  STRENGTH - facial symmetry  NECK/THYROID: normal atraumatic, no neck masses, normal thyroid, no jvd    EYES  Normal occular alignment and mobility with no visible nystagmus at rest    EARS/NOSE/MOUTH/THROAT  EARS  PINNAE AND EXTERNAL EARS - no suspicious lesion OTOSCOPIC EXAM (surgical microscopy was not used for visualization/instrumentation): EAR EXAM - Normal ear canals, tympanic membranes and mobility, and middle ear spaces bilaterally.  HEARING - grossly intact to voice/finger rub    NOSE AND SINUSES  EXTERNAL NOSE - Grossly normal for age/sex  SEPTUM - normal/no obstruction on anterior exam without decongestion TURBINATES - within normal limits MUCOSA - within normal limits     MOUTH AND THROAT   ORAL CAVITY, LIPS, TEETH, GUMS & TONGUE - moist, no suspicious lesions  OROPHARYNX /TONSILS/PHARYNGEAL WALLS/HYPOPHARYNX - some tonsil tags more prominent on right, normal Silas and Warton papillae and drainage, some foamy pooling in tonsil fossae  NASOPHARYNX - limited mirror exam - unable to visualize due to anatomy/gag  LARYNX -  - limited mirror exam - Supraglottis, false and true vocal cord were normal., Vocal cord " mobility was normal., no vallecula or pyriform pooling, no lesion or edema, no erythema    CHEST AND LUNG   INSPECTION & AUSCULTATION - normal effort, no stridor    CARDIOVASCULAR  AUSCULTATION & PERIPHERAL VASCULAR - regular rate and rhythm.    NEUROLOGIC  MENTAL STATUS - alert, interactive CRANIAL NERVES - normal    LYMPHATIC  HEAD AND NECK - non-palpable      Procedure(s):  None    Labs:  WBC   Date Value Ref Range Status   12/29/2022 3.05 (L) 3.90 - 12.70 K/uL Final     Hemoglobin   Date Value Ref Range Status   12/29/2022 10.6 (L) 12.0 - 16.0 g/dL Final     Platelets   Date Value Ref Range Status   12/29/2022 176 150 - 450 K/uL Final     Creatinine   Date Value Ref Range Status   12/29/2022 1.1 0.5 - 1.4 mg/dL Final     TSH   Date Value Ref Range Status   12/29/2022 1.185 0.400 - 4.000 uIU/mL Final     Glucose   Date Value Ref Range Status   12/29/2022 91 70 - 110 mg/dL Final     Hemoglobin A1C   Date Value Ref Range Status   12/29/2022 5.7 (H) 4.0 - 5.6 % Final     Comment:     ADA Screening Guidelines:  5.7-6.4%  Consistent with prediabetes  >or=6.5%  Consistent with diabetes    High levels of fetal hemoglobin interfere with the HbA1C  assay. Heterozygous hemoglobin variants (HbS, HgC, etc)do  not significantly interfere with this assay.   However, presence of multiple variants may affect accuracy.           Assessment:      Problem List Items Addressed This Visit    None  Visit Diagnoses       Excessive salivation    -  Primary                 Plan:      No indication of any mass or recurrent infection of the salivary glands.  No history consistent with salivary stone or lesion.  History is more consistent with a complaint of excessive salivation and patient specifically denies any difficulty swallowing rather than an explanation of excessive saliva.  The risks of Robinul as prescribed or small but there can be some cardiac issues with heart rate changes and it should be used with caution.  Taking half a  pill rather than a full pill might be a good start and taking it no more than up to 3 times daily purely as needed when the saliva is more bothersome is recommended.  This is the opposite to hydration and treatment of thickening saliva which is more typical complaint for people with throat-clearing.    Patient to return with any future salivary infections to determine if this is truly an infection or just an ongoing complaint.  Autoimmune diseases result typically and chronic dryness not in excessive salivation.  Increased cholinergic tone as discussed is not specifically something that ENT tend to be expert in and maybe worth discussing with Neurology or endocrinology if the symptoms persist including excessive tearing and urinary or diarrhea type issues.    Return with any worsening of symptoms, failure to improve, or any other concerns for further evaluation and treatment.      Voice recognition software was used in the creation of this note/communication and any sound-alike errors which may have occurred from its use should be taken in context when interpreting.  If such errors prevent a clear understanding of the note/communication, please contact the office for clarification.      Long discussion with the patient on the nature of her symptoms.  She is convinced her issues excessive salivary production and denies any dysphagia.  She feels a pores down or throat gags but does not choke her.  She had some transient issues on modified barium swallow orally in the past and further evaluation of swallowing and discussion with speech therapy recommended but patient is not interested.  She also was concerned about recurrent salivary infections being diagnosed at urgent care.  She does not have swelling and pain of the salivary glands but rather when she pushes on her parotid glands she feels a rush of saliva.      This has been bothering patient for many many years without any specific worsening or alarming  symptoms.  Given her age I do not want to do any contrast-enhanced imaging looking for anything abnormal as nothing is really favored terms of any obstructive or recurrent infectious symptoms.  No tumor is considered likely.  There maybe some endocrinologic or neurologic issue though I can not identify an identifiable finding.  In spite of a small risk we will try to control her salivation with low-dose glycopyrrolate.

## 2023-10-24 NOTE — PATIENT INSTRUCTIONS
No indication of any mass or recurrent infection of the salivary glands.  No history consistent with salivary stone or lesion.  History is more consistent with a complaint of excessive salivation and patient specifically denies any difficulty swallowing rather than an explanation of excessive saliva.  The risks of Robinul as prescribed or small but there can be some cardiac issues with heart rate changes and it should be used with caution.  Taking half a pill rather than a full pill might be a good start and taking it no more than up to 3 times daily purely as needed when the saliva is more bothersome is recommended.  This is the opposite to hydration and treatment of thickening saliva which is more typical complaint for people with throat-clearing.    Patient to return with any future salivary infections to determine if this is truly an infection or just an ongoing complaint.  Autoimmune diseases result typically and chronic dryness not in excessive salivation.  Increased cholinergic tone as discussed is not specifically something that ENT tend to be expert in and maybe worth discussing with Neurology or endocrinology if the symptoms persist including excessive tearing and urinary or diarrhea type issues.    Return with any worsening of symptoms, failure to improve, or any other concerns for further evaluation and treatment.      Voice recognition software was used in the creation of this note/communication and any sound-alike errors which may have occurred from its use should be taken in context when interpreting.  If such errors prevent a clear understanding of the note/communication, please contact the office for clarification.

## 2023-10-30 ENCOUNTER — OFFICE VISIT (OUTPATIENT)
Dept: GASTROENTEROLOGY | Facility: CLINIC | Age: 83
End: 2023-10-30
Payer: MEDICARE

## 2023-10-30 ENCOUNTER — LAB VISIT (OUTPATIENT)
Dept: LAB | Facility: HOSPITAL | Age: 83
End: 2023-10-30
Attending: STUDENT IN AN ORGANIZED HEALTH CARE EDUCATION/TRAINING PROGRAM
Payer: MEDICARE

## 2023-10-30 VITALS — WEIGHT: 119.06 LBS | HEIGHT: 62 IN | BODY MASS INDEX: 21.91 KG/M2

## 2023-10-30 DIAGNOSIS — D53.9 ANEMIA ASSOCIATED WITH NUTRITIONAL DEFICIENCY: ICD-10-CM

## 2023-10-30 DIAGNOSIS — D64.9 NORMOCYTIC ANEMIA: ICD-10-CM

## 2023-10-30 DIAGNOSIS — D64.9 NORMOCYTIC ANEMIA: Primary | ICD-10-CM

## 2023-10-30 LAB
ALBUMIN SERPL BCP-MCNC: 4.2 G/DL (ref 3.5–5.2)
ALP SERPL-CCNC: 34 U/L (ref 55–135)
ALT SERPL W/O P-5'-P-CCNC: 8 U/L (ref 10–44)
ANION GAP SERPL CALC-SCNC: 13 MMOL/L (ref 8–16)
AST SERPL-CCNC: 21 U/L (ref 10–40)
BASOPHILS # BLD AUTO: 0.04 K/UL (ref 0–0.2)
BASOPHILS NFR BLD: 1.3 % (ref 0–1.9)
BILIRUB SERPL-MCNC: 0.3 MG/DL (ref 0.1–1)
BUN SERPL-MCNC: 26 MG/DL (ref 8–23)
CALCIUM SERPL-MCNC: 10.5 MG/DL (ref 8.7–10.5)
CHLORIDE SERPL-SCNC: 103 MMOL/L (ref 95–110)
CO2 SERPL-SCNC: 27 MMOL/L (ref 23–29)
CREAT SERPL-MCNC: 1.5 MG/DL (ref 0.5–1.4)
DIFFERENTIAL METHOD: ABNORMAL
EOSINOPHIL # BLD AUTO: 0 K/UL (ref 0–0.5)
EOSINOPHIL NFR BLD: 0.6 % (ref 0–8)
ERYTHROCYTE [DISTWIDTH] IN BLOOD BY AUTOMATED COUNT: 15.2 % (ref 11.5–14.5)
EST. GFR  (NO RACE VARIABLE): 34.8 ML/MIN/1.73 M^2
FERRITIN SERPL-MCNC: 90 NG/ML (ref 20–300)
FOLATE SERPL-MCNC: 17.1 NG/ML (ref 4–24)
GLUCOSE SERPL-MCNC: 93 MG/DL (ref 70–110)
HCT VFR BLD AUTO: 34.4 % (ref 37–48.5)
HGB BLD-MCNC: 10.8 G/DL (ref 12–16)
IMM GRANULOCYTES # BLD AUTO: 0 K/UL (ref 0–0.04)
IMM GRANULOCYTES NFR BLD AUTO: 0 % (ref 0–0.5)
IRON SERPL-MCNC: 66 UG/DL (ref 30–160)
LYMPHOCYTES # BLD AUTO: 1.4 K/UL (ref 1–4.8)
LYMPHOCYTES NFR BLD: 44.2 % (ref 18–48)
MCH RBC QN AUTO: 26.9 PG (ref 27–31)
MCHC RBC AUTO-ENTMCNC: 31.4 G/DL (ref 32–36)
MCV RBC AUTO: 86 FL (ref 82–98)
MONOCYTES # BLD AUTO: 0.4 K/UL (ref 0.3–1)
MONOCYTES NFR BLD: 11.3 % (ref 4–15)
NEUTROPHILS # BLD AUTO: 1.3 K/UL (ref 1.8–7.7)
NEUTROPHILS NFR BLD: 42.6 % (ref 38–73)
NRBC BLD-RTO: 0 /100 WBC
PLATELET # BLD AUTO: 180 K/UL (ref 150–450)
PMV BLD AUTO: 9.9 FL (ref 9.2–12.9)
POTASSIUM SERPL-SCNC: 4.8 MMOL/L (ref 3.5–5.1)
PROT SERPL-MCNC: 8.4 G/DL (ref 6–8.4)
RBC # BLD AUTO: 4.01 M/UL (ref 4–5.4)
SATURATED IRON: 13 % (ref 20–50)
SODIUM SERPL-SCNC: 143 MMOL/L (ref 136–145)
TOTAL IRON BINDING CAPACITY: 497 UG/DL (ref 250–450)
TRANSFERRIN SERPL-MCNC: 336 MG/DL (ref 200–375)
VIT B12 SERPL-MCNC: 1072 PG/ML (ref 210–950)
WBC # BLD AUTO: 3.1 K/UL (ref 3.9–12.7)

## 2023-10-30 PROCEDURE — 3288F PR FALLS RISK ASSESSMENT DOCUMENTED: ICD-10-PCS | Mod: CPTII,S$GLB,, | Performed by: INTERNAL MEDICINE

## 2023-10-30 PROCEDURE — 99214 PR OFFICE/OUTPT VISIT, EST, LEVL IV, 30-39 MIN: ICD-10-PCS | Mod: S$GLB,,, | Performed by: INTERNAL MEDICINE

## 2023-10-30 PROCEDURE — 99999 PR PBB SHADOW E&M-EST. PATIENT-LVL IV: CPT | Mod: PBBFAC,,, | Performed by: STUDENT IN AN ORGANIZED HEALTH CARE EDUCATION/TRAINING PROGRAM

## 2023-10-30 PROCEDURE — 85025 COMPLETE CBC W/AUTO DIFF WBC: CPT | Performed by: STUDENT IN AN ORGANIZED HEALTH CARE EDUCATION/TRAINING PROGRAM

## 2023-10-30 PROCEDURE — 82728 ASSAY OF FERRITIN: CPT | Performed by: STUDENT IN AN ORGANIZED HEALTH CARE EDUCATION/TRAINING PROGRAM

## 2023-10-30 PROCEDURE — 1101F PT FALLS ASSESS-DOCD LE1/YR: CPT | Mod: CPTII,S$GLB,, | Performed by: INTERNAL MEDICINE

## 2023-10-30 PROCEDURE — 1159F MED LIST DOCD IN RCRD: CPT | Mod: CPTII,S$GLB,, | Performed by: INTERNAL MEDICINE

## 2023-10-30 PROCEDURE — 80053 COMPREHEN METABOLIC PANEL: CPT | Performed by: STUDENT IN AN ORGANIZED HEALTH CARE EDUCATION/TRAINING PROGRAM

## 2023-10-30 PROCEDURE — 1157F PR ADVANCE CARE PLAN OR EQUIV PRESENT IN MEDICAL RECORD: ICD-10-PCS | Mod: CPTII,S$GLB,, | Performed by: INTERNAL MEDICINE

## 2023-10-30 PROCEDURE — 82607 VITAMIN B-12: CPT | Performed by: STUDENT IN AN ORGANIZED HEALTH CARE EDUCATION/TRAINING PROGRAM

## 2023-10-30 PROCEDURE — 3288F FALL RISK ASSESSMENT DOCD: CPT | Mod: CPTII,S$GLB,, | Performed by: INTERNAL MEDICINE

## 2023-10-30 PROCEDURE — 82746 ASSAY OF FOLIC ACID SERUM: CPT | Performed by: STUDENT IN AN ORGANIZED HEALTH CARE EDUCATION/TRAINING PROGRAM

## 2023-10-30 PROCEDURE — 1101F PR PT FALLS ASSESS DOC 0-1 FALLS W/OUT INJ PAST YR: ICD-10-PCS | Mod: CPTII,S$GLB,, | Performed by: INTERNAL MEDICINE

## 2023-10-30 PROCEDURE — 1157F ADVNC CARE PLAN IN RCRD: CPT | Mod: CPTII,S$GLB,, | Performed by: INTERNAL MEDICINE

## 2023-10-30 PROCEDURE — 1126F PR PAIN SEVERITY QUANTIFIED, NO PAIN PRESENT: ICD-10-PCS | Mod: CPTII,S$GLB,, | Performed by: INTERNAL MEDICINE

## 2023-10-30 PROCEDURE — 83540 ASSAY OF IRON: CPT | Performed by: STUDENT IN AN ORGANIZED HEALTH CARE EDUCATION/TRAINING PROGRAM

## 2023-10-30 PROCEDURE — 36415 COLL VENOUS BLD VENIPUNCTURE: CPT | Performed by: STUDENT IN AN ORGANIZED HEALTH CARE EDUCATION/TRAINING PROGRAM

## 2023-10-30 PROCEDURE — 99214 OFFICE O/P EST MOD 30 MIN: CPT | Mod: S$GLB,,, | Performed by: INTERNAL MEDICINE

## 2023-10-30 PROCEDURE — 84466 ASSAY OF TRANSFERRIN: CPT | Performed by: STUDENT IN AN ORGANIZED HEALTH CARE EDUCATION/TRAINING PROGRAM

## 2023-10-30 PROCEDURE — 1159F PR MEDICATION LIST DOCUMENTED IN MEDICAL RECORD: ICD-10-PCS | Mod: CPTII,S$GLB,, | Performed by: INTERNAL MEDICINE

## 2023-10-30 PROCEDURE — 99999 PR PBB SHADOW E&M-EST. PATIENT-LVL IV: ICD-10-PCS | Mod: PBBFAC,,, | Performed by: STUDENT IN AN ORGANIZED HEALTH CARE EDUCATION/TRAINING PROGRAM

## 2023-10-30 PROCEDURE — 1126F AMNT PAIN NOTED NONE PRSNT: CPT | Mod: CPTII,S$GLB,, | Performed by: INTERNAL MEDICINE

## 2023-10-30 NOTE — PROGRESS NOTES
[facility]    Name: Alana Beth  : 1940  Date of Service: 10/30/2023   PCP: Nurys Bearden MD    Reason for visit:   Chief Complaint   Patient presents with    GI Problem       HPI:     The patient is an 84 y/o F with HTN, HLD, PAD and celiac artery stenosis s/p stenting that presents to GI clinic to discuss the need for colonoscopy. She reports that she is not having any GI specific complaints at this time. She denies any nausea, vomiting, weight loss, constipation or diarrhea. She denies any overt signs of GI bleeding. She denies BRBPR, hematochezia or melena. She had a stent placed for celiac artery stenosis in ; since then has actually regained some weight. She is moving her bowels every day; typically twice a day. She is not on an oral iron supplement, though has been previously. She denies any reflux, dysphagia, pyrosis, chest pain or n/v. She is on Plavix for her stent. No other AC. Labs in 2022 notable for progressive normocytic anemia (Hgb 12.9 in -> 10.6 in 2022. MCV 85.     Most Recent Colonoscopy:   Last performed around 10 years ago in 2013- no colon polyps reported, was told she would likely never need another colonoscopy.     Review of Systems:   Review of Systems   Constitutional:  Negative for chills and fever.   Respiratory:  Positive for sputum production. Negative for cough and shortness of breath.    Cardiovascular:  Negative for chest pain and palpitations.   Gastrointestinal:  Negative for abdominal pain, blood in stool, constipation, diarrhea, melena, nausea and vomiting.   Skin:  Negative for itching and rash.   Neurological:  Negative for dizziness and headaches.       Medications:   Current Outpatient Medications:     alirocumab (PRALUENT PEN) 150 mg/mL PnIj, Inject 1 mL (150 mg total) into the skin every 14 (fourteen) days., Disp: 10 mL, Rfl: 10    amLODIPine (NORVASC) 10 MG tablet, Take 1 tablet by mouth once daily, Disp: 90  tablet, Rfl: 2    ascorbic acid, vitamin C, (VITAMIN C) 1000 MG tablet, Take 1,000 mg by mouth once daily., Disp: , Rfl:     aspirin 81 MG Chew, Take 81 mg by mouth once daily., Disp: , Rfl:     azelastine (ASTELIN) 137 mcg (0.1 %) nasal spray, USE 1 TO 2 SPRAY(S) IN EACH NOSTRIL TWICE DAILY, Disp: , Rfl:     bempedoic acid (NEXLETOL) 180 mg Tab, Take 1 tablet (180 mg total) by mouth once daily., Disp: 90 tablet, Rfl: 3    budesonide 1 mg/2 mL NbSp, EMPTY CONTENTS OF 1 RESPULE INTO NASAL IRRIGATION SYSTEM, ADD DISTILLED WATER, SALT PACK, MIX & IRRIGATE. PERFORM TWICE DAILY, Disp: , Rfl:     carvediloL (COREG) 6.25 MG tablet, TAKE 1 TABLET BY MOUTH TWICE DAILY WITH MEALS, Disp: 180 tablet, Rfl: 3    clopidogreL (PLAVIX) 75 mg tablet, Take 1 tablet by mouth once daily, Disp: 90 tablet, Rfl: 3    clorazepate (TRANXENE) 7.5 MG Tab, Take 1 tablet (7.5 mg total) by mouth daily as needed (Anxiety)., Disp: 90 tablet, Rfl: 3    co-enzyme Q-10 50 mg capsule, Take 50 mg by mouth once daily., Disp: , Rfl:     diphenhydrAMINE (BENADRYL) 25 mg capsule, Take 25 mg by mouth every 6 (six) hours as needed for Itching., Disp: , Rfl:     ezetimibe (ZETIA) 10 mg tablet, TAKE 1 TABLET BY MOUTH ONCE DAILY IN THE EVENING, Disp: 90 tablet, Rfl: 3    fluticasone propionate (FLONASE) 50 mcg/actuation nasal spray, 1 spray by Each Nostril route once daily., Disp: , Rfl:     multivitamin (THERAGRAN) per tablet, Take 1 tablet by mouth once daily., Disp: , Rfl:     NEILMED SINUS RINSE REFILL Pack, use as directed, Disp: , Rfl:     omega-3 fatty acids/fish oil (FISH OIL-OMEGA-3 FATTY ACIDS) 300-1,000 mg capsule, Take 1 capsule by mouth once daily., Disp: , Rfl:     TURMERIC ORAL, Take 1 packet by mouth once daily., Disp: , Rfl:     cetirizine (ZYRTEC) 10 MG tablet, Take 10 mg by mouth as needed for Allergies. Patients states only when provider prescribe it., Disp: , Rfl:     cilostazoL (PLETAL) 100 MG Tab, Take 1 tablet (100 mg total) by mouth 2  "(two) times daily. (Patient not taking: Reported on 2/15/2023), Disp: 60 tablet, Rfl: 11    ferrous sulfate (FEOSOL) 325 mg (65 mg iron) Tab tablet, Take 1 tablet by mouth once daily., Disp: , Rfl:     glycopyrrolate (ROBINUL) 1 mg Tab, Take 1 tablet (1 mg total) by mouth 3 (three) times daily as needed (excessive salivation). (Patient not taking: Reported on 10/30/2023), Disp: 90 tablet, Rfl: 0     Past medical history, past surgical history, family history, allergies, and social history reviewed and updated in EMR.     Vitals:   Vitals:    10/30/23 1532   Weight: 54 kg (119 lb 0.8 oz)   Height: 5' 2" (1.575 m)     Body mass index is 21.77 kg/m².    Physical Exam:   Physical Exam  Vitals reviewed.   Constitutional:       General: She is not in acute distress.     Appearance: Normal appearance. She is not toxic-appearing.   HENT:      Head: Normocephalic and atraumatic.      Nose: Nose normal.   Eyes:      General: No scleral icterus.     Extraocular Movements: Extraocular movements intact.   Cardiovascular:      Rate and Rhythm: Normal rate.      Pulses: Normal pulses.   Pulmonary:      Effort: Pulmonary effort is normal. No respiratory distress.   Abdominal:      General: Abdomen is flat. There is no distension.      Palpations: Abdomen is soft.      Tenderness: There is no abdominal tenderness. There is no guarding or rebound.   Musculoskeletal:         General: No swelling.   Skin:     General: Skin is warm and dry.   Neurological:      Mental Status: She is alert and oriented to person, place, and time. Mental status is at baseline.   Psychiatric:         Mood and Affect: Mood normal.         Behavior: Behavior normal.         Assessment:   1. Normocytic anemia  CBC W/ AUTO DIFFERENTIAL    COMPREHENSIVE METABOLIC PANEL    IRON AND TIBC    Ferritin      2. Anemia associated with nutritional deficiency  Folate    VITAMIN B12          Plan:   - Given history of anemia + celiac artery stenosis + CKD III; would " workup anemia with serologic testing first; recheck CBC, CMP, Iron studies (as none on file), folate level and B12  - Pending serologic workup, can discuss role of EGD/colonoscopy to evaluate for occult blood loss anemia if there is evidence of Iron Deficiency anemia, though suspect her anemia is multifactorial in nature and less likely from GI blood loss given the patient's clinical history as noted above  - Will reach out to schedule endoscopy if there is evidence of  Fe Deficiency anemia     Disposition: Follow-up as needed.     Discussed with staff attending. Attestation to follow.     Nirmala Ontiveros DO PGY- 4  Gastroenterology Fellow  [facility]

## 2023-10-30 NOTE — PROGRESS NOTES
"  GENERAL GI PATIENT INTAKE:    COVID symptoms in the last 7 days (runny nose, sore throat, congestion, cough, fever): No  PCP: Nurys Bearden  If not PCP-  number given to establish [phone]: Yes    ALLERGIES REVIEWED:  Yes    CHIEF COMPLAINT:    Chief Complaint   Patient presents with    GI Problem       VITAL SIGNS:  Ht 5' 2" (1.575 m)   Wt 54 kg (119 lb 0.8 oz)   BMI 21.77 kg/m²      Change in medical, surgical, family or social history: No      REVIEWED MEDICATION LIST RECONCILED INCLUDING ABOVE MEDS:  Yes      "

## 2023-11-02 ENCOUNTER — TELEPHONE (OUTPATIENT)
Dept: INTERNAL MEDICINE | Facility: CLINIC | Age: 83
End: 2023-11-02
Payer: MEDICARE

## 2023-11-02 DIAGNOSIS — N18.30 ACUTE RENAL FAILURE SUPERIMPOSED ON STAGE 3 CHRONIC KIDNEY DISEASE, UNSPECIFIED ACUTE RENAL FAILURE TYPE, UNSPECIFIED WHETHER STAGE 3A OR 3B CKD: ICD-10-CM

## 2023-11-02 DIAGNOSIS — Z00.00 PHYSICAL EXAM, ANNUAL: Primary | ICD-10-CM

## 2023-11-02 DIAGNOSIS — E78.2 MIXED HYPERLIPIDEMIA: ICD-10-CM

## 2023-11-02 DIAGNOSIS — N17.9 ACUTE RENAL FAILURE SUPERIMPOSED ON STAGE 3 CHRONIC KIDNEY DISEASE, UNSPECIFIED ACUTE RENAL FAILURE TYPE, UNSPECIFIED WHETHER STAGE 3A OR 3B CKD: ICD-10-CM

## 2023-11-02 DIAGNOSIS — R73.01 IMPAIRED FASTING BLOOD SUGAR: ICD-10-CM

## 2023-11-02 DIAGNOSIS — I10 ESSENTIAL HYPERTENSION: ICD-10-CM

## 2023-11-02 DIAGNOSIS — E55.9 HYPOVITAMINOSIS D: ICD-10-CM

## 2023-11-02 NOTE — TELEPHONE ENCOUNTER
----- Message from Leila Newton sent at 11/2/2023  3:21 PM CDT -----  Contact: [phone] @patient  Good afternoon patient would like a call back. Patient says she missed a call and doesn't know from who but would like the office to give her a call back [phone]

## 2023-11-02 NOTE — TELEPHONE ENCOUNTER
----- Message from Beatriz Lindsey sent at 11/2/2023  3:47 PM CDT -----  Contact: [phone] Patient  type: Lab    Caller is requesting to schedule their Lab appointment prior to annual appointment.  Order is not listed in EPIC.  Please enter order and contact patient to schedule.        Preferred Date and Time of Labs:01/11/2024 Morning    Date of Annual Physical Appointment:01/18/2024    Where would they like the lab performed?[facility]    Would the patient rather a call back or a response via [facility]? Call Back Please.

## 2023-11-02 NOTE — TELEPHONE ENCOUNTER
Spoke with pt who states she received a call from our office number I do not see that anyone called her . She states may be  office and will call his office .

## 2023-11-09 NOTE — TELEPHONE ENCOUNTER
No care due was identified.  [facility] Embedded Care Due Messages. Reference number: [id number].   11/09/2023 5:56:24 PM CST

## 2023-11-10 RX ORDER — CLORAZEPATE DIPOTASSIUM 7.5 MG/1
TABLET ORAL
Qty: 90 TABLET | Refills: 0 | Status: SHIPPED | OUTPATIENT
Start: 2023-11-10 | End: 2024-01-18 | Stop reason: SDUPTHER

## 2023-11-15 ENCOUNTER — TELEPHONE (OUTPATIENT)
Dept: OTOLARYNGOLOGY | Facility: CLINIC | Age: 83
End: 2023-11-15
Payer: MEDICARE

## 2023-11-15 NOTE — TELEPHONE ENCOUNTER
----- Message from Miracle Centeno sent at 11/15/2023 11:10 AM CST -----  Regarding: appt access  Contact: pt [phone]  PATIENT CALL    Pt called regarding recurrent throat issues. States that whenever she lays down, she has a lot of sinus drainage and having trouble sleeping because of it. Please call back at [phone]

## 2023-11-15 NOTE — TELEPHONE ENCOUNTER
Called pt back and went over Dr. Garcia's recommendations. Pt will check with her PCP for further recommendations. Thanks, Maria Victoria

## 2023-11-15 NOTE — TELEPHONE ENCOUNTER
Called pt back. Pt states that she still has problems with mucous in the throat, states it feels full. States is worse at night. States did not get medication filled that Dr. Garcia ordered at visit 10/24/23 because she has palpitations. Advised pt that I would speak to Dr. Garcia to see what else he recommends and will call pt back. Thanks, Maria Victoria

## 2023-11-15 NOTE — TELEPHONE ENCOUNTER
No active ENT pathology identified.  Given her symptomatic complain of excessive salivation did recommend considering a low-dose trial of Robinul which apparently she does not want to do.  I am happy to see her on a routine basis but not able to double book core her or over subscribed the clinic in some way for a routine issue which appears to be non ENT in nature.      She may want to consider other evaluations for cholinergic issues as per my previous instructions.  For this I refer her back to Dr. Bearden, her primary care provider.

## 2023-11-22 ENCOUNTER — TELEPHONE (OUTPATIENT)
Dept: OBSTETRICS AND GYNECOLOGY | Facility: CLINIC | Age: 83
End: 2023-11-22
Payer: MEDICARE

## 2023-11-22 NOTE — TELEPHONE ENCOUNTER
Pt states that she sees pink on the tissue after she urinates, no pain and no blood. Pt wants to get scheduled for her annual. Offered pt December 12 @2:15pm Tuesday pt verbally accepted and confirmed to scheudle her for December 12 @2:15.

## 2023-12-12 ENCOUNTER — OFFICE VISIT (OUTPATIENT)
Dept: OBSTETRICS AND GYNECOLOGY | Facility: CLINIC | Age: 83
End: 2023-12-12
Payer: MEDICARE

## 2023-12-12 VITALS
SYSTOLIC BLOOD PRESSURE: 143 MMHG | DIASTOLIC BLOOD PRESSURE: 74 MMHG | BODY MASS INDEX: 21.75 KG/M2 | WEIGHT: 118.94 LBS

## 2023-12-12 DIAGNOSIS — Z01.419 WELL WOMAN EXAM WITH ROUTINE GYNECOLOGICAL EXAM: Primary | ICD-10-CM

## 2023-12-12 DIAGNOSIS — N76.1 CHRONIC VAGINITIS: ICD-10-CM

## 2023-12-12 PROCEDURE — G0101 CA SCREEN;PELVIC/BREAST EXAM: HCPCS | Mod: GZ,S$GLB,, | Performed by: OBSTETRICS & GYNECOLOGY

## 2023-12-12 PROCEDURE — 1159F MED LIST DOCD IN RCRD: CPT | Mod: CPTII,S$GLB,, | Performed by: OBSTETRICS & GYNECOLOGY

## 2023-12-12 PROCEDURE — 81514 NFCT DS BV&VAGINITIS DNA ALG: CPT | Performed by: OBSTETRICS & GYNECOLOGY

## 2023-12-12 PROCEDURE — 99999 PR PBB SHADOW E&M-EST. PATIENT-LVL III: CPT | Mod: PBBFAC,,, | Performed by: OBSTETRICS & GYNECOLOGY

## 2023-12-12 PROCEDURE — 1159F PR MEDICATION LIST DOCUMENTED IN MEDICAL RECORD: ICD-10-PCS | Mod: CPTII,S$GLB,, | Performed by: OBSTETRICS & GYNECOLOGY

## 2023-12-12 PROCEDURE — 3078F DIAST BP <80 MM HG: CPT | Mod: CPTII,S$GLB,, | Performed by: OBSTETRICS & GYNECOLOGY

## 2023-12-12 PROCEDURE — 1157F PR ADVANCE CARE PLAN OR EQUIV PRESENT IN MEDICAL RECORD: ICD-10-PCS | Mod: CPTII,S$GLB,, | Performed by: OBSTETRICS & GYNECOLOGY

## 2023-12-12 PROCEDURE — 3077F PR MOST RECENT SYSTOLIC BLOOD PRESSURE >= 140 MM HG: ICD-10-PCS | Mod: CPTII,S$GLB,, | Performed by: OBSTETRICS & GYNECOLOGY

## 2023-12-12 PROCEDURE — 88175 CYTOPATH C/V AUTO FLUID REDO: CPT | Performed by: OBSTETRICS & GYNECOLOGY

## 2023-12-12 PROCEDURE — 3078F PR MOST RECENT DIASTOLIC BLOOD PRESSURE < 80 MM HG: ICD-10-PCS | Mod: CPTII,S$GLB,, | Performed by: OBSTETRICS & GYNECOLOGY

## 2023-12-12 PROCEDURE — 99999 PR PBB SHADOW E&M-EST. PATIENT-LVL III: ICD-10-PCS | Mod: PBBFAC,,, | Performed by: OBSTETRICS & GYNECOLOGY

## 2023-12-12 PROCEDURE — 1157F ADVNC CARE PLAN IN RCRD: CPT | Mod: CPTII,S$GLB,, | Performed by: OBSTETRICS & GYNECOLOGY

## 2023-12-12 PROCEDURE — 3077F SYST BP >= 140 MM HG: CPT | Mod: CPTII,S$GLB,, | Performed by: OBSTETRICS & GYNECOLOGY

## 2023-12-12 PROCEDURE — G0101 PR CA SCREEN;PELVIC/BREAST EXAM: ICD-10-PCS | Mod: GZ,S$GLB,, | Performed by: OBSTETRICS & GYNECOLOGY

## 2023-12-12 NOTE — PROGRESS NOTES
HPI:   83 y.o.   OB History          2    Para        Term                AB   2    Living             SAB   2    IAB        Ectopic        Multiple        Live Births                  No LMP recorded. Patient is postmenopausal.    Patient is  here for her annual gynecologic exam.  She has no complaints.     ROS:  GENERAL: No fever, chills, fatigability or weight loss.  SKIN: No rashes, itching or changes in color or texture of skin.  HEAD: No headaches or recent head trauma.  EYES: Visual acuity fine. No photophobia, ocular pain or diplopia.  EARS: Denies ear pain, discharge or vertigo.  NOSE: No loss of smell, no epistaxis or postnasal drip.  MOUTH & THROAT: No hoarseness or change in voice. No excessive gum bleeding.  NODES: Denies swollen glands.  CHEST: Denies HUSTON, cyanosis, wheezing, cough and sputum production.  CARDIOVASCULAR: Denies chest pain, PND, orthopnea or reduced exercise tolerance.  ABDOMEN: Appetite fine. No weight loss. Denies diarrhea, abdominal pain, hematemesis or blood in stool.  URINARY: No flank pain, dysuria or hematuria.  PERIPHERAL VASCULAR: No claudication or cyanosis.  MUSCULOSKELETAL: No joint stiffness or swelling. Denies back pain.  NEUROLOGIC: No history of seizures, paralysis, alteration of gait or coordination.    PE:   BP (!) 143/74   Wt 53.9 kg (118 lb 15 oz)   BMI 21.75 kg/m²   APPEARANCE: Well nourished, well developed, in no acute distress.  NECK: Neck symmetric without masses or thyromegaly.  BREASTS: Symmetrical, no skin changes or visible lesions. No palpable masses, nipple discharge or adenopathy bilaterally.  ABDOMEN: Flat. Soft. No tenderness or masses. No hepatosplenomegaly. No hernias. No CVA tenderness.  VULVA: No lesions. Normal female genitalia.  URETHRAL MEATUS: Normal size and location, no lesions, no prolapse.  URETHRA: No masses, tenderness, prolapse or scarring.  VAGINA: Moist and well rugated, no discharge, no significant cystocele or  rectocele.  CERVIX: No lesions and discharge. PAP done.  UTERUS: Normal size, regular shape, mobile, non-tender, bladder base nontender.  ADNEXA: No masses, tenderness or CDS nodularity.  ANUS PERINEUM: Normal.    PROCEDURES:  Pap smear    Assessment:  Normal Gynecologic Exam    Plan:  Mammogram and Colonoscopy if indicated by current recommendations.  Return to clinic in one year or for any problems or complaints.  No co  Has gyn organs  Culture done  Some itching , may need cream

## 2023-12-14 LAB
BACTERIAL VAGINOSIS DNA: NEGATIVE
CANDIDA GLABRATA DNA: NEGATIVE
CANDIDA KRUSEI DNA: NEGATIVE
CANDIDA RRNA VAG QL PROBE: NEGATIVE
T VAGINALIS RRNA GENITAL QL PROBE: NEGATIVE

## 2023-12-28 LAB
FINAL PATHOLOGIC DIAGNOSIS: NORMAL
Lab: NORMAL

## 2024-01-11 ENCOUNTER — LAB VISIT (OUTPATIENT)
Dept: LAB | Facility: HOSPITAL | Age: 84
End: 2024-01-11
Attending: STUDENT IN AN ORGANIZED HEALTH CARE EDUCATION/TRAINING PROGRAM
Payer: MEDICARE

## 2024-01-11 DIAGNOSIS — I10 ESSENTIAL HYPERTENSION: ICD-10-CM

## 2024-01-11 DIAGNOSIS — N18.30 ACUTE RENAL FAILURE SUPERIMPOSED ON STAGE 3 CHRONIC KIDNEY DISEASE, UNSPECIFIED ACUTE RENAL FAILURE TYPE, UNSPECIFIED WHETHER STAGE 3A OR 3B CKD: ICD-10-CM

## 2024-01-11 DIAGNOSIS — N17.9 ACUTE RENAL FAILURE SUPERIMPOSED ON STAGE 3 CHRONIC KIDNEY DISEASE, UNSPECIFIED ACUTE RENAL FAILURE TYPE, UNSPECIFIED WHETHER STAGE 3A OR 3B CKD: ICD-10-CM

## 2024-01-11 LAB
ALBUMIN/CREAT UR: 6.3 UG/MG (ref 0–30)
CREAT UR-MCNC: 79 MG/DL (ref 15–325)
MICROALBUMIN UR DL<=1MG/L-MCNC: 5 UG/ML

## 2024-01-11 PROCEDURE — 82043 UR ALBUMIN QUANTITATIVE: CPT | Performed by: STUDENT IN AN ORGANIZED HEALTH CARE EDUCATION/TRAINING PROGRAM

## 2024-01-17 NOTE — PROGRESS NOTES
Subjective:      Chief Complaint: Annual Exam    HPI  Ms. Alana Beth is an 84 yo F with Anxiety, benign recently identified solitary pulmonary nodule, severe PAD/celiac artery atherosclerosis (leading to gastroparesis) presenting for annual physical:    Annual screening labs gathered in preparation for this appointment:  -proteinuria screening, vitamin-D, and TSH/T4: Within normal limits   -lipid panel:  Very well controlled   -A1c:  5.9; represents moderate interval worsening of chronic prediabetes (5.7 one year ago)  -CMP:  Notable for resolution of recent ELIZABETH on CKD  -CBC:  Chronic/stable normocytic anemia only     Family, social, surgical Hx reviewed     Health Maintenance:  Due for routine vaccinations only    Past Medical History:   Diagnosis Date    Atherosclerotic peripheral vascular disease     Chronic cystic mastitis     Essential hypertension     Hypercholesterolemia     Osteopenia     Shingles      Past Surgical History:   Procedure Laterality Date    BREAST BIOPSY      biopsies benign    CELIAC ARTERY STENT Bilateral 12/08/2020    ESOPHAGOGASTRODUODENOSCOPY  08/20/2020    PERCUTANEOUS TRANSLUMINAL ANGIOPLASTY N/A 12/08/2020    Procedure: Pta (Angioplasty, Percutaneous, Transluminal);  Surgeon: Hernando Junior MD;  Location: [facility] CATH LAB/EP;  Service: Cardiology;  Laterality: N/A;     Family History   Problem Relation Age of Onset    Hypertension Mother     Diabetes Brother     Diabetes Sister     Colon cancer Neg Hx     Esophageal cancer Neg Hx      Social History     Socioeconomic History    Marital status: Single   Occupational History    Occupation: retired student loan assistance    Tobacco Use    Smoking status: Never    Smokeless tobacco: Never   Substance and Sexual Activity    Alcohol use: No    Drug use: No    Sexual activity: Not Currently     Social Determinants of Health     Stress: No Stress Concern Present (12/30/2019)    [employer] [place] [employer] - Occupational  Stress Questionnaire     Feeling of Stress : Not at all     Review of patient's allergies indicates:   Allergen Reactions    Benazepril Other (See Comments)     cough    Chlorthalidone     Iodinated contrast media     Iodine and iodide containing products     Lipitor [atorvastatin]      States she is allergic to all statin medications    Sertraline      Medication caused patient to get sick.    Spironolactone      Alana Beth had no medications administered during this visit.      Review of Systems      Objective:      Vitals:    01/18/24 1030   BP: 114/60   Pulse: 90   Resp: 18   Temp: 97.7 °F (36.5 °C)      Physical Exam  Current Outpatient Medications on File Prior to Visit   Medication Sig Dispense Refill    alirocumab (PRALUENT PEN) 150 mg/mL PnIj Inject 1 mL (150 mg total) into the skin every 14 (fourteen) days. 10 mL 10    ascorbic acid, vitamin C, (VITAMIN C) 1000 MG tablet Take 1,000 mg by mouth once daily.      aspirin 81 MG Chew Take 81 mg by mouth once daily.      azelastine (ASTELIN) 137 mcg (0.1 %) nasal spray USE 1 TO 2 SPRAY(S) IN EACH NOSTRIL TWICE DAILY      bempedoic acid (NEXLETOL) 180 mg Tab Take 1 tablet (180 mg total) by mouth once daily. 90 tablet 3    budesonide 1 mg/2 mL NbSp EMPTY CONTENTS OF 1 RESPULE INTO NASAL IRRIGATION SYSTEM, ADD DISTILLED WATER, SALT PACK, MIX & IRRIGATE. PERFORM TWICE DAILY      clopidogreL (PLAVIX) 75 mg tablet Take 1 tablet by mouth once daily 90 tablet 3    co-enzyme Q-10 50 mg capsule Take 50 mg by mouth once daily.      ipratropium (ATROVENT) 42 mcg (0.06 %) nasal spray 2 sprays by Each Nostril route 2 (two) times daily.      multivitamin (THERAGRAN) per tablet Take 1 tablet by mouth once daily.      NEILMED SINUS RINSE REFILL Pack use as directed      omega-3 fatty acids/fish oil (FISH OIL-OMEGA-3 FATTY ACIDS) 300-1,000 mg capsule Take 1 capsule by mouth once daily.      TURMERIC ORAL Take 1 packet by mouth once daily.      [DISCONTINUED] amLODIPine  (NORVASC) 10 MG tablet Take 1 tablet by mouth once daily 90 tablet 2    [DISCONTINUED] carvediloL (COREG) 6.25 MG tablet TAKE 1 TABLET BY MOUTH TWICE DAILY WITH MEALS 180 tablet 3    [DISCONTINUED] clorazepate (TRANXENE) 7.5 MG Tab TAKE 1 TABLET BY MOUTH ONCE DAILY AS NEEDED FOR ANXIETY 90 tablet 0    [DISCONTINUED] ezetimibe (ZETIA) 10 mg tablet TAKE 1 TABLET BY MOUTH ONCE DAILY IN THE EVENING 90 tablet 3    [DISCONTINUED] FLUAD QUAD 2023-24,65Y UP,,PF, 60 mcg (15 mcg x 4)/0.5 mL Syrg       cetirizine (ZYRTEC) 10 MG tablet Take 10 mg by mouth as needed for Allergies. Patients states only when provider prescribe it.      cilostazoL (PLETAL) 100 MG Tab Take 1 tablet (100 mg total) by mouth 2 (two) times daily. (Patient not taking: Reported on 2/15/2023) 60 tablet 11    diphenhydrAMINE (BENADRYL) 25 mg capsule Take 25 mg by mouth every 6 (six) hours as needed for Itching.      fluticasone propionate (FLONASE) 50 mcg/actuation nasal spray 1 spray by Each Nostril route once daily.      [DISCONTINUED] ferrous sulfate (FEOSOL) 325 mg (65 mg iron) Tab tablet Take 1 tablet by mouth once daily.       No current facility-administered medications on file prior to visit.         Assessment:       1. Physical exam, annual    2. Granulomatous lung disease    3. Chronic kidney disease, stage 3a    4. Aortic atherosclerosis    5. ELIZABETH (acute kidney injury)        Plan:       Physical exam, annual   - annual screening labs discussed    - patient to complete routine vaccinations (RSV, Shingrix, and COVID booster) via pharmacy    - health maintenance otherwise up-to-date     Granulomatous lung disease   - continue to monitor and treat as needed be     Chronic kidney disease, stage 3a  ELIZABETH (acute kidney injury)   - ELIZABETH is resolved, but low-grade CKD 3A persists    Aortic atherosclerosis   - will continue to intervened upon all modifiable risk factors     Return to clinic in one year for annual exam or sooner if dictated by labs or  illness.      Other orders  -     amLODIPine (NORVASC) 10 MG tablet; Take 1 tablet (10 mg total) by mouth once daily.  Dispense: 90 tablet; Refill: 3  -     carvediloL (COREG) 6.25 MG tablet; Take 1 tablet (6.25 mg total) by mouth 2 (two) times daily with meals.  Dispense: 180 tablet; Refill: 3  -     clorazepate (TRANXENE) 7.5 MG Tab; TAKE 1 TABLET BY MOUTH ONCE DAILY AS NEEDED FOR ANXIETY  Dispense: 90 tablet; Refill: 1  -     ezetimibe (ZETIA) 10 mg tablet; Take 1 tablet (10 mg total) by mouth every evening.  Dispense: 90 tablet; Refill: 3  -     ferrous sulfate (FEOSOL) 325 mg (65 mg iron) Tab tablet; Take 1 tablet (325 mg total) by mouth 3 (three) times a week.  Dispense: 36 tablet; Refill: 3

## 2024-01-18 ENCOUNTER — OFFICE VISIT (OUTPATIENT)
Dept: INTERNAL MEDICINE | Facility: CLINIC | Age: 84
End: 2024-01-18
Payer: MEDICARE

## 2024-01-18 VITALS
OXYGEN SATURATION: 98 % | SYSTOLIC BLOOD PRESSURE: 114 MMHG | RESPIRATION RATE: 18 BRPM | HEART RATE: 90 BPM | DIASTOLIC BLOOD PRESSURE: 60 MMHG | WEIGHT: 113.31 LBS | BODY MASS INDEX: 20.85 KG/M2 | TEMPERATURE: 98 F | HEIGHT: 62 IN

## 2024-01-18 DIAGNOSIS — I70.0 AORTIC ATHEROSCLEROSIS: ICD-10-CM

## 2024-01-18 DIAGNOSIS — N17.9 AKI (ACUTE KIDNEY INJURY): ICD-10-CM

## 2024-01-18 DIAGNOSIS — N18.31 CHRONIC KIDNEY DISEASE, STAGE 3A: ICD-10-CM

## 2024-01-18 DIAGNOSIS — J84.10 GRANULOMATOUS LUNG DISEASE: ICD-10-CM

## 2024-01-18 DIAGNOSIS — Z00.00 PHYSICAL EXAM, ANNUAL: Primary | ICD-10-CM

## 2024-01-18 PROCEDURE — 99999 PR PBB SHADOW E&M-EST. PATIENT-LVL IV: CPT | Mod: PBBFAC,,, | Performed by: STUDENT IN AN ORGANIZED HEALTH CARE EDUCATION/TRAINING PROGRAM

## 2024-01-18 PROCEDURE — 99214 OFFICE O/P EST MOD 30 MIN: CPT | Mod: S$GLB,,, | Performed by: STUDENT IN AN ORGANIZED HEALTH CARE EDUCATION/TRAINING PROGRAM

## 2024-01-18 RX ORDER — AMLODIPINE BESYLATE 10 MG/1
10 TABLET ORAL DAILY
Qty: 90 TABLET | Refills: 3 | Status: SHIPPED | OUTPATIENT
Start: 2024-01-18

## 2024-01-18 RX ORDER — CLORAZEPATE DIPOTASSIUM 7.5 MG/1
TABLET ORAL
Qty: 90 TABLET | Refills: 1 | Status: SHIPPED | OUTPATIENT
Start: 2024-01-18

## 2024-01-18 RX ORDER — CARVEDILOL 6.25 MG/1
6.25 TABLET ORAL 2 TIMES DAILY WITH MEALS
Qty: 180 TABLET | Refills: 3 | Status: SHIPPED | OUTPATIENT
Start: 2024-01-18

## 2024-01-18 RX ORDER — FERROUS SULFATE 325(65) MG
325 TABLET ORAL
Qty: 36 TABLET | Refills: 3 | Status: SHIPPED | OUTPATIENT
Start: 2024-01-19

## 2024-01-18 RX ORDER — INFLUENZA A VIRUS A/VICTORIA/4897/2022 IVR-238 (H1N1) ANTIGEN (FORMALDEHYDE INACTIVATED), INFLUENZA A VIRUS A/DARWIN/6/2021 IVR-227 (H3N2) ANTIGEN (FORMALDEHYDE INACTIVATED), INFLUENZA B VIRUS B/AUSTRIA/1359417/2021 BVR-26 ANTIGEN (FORMALDEHYDE INACTIVATED), INFLUENZA B VIRUS B/PHUKET/3073/2013 BVR-1B ANTIGEN (FORMALDEHYDE INACTIVATED) 15; 15; 15; 15 UG/.5ML; UG/.5ML; UG/.5ML; UG/.5ML
INJECTION, SUSPENSION INTRAMUSCULAR
COMMUNITY
Start: 2023-11-08 | End: 2024-01-18

## 2024-01-18 RX ORDER — IPRATROPIUM BROMIDE 42 UG/1
2 SPRAY, METERED NASAL 2 TIMES DAILY
COMMUNITY
Start: 2024-01-17

## 2024-01-18 RX ORDER — EZETIMIBE 10 MG/1
10 TABLET ORAL NIGHTLY
Qty: 90 TABLET | Refills: 3 | Status: SHIPPED | OUTPATIENT
Start: 2024-01-18

## 2024-03-06 ENCOUNTER — OFFICE VISIT (OUTPATIENT)
Dept: CARDIOLOGY | Facility: CLINIC | Age: 84
End: 2024-03-06
Payer: MEDICARE

## 2024-03-06 VITALS
WEIGHT: 115.31 LBS | HEIGHT: 63 IN | BODY MASS INDEX: 20.43 KG/M2 | DIASTOLIC BLOOD PRESSURE: 64 MMHG | SYSTOLIC BLOOD PRESSURE: 118 MMHG | HEART RATE: 82 BPM

## 2024-03-06 DIAGNOSIS — I10 ESSENTIAL HYPERTENSION: ICD-10-CM

## 2024-03-06 DIAGNOSIS — I70.0 AORTIC ATHEROSCLEROSIS: ICD-10-CM

## 2024-03-06 DIAGNOSIS — E78.00 HYPERCHOLESTEREMIA: ICD-10-CM

## 2024-03-06 DIAGNOSIS — I70.0 ATHEROSCLEROSIS OF AORTIC ARCH: ICD-10-CM

## 2024-03-06 DIAGNOSIS — R10.9 ABDOMINAL DISCOMFORT: ICD-10-CM

## 2024-03-06 DIAGNOSIS — K55.9 MESENTERIC ISCHEMIA: ICD-10-CM

## 2024-03-06 DIAGNOSIS — I70.8 CELIAC ARTERY ATHEROSCLEROSIS: Primary | ICD-10-CM

## 2024-03-06 DIAGNOSIS — Z78.9 STATIN INTOLERANCE: ICD-10-CM

## 2024-03-06 DIAGNOSIS — E78.2 MIXED HYPERLIPIDEMIA: ICD-10-CM

## 2024-03-06 DIAGNOSIS — I73.9 PAD (PERIPHERAL ARTERY DISEASE): ICD-10-CM

## 2024-03-06 DIAGNOSIS — I70.219 ATHEROSCLEROTIC PERIPHERAL VASCULAR DISEASE WITH INTERMITTENT CLAUDICATION: ICD-10-CM

## 2024-03-06 PROCEDURE — 99215 OFFICE O/P EST HI 40 MIN: CPT | Mod: S$GLB,,, | Performed by: INTERNAL MEDICINE

## 2024-03-06 PROCEDURE — 99999 PR PBB SHADOW E&M-EST. PATIENT-LVL IV: CPT | Mod: PBBFAC,,, | Performed by: INTERNAL MEDICINE

## 2024-03-06 RX ORDER — PANTOPRAZOLE SODIUM 40 MG/1
40 TABLET, DELAYED RELEASE ORAL DAILY
COMMUNITY
Start: 2024-02-28

## 2024-03-06 NOTE — PATIENT INSTRUCTIONS
Assessment/Plan:  Alana Beth is a 82 y.o. female with PAD, celiac artery stenosis s/p PTAS, HTN, HLD, palpitations, who presents for a follow up appointment.     1. 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 Beth continues to do well with no abdominal pain, weight loss, claudication or tissue loss.  Continue ASA/Plavix/Praluent/Bempedoic acid.    2. PAD- Pt with known BLE PAD.  She has no claudication, rest pain or tissue loss to suggest CLI.  Continue ASA/Plavix/Praluent/Bempedoic acid, and cilostazol 100mg BID.       3. HLD- LDL 90 on 1/11/2024.  Continue Praluent 150 mg every 14 days, bempedoic acid 180 mg daily, and zetia 10 mg daily.          4. HTN- Controlled. Continue current medications.      5. Preoperative Cardiac Risk Stratification Prior to Planned Balloon Sinuplasty Procedure- Mrs. Beth has no chest pain, no heart failure symptoms, no abdominal pain, and no arrhythmia.  She can perform greater than 4 METS without difficulty.  She is at low risk for a perioperative major adverse cardiac event during this moderate risk procedure.  Revised Cardiac Risk Index of 3.9%.  No further testing indicated.  Proceed with surgery.     Follow up in 6 months

## 2024-03-06 NOTE — PROGRESS NOTES
"[facility]      CC: Celiac Dewey Stenosis      Patient ID: Alana Beth is a 82 y.o. female with PAD, celiac artery stenosis s/p PTAS, HTN, HLD, palpitations, who presents for a follow up appointment.  Pertinent history/events are as follows:     -Pt presents for evaluation of PAD/weight loss.    -At our initial clinic visit on 9/4/2020, Mrs. Beth reported  "not feeling well since 11/2019".  Main complaint is weakness, fatigue and lack of appetite.  She has lost 25 pounds since 11/2019.  No definite abdominal pain.  No chest pain, SOB, or LE edema.  Exam with audible abdominal bruit.  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    Plan:   Celiac Axis Stenosis- Mrs. 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  Pertinent findings include 25 pound weight loss and abdominal bruit.  No definite abdominal pain, although mild abdominal tenderness noted on exam.  Chronic mesenteric ischemia is also a possibility in this pt with known PAD.  Given these findings, will refer to Vascular Surgery for evaluation.  Pt has several risk factors for CAD, hence, will check nuclear stress test and echo to evaluate further.  PAD- Pt with known BLE PAD.  Continue ASA.  Pt states she's not taking a statin due to issues with liver disease in the past.  Will check outside records before starting statin.  Check updated lipid panel.      -At follow up clinic visit on 9/25/2020, Mrs. Beth reported no new symptoms.  States she still does not feel well.  Pt evaluated by Vascular Surgery (Enrrique Preciado) on 9/16/2020, who recommend psych evaluation and continued GI workup.  Mesenteric Utrasound on 9/10/2020 revealed a velocity elevation of 378 cm/s is visualized near the Celiac artery origin within a region of focal narrowing, suggestive of a greater than 70% stenosis.  Nuclear Stress Test on " 9/23/2020 revealed no evidence from myocardial ischemia or injury.  The EKG portion of this study is abnormal but not diagnostic.  Echo on 9/10/2020 revealed normal left ventricular systolic function with EF of 60%; concentric left ventricular remodeling; normal LV diastolic function; normal right ventricular systolic function; mild left atrial enlargement; mild tricuspid regurgitation; estimated PA systolic pressure is 30 mmHg; normal central venous pressure (3 mmHg).  Labs from 9/10/2020 show total cholesterol of 348 with LDL of 234.   Plan:   Celiac Axis Stenosis- Mrs. 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  Pertinent findings include 25 pound weight loss and abdominal bruit.  No definite abdominal pain, although mild abdominal tenderness noted on exam.  Chronic mesenteric ischemia is also a possibility in this pt with known PAD.  Pt evaluated by Vascular Surgery (Enrrique Preciado) on Recommend psych and continued GI workup.  Mesenteric Utrasound on 9/10/2020 revealed a velocity elevation of 378 cm/s is visualized near the Celiac artery origin within a region of focal narrowing, suggestive of a greater than 70% stenosis.  Nuclear Stress Test on 9/23/2020 revealed no evidence from myocardial ischemia or injury.  The EKG portion of this study is abnormal but not diagnostic.  Echo on 9/10/2020 revealed normal left ventricular systolic function with EF of 60%; concentric left ventricular remodeling; normal LV diastolic function; normal right ventricular systolic function; mild left atrial enlargement; mild tricuspid regurgitation; estimated PA systolic pressure is 30 mmHg; normal central venous pressure (3 mmHg).  Given these findings, will refer to Dr. Junior and GI for evaluation.       PAD- Pt with known BLE PAD.  She has no claudication, rest pain or tissue loss to suggest CLI.  Start rosuvastatin 40 mg daily.  Continue ASA 81 mg daily.  Check carotid ultrasound  prior to next visit.   HLD-  Labs from 9/10/2020 show total cholesterol of 348 with LDL of 234.  Start rosuvastatin 40 mg daily. Monitor LFT's.      -At clinic visit on 10/26/2020, Mrs. Cole reports that she had not starting statin therapy due to insurance issues. She was prescribed alirocumab 75 mg injections and she has received one dose so far. She would prefer oral therapy for her hypercholesterolemia. She notes much improvement in her symptoms of sputum production after starting omeprazole. Patient was evaluated by GI for abdominal pain associated with celiac axis syndrome and increased amounts of sputum. EGD done in 8/2020 she was noted to have a submucosal esophageal nodule that path revealed chronic esophagitis. She was started on Omeprazole 20 mg BID and barium esophagram requested. Barium study was consistent with mild esophageal dysmotility.  Patient has not seen Dr. Junior yet for possible angiogram, she did not have an appointment set up yet.   Plan:  Plan:  -- Schedule pt to see Dr. Junior   -- continue omeprazole, GI follow up PRN for symptomatic care   --Start bempedoic acid 180 mg daily due to statin intolerance    -On 12/8/2020, pt underwent celiac artery intervention with Dr. Junior:  S/p aortogram with selective celiac and SMA angiogram                Initially started R radial then switched to L radial               Patent SMA and 80% celiac stenosis              IVUS guided celiac PTAS              6.0 x 18 and 6 x 14 mm Express SD dilated to 18 noemí     -On 12/16/2020, pt admitted at [facility] with abdominal pain.  Abd ultrasound revealed  patent stent in celiac artery and no significant changes with respiration to suggest extrinsic compression by median arcuate ligament.  Velocities suggest only moderate stenosis of the stent.   Symptoms improved and pt was discharged home on ASA/Plavix/Praluent.       -At clinic visit on 1/27/2021, Mrs. Beth reported feeling well with no abdominal  pain, nausea, vomiting, or anorexia.  Taking all medications as prescribed.  Labs from 12/19/2020 shows  vs 234 on 9/10/2020.    Plan:   Celiac Axis Stenosis-  Now s/p IVUS guided celiac PTAS with 6.0 x 18 and 6 x 14 mm Express SD on 12/18/2020.  Mrs. Beth reports feeling much better with no abdominal pain, n/v, or anorexia.  Abd ultrasound revealed  patent stent in celiac artery and no significant changes with respiration to suggest extrinsic compression by median arcuate ligament.  Velocities suggest only moderate stenosis of the stent.  Continue ASA/Plavix/Praluent.     PAD- Pt with known BLE PAD.  She has no claudication, rest pain or tissue loss to suggest CLI.  Continue ASA/Plavix/Praluent.      HLD-  Labs from 12/19/2020 shows  vs 234 on 9/10/2020.  Continue Praluent.       -At clinic visit on 4/28/2021, Mrs. Cole reported no abdominal pain, nausea, or anorexia.  Labs from 4/21/2021 show  vs 150 on 12/19/2021.  Plan:  Celiac Axis Stenosis-  Now s/p IVUS guided celiac artery PTAS with 6.0 x 18 and 6 x 14 mm Express SD on 12/18/2020.  Mrs. Beth continues to do well with no abdominal pain, n/v, or anorexia.  Abd ultrasound on 12/18/2020 revealed  patent stent in celiac artery and no significant changes with respiration to suggest extrinsic compression by median arcuate ligament.  Velocities suggest only moderate stenosis of the stent.  Continue ASA/Plavix/Praluent.     PAD- Pt with known BLE PAD.  She has no claudication, rest pain or tissue loss to suggest CLI.  Continue ASA/Plavix/Praluent.      HLD-  Labs from 4/21/2021 show  vs 150 on 12/19/2021.  Increase Praluent to 150 mg every 14 days.     -At clinic visit on 7/28/2021, Mrs. Cole reported no chest pain, SOB, weight loss, nausea, TIA symptoms or syncope.  States she's been injecting Praluent in her thigh instead of abdomen.  Labs from 7/19/2021 show LDL now 124 vs 145 on 4/21/2021.  Of note, pt states she did not fast  prior to the labs being drawn.   Plan:   Celiac Axis Stenosis-  Now s/p IVUS guided celiac PTAS with 6.0 x 18 and 6 x 14 mm Express SD on 12/18/2020.  Mrs. Beth has no abdominal pain, n/v, or anorexia.  Abd ultrasound on 12/18/2020 revealed  patent stent in celiac artery and no significant changes with respiration to suggest extrinsic compression by median arcuate ligament.  Velocities suggest only moderate stenosis of the stent.  Continue ASA/Plavix/Praluent.   Surveillance imaging scheduled by Dr. RENÉE Aguilar.   PAD- Pt with known BLE PAD.  She has no claudication, rest pain or tissue loss to suggest CLI.  Continue ASA/Plavix/Praluent.      HLD-  Labs from 7/19/2021 show LDL now 124 vs 145 on 4/21/2021.  The modest reduction in LDL despite increasing Praluent to 150 mg every 14 days may be due to the pt injecting Praluent in the thigh instead of abdomen.  Continue zetia and Praluent.  Before adding bempedoic acid, will have pt inject Praluent in the abdomen, and repeat lipids in 3 months.         HTN- Continue current medications.    History of Pulmonary Nodules- Check CT chest without contrast to evaluate further.     -At clinic visit on 11/8/2021, Mrs. Cole reported no chest pain, abdominal pain, SOB, LE edema, TIA symptoms or syncope.  States she's still injecting praluent in the thigh and not the abdomen, as discussed at our previous clinic visit.  Labs from 11/2/2021 shows LDL   142 vs 124 on 7/19/2021.  CT Chest w/o Contrast on 8/4/2021 revealed no pulmonary abnormalities requiring continued surveillance.   There is a Ill-defined hypoattenuating hepatic lesion, suboptimally evaluated in the absence of intravenous contrast.     Plan:  Celiac Axis Stenosis-  Now s/p IVUS guided celiac PTAS with 6.0 x 18 and 6 x 14 mm Express SD on 12/18/2020.  Mrs. Beth has no abdominal pain, n/v, or anorexia.  Abd ultrasound on 12/18/2020 revealed  patent stent in celiac artery and no significant changes with respiration  to suggest extrinsic compression by median arcuate ligament.  Velocities suggest only moderate stenosis of the stent.  Continue ASA/Plavix/Praluent.   Surveillance imaging scheduled by Dr. RENÉE Aguilar.   PAD- Pt with known BLE PAD.  She has no claudication, rest pain or tissue loss to suggest CLI.  Continue ASA/Plavix/Praluent.      HLD- Mrs. Beth states she's still injecting praluent in the thigh and not the abdomen, as discussed at clinic visit on 7/28/2021.  Labs from 11/2/2021 shows LDL   142 vs 124 on 7/19/2021.  The increase in LDL despite increasing Praluent to 150 mg every 14 days may be due to the pt injecting Praluent in the thigh instead of abdomen.  Pt will try to do injection in abdomen.  Continue zetia and Praluent.  Before adding bempedoic acid, will have pt inject Praluent in the abdomen, and repeat lipids in 3 months.  HTN- Continue current medications.    History of Pulmonary Nodules-  CT Chest w/o Contrast on 8/4/2021 revealed no pulmonary abnormalities requiring continued surveillance.    Liver Lesion- Evaluate further with contrast enhanced abdominal MRI, and refer to Hepatology.    -At clinic visit on 2/9/2022, Mrs. Beth reported feeling better. Her appetite has been okay but not as good as before. She denies abdominal pain. She lost about 2 pounds since November. She denies claudications but she does have pain in her legs when she starts walking which is relieved by continued walking.  Plan:   Celiac Axis Stenosis- s/p IVUS guided celiac PTA on 12/18/2020.  Mrs. Beth has no abdominal pain, n/v, or anorexia. Continue ASA/Plavix/Praluent. Repeat mesenteric artery US.  PAD- Pt with known BLE PAD.  She has no claudication, rest pain or tissue loss to suggest CLI.  Continue ASA/Plavix. Start cilostazol 100mg BID.     HLD- LDL remains not at goal despite using praluent. Will attempt switching to bempedoic acid and evaluate response with repeat lipid panel in 3 months.  HTN- Controlled.  Continue current medications.      -At clinic visit on 5/11/2022, Mrs. Beth reports no abdominal pain, weight loss, claudication or tissue loss.  Pt states she did not start bempedoic acid as prescribed.  Mesenteric Ultrasound on 5/5/2022 revealed the celiac trunk has greater than 70% stenosis. In-stent restenosis noted.  The proximal superior mesenteric artery has greater than 70% stenosis.  The proximal inferior mesenteric artery has greater than 70% stenosis.  Plan:   Celiac Axis Stenosis s/p IVUS guided celiac PTA on 12/18/2020- Mrs. Beth reports no abdominal pain, weight loss, claudication or tissue loss.  Mesenteric Ultrasound on 5/5/2022 revealed the celiac trunk has greater than 70% stenosis. In-stent restenosis noted.  The proximal superior mesenteric artery has greater than 70% stenosis.  The proximal inferior mesenteric artery has greater than 70% stenosis.  Continue ASA/Plavix/Praluent.  PAD- Pt with known BLE PAD.  She has no claudication, rest pain or tissue loss to suggest CLI.  Continue ASA/Plavix and cilostazol 100mg BID.     HLD- LDL remains not at goal despite using max dose praluent. Pt states she did not start bempedoic acid as prescribed.  Unclear if pt is compliant with these medications.  Check Lipo (a).  Continue current medications.    HTN- Controlled. Continue current medications.      -At clinic visit on 8/15/2022, Mrs. Beth reports doing well with no abdominal pain, weight loss, claudication or tissue loss.  Pt started taking bempedoic acid as prescribed.  LDL 78 on 8/8/2022 vs 123 on 5/5/2022.  Lipoprotein (a) is elevated at 324.  Plan:   Celiac Axis Stenosis s/p IVUS guided celiac PTA on 12/18/2020- Mrs. Beth reports no abdominal pain, weight loss, claudication or tissue loss.  Mesenteric Ultrasound on 5/5/2022 revealed the celiac trunk has greater than 70% stenosis. In-stent restenosis noted.  The proximal superior mesenteric artery has greater than 70% stenosis.  The  proximal inferior mesenteric artery has greater than 70% stenosis.  Continue ASA/Plavix/Praluent/Bempedoic acid.  PAD- Pt with known BLE PAD.  She has no claudication, rest pain or tissue loss to suggest CLI.  Continue ASA/Plavix/Praluent/Bempedoic acid, and cilostazol 100mg BID.     HLD- LDL 78 on 8/8/2022 vs 123 on 5/5/2022.  Lipoprotein (a) is elevated at 324.  Continue Praluent 150 mg every 14 days and bempedoic acid 180 mg daily.      HTN- Controlled. Continue current medications.      -At clinic visit on 2/15/2023, Mrs. Beth reports no abdominal pain, claudication or tissue loss. Weight is stable at 112 pounds.  LDL 91 on 12/29/2022.   Plan:   Celiac Axis Stenosis s/p IVUS guided celiac PTA on 12/18/2020- Mrs. Beth reports no abdominal pain, weight loss, claudication or tissue loss.  Mesenteric Ultrasound on 5/5/2022 revealed the celiac trunk has greater than 70% stenosis. In-stent restenosis noted.  The proximal superior mesenteric artery has greater than 70% stenosis.  The proximal inferior mesenteric artery has greater than 70% stenosis.  Continue ASA/Plavix/Praluent/Bempedoic acid.  PAD- Pt with known BLE PAD.  She has no claudication, rest pain or tissue loss to suggest CLI.  Continue ASA/Plavix/Praluent/Bempedoic acid, and cilostazol 100mg BID.     HLD- LDL 91 on 12/29/2022.  Continue Praluent 150 mg every 14 days, bempedoic acid 180 mg daily, and zetia 10 mg daily.        HTN- Controlled. Continue current medications.      -At clinic visit on 8/14/2023, Mrs. Beth reports doing well with no chest pain, abdominal pain, SOB, TIA symptoms or syncope.  LDL 79 on 8/7/2023.  Plan:   Celiac Axis Stenosis s/p IVUS guided celiac PTA on 12/18/2020- Mrs. Beth is doing well with no abdominal pain, weight loss, claudication or tissue loss.  Continue ASA/Plavix/Praluent/Bempedoic acid.  PAD- Pt with known BLE PAD.  She has no claudication, rest pain or tissue loss to suggest CLI.  Continue  ASA/Plavix/Praluent/Bempedoic acid, and cilostazol 100mg BID.     HLD- LDL 79 on 8/7/2023.  Continue Praluent 150 mg every 14 days, bempedoic acid 180 mg daily, and zetia 10 mg daily.        HTN- Controlled. Continue current medications.      HPI:  Mrs. Beth has no chest pain, no heart failure symptoms, no abdominal pain, and no arrhythmia.  She can perform greater than 4 METS without difficulty.  She is scheduled to undergo balloon sinuplasty procedure with Dr. Billy Burger at outside facility on 3/20/2024.  LDL 90 on 1/11/2024.      Past Medical History:   Diagnosis Date    Atherosclerotic peripheral vascular disease     Chronic cystic mastitis     Essential hypertension     Hypercholesterolemia     Osteopenia     Shingles      Past Surgical History:   Procedure Laterality Date    BREAST BIOPSY      biopsies benign    CELIAC ARTERY STENT Bilateral 12/08/2020    ESOPHAGOGASTRODUODENOSCOPY  08/20/2020    PERCUTANEOUS TRANSLUMINAL ANGIOPLASTY N/A 12/08/2020    Procedure: Pta (Angioplasty, Percutaneous, Transluminal);  Surgeon: Hernando Junior MD;  Location: [facility] CATH LAB/EP;  Service: Cardiology;  Laterality: N/A;     Social History     Socioeconomic History    Marital status: Single   Occupational History    Occupation: retired student loan assistance    Tobacco Use    Smoking status: Never    Smokeless tobacco: Never   Substance and Sexual Activity    Alcohol use: No    Drug use: No    Sexual activity: Not Currently     Social Determinants of Health     Stress: No Stress Concern Present (12/30/2019)    [employer] [place] [employer] - Occupational Stress Questionnaire     Feeling of Stress : Not at all     Family History   Problem Relation Age of Onset    Hypertension Mother     Diabetes Brother     Diabetes Sister     Colon cancer Neg Hx     Esophageal cancer Neg Hx        Review of patient's allergies indicates:   Allergen Reactions    Benazepril Other (See Comments)     cough    Chlorthalidone      Iodinated contrast media     Iodine and iodide containing products     Lipitor [atorvastatin]      States she is allergic to all statin medications    Sertraline      Medication caused patient to get sick.    Spironolactone        Medication List with Changes/Refills   Current Medications    ALIROCUMAB (PRALUENT PEN) 150 MG/ML PNIJ    Inject 1 mL (150 mg total) into the skin every 14 (fourteen) days.    AMLODIPINE (NORVASC) 10 MG TABLET    Take 1 tablet (10 mg total) by mouth once daily.    ASCORBIC ACID, VITAMIN C, (VITAMIN C) 1000 MG TABLET    Take 1,000 mg by mouth once daily.    ASPIRIN 81 MG CHEW    Take 81 mg by mouth once daily.    AZELASTINE (ASTELIN) 137 MCG (0.1 %) NASAL SPRAY    as needed.    BEMPEDOIC ACID (NEXLETOL) 180 MG TAB    Take 1 tablet (180 mg total) by mouth once daily.    BUDESONIDE 1 MG/2 ML NBSP    EMPTY CONTENTS OF 1 RESPULE INTO NASAL IRRIGATION SYSTEM, ADD DISTILLED WATER, SALT PACK, MIX & IRRIGATE. PERFORM TWICE DAILY    CARVEDILOL (COREG) 6.25 MG TABLET    Take 1 tablet (6.25 mg total) by mouth 2 (two) times daily with meals.    CETIRIZINE (ZYRTEC) 10 MG TABLET    Take 10 mg by mouth as needed for Allergies. Patients states only when provider prescribe it.    CLOPIDOGREL (PLAVIX) 75 MG TABLET    Take 1 tablet by mouth once daily    CLORAZEPATE (TRANXENE) 7.5 MG TAB    TAKE 1 TABLET BY MOUTH ONCE DAILY AS NEEDED FOR ANXIETY    CO-ENZYME Q-10 50 MG CAPSULE    Take 50 mg by mouth once daily.    DIPHENHYDRAMINE (BENADRYL) 25 MG CAPSULE    Take 25 mg by mouth every 6 (six) hours as needed for Itching.    EZETIMIBE (ZETIA) 10 MG TABLET    Take 1 tablet (10 mg total) by mouth every evening.    FERROUS SULFATE (FEOSOL) 325 MG (65 MG IRON) TAB TABLET    Take 1 tablet (325 mg total) by mouth 3 (three) times a week.    FLUTICASONE PROPIONATE (FLONASE) 50 MCG/ACTUATION NASAL SPRAY    1 spray by Each Nostril route as needed.    IPRATROPIUM (ATROVENT) 42 MCG (0.06 %) NASAL SPRAY    2 sprays by Each  "Nostril route 2 (two) times daily.    MULTIVITAMIN (THERAGRAN) PER TABLET    Take 1 tablet by mouth once daily.    NEILMED SINUS RINSE REFILL PACK    use as directed    OMEGA-3 FATTY ACIDS/FISH OIL (FISH OIL-OMEGA-3 FATTY ACIDS) 300-1,000 MG CAPSULE    Take 1 capsule by mouth once daily.    PANTOPRAZOLE (PROTONIX) 40 MG TABLET    Take 40 mg by mouth once daily.    TURMERIC ORAL    Take 1 packet by mouth once daily.   Discontinued Medications    CILOSTAZOL (PLETAL) 100 MG TAB    Take 1 tablet (100 mg total) by mouth 2 (two) times daily.       Review of Systems  Constitution: Denies chills, fever, and sweats.  HENT: Denies headaches or blurry vision.  Cardiovascular: Denies chest pain or irregular heart beat.  Respiratory: Denies cough or shortness of breath.  Gastrointestinal: Negative for abdominal pain and weight loss.  Musculoskeletal: Denies muscle cramps.  Neurological: Denies dizziness or focal weakness.  Psychiatric/Behavioral: Normal mental status.  Hematologic/Lymphatic: Denies bleeding problem or easy bruising/bleeding.  Skin: Denies rash or suspicious lesions    Physical Examination  /64   Pulse 82   Ht 5' 2.5" (1.588 m)   Wt 52.3 kg (115 lb 4.8 oz)   BMI 20.75 kg/m²     Constitutional: No acute distress, conversant  HEENT: Sclera anicteric, Pupils equal, round and reactive to light, extraocular motions intact, Oropharynx clear  Neck: No JVD, no carotid bruits  Cardiovascular: regular rate and rhythm, no murmur, rubs or gallops, normal S1/S2  Pulmonary: Clear to auscultation bilaterally  Abdominal: Abdomen soft with no TTP, positive bowel sounds  Extremities: No lower extremity edema   Pulses:  Carotid pulses are 2+ on the right side, and 2+ on the left side.  Radial pulses are 2+ on the right side, and 2+ on the left side.   Femoral pulses are 2+ on the right side, and 2+ on the left side.  Popliteal pulses are 2+ on the right side, and 2+ on the left side.   Dorsalis pedis pulses are 2+ on the " "right side, and 2+ on the left side.   Posterior tibial pulses are 2+ on the right side, and 2+ on the left side.    Skin: No ecchymosis, erythema, or ulcers  Psych: Alert and oriented x 3, appropriate affect  Neuro: CNII-XII intact, no focal deficits    Labs:  Most Recent Data  CBC:   Lab Results   Component Value Date    WBC 3.74 (L) 01/11/2024    HGB 10.7 (L) 01/11/2024    HCT 35.1 (L) 01/11/2024     01/11/2024    MCV 87 01/11/2024    RDW 14.1 01/11/2024     BMP:   Lab Results   Component Value Date     01/11/2024    K 4.2 01/11/2024     01/11/2024    CO2 28 01/11/2024    BUN 15 01/11/2024    CREATININE 1.0 01/11/2024    GLU 90 01/11/2024    CALCIUM 9.8 01/11/2024    MG 2.3 12/16/2020    PHOS 2.9 12/16/2020     LFTS;   Lab Results   Component Value Date    PROT 7.5 01/11/2024    ALBUMIN 3.8 01/11/2024    BILITOT 0.4 01/11/2024    AST 20 01/11/2024    ALKPHOS 31 (L) 01/11/2024    ALT 10 01/11/2024     COAGS: No results found for: "INR", "PROTIME", "PTT"  FLP:   Lab Results   Component Value Date    CHOL 191 01/11/2024    HDL 94 (H) 01/11/2024    LDLCALC 90.4 01/11/2024    TRIG 33 01/11/2024    CHOLHDL 49.2 01/11/2024     CARDIAC:   Lab Results   Component Value Date    TROPONINI <0.01 09/06/2021    BNP <10 08/15/2020       EKG 8/15/2020:  Normal sinus rhythm  Low voltage QRS  Anteroseptal infarct (cited on or before 15-AUG-2020)    Mesenteric Ultrasound 5/5/2022:  The celiac trunk has greater than 70% stenosis. In-stent restenosis noted.  The proximal superior mesenteric artery has greater than 70% stenosis.  The proximal inferior mesenteric artery has greater than 70% stenosis.  There is no evidence of an Abdominal Aortic Aneurysm.  Aortic atherosclerosis.    CT Chest w/o Contrast 8/4/2021:  No pulmonary abnormalities requiring continued surveillance.   Multi-vessel coronary artery atherosclerosis.   Ill-defined hypoattenuating hepatic lesion, suboptimally evaluated in the absence of " intravenous contrast.  Consider further characterization with contrast enhanced abdominal MRI.     Mesenteric Ultrasound 12/18/2020:  Color flow duplex imaging reveals patent superior mesenteric and inferior mesenteric arteries with no evidence of a hemodynamically   significant stenosis. The Celiac artery stent is patent with and velocities are 168 cm/s at rest; 157 cm/s with inspiration; 153 cm/s   with expiration. No suggestion of MALS. An accelerated flow velocity of 282 cm/s is visualized at the distal edge on the Celiac stent.   However, this region appear widely patent.       Nuclear Stress Test 9/23/2020:  Normal myocardial perfusion scan.    The perfusion scan is free of evidence from myocardial ischemia or injury.    Visually estimated ejection fraction is normal at rest and normal at stress.    There is normal wall motion at rest and post stress.    LV cavity size is normal at rest and normal at stress.    The EKG portion of this study is abnormal but not diagnostic.    The patient reported no chest pain during the stress test.    There are no prior studies for comparison.       Echo 9/10/2020:  Normal left ventricular systolic function. The estimated ejection fraction is 60%.  Concentric left ventricular remodeling.  Normal LV diastolic function.  No wall motion abnormalities.  Normal right ventricular systolic function.  Mild left atrial enlargement.  Mild tricuspid regurgitation.  The estimated PA systolic pressure is 30 mmHg.  Normal central venous pressure (3 mmHg).    Mesenteric Utrasound 9/10/2020:  Color flow duplex exam reveals a patent abdominal aorta, celiac artery, superior mesenteric artery and inferior mesenteric artery. A   velocity elevation of 378 cm/s is visualized near the Celiac artery origin within a region of focal narrowing, suggestive of a greater than   70% stenosis.     CTA abd/pelvis with contrast (outside study from [facility]) 8/28/2020:  1. No evidence for gastrointestinal  bleeding.    2. Scattered colonic diverticula without adjacent inflammatory changes.    3. Myomatous changes of the uterus with degenerating fibroids.     4. Hemangioma in segment VII.    5. Stenosis of the celiac access with poststenotic dilatation.    BLE Arterial Ultrasound 7/31/2020:  Multilevel disease in the left lower extremity. Moderate stenosis in the left superficial femoral artery. Severe stenosis in the distal popliteal artery.    Left lower extremity:  Ankle-brachial index is 0.73. Triphasic signals are seen in the common femoral and profunda arteries. There are biphasic superficial femoral, popliteal, posterior tibial, and anterior tibial arteries. There is a velocity increase from the mid SFA to the distal SFA from 1 /sec 2 m/s. Consistent with a moderate stenosis. There is another focal velocity increase in the distal popliteal from 2.5 m/sec to 5.0 m/s. Consistent with a severe stenosis. Distal to this there is monophasic waveforms.         Right lower extremity:  Ankle-brachial index is 0.93. Triphasic signals are seen in the common femoral and profunda artery. There are biphasic signals in the superficial femoral, popliteal, posterior tibial, and anterior tibial arteries. No focal areas of elevated velocity are seen.    Assessment/Plan:  Alana Beth is a 82 y.o. female with PAD, celiac artery stenosis s/p PTAS, HTN, HLD, palpitations, who presents for a follow up appointment.     1. 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 Beth continues to do well with no abdominal pain, weight loss, claudication or tissue loss.  Continue ASA/Plavix/Praluent/Bempedoic acid.    2. PAD- Pt with known BLE PAD.  She has no claudication, rest pain or tissue loss to suggest CLI.  Continue ASA/Plavix/Praluent/Bempedoic acid, and cilostazol 100mg BID.       3. HLD- LDL 90 on 1/11/2024.  Continue Praluent 150 mg every 14 days, bempedoic acid 180 mg daily, and zetia 10 mg daily.           4. HTN- Controlled. Continue current medications.      5. Preoperative Cardiac Risk Stratification Prior to Planned Balloon Sinuplasty Procedure- Mrs. Beth has no chest pain, no heart failure symptoms, no abdominal pain, and no arrhythmia.  She can perform greater than 4 METS without difficulty.  She is at low risk for a perioperative major adverse cardiac event during this moderate risk procedure.  Revised Cardiac Risk Index of 3.9%.  No further testing indicated.  Proceed with surgery.     Follow up in 6 months     Total duration of face to face visit time 30 minutes.  Total time spent counseling greater than fifty percent of total visit time.  Counseling included discussion regarding imaging findings, diagnosis, possibilities, treatment options, risks and benefits.  The patient had many questions regarding the options and long-term effects.

## 2024-03-11 RX ORDER — CLOPIDOGREL BISULFATE 75 MG/1
75 TABLET ORAL DAILY
Qty: 90 TABLET | Refills: 3 | Status: SHIPPED | OUTPATIENT
Start: 2024-03-11

## 2024-03-26 RX ORDER — BEMPEDOIC ACID 180 MG/1
1 TABLET, FILM COATED ORAL
Qty: 90 TABLET | Refills: 0 | Status: SHIPPED | OUTPATIENT
Start: 2024-03-26

## 2024-05-08 DIAGNOSIS — Z78.9 STATIN INTOLERANCE: Primary | ICD-10-CM

## 2024-05-08 RX ORDER — ALIROCUMAB 150 MG/ML
150 INJECTION, SOLUTION SUBCUTANEOUS
Qty: 10 ML | Refills: 10 | Status: ACTIVE | OUTPATIENT
Start: 2024-05-08

## 2024-05-21 ENCOUNTER — TELEPHONE (OUTPATIENT)
Dept: INTERNAL MEDICINE | Facility: CLINIC | Age: 84
End: 2024-05-21
Payer: MEDICARE

## 2024-05-21 NOTE — TELEPHONE ENCOUNTER
----- Message from Anabel Stanton sent at 5/21/2024  8:26 AM CDT -----  .Type:  Pharmacy Calling to Clarify an RX    Prescription Name: clorazepate (TRANXENE) 7.5 MG Tab    What do they need to clarify?: PT WILL NEED A P.A FOR THIS MEDICATION. PLEASE SEND TODAY PT NEEDS THE MEDICINE     Best Call Back Number: [phone]    Additional Information:  [facility] HAS BEEN REQUESTING THE P.A NONE HAS BEEN SENT   Vaccine status unknown

## 2024-06-26 RX ORDER — BEMPEDOIC ACID 180 MG/1
1 TABLET, FILM COATED ORAL
Qty: 90 TABLET | Refills: 0 | Status: SHIPPED | OUTPATIENT
Start: 2024-06-26

## 2024-07-18 ENCOUNTER — TELEPHONE (OUTPATIENT)
Dept: CARDIOLOGY | Facility: CLINIC | Age: 84
End: 2024-07-18
Payer: MEDICARE

## 2024-07-18 NOTE — TELEPHONE ENCOUNTER
----- Message from Janneth Arias sent at 7/18/2024 12:35 PM CDT -----  Contact: Self [phone]  Would like to receive medical advice.    Would they like a call back or a response via [facility]:  call back    Additional information: Pt is asking if she can have a CT with iodine per ENT provider due to medical hx.

## 2024-08-01 ENCOUNTER — TELEPHONE (OUTPATIENT)
Dept: CARDIOLOGY | Facility: CLINIC | Age: 84
End: 2024-08-01
Payer: MEDICARE

## 2024-08-02 ENCOUNTER — TELEPHONE (OUTPATIENT)
Dept: CARDIOLOGY | Facility: CLINIC | Age: 84
End: 2024-08-02
Payer: MEDICARE

## 2024-08-02 NOTE — TELEPHONE ENCOUNTER
Pt states that he had a CT done that was ordered by her ENT Dr. Billy Burger (outside of [address] [place] [address]) and he informed her that she has hardening of the arteries, and should follow up with her Cardiologist. Pt has appointment on 9/16. Pt had testing done at [facility] at [address] [place] [address] in [place].

## 2024-08-05 ENCOUNTER — TELEPHONE (OUTPATIENT)
Dept: INTERNAL MEDICINE | Facility: CLINIC | Age: 84
End: 2024-08-05
Payer: MEDICARE

## 2024-08-06 ENCOUNTER — OFFICE VISIT (OUTPATIENT)
Dept: PRIMARY CARE CLINIC | Facility: CLINIC | Age: 84
End: 2024-08-06
Payer: MEDICARE

## 2024-08-06 VITALS
BODY MASS INDEX: 20.2 KG/M2 | WEIGHT: 114 LBS | HEIGHT: 63 IN | TEMPERATURE: 99 F | DIASTOLIC BLOOD PRESSURE: 66 MMHG | SYSTOLIC BLOOD PRESSURE: 120 MMHG | RESPIRATION RATE: 18 BRPM | HEART RATE: 82 BPM | OXYGEN SATURATION: 99 %

## 2024-08-06 DIAGNOSIS — I70.8 CELIAC ARTERY ATHEROSCLEROSIS: ICD-10-CM

## 2024-08-06 DIAGNOSIS — I10 PRIMARY HYPERTENSION: ICD-10-CM

## 2024-08-06 DIAGNOSIS — I77.89 OTHER SPECIFIED DISORDERS OF ARTERIES AND ARTERIOLES: ICD-10-CM

## 2024-08-06 DIAGNOSIS — F41.1 GENERALIZED ANXIETY DISORDER: ICD-10-CM

## 2024-08-06 DIAGNOSIS — E78.2 MIXED HYPERLIPIDEMIA: ICD-10-CM

## 2024-08-06 DIAGNOSIS — I65.29 CAROTID ATHEROSCLEROSIS, UNSPECIFIED LATERALITY: Primary | ICD-10-CM

## 2024-08-06 PROCEDURE — 99214 OFFICE O/P EST MOD 30 MIN: CPT | Mod: S$GLB,,, | Performed by: FAMILY MEDICINE

## 2024-08-06 PROCEDURE — 1157F ADVNC CARE PLAN IN RCRD: CPT | Mod: CPTII,S$GLB,, | Performed by: FAMILY MEDICINE

## 2024-08-06 PROCEDURE — 3288F FALL RISK ASSESSMENT DOCD: CPT | Mod: CPTII,S$GLB,, | Performed by: FAMILY MEDICINE

## 2024-08-06 PROCEDURE — 99999 PR PBB SHADOW E&M-EST. PATIENT-LVL V: CPT | Mod: PBBFAC,,, | Performed by: FAMILY MEDICINE

## 2024-08-06 PROCEDURE — 3078F DIAST BP <80 MM HG: CPT | Mod: CPTII,S$GLB,, | Performed by: FAMILY MEDICINE

## 2024-08-06 PROCEDURE — 1159F MED LIST DOCD IN RCRD: CPT | Mod: CPTII,S$GLB,, | Performed by: FAMILY MEDICINE

## 2024-08-06 PROCEDURE — 1160F RVW MEDS BY RX/DR IN RCRD: CPT | Mod: CPTII,S$GLB,, | Performed by: FAMILY MEDICINE

## 2024-08-06 PROCEDURE — 1126F AMNT PAIN NOTED NONE PRSNT: CPT | Mod: CPTII,S$GLB,, | Performed by: FAMILY MEDICINE

## 2024-08-06 PROCEDURE — 1101F PT FALLS ASSESS-DOCD LE1/YR: CPT | Mod: CPTII,S$GLB,, | Performed by: FAMILY MEDICINE

## 2024-08-06 PROCEDURE — 3074F SYST BP LT 130 MM HG: CPT | Mod: CPTII,S$GLB,, | Performed by: FAMILY MEDICINE

## 2024-08-08 ENCOUNTER — HOSPITAL ENCOUNTER (OUTPATIENT)
Dept: RADIOLOGY | Facility: HOSPITAL | Age: 84
Discharge: HOME | End: 2024-08-08
Attending: FAMILY MEDICINE
Payer: MEDICARE

## 2024-08-08 DIAGNOSIS — I65.29 CAROTID ATHEROSCLEROSIS, UNSPECIFIED LATERALITY: ICD-10-CM

## 2024-08-08 DIAGNOSIS — I77.89 OTHER SPECIFIED DISORDERS OF ARTERIES AND ARTERIOLES: ICD-10-CM

## 2024-08-08 PROCEDURE — 93880 EXTRACRANIAL BILAT STUDY: CPT | Mod: 26,,, | Performed by: RADIOLOGY

## 2024-08-08 PROCEDURE — 93880 EXTRACRANIAL BILAT STUDY: CPT | Mod: TC

## 2024-08-11 ENCOUNTER — HOSPITAL ENCOUNTER (EMERGENCY)
Facility: HOSPITAL | Age: 84
Discharge: HOME | End: 2024-08-11
Attending: EMERGENCY MEDICINE
Payer: MEDICARE

## 2024-08-11 VITALS
TEMPERATURE: 99 F | RESPIRATION RATE: 16 BRPM | WEIGHT: 112 LBS | BODY MASS INDEX: 19.84 KG/M2 | HEART RATE: 76 BPM | HEIGHT: 63 IN | OXYGEN SATURATION: 99 % | SYSTOLIC BLOOD PRESSURE: 171 MMHG | DIASTOLIC BLOOD PRESSURE: 71 MMHG

## 2024-08-11 DIAGNOSIS — R07.0 THROAT DISCOMFORT: Primary | ICD-10-CM

## 2024-08-11 LAB
ALBUMIN SERPL BCP-MCNC: 4 G/DL (ref 3.5–5.2)
ALP SERPL-CCNC: 36 U/L (ref 55–135)
ALT SERPL W/O P-5'-P-CCNC: 10 U/L (ref 10–44)
ANION GAP SERPL CALC-SCNC: 9 MMOL/L (ref 8–16)
AST SERPL-CCNC: 20 U/L (ref 10–40)
BASOPHILS # BLD AUTO: 0.03 K/UL (ref 0–0.2)
BASOPHILS NFR BLD: 1 % (ref 0–1.9)
BILIRUB SERPL-MCNC: 0.3 MG/DL (ref 0.1–1)
BUN SERPL-MCNC: 19 MG/DL (ref 8–23)
CALCIUM SERPL-MCNC: 10 MG/DL (ref 8.7–10.5)
CHLORIDE SERPL-SCNC: 105 MMOL/L (ref 95–110)
CO2 SERPL-SCNC: 27 MMOL/L (ref 23–29)
CREAT SERPL-MCNC: 1.2 MG/DL (ref 0.5–1.4)
DIFFERENTIAL METHOD BLD: ABNORMAL
EOSINOPHIL # BLD AUTO: 0 K/UL (ref 0–0.5)
EOSINOPHIL NFR BLD: 0.7 % (ref 0–8)
ERYTHROCYTE [DISTWIDTH] IN BLOOD BY AUTOMATED COUNT: 14.4 % (ref 11.5–14.5)
EST. GFR  (NO RACE VARIABLE): 45.5 ML/MIN/1.73 M^2
GLUCOSE SERPL-MCNC: 103 MG/DL (ref 70–110)
HCT VFR BLD AUTO: 33.1 % (ref 37–48.5)
HCV AB SERPL QL IA: NORMAL
HGB BLD-MCNC: 10.8 G/DL (ref 12–16)
HIV 1+2 AB+HIV1 P24 AG SERPL QL IA: NORMAL
IMM GRANULOCYTES # BLD AUTO: 0 K/UL (ref 0–0.04)
IMM GRANULOCYTES NFR BLD AUTO: 0 % (ref 0–0.5)
LYMPHOCYTES # BLD AUTO: 1 K/UL (ref 1–4.8)
LYMPHOCYTES NFR BLD: 33.3 % (ref 18–48)
MCH RBC QN AUTO: 27.5 PG (ref 27–31)
MCHC RBC AUTO-ENTMCNC: 32.6 G/DL (ref 32–36)
MCV RBC AUTO: 84 FL (ref 82–98)
MONOCYTES # BLD AUTO: 0.3 K/UL (ref 0.3–1)
MONOCYTES NFR BLD: 8.9 % (ref 4–15)
NEUTROPHILS # BLD AUTO: 1.7 K/UL (ref 1.8–7.7)
NEUTROPHILS NFR BLD: 56.1 % (ref 38–73)
NRBC BLD-RTO: 0 /100 WBC
PLATELET # BLD AUTO: 165 K/UL (ref 150–450)
PMV BLD AUTO: 9.6 FL (ref 9.2–12.9)
POTASSIUM SERPL-SCNC: 4.2 MMOL/L (ref 3.5–5.1)
PROT SERPL-MCNC: 8.1 G/DL (ref 6–8.4)
RBC # BLD AUTO: 3.93 M/UL (ref 4–5.4)
SARS-COV-2 RDRP RESP QL NAA+PROBE: NEGATIVE
SODIUM SERPL-SCNC: 141 MMOL/L (ref 136–145)
TROPONIN I SERPL DL<=0.01 NG/ML-MCNC: <0.006 NG/ML (ref 0–0.03)
WBC # BLD AUTO: 3.03 K/UL (ref 3.9–12.7)

## 2024-08-11 PROCEDURE — 87389 HIV-1 AG W/HIV-1&-2 AB AG IA: CPT | Performed by: EMERGENCY MEDICINE

## 2024-08-11 PROCEDURE — 99285 EMERGENCY DEPT VISIT HI MDM: CPT | Mod: 25

## 2024-08-11 PROCEDURE — U0002 COVID-19 LAB TEST NON-CDC: HCPCS | Performed by: PHYSICIAN ASSISTANT

## 2024-08-11 PROCEDURE — 84484 ASSAY OF TROPONIN QUANT: CPT | Performed by: PHYSICIAN ASSISTANT

## 2024-08-11 PROCEDURE — 25000003 PHARM REV CODE 250: Performed by: PHYSICIAN ASSISTANT

## 2024-08-11 PROCEDURE — 80053 COMPREHEN METABOLIC PANEL: CPT | Performed by: PHYSICIAN ASSISTANT

## 2024-08-11 PROCEDURE — 85025 COMPLETE CBC W/AUTO DIFF WBC: CPT | Performed by: PHYSICIAN ASSISTANT

## 2024-08-11 PROCEDURE — 93005 ELECTROCARDIOGRAM TRACING: CPT

## 2024-08-11 PROCEDURE — 93010 ELECTROCARDIOGRAM REPORT: CPT | Mod: ,,, | Performed by: INTERNAL MEDICINE

## 2024-08-11 PROCEDURE — 86803 HEPATITIS C AB TEST: CPT | Performed by: EMERGENCY MEDICINE

## 2024-08-11 RX ORDER — ALUMINUM HYDROXIDE, MAGNESIUM HYDROXIDE, AND SIMETHICONE 1200; 120; 1200 MG/30ML; MG/30ML; MG/30ML
15 SUSPENSION ORAL
Status: COMPLETED | OUTPATIENT
Start: 2024-08-11 | End: 2024-08-11

## 2024-08-11 RX ADMIN — ALUMINUM HYDROXIDE, MAGNESIUM HYDROXIDE, AND SIMETHICONE 15 ML: 200; 200; 20 SUSPENSION ORAL at 03:08

## 2024-08-11 NOTE — ED PROVIDER NOTES
"Encounter Date: 8/11/2024       History     Chief Complaint   Patient presents with    Multiple complaints     Seen by ent for trouble  swallowing and not able to eat     The history is provided by the patient and medical records. No  was used.       Alana Beth is a 83 y.o. 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     Reports 1-2 weeks of difficulty swallowing. Reports having to put forth a lot of effort to swallow soft foods. She has to drink water slowly. She denies regurgitation or choking. Feels like she has "foam and mucus" in the back of her throat. No hoarseness. She previously saw her [facility] ENT for this complaint. Reports having a upper respiratory scope done without significant findings. She had an outside CT soft tissue neck that did not show soft tissue abnormalities. CT did show carotid stenosis and she had a f/u with her PCP here at [facility] who obtained an US that showed <50% stenosis BL. She is scared she is going to have difficulty breathing if her symptoms continue. She denies history of EGD. She was previously on PPI but this was discontinued. Denies fever, chest pain, cough, abdominal pain, vomiting, urinary or bowel movement changes.    Review of patient's allergies indicates:   Allergen Reactions    Benazepril Other (See Comments)     cough    Chlorthalidone     Iodinated contrast media     Iodine and iodide containing products     Lipitor [atorvastatin]      States she is allergic to all statin medications    Sertraline      Medication caused patient to get sick.    Spironolactone      Past Medical History:   Diagnosis Date    Atherosclerotic peripheral vascular disease     Chronic cystic mastitis     Essential hypertension     Hypercholesterolemia     Osteopenia     Shingles      Past Surgical History:   Procedure " Laterality Date    BREAST BIOPSY      biopsies benign    CELIAC ARTERY STENT Bilateral 12/08/2020    ESOPHAGOGASTRODUODENOSCOPY  08/20/2020    PERCUTANEOUS TRANSLUMINAL ANGIOPLASTY N/A 12/08/2020    Procedure: Pta (Angioplasty, Percutaneous, Transluminal);  Surgeon: Hernando Junior MD;  Location: [facility] CATH LAB/EP;  Service: Cardiology;  Laterality: N/A;     Family History   Problem Relation Name Age of Onset    Hypertension Mother      Diabetes Brother      Diabetes Sister      Colon cancer Neg Hx      Esophageal cancer Neg Hx       Social History     Tobacco Use    Smoking status: Never    Smokeless tobacco: Never   Substance Use Topics    Alcohol use: No    Drug use: No     Review of Systems   HENT:  Positive for trouble swallowing.        Physical Exam     Initial Vitals [08/11/24 1356]   BP Pulse Resp Temp SpO2   (!) 149/74 99 18 99.5 °F (37.5 °C) 98 %      MAP       --         Physical Exam    Constitutional: She appears well-developed and well-nourished. She is not diaphoretic. No distress.   HENT:   Head: Normocephalic and atraumatic.   Mouth/Throat: Oropharynx is clear and moist. No oropharyngeal exudate.   Posterior oropharynx clear and pink. Tolerating secretions. Drinking water on exam without difficulty. No hoarseness or stridor   Eyes: Conjunctivae and EOM are normal. Pupils are equal, round, and reactive to light. No scleral icterus.   Neck: Neck supple.   Normal range of motion.  Cardiovascular:  Normal rate and regular rhythm.           Pulmonary/Chest: Breath sounds normal. No respiratory distress. She has no wheezes.   Abdominal: Abdomen is soft. She exhibits no distension. There is no abdominal tenderness. There is no rebound.   Musculoskeletal:         General: No tenderness or edema. Normal range of motion.      Cervical back: Normal range of motion and neck supple.     Neurological: She is alert and oriented to person, place, and time. She has normal strength. No sensory deficit.   Skin: Skin  is warm and dry. No rash noted. No erythema.   Psychiatric: She has a normal mood and affect.         ED Course   Procedures  Labs Reviewed   CBC W/ AUTO DIFFERENTIAL - Abnormal       Result Value    WBC 3.03 (*)     RBC 3.93 (*)     Hemoglobin 10.8 (*)     Hematocrit 33.1 (*)     MCV 84      MCH 27.5      MCHC 32.6      RDW 14.4      Platelets 165      MPV 9.6      Immature Granulocytes 0.0      Gran # (ANC) 1.7 (*)     Immature Grans (Abs) 0.00      Lymph # 1.0      Mono # 0.3      Eos # 0.0      Baso # 0.03      nRBC 0      Gran % 56.1      Lymph % 33.3      Mono % 8.9      Eosinophil % 0.7      Basophil % 1.0      Differential Method Automated      Narrative:     Release to patient->Immediate   COMPREHENSIVE METABOLIC PANEL - Abnormal    Sodium 141      Potassium 4.2      Chloride 105      CO2 27      Glucose 103      BUN 19      Creatinine 1.2      Calcium 10.0      Total Protein 8.1      Albumin 4.0      Total Bilirubin 0.3      Alkaline Phosphatase 36 (*)     AST 20      ALT 10      eGFR 45.5 (*)     Anion Gap 9      Narrative:     Release to patient->Immediate   HIV 1 / 2 ANTIBODY    HIV 1/2 Ag/Ab Non-reactive      Narrative:     Release to patient->Immediate   HEPATITIS C ANTIBODY    Hepatitis C Ab Non-reactive      Narrative:     Release to patient->Immediate   TROPONIN I    Troponin I <0.006      Narrative:     Release to patient->Immediate   SARS-COV-2 RNA AMPLIFICATION, QUAL    SARS-CoV-2 RNA, Amplification, Qual Negative       EKG Readings: (Independently Interpreted)   NSR 79 bpm. NOrmal axis. Normal T waves. No STEMI       Imaging Results              X-Ray Chest PA And Lateral (Final result)  Result time 08/11/24 16:31:52      Final result by Clyde Shaw MD (08/11/24 16:31:52)                   Impression:      No acute cardiopulmonary process.      Electronically signed by: Clyde Shaw MD  Date:    08/11/2024  Time:    16:31               Narrative:    EXAMINATION:  XR CHEST PA AND  LATERAL    CLINICAL HISTORY:  Pain in throat    TECHNIQUE:  PA and lateral views of the chest were performed.    COMPARISON:  08/15/2020    FINDINGS:  There is no consolidation, effusion, or pneumothorax.  Cardiomediastinal silhouette is unremarkable.  Regional osseous structures are unremarkable.                                       Medications   aluminum-magnesium hydroxide-simethicone 200-200-20 mg/5 mL suspension 15 mL (15 mLs Oral Given 8/11/24 1546)     Medical Decision Making  83 y.o. 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 Previously had CT soft tissue neck with ENT that did not show significant findings outpatient.     DDx includes but not limited to esophageal dysmotility disorder, GERD, esophagitis, pharyngitis, malignancy, ACS, anxiety, social stressors. Tolerating water without difficulty and do not suspect food bolus obstruction.     Amount and/or Complexity of Data Reviewed  Labs: ordered. Decision-making details documented in ED Course.  Radiology: ordered and independent interpretation performed.    Risk  OTC drugs.               ED Course as of 08/11/24 1804   Sun Aug 11, 2024   1728 Troponin I: <0.006 [BA]   1728 SARS-CoV-2 RNA, Amplification, Qual: Negative [BA]   1728 WBC(!): 3.03 [BA]   1728 Hemoglobin(!): 10.8 [BA]   1728 Hematocrit(!): 33.1 [BA]   1728 Creatinine: 1.2 [BA]   1728 CXR without large consolidation, pleural effusion, or pneumothorax on my read. [BA]   1728 Reports improvement in her throat discomfort with Mylanta. Suspect GERD/esophagitis likely etiology for her symptoms. I discussed compliance with her protonix at home and lifestyle GERD modifications. Ambulatory referral for GI placed for f/u. Patient expresses understanding and agreeable to the plan. Return to ED precautions given for new,  worsening, or concerning symptoms. [BA]      ED Course User Index  [BA] Jono Willoughby PA-C                           Clinical Impression:  Final diagnoses:  [R07.0] Throat discomfort (Primary)          ED Disposition Condition    Discharge Stable          ED Prescriptions    None       Follow-up Information       Follow up With Specialties Details Why Contact Info Additional Information    [facility] [address] [facility]   [address]  [facility] [id number] [phone] [facility] - Main Building, 4th Floor Please park in [facility] and take [facility] elevator             Jono Willoughby PA-C  08/11/24 6022

## 2024-08-11 NOTE — DISCHARGE INSTRUCTIONS
Continue your pantoprazole as previously directed for your acid reflux  Follow-up with gastroenterology (GI) for further evaluation of your difficulty swallowing. You may use the below contact information to obtain an appointment.     Return to the emergency room for new, worsening, or concerning symptoms.     Future Appointments   Date Time Provider Department Center   9/9/2024  7:00 AM LAB[facility] [place]   9/16/2024  1:30 PM Ramon Pink MD PhD Health system [facility]

## 2024-08-11 NOTE — ED TRIAGE NOTES
Patient presents to the ED today with report of difficulty eating and swallowing x2 weeks. Patient states seen by ENT but states woke up this AM and states that its getting worse.

## 2024-08-12 ENCOUNTER — PATIENT OUTREACH (OUTPATIENT)
Dept: EMERGENCY MEDICINE | Facility: HOSPITAL | Age: 84
End: 2024-08-12
Payer: MEDICARE

## 2024-08-12 LAB
OHS QRS DURATION: 70 MS
OHS QTC CALCULATION: 385 MS

## 2024-08-12 NOTE — PROGRESS NOTES
Abena Wong MA  ED Navigator  Emergency Department    Project: [facility] ED Navigator  Role: Community Health Worker    Date: 08/12/2024  Patient Name: Alana Beth  MRN: [id number]  PCP: Nurys Bearden MD    Assessment:     Alana Beth is a 83 y.o. female who has presented to ED for Throat discomfort. Patient has visited the ED 1 times in the past 3 months. Patient did not contact PCP.     ED Navigator Initial Assessment    ED Navigator Enrollment Documentation  Consent to Services  Does patient consent to completing the assessment?: Yes  Contact  Method of Initial Contact: Phone  Transportation  Does the patient have issues with Transportation?: No  Does the patient have transportation to and from healthcare appointments?: Yes  Insurance Coverage  Do you have coverage/adequate coverage?: Yes  Type/kind of coverage: [place] [employer] ADV  Is patient able to afford co-pays/deductibles?: Yes  Is patient able to afford HME or supplies?: Yes  Does patient have an established [facility] PCP?: Yes  Able to access?: Yes  Does the patient have a lack of adequate coverage?: No  Specialist Appointment  Did the patient come to the ED to see a specialist?: No  Does the patient have a pending specialist referral?: No  Does the patient have a specialist appointment made?: No  PCP Follow Up Appointment  Medications  Is patient able to afford medication?: Yes  Is patient unable to get medication due to lack of transportation?: No  Psychological  Does the patient have psycho-social concerns?: No  Food  Does the patient have concerns about food?: No  Communication/Education  Does the patient have limited English proficiency/English not primary language?: No  Does patient have low literacy and/or low health literacy?: Yes  Does patient have concerns with care?: No  Does patient have dissatisfaction with care?: No  Other Financial Concerns  Does the patient have immediate financial distress?: No  Does the patient  have general financial concerns?: No  Other Social Barriers/Concerns  Does the patient have any additional barriers or concerns?: None  Primary Barrier  Barriers identified: Cognitive barrier (health literacy, language and communication, etc.)  Root Cause of ED Utilization: Chronic Conditions  Plan to address Chronic Conditions: Encourage patient to contact PCP/specialist for follow up per ED discharge instructions  Next steps: Provided Education  Was education/educational materials provided surrounding PCP services/creating a medical home?: Yes Was education verbal or written?: Written     Was education/educational materials provided surrounding low cost, healthy foods?: Yes Was education verbal or written?: Written     Was education/educational materials provided surrounding other items? If so, use comment to explain.: Yes Was education verbal or written?: Written   Plan: Provided information for [facility] 24/7 Nurse triage line, [phone] or [phone] ([phone])  Expected Date of Follow Up 1: 9/12/24  Additional Documentation: Successful Enrollment. Patient states she is still having the same issue. Staff message to GI to assist with patient getting in for a sooner appointment. Patient declined resource information at this time. Patient did write down my number. Advised if she needs anything to give me a call.    YON Morrison  Ed Navigator           Social History     Socioeconomic History    Marital status: Single   Occupational History    Occupation: retired student loan assistance    Tobacco Use    Smoking status: Never    Smokeless tobacco: Never   Substance and Sexual Activity    Alcohol use: No    Drug use: No    Sexual activity: Not Currently     Social Determinants of Health     Financial Resource Strain: Low Risk  (8/12/2024)    Overall Financial Resource Strain (CARDIA)     Difficulty of Paying Living Expenses: Not hard at all   Food Insecurity: No Food Insecurity (8/12/2024)    Hunger  Vital Sign     Worried About Running Out of Food in the Last Year: Never true     Ran Out of Food in the Last Year: Never true   Transportation Needs: No Transportation Needs (8/12/2024)    PRAPARE - Transportation     Lack of Transportation (Medical): No     Lack of Transportation (Non-Medical): No   Physical Activity: Patient Unable To Answer (8/12/2024)    Exercise Vital Sign     Days of Exercise per Week: Patient unable to answer     Minutes of Exercise per Session: Patient unable to answer   Stress: No Stress Concern Present (8/12/2024)    Citizen of [place] of Occupational Health - Occupational Stress Questionnaire     Feeling of Stress : Not at all   Housing Stability: Low Risk  (8/12/2024)    Housing Stability Vital Sign     Unable to Pay for Housing in the Last Year: No     Homeless in the Last Year: No       Plan:   Successful Enrollment. Patient states she is still having the same issue. Staff message to GI to assist with patient getting in for a sooner appointment. Patient declined resource information at this time. Patient did write down my number. Advised if she needs anything to give me a call.    YON Morrison  Ed Navigator

## 2024-08-14 ENCOUNTER — TELEPHONE (OUTPATIENT)
Dept: ENDOSCOPY | Facility: HOSPITAL | Age: 84
End: 2024-08-14
Payer: MEDICARE

## 2024-08-14 ENCOUNTER — OFFICE VISIT (OUTPATIENT)
Dept: GASTROENTEROLOGY | Facility: CLINIC | Age: 84
End: 2024-08-14
Payer: MEDICARE

## 2024-08-14 VITALS
DIASTOLIC BLOOD PRESSURE: 75 MMHG | WEIGHT: 115.31 LBS | BODY MASS INDEX: 20.43 KG/M2 | SYSTOLIC BLOOD PRESSURE: 156 MMHG | HEIGHT: 63 IN | HEART RATE: 88 BPM

## 2024-08-14 DIAGNOSIS — D64.9 ANEMIA, UNSPECIFIED TYPE: ICD-10-CM

## 2024-08-14 DIAGNOSIS — E61.1 IRON DEFICIENCY: ICD-10-CM

## 2024-08-14 DIAGNOSIS — R11.0 NAUSEA: ICD-10-CM

## 2024-08-14 DIAGNOSIS — R11.2 NAUSEA AND VOMITING, UNSPECIFIED VOMITING TYPE: ICD-10-CM

## 2024-08-14 DIAGNOSIS — R63.4 WEIGHT LOSS: ICD-10-CM

## 2024-08-14 DIAGNOSIS — D64.9 NORMOCYTIC ANEMIA: Primary | ICD-10-CM

## 2024-08-14 DIAGNOSIS — Z12.11 ENCOUNTER FOR SCREENING COLONOSCOPY FOR NON-HIGH-RISK PATIENT: Primary | ICD-10-CM

## 2024-08-14 PROCEDURE — 1159F MED LIST DOCD IN RCRD: CPT | Mod: CPTII,GC,S$GLB, | Performed by: STUDENT IN AN ORGANIZED HEALTH CARE EDUCATION/TRAINING PROGRAM

## 2024-08-14 PROCEDURE — 1157F ADVNC CARE PLAN IN RCRD: CPT | Mod: CPTII,GC,S$GLB, | Performed by: STUDENT IN AN ORGANIZED HEALTH CARE EDUCATION/TRAINING PROGRAM

## 2024-08-14 PROCEDURE — 3078F DIAST BP <80 MM HG: CPT | Mod: CPTII,GC,S$GLB, | Performed by: STUDENT IN AN ORGANIZED HEALTH CARE EDUCATION/TRAINING PROGRAM

## 2024-08-14 PROCEDURE — 1101F PT FALLS ASSESS-DOCD LE1/YR: CPT | Mod: CPTII,GC,S$GLB, | Performed by: STUDENT IN AN ORGANIZED HEALTH CARE EDUCATION/TRAINING PROGRAM

## 2024-08-14 PROCEDURE — 1126F AMNT PAIN NOTED NONE PRSNT: CPT | Mod: CPTII,GC,S$GLB, | Performed by: STUDENT IN AN ORGANIZED HEALTH CARE EDUCATION/TRAINING PROGRAM

## 2024-08-14 PROCEDURE — 3288F FALL RISK ASSESSMENT DOCD: CPT | Mod: CPTII,GC,S$GLB, | Performed by: STUDENT IN AN ORGANIZED HEALTH CARE EDUCATION/TRAINING PROGRAM

## 2024-08-14 PROCEDURE — 3077F SYST BP >= 140 MM HG: CPT | Mod: CPTII,GC,S$GLB, | Performed by: STUDENT IN AN ORGANIZED HEALTH CARE EDUCATION/TRAINING PROGRAM

## 2024-08-14 PROCEDURE — 99213 OFFICE O/P EST LOW 20 MIN: CPT | Mod: GC,S$GLB,, | Performed by: STUDENT IN AN ORGANIZED HEALTH CARE EDUCATION/TRAINING PROGRAM

## 2024-08-14 PROCEDURE — 99999 PR PBB SHADOW E&M-EST. PATIENT-LVL IV: CPT | Mod: PBBFAC,GC,, | Performed by: STUDENT IN AN ORGANIZED HEALTH CARE EDUCATION/TRAINING PROGRAM

## 2024-08-14 NOTE — TELEPHONE ENCOUNTER
Spoke to pt to schedule procedure(s) Colonoscopy/EGD       Physician to perform procedure(s) Dr. ASHLEIGH Sibley  Date of Procedure (s) 10/10/24  Arrival Time 6:30 AM  Time of Procedure(s) 7:30 AM   Location of Procedure(s) [place] 4th Floor  Type of Rx Prep sent to patient: Miralax  Instructions provided to patient via [facility]    Patient was informed on the following information and verbalized understanding. Screening questionnaire reviewed with patient and complete. If procedure requires anesthesia, a responsible adult needs to be present to accompany the patient home, patient cannot drive after receiving anesthesia. Appointment details are tentative, especially check-in time. Patient will receive a prep-op call 7 days prior to confirm check-in time for procedure. If applicable the patient should contact their pharmacy to verify Rx for procedure prep is ready for pick-up. Patient was advised to call the scheduling department at [phone] if pharmacy states no Rx is available. Patient was advised to call the endoscopy scheduling department if any questions or concerns arise.      [facility] Scheduling Department

## 2024-08-14 NOTE — PROGRESS NOTES
Gastroenterology Clinic    Reason for visit: There were no encounter diagnoses.  Referring Provider/PCP: Nurys Bearden MD    History of Present Illness:  Alana Beth is a 83 y.o. female with a history of  HTN, HLD, PAD and celiac artery stenosis s/p stenting that presents to GI clinic to discuss her nausea symptoms. She reports having mucous drainage down back of throat for many months now that has been contributing to her nausea. She had a surgery with ENT that has significantly helped with the drainage but has persisted to a mild degree. Reports losing 30lbs since 2019 but has been stable for the past year. She has decreased appetite but is able to tolerate PO intake well. She last saw Dr. Ontiveros in GI clinic in 10/30/23 and had a discussion about EGD/colonoscopy given her normocytic anemia but iron studies were wnl and scopes were deferred at that time. She is on ASA and plavix at home. Denies vomiting, melena, hematochezia, hematemesis. Denies other NSAID use.     Family History:  - CRC in brother in his 60s  - no other GI malignancies    Social History:  - never smoked tobacco  - never drank alcohol  - never used illicit drugs    Surgical History:  - 2020 celiac artery stent  - Tonsillectomy many years ago  - breast biopsy at age 22      Physical Exam:  Constitutional:  not in acute distress and well developed  HENT: Head: Normal, normocephalic, atraumatic.  Eyes: conjunctiva clear and sclera nonicteric  GI: soft, non-tender, without masses or organomegaly  Skin: normal color  Neurological: alert, oriented x3  Psychiatric: mood and affect are within normal limits, pt is a good historian; no memory problems were noted    Laboratory:  Lab Results   Component Value Date/Time    HGB 10.8 (L) 08/11/2024 03:34 PM    HGB 10.7 (L) 01/11/2024 07:48 AM    AST 20 08/11/2024 03:34 PM    AST 20 01/11/2024 07:48 AM    ALT 10 08/11/2024 03:34 PM    ALT 10 01/11/2024 07:48 AM    BILITOT 0.3 08/11/2024 03:34 PM     BILITOT 0.4 01/11/2024 07:48 AM     Hgb 12.9 in 8/2020 -> 11.6 12/20 -> 10.8 10/30/2023               Component Ref Range & Units 3 d ago  (8/11/24) 7 mo ago  (1/11/24) 9 mo ago  (10/30/23) 1 yr ago  (12/29/22) 3 yr ago  (7/19/21) 3 yr ago  (12/19/20) 3 yr ago  (12/18/20)   Sodium 136 - 145 mmol/L 141 140 143 141 139 138 141   Potassium 3.5 - 5.1 mmol/L 4.2 4.2 4.8 4.5 4.3 3.7 3.8   Chloride 95 - 110 mmol/L 105 104 103 103 103 103 104   CO2 23 - 29 mmol/L 27 28 27 30 High  29 27 26   Glucose 70 - 110 mg/dL 103 90 93 91 136 High  93 89   BUN 8 - 23 mg/dL 19 15 26 High  20 20 18 24 High    Creatinine 0.5 - 1.4 mg/dL 1.2 1.0 1.5 High  1.1 1.1 0.9 1.0   Calcium 8.7 - 10.5 mg/dL 10.0 9.8 10.5 9.9 9.9 8.8 9.2   Total Protein 6.0 - 8.4 g/dL 8.1 7.5 8.4 7.5 7.3     Albumin 3.5 - 5.2 g/dL 4.0 3.8 4.2 3.9 3.6     Total Bilirubin 0.1 - 1.0 mg/dL 0.3 0.4 CM 0.3 CM 0.5 CM 0.4 CM     Alkaline Phosphatase 55 - 135 U/L 36 Low  31 Low  34 Low  31 Low  40 Low      AST 10 - 40 U/L 20 20 21 23 32     ALT 10 - 44 U/L 10 10 8 Low  12 39     eGFR >60 mL/min/1.73 m^2 45.5 Abnormal  56.6 Abnormal  34.8 Abnormal  50.8 Abnormal       Anion Gap 8 - 16 mmol/L 9 8 13 8 7 Low  8 11     Component Ref Range & Units 9 mo ago   Iron 30 - 160 ug/dL 66   Transferrin 200 - 375 mg/dL 336   TIBC 250 - 450 ug/dL 497 High    Saturated Iron 20 - 50 % 13 Low           Component Ref Range & Units 9 mo ago   Ferritin 20.0 - 300.0 ng/mL 90        Folate 17.1 (10/30/2023)  B12 1072 (10/30/2023)    Imaging:  Reviewed     Endoscopy:  Last performed around 10 years ago in 1/2013- no colon polyps reported, was told she would likely never need another colonoscopy.     Assessment:  Alana Beth is a 83 y.o. female with a history of  HTN, HLD, PAD and celiac artery stenosis s/p stenting that presents to GI clinic to discuss nausea. She reports having mucous drainage down back of throat for many months now that has been contributing to her nausea which improved  with ENT surgery but still is persistent. Reports losing 30lbs since 2019 but has been stable for the past year. She has decreased appetite but is able to tolerate PO intake well. She last saw Dr. Ontiveros in GI clinic in 10/30/23 and had a discussion about EGD/colonoscopy given her normocytic anemia but iron studies were wnl and scopes were deferred at that time. She is on ASA and plavix at home.    Problems:  Normocytic anemia  Nausea  Mucous drainage from throat      Plan:  Continue taking protonix 40mg daily  Plan for EGD and colonoscopy to workup persistent anemia and nausea  Recommended patient to follow up closely with ENT regarding her mucous drainage contributing to her nausea    Jake Colon MD  Gastroenterology and Hepatology    No orders of the defined types were placed in this encounter.

## 2024-08-14 NOTE — TELEPHONE ENCOUNTER
----- Message from Jake Colon MD sent at 2024  3:42 PM CDT -----  Procedure: EGD/Colonoscopy at same time    Diagnosis: Screening colonoscopy, Weight loss, Iron deficiency anemia, and Nausea/Vomiting    Procedure Timin weeks      Provider: Any GI provider    Location: Any Site    Additional Scheduling Information: Blood thinners on Aspirin and Plavix    Prep Specifications:Standard prep    Have you attached a patient to this message: Yes

## 2024-08-14 NOTE — PROGRESS NOTES
"GENERAL GI PATIENT INTAKE:    COVID symptoms in the last 7 days (runny nose, sore throat, congestion, cough, fever): No  PCP: Nurys Bearden  If not PCP-  number given to establish [phone]: No    ALLERGIES REVIEWED:  Yes    CHIEF COMPLAINT:    Chief Complaint   Patient presents with    Follow-up     ED FOLLOW UP       VITAL SIGNS:  BP (!) 156/75   Pulse 88   Ht 5' 2.5" (1.588 m)   Wt 52.3 kg (115 lb 4.8 oz)   BMI 20.75 kg/m²      Change in medical, surgical, family or social history: No      REVIEWED MEDICATION LIST RECONCILED INCLUDING ABOVE MEDS:  Yes     "

## 2024-08-15 NOTE — TELEPHONE ENCOUNTER
Pt is currently taking Plavix (clopidogrel). Message sent to Endoscopy clearance nurse per protocol to submit Plavix (clopidogrel) hold.

## 2024-08-16 ENCOUNTER — TELEPHONE (OUTPATIENT)
Dept: ENDOSCOPY | Facility: HOSPITAL | Age: 84
End: 2024-08-16
Payer: MEDICARE

## 2024-08-16 NOTE — TELEPHONE ENCOUNTER
----- Message from Marilu Saunders MA sent at 8/15/2024  4:01 PM CDT -----  Regarding: 10/10 BT  The patient is currently under an internal cardiologist Dr. Ramon Pink MD care and requires a blood thinner Plavix (clopidogrel) for their upcoming scheduled Colonoscopy/EGD on 10/10/24.

## 2024-08-19 ENCOUNTER — OFFICE VISIT (OUTPATIENT)
Dept: PRIMARY CARE CLINIC | Facility: CLINIC | Age: 84
End: 2024-08-19
Payer: MEDICARE

## 2024-08-19 ENCOUNTER — TELEPHONE (OUTPATIENT)
Dept: INTERNAL MEDICINE | Facility: CLINIC | Age: 84
End: 2024-08-19
Payer: MEDICARE

## 2024-08-19 VITALS
DIASTOLIC BLOOD PRESSURE: 78 MMHG | BODY MASS INDEX: 20.04 KG/M2 | HEIGHT: 63 IN | SYSTOLIC BLOOD PRESSURE: 136 MMHG | WEIGHT: 113.13 LBS | OXYGEN SATURATION: 98 % | TEMPERATURE: 99 F | HEART RATE: 87 BPM

## 2024-08-19 DIAGNOSIS — E78.2 MIXED HYPERLIPIDEMIA: ICD-10-CM

## 2024-08-19 DIAGNOSIS — I10 ESSENTIAL HYPERTENSION: ICD-10-CM

## 2024-08-19 DIAGNOSIS — R09.82 POSTNASAL DRIP: Primary | ICD-10-CM

## 2024-08-19 DIAGNOSIS — F41.1 GAD (GENERALIZED ANXIETY DISORDER): ICD-10-CM

## 2024-08-19 PROCEDURE — 99999 PR PBB SHADOW E&M-EST. PATIENT-LVL V: CPT | Mod: PBBFAC,,, | Performed by: STUDENT IN AN ORGANIZED HEALTH CARE EDUCATION/TRAINING PROGRAM

## 2024-08-19 PROCEDURE — 1159F MED LIST DOCD IN RCRD: CPT | Mod: CPTII,S$GLB,, | Performed by: STUDENT IN AN ORGANIZED HEALTH CARE EDUCATION/TRAINING PROGRAM

## 2024-08-19 PROCEDURE — 99214 OFFICE O/P EST MOD 30 MIN: CPT | Mod: S$GLB,,, | Performed by: STUDENT IN AN ORGANIZED HEALTH CARE EDUCATION/TRAINING PROGRAM

## 2024-08-19 PROCEDURE — 3078F DIAST BP <80 MM HG: CPT | Mod: CPTII,S$GLB,, | Performed by: STUDENT IN AN ORGANIZED HEALTH CARE EDUCATION/TRAINING PROGRAM

## 2024-08-19 PROCEDURE — 1126F AMNT PAIN NOTED NONE PRSNT: CPT | Mod: CPTII,S$GLB,, | Performed by: STUDENT IN AN ORGANIZED HEALTH CARE EDUCATION/TRAINING PROGRAM

## 2024-08-19 PROCEDURE — 1101F PT FALLS ASSESS-DOCD LE1/YR: CPT | Mod: CPTII,S$GLB,, | Performed by: STUDENT IN AN ORGANIZED HEALTH CARE EDUCATION/TRAINING PROGRAM

## 2024-08-19 PROCEDURE — 1157F ADVNC CARE PLAN IN RCRD: CPT | Mod: CPTII,S$GLB,, | Performed by: STUDENT IN AN ORGANIZED HEALTH CARE EDUCATION/TRAINING PROGRAM

## 2024-08-19 PROCEDURE — 3288F FALL RISK ASSESSMENT DOCD: CPT | Mod: CPTII,S$GLB,, | Performed by: STUDENT IN AN ORGANIZED HEALTH CARE EDUCATION/TRAINING PROGRAM

## 2024-08-19 PROCEDURE — 1160F RVW MEDS BY RX/DR IN RCRD: CPT | Mod: CPTII,S$GLB,, | Performed by: STUDENT IN AN ORGANIZED HEALTH CARE EDUCATION/TRAINING PROGRAM

## 2024-08-19 PROCEDURE — 3075F SYST BP GE 130 - 139MM HG: CPT | Mod: CPTII,S$GLB,, | Performed by: STUDENT IN AN ORGANIZED HEALTH CARE EDUCATION/TRAINING PROGRAM

## 2024-08-19 RX ORDER — CETIRIZINE HYDROCHLORIDE 10 MG/1
10 TABLET ORAL DAILY
Qty: 30 TABLET | Refills: 2 | Status: SHIPPED | OUTPATIENT
Start: 2024-08-19 | End: 2025-08-19

## 2024-08-19 NOTE — TELEPHONE ENCOUNTER
----- Message from Beatriz Lindsey sent at 8/19/2024  8:39 AM CDT -----  Contact: [phone] Patient  [email or web address]     Patient is calling for Medical Advice regarding: gums are swelling. Pt stated her dentist told her to call PCP about her amlodipine.    How long has patient had these symptoms: for a while    Pharmacy name and phone#:   [facility] - [place] - [address]  [address] [place] [address]  Phone: [phone] Fax: [phone]          Patient wants a call back or thru [facility]: Call Back . If pt needs to be seen please call pt and let her know. First available 11/2024     Comments:    Please advise patient replies from provider may take up to 48 hours.

## 2024-08-19 NOTE — PROGRESS NOTES
Office visit  Patient: Alana Beth   8/19/2024     Assessment:     1. Postnasal drip    2. Essential hypertension    3. EFRAÍN (generalized anxiety disorder)      Plan:       1. Postnasal drip        -patient's description of swallowing mucous sounds like it could be post-nasal drip, and that's what is making her nauseous        -salivary glands palpated and not particularly enlarged - patient very concerned about swelling        -start antihistamine daily  -     cetirizine (ZYRTEC) 10 MG tablet; Take 1 tablet (10 mg total) by mouth once daily.  Dispense: 30 tablet; Refill: 2    2. Essential hypertension         -stable, continue current medication    3. EFRAÍN (generalized anxiety disorder)        -stable, on clorazepate per PCP          CHIEF COMPLAINT: mucous and nausea    HPI: Alana Beth is a 83 y.o. female who presents for concern for mucous.  She reports a weird feeling in her lower jaw in the morning when she swallows.  She states when she does swallow, she feels nauseous and it decreases her appetite.  She complains about swelling under her mandible as well.  She recently went to the dentist and they told her that her teeth look good and they do not think the excess saliva/mucous is a dental issue.  Of note, she recently went to the ED about a week ago for this same problem.  She states that it feels like when she had tonsillitis before.  These symptoms all started a week ago.  She states that she feels like she's infected.  She does not take Zyrtec or Flonase.  She uses a nasal saline rinse.  She also uses a mucus solvent and  (alkolol nasal wash).    Then, upon further questioning, at the end of the interview, patient reports that this problem has been going on for 5 years. She states that one time she was on antibiotics and it felt better.    She reports that on August 1st, she saw an ENT doctor, and he said that she should start a mouth rinse.  He also told her to see her primary doctor.   She reports that he looked in her mouth and ear, but didn't tell her anything.    #EFRAÍN - On clorazepate prescribed by PCP Dr. Bearden.  States that she was started on this when she was in her 20's.  She reports anxiety attacks that sound like panic attacks.  She often feels like she's gasping for air.    #Hypertension - On amlodipine.  She checks her BP at home every day.  This morning, it was 128/68 mm Hg.  Some mornings, it is in the 130's systolic.  A few mornings, her systolic BP is in the 140's.        Current Outpatient Medications   Medication Instructions    amLODIPine (NORVASC) 10 mg, Oral, Daily    ascorbic acid (vitamin C) (VITAMIN C) 1,000 mg, Oral, Daily    aspirin 81 mg, Oral, Daily    azelastine (ASTELIN) 137 mcg (0.1 %) nasal spray As needed (PRN)    budesonide 1 mg/2 mL NbSp EMPTY CONTENTS OF 1 RESPULE INTO NASAL IRRIGATION SYSTEM, ADD DISTILLED WATER, SALT PACK, MIX & IRRIGATE. PERFORM TWICE DAILY    carvediloL (COREG) 6.25 mg, Oral, 2 times daily with meals    cetirizine (ZYRTEC) 10 mg, Oral, Daily    clopidogreL (PLAVIX) 75 mg, Oral, Daily    clorazepate (TRANXENE) 7.5 MG Tab TAKE 1 TABLET BY MOUTH ONCE DAILY AS NEEDED FOR ANXIETY    co-enzyme Q-10 50 mg, Oral, Daily    diphenhydrAMINE (BENADRYL) 25 mg, Oral, Every 6 hours PRN    ezetimibe (ZETIA) 10 mg, Oral, Nightly    ferrous sulfate (FEOSOL) 325 mg, Oral, Three times weekly    fluticasone propionate (FLONASE) 50 mcg/actuation nasal spray 1 spray, Each Nostril, As needed (PRN)    ipratropium (ATROVENT) 42 mcg (0.06 %) nasal spray 2 sprays, Each Nostril, 2 times daily    men/euc/thy/camp/david/NaCl/pot (ALKALOL NASAL WASH NASL) Nasal    multivitamin (THERAGRAN) per tablet 1 tablet, Oral, Daily    NEILMED SINUS RINSE REFILL Pack use as directed    NEXLETOL 180 mg, Oral    omega-3 fatty acids/fish oil (FISH OIL-OMEGA-3 FATTY ACIDS) 300-1,000 mg capsule 1 capsule, Oral, Daily    pantoprazole (PROTONIX) 40 mg, Oral, Daily    PRALUENT  mg,  Subcutaneous, Every 14 days    TURMERIC ORAL 1 packet, Oral, Daily       Lab Results   Component Value Date    HGBA1C 5.9 (H) 01/11/2024    HGBA1C 5.7 (H) 12/29/2022     Lab Results   Component Value Date    MICALBCREAT 6.3 01/11/2024     Lab Results   Component Value Date    LDLCALC 90.4 01/11/2024    LDLCALC 79.2 08/07/2023    CHOL 191 01/11/2024    HDL 94 (H) 01/11/2024    TRIG 33 01/11/2024       Lab Results   Component Value Date     08/11/2024    K 4.2 08/11/2024     08/11/2024    CO2 27 08/11/2024     08/11/2024    BUN 19 08/11/2024    CREATININE 1.2 08/11/2024    CALCIUM 10.0 08/11/2024    PROT 8.1 08/11/2024    ALBUMIN 4.0 08/11/2024    BILITOT 0.3 08/11/2024    ALKPHOS 36 (L) 08/11/2024    AST 20 08/11/2024    ALT 10 08/11/2024    ANIONGAP 9 08/11/2024    ESTGFRAFRICA 55.6 (A) 07/19/2021    EGFRNONAA 48.2 (A) 07/19/2021    WBC 3.03 (L) 08/11/2024    HGB 10.8 (L) 08/11/2024    HGB 10.7 (L) 01/11/2024    HCT 33.1 (L) 08/11/2024    MCV 84 08/11/2024     08/11/2024    TSH 0.983 01/11/2024    HEPCAB Non-reactive 08/11/2024       Lab Results   Component Value Date    DCFAVMYK55ET 61 01/11/2024    GAPNBQYR85 1072 (H) 10/30/2023    FERRITIN 90 10/30/2023    IRON 66 10/30/2023    TRANSFERRIN 336 10/30/2023    TIBC 497 (H) 10/30/2023    FESATURATED 13 (L) 10/30/2023         Past Medical History:   Diagnosis Date    Atherosclerotic peripheral vascular disease     Chronic cystic mastitis     Essential hypertension     Hypercholesterolemia     Osteopenia     Shingles      Past Surgical History:   Procedure Laterality Date    BREAST BIOPSY      biopsies benign    CELIAC ARTERY STENT Bilateral 12/08/2020    ESOPHAGOGASTRODUODENOSCOPY  08/20/2020    PERCUTANEOUS TRANSLUMINAL ANGIOPLASTY N/A 12/08/2020    Procedure: Pta (Angioplasty, Percutaneous, Transluminal);  Surgeon: Hernando Junior MD;  Location: [facility] CATH LAB/EP;  Service: Cardiology;  Laterality: N/A;       Social History     Tobacco Use  "   Smoking status: Never    Smokeless tobacco: Never   Substance Use Topics    Alcohol use: No    Drug use: No       family history includes Diabetes in her brother and sister; Hypertension in her mother.    Vitals:    08/19/24 1518 08/19/24 1548   BP: (!) 154/74 136/78   Pulse: 87    Temp: 98.6 °F (37 °C)    TempSrc: Oral    SpO2: 98%    Weight: 51.3 kg (113 lb 1.5 oz)    Height: 5' 2.5" (1.588 m)    PainSc: 0-No pain      Objective:   Physical Exam  Vitals reviewed.   Constitutional:       General: She is not in acute distress.     Appearance: Normal appearance. She is well-developed.   HENT:      Head: Normocephalic and atraumatic.      Right Ear: External ear normal.      Left Ear: External ear normal.      Nose: Nose normal.      Mouth/Throat:      Mouth: Mucous membranes are moist.   Eyes:      General: No scleral icterus.        Right eye: No discharge.         Left eye: No discharge.      Conjunctiva/sclera: Conjunctivae normal.   Cardiovascular:      Rate and Rhythm: Normal rate and regular rhythm.      Heart sounds: Normal heart sounds. No murmur heard.     No friction rub. No gallop.   Pulmonary:      Effort: Pulmonary effort is normal. No respiratory distress.      Breath sounds: Normal breath sounds. No wheezing, rhonchi or rales.   Musculoskeletal:         General: No deformity.      Cervical back: No tenderness.      Right lower leg: No edema.      Left lower leg: No edema.   Lymphadenopathy:      Cervical: No cervical adenopathy.   Skin:     General: Skin is warm and dry.   Neurological:      General: No focal deficit present.      Mental Status: She is alert and oriented to person, place, and time.   Psychiatric:         Mood and Affect: Mood normal.         Behavior: Behavior normal.         Thought Content: Thought content normal.             Winter Chris MD  Internal Medicine and Pediatrics                            "

## 2024-08-19 NOTE — TELEPHONE ENCOUNTER
Lov 1/18/24 annual.    I returned her call.  Dentist thinks amlodipine is causing swelling of gums.  Has been on amlodipine since 2020.    She is seeing another dr at 3:30 for mucus  But wants your opinion if you think amlodipine  Could be cause of gum swelling too like the  Dentist. Sometimes her tongue swells too.   If so, needs new rx [facility] to [facility]  [place].   She says she gets dental cleaning ev 6 mos.    Please advise. Thanks richardson

## 2024-08-19 NOTE — PATIENT INSTRUCTIONS
Start taking Zyrtec every day, as well as Flonase.    Follow up with your ENT doctor about your concern for swelling in your glands.    Follow up with your Primary Care Doctor about switching your amlodipine.

## 2024-08-20 ENCOUNTER — OFFICE VISIT (OUTPATIENT)
Dept: INTERNAL MEDICINE | Facility: CLINIC | Age: 84
End: 2024-08-20
Payer: MEDICARE

## 2024-08-20 VITALS
OXYGEN SATURATION: 99 % | RESPIRATION RATE: 16 BRPM | WEIGHT: 112.63 LBS | SYSTOLIC BLOOD PRESSURE: 130 MMHG | DIASTOLIC BLOOD PRESSURE: 80 MMHG | HEIGHT: 63 IN | TEMPERATURE: 98 F | BODY MASS INDEX: 19.96 KG/M2 | HEART RATE: 90 BPM

## 2024-08-20 DIAGNOSIS — R22.0 SWELLING OF GUMS: Primary | ICD-10-CM

## 2024-08-20 PROCEDURE — 99999 PR PBB SHADOW E&M-EST. PATIENT-LVL IV: CPT | Mod: PBBFAC,,, | Performed by: STUDENT IN AN ORGANIZED HEALTH CARE EDUCATION/TRAINING PROGRAM

## 2024-08-20 PROCEDURE — 1101F PT FALLS ASSESS-DOCD LE1/YR: CPT | Mod: CPTII,S$GLB,, | Performed by: STUDENT IN AN ORGANIZED HEALTH CARE EDUCATION/TRAINING PROGRAM

## 2024-08-20 PROCEDURE — 3079F DIAST BP 80-89 MM HG: CPT | Mod: CPTII,S$GLB,, | Performed by: STUDENT IN AN ORGANIZED HEALTH CARE EDUCATION/TRAINING PROGRAM

## 2024-08-20 PROCEDURE — 3288F FALL RISK ASSESSMENT DOCD: CPT | Mod: CPTII,S$GLB,, | Performed by: STUDENT IN AN ORGANIZED HEALTH CARE EDUCATION/TRAINING PROGRAM

## 2024-08-20 PROCEDURE — 1159F MED LIST DOCD IN RCRD: CPT | Mod: CPTII,S$GLB,, | Performed by: STUDENT IN AN ORGANIZED HEALTH CARE EDUCATION/TRAINING PROGRAM

## 2024-08-20 PROCEDURE — 3075F SYST BP GE 130 - 139MM HG: CPT | Mod: CPTII,S$GLB,, | Performed by: STUDENT IN AN ORGANIZED HEALTH CARE EDUCATION/TRAINING PROGRAM

## 2024-08-20 PROCEDURE — G2211 COMPLEX E/M VISIT ADD ON: HCPCS | Mod: GZ,S$GLB,, | Performed by: STUDENT IN AN ORGANIZED HEALTH CARE EDUCATION/TRAINING PROGRAM

## 2024-08-20 PROCEDURE — 1157F ADVNC CARE PLAN IN RCRD: CPT | Mod: CPTII,S$GLB,, | Performed by: STUDENT IN AN ORGANIZED HEALTH CARE EDUCATION/TRAINING PROGRAM

## 2024-08-20 PROCEDURE — 99214 OFFICE O/P EST MOD 30 MIN: CPT | Mod: S$GLB,,, | Performed by: STUDENT IN AN ORGANIZED HEALTH CARE EDUCATION/TRAINING PROGRAM

## 2024-08-20 NOTE — PROGRESS NOTES
Subjective:         Chief Complaint: Follow-up (Gum  swelling )    HPI  Ms. Alana Beth is an 84 yo F with Anxiety, benign recently identified solitary pulmonary nodule, severe PAD/celiac artery atherosclerosis (leading to gastroparesis) presenting to continue discussing nonspecific oral discomfort/swelling:    Excess elevation:   -status post non substantive evaluation via ENT with seemingly failed balloon sinuplasty (reports no improvement with subjectively increased mucus production)  - Seen via the ED for difficulty swallowing with reassuring evaluation; referred to GI and has impending EGD/colonoscopy   -outside CT soft tissue neck that did not show soft tissue abnormalities   -was seen by a colleague in internal medicine (Dr. Winter Chris) yesterday recommended starting generic Zyrtec and follow-up with your primary care doctor about switching your amlodipine (progress note is not complete so I can not read assessment and plan); subjective swelling is notably focal (isolated to right posterior upper gums).  -dentist appears to believe her amlodipine may be causing gingival swelling, despite not documenting any gingival swelling on physical exam and having already been evaluated by ENT   - seemingly perseverative regarding unilateral mandibular soft tissue irregularity on external xrays      Review of Systems   Constitutional:  Negative for appetite change, chills and fever.   HENT: Negative.     Respiratory:  Negative for cough, chest tightness and shortness of breath.    Cardiovascular:  Negative for chest pain, palpitations and leg swelling.   Gastrointestinal:  Negative for abdominal distention, abdominal pain, blood in stool, constipation, diarrhea, nausea and vomiting.   Endocrine: Negative.    Genitourinary:  Negative for difficulty urinating, dysuria, frequency and hematuria.   Musculoskeletal: Negative.    Integumentary:  Negative.   Neurological: Negative.    Psychiatric/Behavioral:  Negative.           Objective:      Vitals:    08/20/24 0800   BP: 130/80   Pulse:    Resp:    Temp:       Physical Exam  Vitals reviewed.   Constitutional:       General: She is not in acute distress.     Appearance: Normal appearance.   HENT:      Head: Normocephalic and atraumatic.      Comments: Facial features are symmetric      Nose: Nose normal. No congestion or rhinorrhea.      Mouth/Throat:      Mouth: Mucous membranes are moist.      Pharynx: Oropharynx is clear. No oropharyngeal exudate or posterior oropharyngeal erythema.   Eyes:      General: No scleral icterus.     Extraocular Movements: Extraocular movements intact.      Conjunctiva/sclera: Conjunctivae normal.   Cardiovascular:      Rate and Rhythm: Normal rate and regular rhythm.      Pulses: Normal pulses.      Heart sounds: Normal heart sounds.   Pulmonary:      Effort: Pulmonary effort is normal. No respiratory distress.      Breath sounds: Normal breath sounds.   Musculoskeletal:         General: No deformity or signs of injury. Normal range of motion.      Cervical back: Normal range of motion.      Comments: Gait normal    Skin:     General: Skin is warm and dry.      Findings: No rash.   Neurological:      General: No focal deficit present.      Mental Status: She is alert and oriented to person, place, and time. Mental status is at baseline.   Psychiatric:         Mood and Affect: Mood normal.         Behavior: Behavior normal.         Thought Content: Thought content normal.       Current Outpatient Medications on File Prior to Visit   Medication Sig Dispense Refill    alirocumab (PRALUENT PEN) 150 mg/mL PnMario Inject 1 mL (150 mg total) into the skin every 14 (fourteen) days. 10 mL 10    amLODIPine (NORVASC) 10 MG tablet Take 1 tablet (10 mg total) by mouth once daily. 90 tablet 3    ascorbic acid, vitamin C, (VITAMIN C) 1000 MG tablet Take 1,000 mg by mouth once daily.      aspirin 81 MG Chew Take 81 mg by mouth once daily.      azelastine  (ASTELIN) 137 mcg (0.1 %) nasal spray as needed.      budesonide 1 mg/2 mL NbSp EMPTY CONTENTS OF 1 RESPULE INTO NASAL IRRIGATION SYSTEM, ADD DISTILLED WATER, SALT PACK, MIX & IRRIGATE. PERFORM TWICE DAILY      carvediloL (COREG) 6.25 MG tablet Take 1 tablet (6.25 mg total) by mouth 2 (two) times daily with meals. 180 tablet 3    cetirizine (ZYRTEC) 10 MG tablet Take 1 tablet (10 mg total) by mouth once daily. 30 tablet 2    clopidogreL (PLAVIX) 75 mg tablet Take 1 tablet (75 mg total) by mouth once daily. 90 tablet 3    clorazepate (TRANXENE) 7.5 MG Tab TAKE 1 TABLET BY MOUTH ONCE DAILY AS NEEDED FOR ANXIETY 90 tablet 1    co-enzyme Q-10 50 mg capsule Take 50 mg by mouth once daily.      diphenhydrAMINE (BENADRYL) 25 mg capsule Take 25 mg by mouth every 6 (six) hours as needed for Itching.      ezetimibe (ZETIA) 10 mg tablet Take 1 tablet (10 mg total) by mouth every evening. 90 tablet 3    ferrous sulfate (FEOSOL) 325 mg (65 mg iron) Tab tablet Take 1 tablet (325 mg total) by mouth 3 (three) times a week. 36 tablet 3    fluticasone propionate (FLONASE) 50 mcg/actuation nasal spray 1 spray by Each Nostril route as needed.      ipratropium (ATROVENT) 42 mcg (0.06 %) nasal spray 2 sprays by Each Nostril route 2 (two) times daily.      men/euc/thy/camp/david/NaCl/pot (ALKALOL NASAL WASH NASL) by Nasal route.      multivitamin (THERAGRAN) per tablet Take 1 tablet by mouth once daily.      NEILMED SINUS RINSE REFILL Pack use as directed      NEXLETOL 180 mg Tab Take 1 tablet by mouth once daily 90 tablet 0    omega-3 fatty acids/fish oil (FISH OIL-OMEGA-3 FATTY ACIDS) 300-1,000 mg capsule Take 1 capsule by mouth once daily.      pantoprazole (PROTONIX) 40 MG tablet Take 40 mg by mouth once daily.      TURMERIC ORAL Take 1 packet by mouth once daily.       No current facility-administered medications on file prior to visit.         Assessment:       1. Swelling of gums        Plan:       Swelling of gums   - no  identifiable pathology explaining presentation   - as both ENT and dentistry have not found any structural abnormalities, a rather kyrie conversation occurred regarding lack treatment options.  Do not personally believe amlodipine to be responsible for subjective (non observable) and FOCAL soft tissue swelling (amlodipine classically causes diffuse and symmetric angioedema); however, per the recommendation of my predecessor and dentistry will trial discontinue amlodipine.  Patient was provided blood pressure log to complete b.i.d. pre meal checks and return to clinic in one-month

## 2024-08-22 NOTE — TELEPHONE ENCOUNTER
Dr jimenez saw her the next day after this  Reply by him.  See appt 8/20.  She booked that appt 8/19

## 2024-08-29 NOTE — PATIENT INSTRUCTIONS
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    She was started on Omeprazole 20 mg BID and barium esophagram requested. Barium study was consistent with mild esophageal dysmotility.   Plan:  -- will follow up with Dr. Junior's evaluation, will schedule an appointment for the patient.   -- continue omeprazole     2. PAD- Pt with known BLE PAD.  She has no claudication, rest pain or tissue loss to suggest CLI. Continue ASA 81 mg daily.  Carotid US unremarkable for stenosis.      3. HLD-  Labs from 9/10/2020 show total cholesterol of 348 with LDL of 234..  Monitor LFT's.    -- Start new medication bempedoic 180 mg daily    Follow up in 3 months.    [FreeTextEntry1] : Patient is a 85 years old with hx of uphold and TOT sling 11/2018, hx of frequent UTIs and OAB. Pt had urodynamics testing on 02/27/23 that showed, normal capacity, normal sensation, normal compliance, absence of detrusor overactivity, absence of stress urinary incontinence. Pt was started on 06/06/23 on Tolterodine tartrate. Pt was noticing good improvement in her symptoms with Tolteradine tartrate but her PVR in the office was 190ml, so we discontinue Tolteradine. We prescribed her Myrbetriq and Gemtesa but she couldn't fill it due to high copay.  Pt presents to today to discuss further options to treat overactive bladder and repeat the pelvic exam considering she is experiencing recurrent UTIs. Pt states she is taking Keflex 500mg due to her recent UTI and she will be done with antibiotics today. states she still feel some discomfort in vaginal area and notes frequency. States she wears diaper but notes only small incontinence.

## 2024-09-06 DIAGNOSIS — E78.2 MIXED HYPERLIPIDEMIA: Primary | ICD-10-CM

## 2024-09-09 ENCOUNTER — LAB VISIT (OUTPATIENT)
Dept: LAB | Facility: HOSPITAL | Age: 84
End: 2024-09-09
Payer: MEDICARE

## 2024-09-09 DIAGNOSIS — E78.2 MIXED HYPERLIPIDEMIA: ICD-10-CM

## 2024-09-09 LAB
CHOLEST SERPL-MCNC: 174 MG/DL (ref 120–199)
CHOLEST/HDLC SERPL: 2.2 {RATIO} (ref 2–5)
HDLC SERPL-MCNC: 80 MG/DL (ref 40–75)
HDLC SERPL: 46 % (ref 20–50)
LDLC SERPL CALC-MCNC: 87 MG/DL (ref 63–159)
NONHDLC SERPL-MCNC: 94 MG/DL
TRIGL SERPL-MCNC: 35 MG/DL (ref 30–150)

## 2024-09-09 PROCEDURE — 36415 COLL VENOUS BLD VENIPUNCTURE: CPT | Mod: PN | Performed by: INTERNAL MEDICINE

## 2024-09-09 PROCEDURE — 80061 LIPID PANEL: CPT | Performed by: INTERNAL MEDICINE

## 2024-09-12 ENCOUNTER — PATIENT OUTREACH (OUTPATIENT)
Dept: EMERGENCY MEDICINE | Facility: HOSPITAL | Age: 84
End: 2024-09-12
Payer: MEDICARE

## 2024-09-12 NOTE — PROGRESS NOTES
Successful first follow up message sent to patient. Advised if she needs assistance with anything to give me a call.    YON Morrison  Ed Navigator

## 2024-09-16 ENCOUNTER — E-CONSULT (OUTPATIENT)
Dept: CARDIOLOGY | Facility: CLINIC | Age: 84
End: 2024-09-16
Payer: MEDICARE

## 2024-09-16 ENCOUNTER — OFFICE VISIT (OUTPATIENT)
Dept: CARDIOLOGY | Facility: CLINIC | Age: 84
End: 2024-09-16
Payer: MEDICARE

## 2024-09-16 ENCOUNTER — OFFICE VISIT (OUTPATIENT)
Dept: INTERNAL MEDICINE | Facility: CLINIC | Age: 84
End: 2024-09-16
Payer: MEDICARE

## 2024-09-16 VITALS
HEART RATE: 73 BPM | HEART RATE: 88 BPM | SYSTOLIC BLOOD PRESSURE: 130 MMHG | DIASTOLIC BLOOD PRESSURE: 70 MMHG | DIASTOLIC BLOOD PRESSURE: 79 MMHG | BODY MASS INDEX: 20.54 KG/M2 | TEMPERATURE: 97 F | HEIGHT: 63 IN | RESPIRATION RATE: 14 BRPM | BODY MASS INDEX: 20.28 KG/M2 | WEIGHT: 115.94 LBS | WEIGHT: 114.44 LBS | OXYGEN SATURATION: 98 % | HEIGHT: 63 IN | SYSTOLIC BLOOD PRESSURE: 164 MMHG

## 2024-09-16 DIAGNOSIS — I73.9 PAD (PERIPHERAL ARTERY DISEASE): Primary | ICD-10-CM

## 2024-09-16 DIAGNOSIS — R09.89 CHRONIC THROAT CLEARING: Primary | ICD-10-CM

## 2024-09-16 DIAGNOSIS — Z78.9 STATIN INTOLERANCE: ICD-10-CM

## 2024-09-16 DIAGNOSIS — I70.0 AORTIC ATHEROSCLEROSIS: ICD-10-CM

## 2024-09-16 DIAGNOSIS — I70.0 ATHEROSCLEROSIS OF AORTIC ARCH: ICD-10-CM

## 2024-09-16 DIAGNOSIS — I73.9 PAD (PERIPHERAL ARTERY DISEASE): ICD-10-CM

## 2024-09-16 DIAGNOSIS — I65.29 CAROTID ATHEROSCLEROSIS, UNSPECIFIED LATERALITY: ICD-10-CM

## 2024-09-16 DIAGNOSIS — I70.8 CELIAC ARTERY ATHEROSCLEROSIS: ICD-10-CM

## 2024-09-16 DIAGNOSIS — I70.219 ATHEROSCLEROTIC PERIPHERAL VASCULAR DISEASE WITH INTERMITTENT CLAUDICATION: Primary | ICD-10-CM

## 2024-09-16 DIAGNOSIS — R05.3 CHRONIC COUGH: ICD-10-CM

## 2024-09-16 DIAGNOSIS — E78.00 HYPERCHOLESTEREMIA: ICD-10-CM

## 2024-09-16 DIAGNOSIS — E78.2 MIXED HYPERLIPIDEMIA: ICD-10-CM

## 2024-09-16 PROCEDURE — 1101F PT FALLS ASSESS-DOCD LE1/YR: CPT | Mod: CPTII,S$GLB,, | Performed by: STUDENT IN AN ORGANIZED HEALTH CARE EDUCATION/TRAINING PROGRAM

## 2024-09-16 PROCEDURE — 3288F FALL RISK ASSESSMENT DOCD: CPT | Mod: CPTII,S$GLB,, | Performed by: STUDENT IN AN ORGANIZED HEALTH CARE EDUCATION/TRAINING PROGRAM

## 2024-09-16 PROCEDURE — G2211 COMPLEX E/M VISIT ADD ON: HCPCS | Mod: GZ,S$GLB,, | Performed by: STUDENT IN AN ORGANIZED HEALTH CARE EDUCATION/TRAINING PROGRAM

## 2024-09-16 PROCEDURE — 3078F DIAST BP <80 MM HG: CPT | Mod: CPTII,S$GLB,, | Performed by: STUDENT IN AN ORGANIZED HEALTH CARE EDUCATION/TRAINING PROGRAM

## 2024-09-16 PROCEDURE — 3075F SYST BP GE 130 - 139MM HG: CPT | Mod: CPTII,S$GLB,, | Performed by: STUDENT IN AN ORGANIZED HEALTH CARE EDUCATION/TRAINING PROGRAM

## 2024-09-16 PROCEDURE — 1159F MED LIST DOCD IN RCRD: CPT | Mod: CPTII,S$GLB,, | Performed by: STUDENT IN AN ORGANIZED HEALTH CARE EDUCATION/TRAINING PROGRAM

## 2024-09-16 PROCEDURE — 1126F AMNT PAIN NOTED NONE PRSNT: CPT | Mod: CPTII,S$GLB,, | Performed by: STUDENT IN AN ORGANIZED HEALTH CARE EDUCATION/TRAINING PROGRAM

## 2024-09-16 PROCEDURE — 99214 OFFICE O/P EST MOD 30 MIN: CPT | Mod: S$GLB,,, | Performed by: STUDENT IN AN ORGANIZED HEALTH CARE EDUCATION/TRAINING PROGRAM

## 2024-09-16 PROCEDURE — 99999 PR PBB SHADOW E&M-EST. PATIENT-LVL IV: CPT | Mod: PBBFAC,,, | Performed by: INTERNAL MEDICINE

## 2024-09-16 PROCEDURE — 99999 PR PBB SHADOW E&M-EST. PATIENT-LVL V: CPT | Mod: PBBFAC,,, | Performed by: STUDENT IN AN ORGANIZED HEALTH CARE EDUCATION/TRAINING PROGRAM

## 2024-09-16 PROCEDURE — 1157F ADVNC CARE PLAN IN RCRD: CPT | Mod: CPTII,S$GLB,, | Performed by: STUDENT IN AN ORGANIZED HEALTH CARE EDUCATION/TRAINING PROGRAM

## 2024-09-16 NOTE — PROGRESS NOTES
Subjective:         Chief Complaint: Hypertension    HPI  Ms. Alana Beth is an 82 yo F with Anxiety, benign recently identified solitary pulmonary nodule, severe PAD/celiac artery atherosclerosis (leading to gastroparesis) presenting for hypertensive follow-up:    Hypertension:   -at last appointment, amlodipine was discontinued per the recommendation of urgent care specialist evaluated the patient for chronic/nonspecific oral discomfort with subjectively excessive salivation (status post nondiagnostic/non substantive evaluations via both ENT, speech therapy, and dentistry); Now seeking a second ENT opinion outside of [facility]   -no prior history of allergist evaluation  - BP is normotensive (in office and via home values as below)        Review of Systems   Constitutional:  Negative for appetite change, chills and fever.   HENT:  Positive for nasal congestion, dental problem, drooling, ear pain, postnasal drip and sore throat.    Respiratory:  Negative for cough, chest tightness and shortness of breath.    Cardiovascular:  Negative for chest pain, palpitations and leg swelling.   Gastrointestinal:  Negative for abdominal distention, abdominal pain, blood in stool, constipation, diarrhea, nausea and vomiting.   Endocrine: Negative.    Genitourinary:  Negative for difficulty urinating, dysuria, frequency and hematuria.   Musculoskeletal: Negative.    Integumentary:  Negative.   Neurological: Negative.    Psychiatric/Behavioral: Negative.           Objective:      Vitals:    09/16/24 1259   BP: 130/70   Pulse: 88   Resp: 14   Temp: 97.1 °F (36.2 °C)      Physical Exam  Vitals reviewed.   Constitutional:       General: She is not in acute distress.     Appearance: Normal appearance.   HENT:      Head: Normocephalic and atraumatic.      Comments: Facial features are symmetric      Nose: Nose normal. No congestion or rhinorrhea.      Mouth/Throat:      Mouth: Mucous membranes are moist.      Pharynx: Oropharynx is  clear. No oropharyngeal exudate or posterior oropharyngeal erythema.   Eyes:      General: No scleral icterus.     Extraocular Movements: Extraocular movements intact.      Conjunctiva/sclera: Conjunctivae normal.   Cardiovascular:      Rate and Rhythm: Normal rate and regular rhythm.      Pulses: Normal pulses.      Heart sounds: Normal heart sounds.   Pulmonary:      Effort: Pulmonary effort is normal. No respiratory distress.      Breath sounds: Normal breath sounds.   Musculoskeletal:         General: No deformity or signs of injury. Normal range of motion.      Cervical back: Normal range of motion.      Comments: Gait normal    Skin:     General: Skin is warm and dry.      Findings: No rash.   Neurological:      General: No focal deficit present.      Mental Status: She is alert and oriented to person, place, and time. Mental status is at baseline.   Psychiatric:         Mood and Affect: Mood normal.         Behavior: Behavior normal.         Thought Content: Thought content normal.       Current Outpatient Medications on File Prior to Visit   Medication Sig Dispense Refill    alirocumab (PRALUENT PEN) 150 mg/mL PnIj Inject 1 mL (150 mg total) into the skin every 14 (fourteen) days. 10 mL 10    ascorbic acid, vitamin C, (VITAMIN C) 1000 MG tablet Take 1,000 mg by mouth once daily.      aspirin 81 MG Chew Take 81 mg by mouth once daily.      azelastine (ASTELIN) 137 mcg (0.1 %) nasal spray as needed.      budesonide 1 mg/2 mL NbSp EMPTY CONTENTS OF 1 RESPULE INTO NASAL IRRIGATION SYSTEM, ADD DISTILLED WATER, SALT PACK, MIX & IRRIGATE. PERFORM TWICE DAILY      carvediloL (COREG) 6.25 MG tablet Take 1 tablet (6.25 mg total) by mouth 2 (two) times daily with meals. 180 tablet 3    cetirizine (ZYRTEC) 10 MG tablet Take 1 tablet (10 mg total) by mouth once daily. 30 tablet 2    clopidogreL (PLAVIX) 75 mg tablet Take 1 tablet (75 mg total) by mouth once daily. 90 tablet 3    clorazepate (TRANXENE) 7.5 MG Tab TAKE 1  TABLET BY MOUTH ONCE DAILY AS NEEDED FOR ANXIETY 90 tablet 1    co-enzyme Q-10 50 mg capsule Take 50 mg by mouth once daily.      diphenhydrAMINE (BENADRYL) 25 mg capsule Take 25 mg by mouth every 6 (six) hours as needed for Itching.      ezetimibe (ZETIA) 10 mg tablet Take 1 tablet (10 mg total) by mouth every evening. 90 tablet 3    ferrous sulfate (FEOSOL) 325 mg (65 mg iron) Tab tablet Take 1 tablet (325 mg total) by mouth 3 (three) times a week. 36 tablet 3    fluticasone propionate (FLONASE) 50 mcg/actuation nasal spray 1 spray by Each Nostril route as needed.      ipratropium (ATROVENT) 42 mcg (0.06 %) nasal spray 2 sprays by Each Nostril route 2 (two) times daily.      men/euc/thy/camp/david/NaCl/pot (ALKALOL NASAL WASH NASL) by Nasal route.      multivitamin (THERAGRAN) per tablet Take 1 tablet by mouth once daily.      NEILMED SINUS RINSE REFILL Pack use as directed      NEXLETOL 180 mg Tab Take 1 tablet by mouth once daily 90 tablet 0    omega-3 fatty acids/fish oil (FISH OIL-OMEGA-3 FATTY ACIDS) 300-1,000 mg capsule Take 1 capsule by mouth once daily.      pantoprazole (PROTONIX) 40 MG tablet Take 40 mg by mouth once daily.      TURMERIC ORAL Take 1 packet by mouth once daily.      amLODIPine (NORVASC) 10 MG tablet Take 1 tablet (10 mg total) by mouth once daily. (Patient not taking: Reported on 9/16/2024) 90 tablet 3     No current facility-administered medications on file prior to visit.         Assessment:       1. Chronic throat clearing    2. Chronic cough        Plan:       Chronic throat clearing  Chronic cough   - extensive conversation had regarding numerous prior nondiagnostic evaluations and treatment failures.  Would encourage patient to continue with 2nd opinion via outside ENT (this document will be printed and provided to patient for continuity sake) and would also be happy to involve an allergist upon request.

## 2024-09-16 NOTE — PATIENT INSTRUCTIONS
Assessment/Plan:  Alana Beth is a 83 y.o. female with PAD, celiac artery stenosis s/p PTAS, HTN, HLD, palpitations, who presents for a follow up appointment.     1. 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 Beth continues to do well with no abdominal pain, weight loss, claudication or tissue loss.  Continue ASA/Plavix/Praluent/Bempedoic acid.    2. PAD- Pt with known BLE PAD.  She has no claudication, rest pain or tissue loss to suggest CLI.  Continue ASA/Plavix/Praluent/Bempedoic acid, and cilostazol 100mg BID.       3. HLD- LDL 87 on 9/9/2024. Continue Praluent 150 mg every 14 days, bempedoic acid 180 mg daily, and zetia 10 mg daily.          4. HTN- Controlled. Continue current medications.      Follow up in 6 months

## 2024-09-16 NOTE — CONSULTS
[facility]  Response for E-Consult     Patient Name: Alana Beth  MRN: [id number]  Primary Care Provider: Nurys Bearden MD   Requesting Provider: Janneth Lala NP  Consults    Recommendation:   Ok to hold Plavix 5 days for colonoscopy on 10/10/24.  Restart after the procedure when safe from a bleeding perspective.      Contingency that warrants a repeat eConsult or referral:     Total time of Consultation: 10 minute    I did not speak to the requesting provider verbally about this.     *This eConsult is based on the clinical data available to me and is furnished without benefit of a physical examination. The eConsult will need to be interpreted in light of any clinical issues or changes in patient status not available to me at the time of filing this eConsults. Significant changes in patient condition or level of acuity should result in immediate formal consultation and reevaluation. Please alert me if you have further questions.    Thank you for this eConsult referral.     Ramon Pink MD PhD  [facility]

## 2024-09-16 NOTE — PROGRESS NOTES
"[facility]      CC: Celiac [place] Stenosis      Patient ID: Alana Beth is a 82 y.o. female with PAD, celiac artery stenosis s/p PTAS, HTN, HLD, palpitations, who presents for a follow up appointment.  Pertinent history/events are as follows:     -Pt presents for evaluation of PAD/weight loss.    -At our initial clinic visit on 9/4/2020, Mrs. Beth reported  "not feeling well since 11/2019".  Main complaint is weakness, fatigue and lack of appetite.  She has lost 25 pounds since 11/2019.  No definite abdominal pain.  No chest pain, SOB, or LE edema.  Exam with audible abdominal bruit.  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    Plan:   Celiac Axis Stenosis- Mrs. 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  Pertinent findings include 25 pound weight loss and abdominal bruit.  No definite abdominal pain, although mild abdominal tenderness noted on exam.  Chronic mesenteric ischemia is also a possibility in this pt with known PAD.  Given these findings, will refer to Vascular Surgery for evaluation.  Pt has several risk factors for CAD, hence, will check nuclear stress test and echo to evaluate further.  PAD- Pt with known BLE PAD.  Continue ASA.  Pt states she's not taking a statin due to issues with liver disease in the past.  Will check outside records before starting statin.  Check updated lipid panel.      -At follow up clinic visit on 9/25/2020, Mrs. Beth reported no new symptoms.  States she still does not feel well.  Pt evaluated by Vascular Surgery (Enrrique Preciado) on 9/16/2020, who recommend psych evaluation and continued GI workup.  Mesenteric Utrasound on 9/10/2020 revealed a velocity elevation of 378 cm/s is visualized near the Celiac artery origin within a region of focal narrowing, suggestive of a greater than 70% stenosis.  Nuclear Stress Test on " 9/23/2020 revealed no evidence from myocardial ischemia or injury.  The EKG portion of this study is abnormal but not diagnostic.  Echo on 9/10/2020 revealed normal left ventricular systolic function with EF of 60%; concentric left ventricular remodeling; normal LV diastolic function; normal right ventricular systolic function; mild left atrial enlargement; mild tricuspid regurgitation; estimated PA systolic pressure is 30 mmHg; normal central venous pressure (3 mmHg).  Labs from 9/10/2020 show total cholesterol of 348 with LDL of 234.   Plan:   Celiac Axis Stenosis- Mrs. 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  Pertinent findings include 25 pound weight loss and abdominal bruit.  No definite abdominal pain, although mild abdominal tenderness noted on exam.  Chronic mesenteric ischemia is also a possibility in this pt with known PAD.  Pt evaluated by Vascular Surgery (Enrrique Preciado) on Recommend psych and continued GI workup.  Mesenteric Utrasound on 9/10/2020 revealed a velocity elevation of 378 cm/s is visualized near the Celiac artery origin within a region of focal narrowing, suggestive of a greater than 70% stenosis.  Nuclear Stress Test on 9/23/2020 revealed no evidence from myocardial ischemia or injury.  The EKG portion of this study is abnormal but not diagnostic.  Echo on 9/10/2020 revealed normal left ventricular systolic function with EF of 60%; concentric left ventricular remodeling; normal LV diastolic function; normal right ventricular systolic function; mild left atrial enlargement; mild tricuspid regurgitation; estimated PA systolic pressure is 30 mmHg; normal central venous pressure (3 mmHg).  Given these findings, will refer to Dr. Junior and GI for evaluation.       PAD- Pt with known BLE PAD.  She has no claudication, rest pain or tissue loss to suggest CLI.  Start rosuvastatin 40 mg daily.  Continue ASA 81 mg daily.  Check carotid ultrasound  prior to next visit.   HLD-  Labs from 9/10/2020 show total cholesterol of 348 with LDL of 234.  Start rosuvastatin 40 mg daily. Monitor LFT's.      -At clinic visit on 10/26/2020, Mrs. Cole reports that she had not starting statin therapy due to insurance issues. She was prescribed alirocumab 75 mg injections and she has received one dose so far. She would prefer oral therapy for her hypercholesterolemia. She notes much improvement in her symptoms of sputum production after starting omeprazole. Patient was evaluated by GI for abdominal pain associated with celiac axis syndrome and increased amounts of sputum. EGD done in 8/2020 she was noted to have a submucosal esophageal nodule that path revealed chronic esophagitis. She was started on Omeprazole 20 mg BID and barium esophagram requested. Barium study was consistent with mild esophageal dysmotility.  Patient has not seen Dr. Junior yet for possible angiogram, she did not have an appointment set up yet.   Plan:  Plan:  -- Schedule pt to see Dr. Junior   -- continue omeprazole, GI follow up PRN for symptomatic care   --Start bempedoic acid 180 mg daily due to statin intolerance    -On 12/8/2020, pt underwent celiac artery intervention with Dr. Junior:  S/p aortogram with selective celiac and SMA angiogram                Initially started R radial then switched to L radial               Patent SMA and 80% celiac stenosis              IVUS guided celiac PTAS              6.0 x 18 and 6 x 14 mm Express SD dilated to 18 noemí     -On 12/16/2020, pt admitted at [facility] with abdominal pain.  Abd ultrasound revealed  patent stent in celiac artery and no significant changes with respiration to suggest extrinsic compression by median arcuate ligament.  Velocities suggest only moderate stenosis of the stent.   Symptoms improved and pt was discharged home on ASA/Plavix/Praluent.       -At clinic visit on 1/27/2021, Mrs. Beth reported feeling well with no abdominal  pain, nausea, vomiting, or anorexia.  Taking all medications as prescribed.  Labs from 12/19/2020 shows  vs 234 on 9/10/2020.    Plan:   Celiac Axis Stenosis-  Now s/p IVUS guided celiac PTAS with 6.0 x 18 and 6 x 14 mm Express SD on 12/18/2020.  Mrs. Beth reports feeling much better with no abdominal pain, n/v, or anorexia.  Abd ultrasound revealed  patent stent in celiac artery and no significant changes with respiration to suggest extrinsic compression by median arcuate ligament.  Velocities suggest only moderate stenosis of the stent.  Continue ASA/Plavix/Praluent.     PAD- Pt with known BLE PAD.  She has no claudication, rest pain or tissue loss to suggest CLI.  Continue ASA/Plavix/Praluent.      HLD-  Labs from 12/19/2020 shows  vs 234 on 9/10/2020.  Continue Praluent.       -At clinic visit on 4/28/2021, Mrs. Cole reported no abdominal pain, nausea, or anorexia.  Labs from 4/21/2021 show  vs 150 on 12/19/2021.  Plan:  Celiac Axis Stenosis-  Now s/p IVUS guided celiac artery PTAS with 6.0 x 18 and 6 x 14 mm Express SD on 12/18/2020.  Mrs. Beth continues to do well with no abdominal pain, n/v, or anorexia.  Abd ultrasound on 12/18/2020 revealed  patent stent in celiac artery and no significant changes with respiration to suggest extrinsic compression by median arcuate ligament.  Velocities suggest only moderate stenosis of the stent.  Continue ASA/Plavix/Praluent.     PAD- Pt with known BLE PAD.  She has no claudication, rest pain or tissue loss to suggest CLI.  Continue ASA/Plavix/Praluent.      HLD-  Labs from 4/21/2021 show  vs 150 on 12/19/2021.  Increase Praluent to 150 mg every 14 days.     -At clinic visit on 7/28/2021, Mrs. Cole reported no chest pain, SOB, weight loss, nausea, TIA symptoms or syncope.  States she's been injecting Praluent in her thigh instead of abdomen.  Labs from 7/19/2021 show LDL now 124 vs 145 on 4/21/2021.  Of note, pt states she did not fast  prior to the labs being drawn.   Plan:   Celiac Axis Stenosis-  Now s/p IVUS guided celiac PTAS with 6.0 x 18 and 6 x 14 mm Express SD on 12/18/2020.  Mrs. Beth has no abdominal pain, n/v, or anorexia.  Abd ultrasound on 12/18/2020 revealed  patent stent in celiac artery and no significant changes with respiration to suggest extrinsic compression by median arcuate ligament.  Velocities suggest only moderate stenosis of the stent.  Continue ASA/Plavix/Praluent.   Surveillance imaging scheduled by Dr. RENÉE Aguilar.   PAD- Pt with known BLE PAD.  She has no claudication, rest pain or tissue loss to suggest CLI.  Continue ASA/Plavix/Praluent.      HLD-  Labs from 7/19/2021 show LDL now 124 vs 145 on 4/21/2021.  The modest reduction in LDL despite increasing Praluent to 150 mg every 14 days may be due to the pt injecting Praluent in the thigh instead of abdomen.  Continue zetia and Praluent.  Before adding bempedoic acid, will have pt inject Praluent in the abdomen, and repeat lipids in 3 months.         HTN- Continue current medications.    History of Pulmonary Nodules- Check CT chest without contrast to evaluate further.     -At clinic visit on 11/8/2021, Mrs. Cole reported no chest pain, abdominal pain, SOB, LE edema, TIA symptoms or syncope.  States she's still injecting praluent in the thigh and not the abdomen, as discussed at our previous clinic visit.  Labs from 11/2/2021 shows LDL   142 vs 124 on 7/19/2021.  CT Chest w/o Contrast on 8/4/2021 revealed no pulmonary abnormalities requiring continued surveillance.   There is a Ill-defined hypoattenuating hepatic lesion, suboptimally evaluated in the absence of intravenous contrast.     Plan:  Celiac Axis Stenosis-  Now s/p IVUS guided celiac PTAS with 6.0 x 18 and 6 x 14 mm Express SD on 12/18/2020.  Mrs. Beth has no abdominal pain, n/v, or anorexia.  Abd ultrasound on 12/18/2020 revealed  patent stent in celiac artery and no significant changes with respiration  to suggest extrinsic compression by median arcuate ligament.  Velocities suggest only moderate stenosis of the stent.  Continue ASA/Plavix/Praluent.   Surveillance imaging scheduled by Dr. RENÉE Aguilar.   PAD- Pt with known BLE PAD.  She has no claudication, rest pain or tissue loss to suggest CLI.  Continue ASA/Plavix/Praluent.      HLD- Mrs. Beth states she's still injecting praluent in the thigh and not the abdomen, as discussed at clinic visit on 7/28/2021.  Labs from 11/2/2021 shows LDL   142 vs 124 on 7/19/2021.  The increase in LDL despite increasing Praluent to 150 mg every 14 days may be due to the pt injecting Praluent in the thigh instead of abdomen.  Pt will try to do injection in abdomen.  Continue zetia and Praluent.  Before adding bempedoic acid, will have pt inject Praluent in the abdomen, and repeat lipids in 3 months.  HTN- Continue current medications.    History of Pulmonary Nodules-  CT Chest w/o Contrast on 8/4/2021 revealed no pulmonary abnormalities requiring continued surveillance.    Liver Lesion- Evaluate further with contrast enhanced abdominal MRI, and refer to Hepatology.    -At clinic visit on 2/9/2022, Mrs. Beth reported feeling better. Her appetite has been okay but not as good as before. She denies abdominal pain. She lost about 2 pounds since November. She denies claudications but she does have pain in her legs when she starts walking which is relieved by continued walking.  Plan:   Celiac Axis Stenosis- s/p IVUS guided celiac PTA on 12/18/2020.  Mrs. Beth has no abdominal pain, n/v, or anorexia. Continue ASA/Plavix/Praluent. Repeat mesenteric artery US.  PAD- Pt with known BLE PAD.  She has no claudication, rest pain or tissue loss to suggest CLI.  Continue ASA/Plavix. Start cilostazol 100mg BID.     HLD- LDL remains not at goal despite using praluent. Will attempt switching to bempedoic acid and evaluate response with repeat lipid panel in 3 months.  HTN- Controlled.  Continue current medications.      -At clinic visit on 5/11/2022, Mrs. Beth reports no abdominal pain, weight loss, claudication or tissue loss.  Pt states she did not start bempedoic acid as prescribed.  Mesenteric Ultrasound on 5/5/2022 revealed the celiac trunk has greater than 70% stenosis. In-stent restenosis noted.  The proximal superior mesenteric artery has greater than 70% stenosis.  The proximal inferior mesenteric artery has greater than 70% stenosis.  Plan:   Celiac Axis Stenosis s/p IVUS guided celiac PTA on 12/18/2020- Mrs. Beth reports no abdominal pain, weight loss, claudication or tissue loss.  Mesenteric Ultrasound on 5/5/2022 revealed the celiac trunk has greater than 70% stenosis. In-stent restenosis noted.  The proximal superior mesenteric artery has greater than 70% stenosis.  The proximal inferior mesenteric artery has greater than 70% stenosis.  Continue ASA/Plavix/Praluent.  PAD- Pt with known BLE PAD.  She has no claudication, rest pain or tissue loss to suggest CLI.  Continue ASA/Plavix and cilostazol 100mg BID.     HLD- LDL remains not at goal despite using max dose praluent. Pt states she did not start bempedoic acid as prescribed.  Unclear if pt is compliant with these medications.  Check Lipo (a).  Continue current medications.    HTN- Controlled. Continue current medications.      -At clinic visit on 8/15/2022, Mrs. Beth reports doing well with no abdominal pain, weight loss, claudication or tissue loss.  Pt started taking bempedoic acid as prescribed.  LDL 78 on 8/8/2022 vs 123 on 5/5/2022.  Lipoprotein (a) is elevated at 324.  Plan:   Celiac Axis Stenosis s/p IVUS guided celiac PTA on 12/18/2020- Mrs. Beth reports no abdominal pain, weight loss, claudication or tissue loss.  Mesenteric Ultrasound on 5/5/2022 revealed the celiac trunk has greater than 70% stenosis. In-stent restenosis noted.  The proximal superior mesenteric artery has greater than 70% stenosis.  The  proximal inferior mesenteric artery has greater than 70% stenosis.  Continue ASA/Plavix/Praluent/Bempedoic acid.  PAD- Pt with known BLE PAD.  She has no claudication, rest pain or tissue loss to suggest CLI.  Continue ASA/Plavix/Praluent/Bempedoic acid, and cilostazol 100mg BID.     HLD- LDL 78 on 8/8/2022 vs 123 on 5/5/2022.  Lipoprotein (a) is elevated at 324.  Continue Praluent 150 mg every 14 days and bempedoic acid 180 mg daily.      HTN- Controlled. Continue current medications.      -At clinic visit on 2/15/2023, Mrs. Beth reports no abdominal pain, claudication or tissue loss. Weight is stable at 112 pounds.  LDL 91 on 12/29/2022.   Plan:   Celiac Axis Stenosis s/p IVUS guided celiac PTA on 12/18/2020- Mrs. Beth reports no abdominal pain, weight loss, claudication or tissue loss.  Mesenteric Ultrasound on 5/5/2022 revealed the celiac trunk has greater than 70% stenosis. In-stent restenosis noted.  The proximal superior mesenteric artery has greater than 70% stenosis.  The proximal inferior mesenteric artery has greater than 70% stenosis.  Continue ASA/Plavix/Praluent/Bempedoic acid.  PAD- Pt with known BLE PAD.  She has no claudication, rest pain or tissue loss to suggest CLI.  Continue ASA/Plavix/Praluent/Bempedoic acid, and cilostazol 100mg BID.     HLD- LDL 91 on 12/29/2022.  Continue Praluent 150 mg every 14 days, bempedoic acid 180 mg daily, and zetia 10 mg daily.        HTN- Controlled. Continue current medications.      -At clinic visit on 8/14/2023, Mrs. Beth reports doing well with no chest pain, abdominal pain, SOB, TIA symptoms or syncope.  LDL 79 on 8/7/2023.  Plan:   Celiac Axis Stenosis s/p IVUS guided celiac PTA on 12/18/2020- Mrs. Beth is doing well with no abdominal pain, weight loss, claudication or tissue loss.  Continue ASA/Plavix/Praluent/Bempedoic acid.  PAD- Pt with known BLE PAD.  She has no claudication, rest pain or tissue loss to suggest CLI.  Continue  ASA/Plavix/Praluent/Bempedoic acid, and cilostazol 100mg BID.     HLD- LDL 79 on 8/7/2023.  Continue Praluent 150 mg every 14 days, bempedoic acid 180 mg daily, and zetia 10 mg daily.        HTN- Controlled. Continue current medications.      -At clinic visit on 3/6/2024, Mrs. Beth has no chest pain, no heart failure symptoms, no abdominal pain, and no arrhythmia.  She can perform greater than 4 METS without difficulty.  She is scheduled to undergo balloon sinuplasty procedure with Dr. Billy Burger at outside facility on 3/20/2024.  LDL 90 on 1/11/2024.   Plan:  Celiac Axis Stenosis s/p IVUS guided celiac PTA on 12/18/2020- Mrs. Beth continues to do well with no abdominal pain, weight loss, claudication or tissue loss.  Continue ASA/Plavix/Praluent/Bempedoic acid.  PAD- Pt with known BLE PAD.  She has no claudication, rest pain or tissue loss to suggest CLI.  Continue ASA/Plavix/Praluent/Bempedoic acid, and cilostazol 100mg BID.     HLD- LDL 90 on 1/11/2024.  Continue Praluent 150 mg every 14 days, bempedoic acid 180 mg daily, and zetia 10 mg daily.        HTN- Controlled. Continue current medications.    Preoperative Cardiac Risk Stratification Prior to Planned Balloon Sinuplasty Procedure- Mrs. Beth has no chest pain, no heart failure symptoms, no abdominal pain, and no arrhythmia.  She can perform greater than 4 METS without difficulty.  She is at low risk for a perioperative major adverse cardiac event during this moderate risk procedure.  Revised Cardiac Risk Index of 3.9%.  No further testing indicated.  Proceed with surgery.     HPI:  Mrs. Beth doing well with no chest pain, SOB, no abdominal pain, no TIA symptoms or syncope.  LDL 87 on 9/9/2024.       Past Medical History:   Diagnosis Date    Atherosclerotic peripheral vascular disease     Chronic cystic mastitis     Essential hypertension     Hypercholesterolemia     Osteopenia     Shingles      Past Surgical History:   Procedure Laterality  Date    BREAST BIOPSY      biopsies benign    CELIAC ARTERY STENT Bilateral 12/08/2020    ESOPHAGOGASTRODUODENOSCOPY  08/20/2020    PERCUTANEOUS TRANSLUMINAL ANGIOPLASTY N/A 12/08/2020    Procedure: Pta (Angioplasty, Percutaneous, Transluminal);  Surgeon: Hernando Junior MD;  Location: [facility] CATH LAB/EP;  Service: Cardiology;  Laterality: N/A;     Social History     Socioeconomic History    Marital status: Single   Occupational History    Occupation: retired student loan assistance    Tobacco Use    Smoking status: Never    Smokeless tobacco: Never   Substance and Sexual Activity    Alcohol use: No    Drug use: No    Sexual activity: Not Currently   Social History Narrative    N/A PER THE PATIENT.      Social Determinants of Health     Financial Resource Strain: Low Risk  (8/12/2024)    Overall Financial Resource Strain (CARDIA)     Difficulty of Paying Living Expenses: Not hard at all   Food Insecurity: No Food Insecurity (8/12/2024)    Hunger Vital Sign     Worried About Running Out of Food in the Last Year: Never true     Ran Out of Food in the Last Year: Never true   Transportation Needs: No Transportation Needs (8/12/2024)    PRAPARE - Transportation     Lack of Transportation (Medical): No     Lack of Transportation (Non-Medical): No   Physical Activity: Patient Unable To Answer (8/12/2024)    Exercise Vital Sign     Days of Exercise per Week: Patient unable to answer     Minutes of Exercise per Session: Patient unable to answer   Stress: No Stress Concern Present (8/12/2024)    [employer] [place] [employer] - Occupational Stress Questionnaire     Feeling of Stress : Not at all   Housing Stability: Low Risk  (8/12/2024)    Housing Stability Vital Sign     Unable to Pay for Housing in the Last Year: No     Homeless in the Last Year: No     Family History   Problem Relation Name Age of Onset    Hypertension Mother      Diabetes Brother      Diabetes Sister      Colon cancer Neg Hx      Esophageal  cancer Neg Hx         Review of patient's allergies indicates:   Allergen Reactions    Benazepril Other (See Comments)     cough    Chlorthalidone     Iodinated contrast media     Iodine and iodide containing products     Lipitor [atorvastatin]      States she is allergic to all statin medications    Sertraline      Medication caused patient to get sick.    Spironolactone        Medication List with Changes/Refills   Current Medications    ALIROCUMAB (PRALUENT PEN) 150 MG/ML PNIJ    Inject 1 mL (150 mg total) into the skin every 14 (fourteen) days.    ASCORBIC ACID, VITAMIN C, (VITAMIN C) 1000 MG TABLET    Take 1,000 mg by mouth once daily.    ASPIRIN 81 MG CHEW    Take 81 mg by mouth once daily.    AZELASTINE (ASTELIN) 137 MCG (0.1 %) NASAL SPRAY    as needed.    BUDESONIDE 1 MG/2 ML NBSP    EMPTY CONTENTS OF 1 RESPULE INTO NASAL IRRIGATION SYSTEM, ADD DISTILLED WATER, SALT PACK, MIX & IRRIGATE. PERFORM TWICE DAILY    CARVEDILOL (COREG) 6.25 MG TABLET    Take 1 tablet (6.25 mg total) by mouth 2 (two) times daily with meals.    CETIRIZINE (ZYRTEC) 10 MG TABLET    Take 1 tablet (10 mg total) by mouth once daily.    CLOPIDOGREL (PLAVIX) 75 MG TABLET    Take 1 tablet (75 mg total) by mouth once daily.    CLORAZEPATE (TRANXENE) 7.5 MG TAB    TAKE 1 TABLET BY MOUTH ONCE DAILY AS NEEDED FOR ANXIETY    CO-ENZYME Q-10 50 MG CAPSULE    Take 50 mg by mouth once daily.    DIPHENHYDRAMINE (BENADRYL) 25 MG CAPSULE    Take 25 mg by mouth every 6 (six) hours as needed for Itching.    EZETIMIBE (ZETIA) 10 MG TABLET    Take 1 tablet (10 mg total) by mouth every evening.    FERROUS SULFATE (FEOSOL) 325 MG (65 MG IRON) TAB TABLET    Take 1 tablet (325 mg total) by mouth 3 (three) times a week.    FLUTICASONE PROPIONATE (FLONASE) 50 MCG/ACTUATION NASAL SPRAY    1 spray by Each Nostril route as needed.    IPRATROPIUM (ATROVENT) 42 MCG (0.06 %) NASAL SPRAY    2 sprays by Each Nostril route 2 (two) times daily.     "MEN/EUC/THY/CAMP/SHANITA/NACL/POT (ALKALOL NASAL WASH NASL)    by Nasal route.    MULTIVITAMIN (THERAGRAN) PER TABLET    Take 1 tablet by mouth once daily.    NEILMED SINUS RINSE REFILL PACK    use as directed    NEXLETOL 180 MG TAB    Take 1 tablet by mouth once daily    OMEGA-3 FATTY ACIDS/FISH OIL (FISH OIL-OMEGA-3 FATTY ACIDS) 300-1,000 MG CAPSULE    Take 1 capsule by mouth once daily.    PANTOPRAZOLE (PROTONIX) 40 MG TABLET    Take 40 mg by mouth once daily.    TURMERIC ORAL    Take 1 packet by mouth once daily.   Discontinued Medications    AMLODIPINE (NORVASC) 10 MG TABLET    Take 1 tablet (10 mg total) by mouth once daily.       Review of Systems  Constitution: Denies chills, fever, and sweats.  HENT: Denies headaches or blurry vision.  Cardiovascular: Denies chest pain or irregular heart beat.  Respiratory: Denies cough or shortness of breath.  Gastrointestinal: Negative for abdominal pain and weight loss.  Musculoskeletal: Denies muscle cramps.  Neurological: Denies dizziness or focal weakness.  Psychiatric/Behavioral: Normal mental status.  Hematologic/Lymphatic: Denies bleeding problem or easy bruising/bleeding.  Skin: Denies rash or suspicious lesions    Physical Examination  BP (!) 164/79   Pulse 73   Ht 5' 3" (1.6 m)   Wt 52.6 kg (115 lb 15.4 oz)   BMI 20.54 kg/m²     Constitutional: No acute distress, conversant  HEENT: Sclera anicteric, Pupils equal, round and reactive to light, extraocular motions intact, Oropharynx clear  Neck: No JVD, no carotid bruits  Cardiovascular: regular rate and rhythm, no murmur, rubs or gallops, normal S1/S2  Pulmonary: Clear to auscultation bilaterally  Abdominal: Abdomen soft with no TTP, positive bowel sounds  Extremities: No lower extremity edema   Pulses:  Carotid pulses are 2+ on the right side, and 2+ on the left side.  Radial pulses are 2+ on the right side, and 2+ on the left side.   Femoral pulses are 2+ on the right side, and 2+ on the left side.  Popliteal " "pulses are 2+ on the right side, and 2+ on the left side.   Dorsalis pedis pulses are 2+ on the right side, and 2+ on the left side.   Posterior tibial pulses are 2+ on the right side, and 2+ on the left side.    Skin: No ecchymosis, erythema, or ulcers  Psych: Alert and oriented x 3, appropriate affect  Neuro: CNII-XII intact, no focal deficits    Labs:  Most Recent Data  CBC:   Lab Results   Component Value Date    WBC 3.03 (L) 08/11/2024    HGB 10.8 (L) 08/11/2024    HCT 33.1 (L) 08/11/2024     08/11/2024    MCV 84 08/11/2024    RDW 14.4 08/11/2024     BMP:   Lab Results   Component Value Date     08/11/2024    K 4.2 08/11/2024     08/11/2024    CO2 27 08/11/2024    BUN 19 08/11/2024    CREATININE 1.2 08/11/2024     08/11/2024    CALCIUM 10.0 08/11/2024    MG 2.3 12/16/2020    PHOS 2.9 12/16/2020     LFTS;   Lab Results   Component Value Date    PROT 8.1 08/11/2024    ALBUMIN 4.0 08/11/2024    BILITOT 0.3 08/11/2024    AST 20 08/11/2024    ALKPHOS 36 (L) 08/11/2024    ALT 10 08/11/2024     COAGS: No results found for: "INR", "PROTIME", "PTT"  FLP:   Lab Results   Component Value Date    CHOL 174 09/09/2024    HDL 80 (H) 09/09/2024    LDLCALC 87.0 09/09/2024    TRIG 35 09/09/2024    CHOLHDL 46.0 09/09/2024     CARDIAC:   Lab Results   Component Value Date    TROPONINI <0.006 08/11/2024    BNP <10 08/15/2020       EKG 8/15/2020:  Normal sinus rhythm  Low voltage QRS  Anteroseptal infarct (cited on or before 15-AUG-2020)    Mesenteric Ultrasound 5/5/2022:  The celiac trunk has greater than 70% stenosis. In-stent restenosis noted.  The proximal superior mesenteric artery has greater than 70% stenosis.  The proximal inferior mesenteric artery has greater than 70% stenosis.  There is no evidence of an Abdominal Aortic Aneurysm.  Aortic atherosclerosis.    CT Chest w/o Contrast 8/4/2021:  No pulmonary abnormalities requiring continued surveillance.   Multi-vessel coronary artery " atherosclerosis.   Ill-defined hypoattenuating hepatic lesion, suboptimally evaluated in the absence of intravenous contrast.  Consider further characterization with contrast enhanced abdominal MRI.     Mesenteric Ultrasound 12/18/2020:  Color flow duplex imaging reveals patent superior mesenteric and inferior mesenteric arteries with no evidence of a hemodynamically   significant stenosis. The Celiac artery stent is patent with and velocities are 168 cm/s at rest; 157 cm/s with inspiration; 153 cm/s   with expiration. No suggestion of MALS. An accelerated flow velocity of 282 cm/s is visualized at the distal edge on the Celiac stent.   However, this region appear widely patent.       Nuclear Stress Test 9/23/2020:  Normal myocardial perfusion scan.    The perfusion scan is free of evidence from myocardial ischemia or injury.    Visually estimated ejection fraction is normal at rest and normal at stress.    There is normal wall motion at rest and post stress.    LV cavity size is normal at rest and normal at stress.    The EKG portion of this study is abnormal but not diagnostic.    The patient reported no chest pain during the stress test.    There are no prior studies for comparison.       Echo 9/10/2020:  Normal left ventricular systolic function. The estimated ejection fraction is 60%.  Concentric left ventricular remodeling.  Normal LV diastolic function.  No wall motion abnormalities.  Normal right ventricular systolic function.  Mild left atrial enlargement.  Mild tricuspid regurgitation.  The estimated PA systolic pressure is 30 mmHg.  Normal central venous pressure (3 mmHg).    Mesenteric Utrasound 9/10/2020:  Color flow duplex exam reveals a patent abdominal aorta, celiac artery, superior mesenteric artery and inferior mesenteric artery. A   velocity elevation of 378 cm/s is visualized near the Celiac artery origin within a region of focal narrowing, suggestive of a greater than   70% stenosis.     CTA  abd/pelvis with contrast (outside study from [facility]) 8/28/2020:  1. No evidence for gastrointestinal bleeding.    2. Scattered colonic diverticula without adjacent inflammatory changes.    3. Myomatous changes of the uterus with degenerating fibroids.     4. Hemangioma in segment VII.    5. Stenosis of the celiac access with poststenotic dilatation.    BLE Arterial Ultrasound 7/31/2020:  Multilevel disease in the left lower extremity. Moderate stenosis in the left superficial femoral artery. Severe stenosis in the distal popliteal artery.    Left lower extremity:  Ankle-brachial index is 0.73. Triphasic signals are seen in the common femoral and profunda arteries. There are biphasic superficial femoral, popliteal, posterior tibial, and anterior tibial arteries. There is a velocity increase from the mid SFA to the distal SFA from 1 /sec 2 m/s. Consistent with a moderate stenosis. There is another focal velocity increase in the distal popliteal from 2.5 m/sec to 5.0 m/s. Consistent with a severe stenosis. Distal to this there is monophasic waveforms.         Right lower extremity:  Ankle-brachial index is 0.93. Triphasic signals are seen in the common femoral and profunda artery. There are biphasic signals in the superficial femoral, popliteal, posterior tibial, and anterior tibial arteries. No focal areas of elevated velocity are seen.    Assessment/Plan:  Alana Beth is a 83 y.o. female with PAD, celiac artery stenosis s/p PTAS, HTN, HLD, palpitations, who presents for a follow up appointment.     1. 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 Beth continues to do well with no abdominal pain, weight loss, claudication or tissue loss.  Continue ASA/Plavix/Praluent/Bempedoic acid.    2. PAD- Pt with known BLE PAD.  She has no claudication, rest pain or tissue loss to suggest CLI.  Continue ASA/Plavix/Praluent/Bempedoic acid, and cilostazol 100mg BID.       3. HLD- LDL 87 on 9/9/2024.  Continue Praluent 150 mg every 14 days, bempedoic acid 180 mg daily, and zetia 10 mg daily.          4. HTN- Controlled. Continue current medications.      Follow up in 6 months     Total duration of face to face visit time 45 minutes.  Total time spent counseling greater than fifty percent of total visit time.  Counseling included discussion regarding imaging findings, diagnosis, possibilities, treatment options, risks and benefits.  The patient had many questions regarding the options and long-term effects.

## 2024-09-23 RX ORDER — BEMPEDOIC ACID 180 MG/1
1 TABLET, FILM COATED ORAL
Qty: 90 TABLET | Refills: 0 | Status: SHIPPED | OUTPATIENT
Start: 2024-09-23

## 2024-10-03 ENCOUNTER — TELEPHONE (OUTPATIENT)
Dept: ENDOSCOPY | Facility: HOSPITAL | Age: 84
End: 2024-10-03
Payer: MEDICARE

## 2024-10-03 NOTE — TELEPHONE ENCOUNTER
Spoke to patient for pre-call to confirm scheduled Colonoscopy/EGD and patient verbalized understanding of the following:       Date of Procedure (s)  verified 10/10/24  Arrival Time 6:30 AM verified.  Location of Procedure(s) [facility] 4th Floor verified.  NPO status reinforced. Ok to continue clear liquids up until 4 hours prior to the Endoscopy procedure.       Pt is taking  Plavix (clopidogrel) , Pt instructed to stop taking Plavix (clopidogrel)  on:  10/5/24, per endoscopy protocol.  Approval to hold received from Ramon Siu,.   Pt confirmed receipt of prep instructions and Rx prep (if applicable).  Instructions provided to patient via [facility]  Pt confirmed ride home after procedure if procedure requires anesthesia.   Pre-call screening questionnaire reviewed and completed with patient.   Appointment details are tentative, including check-in time.  If the patient begins taking any blood thinning medications, injectable weight loss/diabetes medications (other than insulin), or Adipex (phentermine) patient was instructed to contact the endoscopy scheduling department as soon as possible.  Patient was advised to call the endoscopy scheduling department if any questions or concerns arise.       [facility] Scheduling Department

## 2024-10-04 ENCOUNTER — TELEPHONE (OUTPATIENT)
Dept: ENDOSCOPY | Facility: HOSPITAL | Age: 84
End: 2024-10-04
Payer: MEDICARE

## 2024-10-09 ENCOUNTER — ANESTHESIA EVENT (OUTPATIENT)
Dept: ENDOSCOPY | Facility: HOSPITAL | Age: 84
End: 2024-10-09
Payer: MEDICARE

## 2024-10-10 ENCOUNTER — HOSPITAL ENCOUNTER (OUTPATIENT)
Facility: HOSPITAL | Age: 84
Discharge: HOME | End: 2024-10-10
Attending: STUDENT IN AN ORGANIZED HEALTH CARE EDUCATION/TRAINING PROGRAM | Admitting: STUDENT IN AN ORGANIZED HEALTH CARE EDUCATION/TRAINING PROGRAM
Payer: MEDICARE

## 2024-10-10 ENCOUNTER — ANESTHESIA (OUTPATIENT)
Dept: ENDOSCOPY | Facility: HOSPITAL | Age: 84
End: 2024-10-10
Payer: MEDICARE

## 2024-10-10 VITALS
SYSTOLIC BLOOD PRESSURE: 142 MMHG | WEIGHT: 115 LBS | HEART RATE: 68 BPM | DIASTOLIC BLOOD PRESSURE: 76 MMHG | TEMPERATURE: 97 F | OXYGEN SATURATION: 98 % | RESPIRATION RATE: 15 BRPM | BODY MASS INDEX: 20.38 KG/M2 | HEIGHT: 63 IN

## 2024-10-10 DIAGNOSIS — R11.0 NAUSEA: ICD-10-CM

## 2024-10-10 PROCEDURE — 88341 IMHCHEM/IMCYTCHM EA ADD ANTB: CPT | Performed by: PATHOLOGY

## 2024-10-10 PROCEDURE — 37000008 HC ANESTHESIA 1ST 15 MINUTES: Performed by: INTERNAL MEDICINE

## 2024-10-10 PROCEDURE — 43239 EGD BIOPSY SINGLE/MULTIPLE: CPT | Mod: ,,, | Performed by: INTERNAL MEDICINE

## 2024-10-10 PROCEDURE — 88305 TISSUE EXAM BY PATHOLOGIST: CPT | Mod: 26,,, | Performed by: PATHOLOGY

## 2024-10-10 PROCEDURE — 25000003 PHARM REV CODE 250: Performed by: STUDENT IN AN ORGANIZED HEALTH CARE EDUCATION/TRAINING PROGRAM

## 2024-10-10 PROCEDURE — 88342 IMHCHEM/IMCYTCHM 1ST ANTB: CPT | Performed by: PATHOLOGY

## 2024-10-10 PROCEDURE — 45380 COLONOSCOPY AND BIOPSY: CPT | Performed by: INTERNAL MEDICINE

## 2024-10-10 PROCEDURE — 27202410 HC FORCEPS, WIRE-GUIDED: Performed by: INTERNAL MEDICINE

## 2024-10-10 PROCEDURE — 88305 TISSUE EXAM BY PATHOLOGIST: CPT | Performed by: PATHOLOGY

## 2024-10-10 PROCEDURE — 27201012 HC FORCEPS, HOT/COLD, DISP: Performed by: INTERNAL MEDICINE

## 2024-10-10 PROCEDURE — 43239 EGD BIOPSY SINGLE/MULTIPLE: CPT | Performed by: INTERNAL MEDICINE

## 2024-10-10 PROCEDURE — 88341 IMHCHEM/IMCYTCHM EA ADD ANTB: CPT | Mod: 26,,, | Performed by: PATHOLOGY

## 2024-10-10 PROCEDURE — 88342 IMHCHEM/IMCYTCHM 1ST ANTB: CPT | Mod: 26,,, | Performed by: PATHOLOGY

## 2024-10-10 PROCEDURE — 37000009 HC ANESTHESIA EA ADD 15 MINS: Performed by: INTERNAL MEDICINE

## 2024-10-10 PROCEDURE — 45380 COLONOSCOPY AND BIOPSY: CPT | Mod: ,,, | Performed by: INTERNAL MEDICINE

## 2024-10-10 PROCEDURE — 63600175 PHARM REV CODE 636 W HCPCS: Performed by: NURSE ANESTHETIST, CERTIFIED REGISTERED

## 2024-10-10 RX ORDER — PROPOFOL 10 MG/ML
VIAL (ML) INTRAVENOUS
Status: DISCONTINUED | OUTPATIENT
Start: 2024-10-10 | End: 2024-10-10

## 2024-10-10 RX ORDER — SODIUM CHLORIDE 9 MG/ML
INJECTION, SOLUTION INTRAVENOUS CONTINUOUS
Status: DISCONTINUED | OUTPATIENT
Start: 2024-10-10 | End: 2024-10-10 | Stop reason: HOSPADM

## 2024-10-10 RX ORDER — LIDOCAINE HYDROCHLORIDE 20 MG/ML
INJECTION INTRAVENOUS
Status: DISCONTINUED | OUTPATIENT
Start: 2024-10-10 | End: 2024-10-10

## 2024-10-10 RX ADMIN — LIDOCAINE HYDROCHLORIDE 80 MG: 20 INJECTION INTRAVENOUS at 07:10

## 2024-10-10 RX ADMIN — PROPOFOL 150 MCG/KG/MIN: 10 INJECTION, EMULSION INTRAVENOUS at 07:10

## 2024-10-10 RX ADMIN — SODIUM CHLORIDE: 0.9 INJECTION, SOLUTION INTRAVENOUS at 07:10

## 2024-10-10 RX ADMIN — PROPOFOL 50 MG: 10 INJECTION, EMULSION INTRAVENOUS at 07:10

## 2024-10-10 NOTE — PROVATION PATIENT INSTRUCTIONS
Discharge Summary/Instructions after an Endoscopic Procedure  Patient Name: Alana Beth  Patient MRN: [id number]  Patient YOB: 1940  Thursday, October 10, 2024  Billy Sibley MD  Dear patient,  As a result of recent federal legislation (The Federal Cures Act), you may   receive lab or pathology results from your procedure in your [facility]   account before your physician is able to contact you. Your physician or   their representative will relay the results to you with their   recommendations at their soonest availability.  Thank you,  RESTRICTIONS:  During your procedure today, you received medications for sedation.  These   medications may affect your judgment, balance and coordination.  Therefore,   for 24 hours, you have the following restrictions:   - DO NOT drive a car, operate machinery, make legal/financial decisions,   sign important papers or drink alcohol.    ACTIVITY:  Today: no heavy lifting, straining or running due to procedural   sedation/anesthesia.  The following day: return to full activity including work.  DIET:  Eat and drink normally unless instructed otherwise.     TREATMENT FOR COMMON SIDE EFFECTS:  - Mild abdominal pain, nausea, belching, bloating or excessive gas:  rest,   eat lightly and use a heating pad.  - Sore Throat: treat with throat lozenges and/or gargle with warm salt   water.  - Because air was used during the procedure, expelling large amounts of air   from your rectum or belching is normal.  - If a bowel prep was taken, you may not have a bowel movement for 1-3 days.    This is normal.  SYMPTOMS TO WATCH FOR AND REPORT TO YOUR PHYSICIAN:  1. Abdominal pain or bloating, other than gas cramps.  2. Chest pain.  3. Back pain.  4. Signs of infection such as: chills or fever occurring within 24 hours   after the procedure.  5. Rectal bleeding, which would show as bright red, maroon, or black stools.   (A tablespoon of blood from the rectum is not serious,  especially if   hemorrhoids are present.)  6. Vomiting.  7. Weakness or dizziness.  GO DIRECTLY TO THE NEAREST EMERGENCY ROOM IF YOU HAVE ANY OF THE FOLLOWING:      Difficulty breathing              Chills and/or fever over 101 F   Persistent vomiting and/or vomiting blood   Severe abdominal pain   Severe chest pain   Black, tarry stools   Bleeding- more than one tablespoon   Any other symptom or condition that you feel may need urgent attention  Your doctor recommends these additional instructions:  If any biopsies were taken, your doctors clinic will contact you in 1 to 2   weeks with any results.  - Discharge patient to home.   - Resume previous diet today.   - Continue present medications.   - Await pathology results.   - Return to referring physician as previously scheduled.  For questions, problems or results please call your physician - Billy Sibley MD at Work:  [phone].  [facility], EMERGENCY ROOM PHONE NUMBER: [phone]  IF A COMPLICATION OR EMERGENCY SITUATION ARISES AND YOU ARE UNABLE TO REACH   YOUR PHYSICIAN - GO DIRECTLY TO THE EMERGENCY ROOM.  Billy Sibley MD  10/10/2024 7:45:08 AM  This report has been verified and signed electronically.  Dear patient,  As a result of recent federal legislation (The Federal Cures Act), you may   receive lab or pathology results from your procedure in your [facility]   account before your physician is able to contact you. Your physician or   their representative will relay the results to you with their   recommendations at their soonest availability.  Thank you,  PROVATION

## 2024-10-10 NOTE — ANESTHESIA PREPROCEDURE EVALUATION
10/10/2024  Alana Beth is a 84 y.o., female.    Patient Active Problem List   Diagnosis    Essential hypertension    Atherosclerotic peripheral vascular disease with intermittent claudication    Anxiety    Palpitations    Sensitivity to medication    Solitary pulmonary nodule    Abnormal chest x-ray    Weight loss    Cough    Atherosclerosis of aortic arch    Hypercholesteremia    Aortic atherosclerosis    Granulomatous lung disease    Celiac artery atherosclerosis    PAD (peripheral artery disease)    Abdominal discomfort    Statin intolerance    Mesenteric ischemia    History of iron deficiency anemia    Mixed hyperlipidemia    Lung nodule    Chronic cough    Chronic throat clearing    Liver lesion    Chronic kidney disease, stage 3a    Carotid atherosclerosis    EFRAÍN (generalized anxiety disorder)     Past Surgical History:   Procedure Laterality Date    BREAST BIOPSY      biopsies benign    CELIAC ARTERY STENT Bilateral 12/08/2020    ESOPHAGOGASTRODUODENOSCOPY  08/20/2020    PERCUTANEOUS TRANSLUMINAL ANGIOPLASTY N/A 12/08/2020    Procedure: Pta (Angioplasty, Percutaneous, Transluminal);  Surgeon: Hernando Junior MD;  Location: [facility] CATH LAB/EP;  Service: Cardiology;  Laterality: N/A;     Past Medical History:   Diagnosis Date    Atherosclerotic peripheral vascular disease     Chronic cystic mastitis     Essential hypertension     Hypercholesterolemia     Osteopenia     Shingles          Pre-op Assessment    I have reviewed the Patient Summary Reports.       I have reviewed the Medications.     Review of Systems      Physical Exam  General: Well nourished, Alert and Oriented    Airway:  Mallampati: II / I  Mouth Opening: Normal  TM Distance: Normal  Tongue: Normal  Neck ROM: Normal ROM    Dental:  Intact        Anesthesia Plan  Type of Anesthesia, risks & benefits discussed:    Anesthesia Type: Gen  Natural Airway  Intra-op Monitoring Plan: Standard ASA Monitors  Induction:  IV  Airway Plan: , Post-Induction  Informed Consent: Informed consent signed with the Patient and all parties understand the risks and agree with anesthesia plan.  All questions answered.   ASA Score: 3  Day of Surgery Review of History & Physical: I have interviewed and examined the patient. I have reviewed the patient's H&P dated: There are no significant changes.     Ready For Surgery From Anesthesia Perspective.     .

## 2024-10-10 NOTE — ANESTHESIA POSTPROCEDURE EVALUATION
Anesthesia Post Evaluation    Patient: Alana Beth    Procedure(s) Performed: Procedure(s) (LRB):  EGD (ESOPHAGOGASTRODUODENOSCOPY) (N/A)  COLONOSCOPY (N/A)    Final Anesthesia Type: general      Patient location during evaluation: GI PACU  Patient participation: Yes- Able to Participate  Level of consciousness: awake and alert  Post-procedure vital signs: reviewed and stable  Pain management: adequate  Airway patency: patent    PONV status at discharge: No PONV  Anesthetic complications: no      Cardiovascular status: blood pressure returned to baseline  Respiratory status: unassisted  Hydration status: euvolemic  Follow-up not needed.              Vitals Value Taken Time   /76 10/10/24 0838   Temp 36.2 °C (97.2 °F) 10/10/24 0809   Pulse 68 10/10/24 0838   Resp 15 10/10/24 0838   SpO2 98 % 10/10/24 0838         Event Time   Out of Recovery 08:57:32         Pain/Oralia Score: Oralia Score: 10 (10/10/2024  8:23 AM)

## 2024-10-10 NOTE — PROVATION PATIENT INSTRUCTIONS
Discharge Summary/Instructions after an Endoscopic Procedure  Patient Name: Alana Beth  Patient MRN: [id number]  Patient YOB: 1940  Thursday, October 10, 2024  Billy Sibley MD  Dear patient,  As a result of recent federal legislation (The Federal Cures Act), you may   receive lab or pathology results from your procedure in your [facility]   account before your physician is able to contact you. Your physician or   their representative will relay the results to you with their   recommendations at their soonest availability.  Thank you,  RESTRICTIONS:  During your procedure today, you received medications for sedation.  These   medications may affect your judgment, balance and coordination.  Therefore,   for 24 hours, you have the following restrictions:   - DO NOT drive a car, operate machinery, make legal/financial decisions,   sign important papers or drink alcohol.    ACTIVITY:  Today: no heavy lifting, straining or running due to procedural   sedation/anesthesia.  The following day: return to full activity including work.  DIET:  Eat and drink normally unless instructed otherwise.     TREATMENT FOR COMMON SIDE EFFECTS:  - Mild abdominal pain, nausea, belching, bloating or excessive gas:  rest,   eat lightly and use a heating pad.  - Sore Throat: treat with throat lozenges and/or gargle with warm salt   water.  - Because air was used during the procedure, expelling large amounts of air   from your rectum or belching is normal.  - If a bowel prep was taken, you may not have a bowel movement for 1-3 days.    This is normal.  SYMPTOMS TO WATCH FOR AND REPORT TO YOUR PHYSICIAN:  1. Abdominal pain or bloating, other than gas cramps.  2. Chest pain.  3. Back pain.  4. Signs of infection such as: chills or fever occurring within 24 hours   after the procedure.  5. Rectal bleeding, which would show as bright red, maroon, or black stools.   (A tablespoon of blood from the rectum is not serious,  especially if   hemorrhoids are present.)  6. Vomiting.  7. Weakness or dizziness.  GO DIRECTLY TO THE NEAREST EMERGENCY ROOM IF YOU HAVE ANY OF THE FOLLOWING:      Difficulty breathing              Chills and/or fever over 101 F   Persistent vomiting and/or vomiting blood   Severe abdominal pain   Severe chest pain   Black, tarry stools   Bleeding- more than one tablespoon   Any other symptom or condition that you feel may need urgent attention  Your doctor recommends these additional instructions:  If any biopsies were taken, your doctors clinic will contact you in 1 to 2   weeks with any results.  - Discharge patient to home.   - Resume previous diet today.   - Continue present medications.   - Await pathology results.   - Repeat colonoscopy is not recommended due to current age (66 years or   older) for surveillance.   - Further colonoscopy for surveillance/screening purposes is not recommended   given the patient's age and/or comorbidities.  - Return to referring physician as previously scheduled.   - Patient has a contact number available for emergencies.  The signs and   symptoms of potential delayed complications were discussed with the   patient.  Return to normal activities tomorrow.  Written discharge   instructions were provided to the patient.  For questions, problems or results please call your physician - Billy Sibley MD at Work:  [phone].  [facility], EMERGENCY ROOM PHONE NUMBER: [phone]  IF A COMPLICATION OR EMERGENCY SITUATION ARISES AND YOU ARE UNABLE TO REACH   YOUR PHYSICIAN - GO DIRECTLY TO THE EMERGENCY ROOM.  Billy Sibley MD  10/10/2024 8:04:29 AM  This report has been verified and signed electronically.  Dear patient,  As a result of recent federal legislation (The Federal Cures Act), you may   receive lab or pathology results from your procedure in your [facility]   account before your physician is able to contact you. Your physician or   their  representative will relay the results to you with their   recommendations at their soonest availability.  Thank you,  PROVATION

## 2024-10-10 NOTE — H&P
Short Stay Endoscopy History and Physical    PCP - Nurys Bearden MD    Procedure - EGD and colonoscopy  ASA - 3  Mallampati - per anesthesia  History of Anesthesia problems - no  Family history Anesthesia problems -  no     HPI:  This is a 84 y.o. female here for evaluation of :     EGD  Reflux - no  Dysphagia - no  Abdominal pain - no  Diarrhea - no  Anemia - yes  GI bleeding - no  Other - nausea    Colonoscopy  Screening - no  History of polyps - no  Diarrhea - no  Anemia - yes  Blood in stools - no  Abdominal pain - no  Other - no    ROS:  CONSTITUTIONAL: Denies weight change,  fatigue, fevers, chills, night sweats.  CARDIOVASCULAR: Denies chest pain, shortness of breath, orthopnea and edema.  RESPIRATORY: Denies cough, hemoptysis, dyspnea, and wheezing.  GI: See HPI.    Medical History:   Past Medical History:   Diagnosis Date    Atherosclerotic peripheral vascular disease     Chronic cystic mastitis     Essential hypertension     Hypercholesterolemia     Osteopenia     Shingles        Surgical History:   Past Surgical History:   Procedure Laterality Date    BREAST BIOPSY      biopsies benign    CELIAC ARTERY STENT Bilateral 12/08/2020    ESOPHAGOGASTRODUODENOSCOPY  08/20/2020    PERCUTANEOUS TRANSLUMINAL ANGIOPLASTY N/A 12/08/2020    Procedure: Pta (Angioplasty, Percutaneous, Transluminal);  Surgeon: Hernando Junior MD;  Location: [facility] CATH LAB/EP;  Service: Cardiology;  Laterality: N/A;       Family History:   Family History   Problem Relation Name Age of Onset    Hypertension Mother      Diabetes Brother      Diabetes Sister      Colon cancer Neg Hx      Esophageal cancer Neg Hx         Social History:   Social History     Tobacco Use    Smoking status: Never    Smokeless tobacco: Never   Substance Use Topics    Alcohol use: No    Drug use: No       Allergies: Reviewed    Medications:   No current facility-administered medications on file prior to encounter.     Current Outpatient Medications on  File Prior to Encounter   Medication Sig Dispense Refill    alirocumab (PRALUENT PEN) 150 mg/mL PnIj Inject 1 mL (150 mg total) into the skin every 14 (fourteen) days. 10 mL 10    ascorbic acid, vitamin C, (VITAMIN C) 1000 MG tablet Take 1,000 mg by mouth once daily.      budesonide 1 mg/2 mL NbSp EMPTY CONTENTS OF 1 RESPULE INTO NASAL IRRIGATION SYSTEM, ADD DISTILLED WATER, SALT PACK, MIX & IRRIGATE. PERFORM TWICE DAILY      carvediloL (COREG) 6.25 MG tablet Take 1 tablet (6.25 mg total) by mouth 2 (two) times daily with meals. 180 tablet 3    clorazepate (TRANXENE) 7.5 MG Tab TAKE 1 TABLET BY MOUTH ONCE DAILY AS NEEDED FOR ANXIETY 90 tablet 1    ezetimibe (ZETIA) 10 mg tablet Take 1 tablet (10 mg total) by mouth every evening. 90 tablet 3    ipratropium (ATROVENT) 42 mcg (0.06 %) nasal spray 2 sprays by Each Nostril route 2 (two) times daily.      multivitamin (THERAGRAN) per tablet Take 1 tablet by mouth once daily.      omega-3 fatty acids/fish oil (FISH OIL-OMEGA-3 FATTY ACIDS) 300-1,000 mg capsule Take 1 capsule by mouth once daily.      pantoprazole (PROTONIX) 40 MG tablet Take 40 mg by mouth once daily.      aspirin 81 MG Chew Take 81 mg by mouth once daily.      azelastine (ASTELIN) 137 mcg (0.1 %) nasal spray as needed.      clopidogreL (PLAVIX) 75 mg tablet Take 1 tablet (75 mg total) by mouth once daily. 90 tablet 3    co-enzyme Q-10 50 mg capsule Take 50 mg by mouth once daily.      diphenhydrAMINE (BENADRYL) 25 mg capsule Take 25 mg by mouth every 6 (six) hours as needed for Itching.      ferrous sulfate (FEOSOL) 325 mg (65 mg iron) Tab tablet Take 1 tablet (325 mg total) by mouth 3 (three) times a week. 36 tablet 3    fluticasone propionate (FLONASE) 50 mcg/actuation nasal spray 1 spray by Each Nostril route as needed.      men/euc/thy/camp/david/NaCl/pot (ALKALOL NASAL WASH NASL) by Nasal route.      NEILMED SINUS RINSE REFILL Pack use as directed      TURMERIC ORAL Take 1 packet by mouth once daily.          Physical Exam:  Vital Signs:   Vitals:    10/10/24 0721   BP: (!) 193/92   Pulse: 75   Resp: 16   Temp: 97.3 °F (36.3 °C)     General Appearance: Well appearing in no acute distress  ENT: OP clear  Chest: CTA B  CV: RRR, no m/r/g  Abd: s/nt/nd/nabs  Ext: no edema    Labs:Reviewed    Plan:   I have explained the risks and benefits of upper endoscopy and colonoscopy to the patient including but not limited to bleeding, perforation, infection, and death. The patient wishes to proceed.

## 2024-10-10 NOTE — TRANSFER OF CARE
"Anesthesia Transfer of Care Note    Patient: Alana Beth    Procedure(s) Performed: Procedure(s) (LRB):  EGD (ESOPHAGOGASTRODUODENOSCOPY) (N/A)  COLONOSCOPY (N/A)    Patient location: GI    Anesthesia Type: general    Transport from OR: Transported from OR on room air with adequate spontaneous ventilation    Post pain: adequate analgesia    Post assessment: no apparent anesthetic complications and tolerated procedure well    Post vital signs: stable    Level of consciousness: awake and alert    Nausea/Vomiting: no nausea/vomiting    Complications: none    Transfer of care protocol was followed      Last vitals: Visit Vitals  BP (!) 193/92 (BP Location: Left arm, Patient Position: Lying)   Pulse 75   Temp 36.3 °C (97.3 °F) (Temporal)   Resp 16   Ht 5' 3" (1.6 m)   Wt 52.2 kg (115 lb)   SpO2 99%   BMI 20.37 kg/m²     "

## 2024-10-16 LAB
FINAL PATHOLOGIC DIAGNOSIS: NORMAL
GROSS: NORMAL
Lab: NORMAL

## 2024-10-22 ENCOUNTER — OFFICE VISIT (OUTPATIENT)
Dept: OBSTETRICS AND GYNECOLOGY | Facility: CLINIC | Age: 84
End: 2024-10-22
Payer: MEDICARE

## 2024-10-22 VITALS
DIASTOLIC BLOOD PRESSURE: 79 MMHG | BODY MASS INDEX: 20.09 KG/M2 | WEIGHT: 113.44 LBS | SYSTOLIC BLOOD PRESSURE: 151 MMHG

## 2024-10-22 DIAGNOSIS — N88.2 CERVICAL OS STENOSIS: ICD-10-CM

## 2024-10-22 DIAGNOSIS — N95.2 VAGINAL ATROPHY: ICD-10-CM

## 2024-10-22 DIAGNOSIS — D25.9 UTERINE LEIOMYOMA, UNSPECIFIED LOCATION: ICD-10-CM

## 2024-10-22 DIAGNOSIS — N95.0 PMB (POSTMENOPAUSAL BLEEDING): Primary | ICD-10-CM

## 2024-10-22 PROCEDURE — 1126F AMNT PAIN NOTED NONE PRSNT: CPT | Mod: CPTII,S$GLB,, | Performed by: NURSE PRACTITIONER

## 2024-10-22 PROCEDURE — 99999 PR PBB SHADOW E&M-EST. PATIENT-LVL IV: CPT | Mod: PBBFAC,,, | Performed by: NURSE PRACTITIONER

## 2024-10-22 PROCEDURE — 1157F ADVNC CARE PLAN IN RCRD: CPT | Mod: CPTII,S$GLB,, | Performed by: NURSE PRACTITIONER

## 2024-10-22 PROCEDURE — 3077F SYST BP >= 140 MM HG: CPT | Mod: CPTII,S$GLB,, | Performed by: NURSE PRACTITIONER

## 2024-10-22 PROCEDURE — 1159F MED LIST DOCD IN RCRD: CPT | Mod: CPTII,S$GLB,, | Performed by: NURSE PRACTITIONER

## 2024-10-22 PROCEDURE — 1160F RVW MEDS BY RX/DR IN RCRD: CPT | Mod: CPTII,S$GLB,, | Performed by: NURSE PRACTITIONER

## 2024-10-22 PROCEDURE — 99213 OFFICE O/P EST LOW 20 MIN: CPT | Mod: S$GLB,,, | Performed by: NURSE PRACTITIONER

## 2024-10-22 PROCEDURE — 3078F DIAST BP <80 MM HG: CPT | Mod: CPTII,S$GLB,, | Performed by: NURSE PRACTITIONER

## 2024-10-22 RX ORDER — LEVOCETIRIZINE DIHYDROCHLORIDE 5 MG/1
TABLET, FILM COATED ORAL
COMMUNITY
Start: 2024-10-08 | End: 2025-04-06

## 2024-10-22 NOTE — PROGRESS NOTES
HISTORY OF PRESENT ILLNESS:    Alana Beth is a 84 y.o. female, , No LMP recorded. Patient is postmenopausal.,  presents with c/o postmenopausal bleeding. Pt reports she noticed light pink in her underwear and on paper tissue with first wipe after using the restroom. Pt also reports right sided mild cramping at times. Pt takes aspirin and plavix, easily bruise. Pt had a colonoscopy performed on 10/10, but she noticed pink discharge on 10/08/24.    Pt reports pink discharge in underwear and with wiping previously. Pt had a pelvic U/S performed on 2022.  FINDINGS:  Uterus: Size: 8.4*3.5*5.8 cm; Masses: 3.3*3.2*3.2 cm fundal fibroid  Endometrium: Non visualized in this post menopausal patient. Heterogeneous appearance to uterus with multiple calcifications.  Right ovary: Not visualized.   Left ovary: Size: 3.1*1.2*1.8 cm, Appearance: Normal, Vascular Flow: Normal.  Free Fluid: None.  Impression: Heterogeneous appearing uterus with calcification and nonvisualization of endometrial stripe. 3 cm uterine fundal fibroid.    Past Medical History:   Diagnosis Date    Atherosclerotic peripheral vascular disease     Chronic cystic mastitis     Essential hypertension     Hypercholesterolemia     Osteopenia     Shingles      Past Surgical History:   Procedure Laterality Date    BREAST BIOPSY      biopsies benign    CELIAC ARTERY STENT Bilateral 2020    COLONOSCOPY N/A 10/10/2024    Procedure: COLONOSCOPY;  Surgeon: Billy Sibley MD;  Location: [address]);  Service: Gastroenterology;  Laterality: N/A;    ESOPHAGOGASTRODUODENOSCOPY  2020    ESOPHAGOGASTRODUODENOSCOPY N/A 10/10/2024    Procedure: EGD (ESOPHAGOGASTRODUODENOSCOPY);  Surgeon: Billy Sibley MD;  Location: [facility] ([address] [place] [address]);  Service: Gastroenterology;  Laterality: N/A;  ok to hold Plavix 5 days per Dr Pink-see E consult dated -GT  10/3 precall complete-st    PERCUTANEOUS TRANSLUMINAL ANGIOPLASTY N/A  12/08/2020    Procedure: Pta (Angioplasty, Percutaneous, Transluminal);  Surgeon: Hernando Junior MD;  Location: [facility] CATH LAB/EP;  Service: Cardiology;  Laterality: N/A;     MEDICATIONS AND ALLERGIES:    Current Outpatient Medications:     alirocumab (PRALUENT PEN) 150 mg/mL PnIj, Inject 1 mL (150 mg total) into the skin every 14 (fourteen) days., Disp: 10 mL, Rfl: 10    ascorbic acid, vitamin C, (VITAMIN C) 1000 MG tablet, Take 1,000 mg by mouth once daily., Disp: , Rfl:     aspirin 81 MG Chew, Take 81 mg by mouth once daily., Disp: , Rfl:     carvediloL (COREG) 6.25 MG tablet, Take 1 tablet (6.25 mg total) by mouth 2 (two) times daily with meals., Disp: 180 tablet, Rfl: 3    clopidogreL (PLAVIX) 75 mg tablet, Take 1 tablet (75 mg total) by mouth once daily., Disp: 90 tablet, Rfl: 3    clorazepate (TRANXENE) 7.5 MG Tab, TAKE 1 TABLET BY MOUTH ONCE DAILY AS NEEDED FOR ANXIETY, Disp: 90 tablet, Rfl: 1    co-enzyme Q-10 50 mg capsule, Take 50 mg by mouth once daily., Disp: , Rfl:     diphenhydrAMINE (BENADRYL) 25 mg capsule, Take 25 mg by mouth every 6 (six) hours as needed for Itching., Disp: , Rfl:     ezetimibe (ZETIA) 10 mg tablet, Take 1 tablet (10 mg total) by mouth every evening., Disp: 90 tablet, Rfl: 3    ferrous sulfate (FEOSOL) 325 mg (65 mg iron) Tab tablet, Take 1 tablet (325 mg total) by mouth 3 (three) times a week., Disp: 36 tablet, Rfl: 3    fluticasone propionate (FLONASE) 50 mcg/actuation nasal spray, 1 spray by Each Nostril route as needed., Disp: , Rfl:     ipratropium (ATROVENT) 42 mcg (0.06 %) nasal spray, 2 sprays by Each Nostril route 2 (two) times daily., Disp: , Rfl:     levocetirizine (XYZAL) 5 MG tablet, 1 tablet Orally Once a day for 30 days, Disp: , Rfl:     men/euc/thy/camp/david/NaCl/pot (ALKALOL NASAL WASH NASL), by Nasal route., Disp: , Rfl:     multivitamin (THERAGRAN) per tablet, Take 1 tablet by mouth once daily., Disp: , Rfl:     NEILMED SINUS RINSE REFILL Pack, use as directed,  Disp: , Rfl:     NEXLETOL 180 mg Tab, Take 1 tablet by mouth once daily, Disp: 90 tablet, Rfl: 0    omega-3 fatty acids/fish oil (FISH OIL-OMEGA-3 FATTY ACIDS) 300-1,000 mg capsule, Take 1 capsule by mouth once daily., Disp: , Rfl:     pantoprazole (PROTONIX) 40 MG tablet, Take 40 mg by mouth once daily., Disp: , Rfl:     TURMERIC ORAL, Take 1 packet by mouth once daily., Disp: , Rfl:     azelastine (ASTELIN) 137 mcg (0.1 %) nasal spray, as needed. (Patient not taking: Reported on 10/22/2024), Disp: , Rfl:     budesonide 1 mg/2 mL NbSp, EMPTY CONTENTS OF 1 RESPULE INTO NASAL IRRIGATION SYSTEM, ADD DISTILLED WATER, SALT PACK, MIX & IRRIGATE. PERFORM TWICE DAILY (Patient not taking: Reported on 10/22/2024), Disp: , Rfl:     cetirizine (ZYRTEC) 10 MG tablet, Take 1 tablet (10 mg total) by mouth once daily. (Patient not taking: Reported on 10/22/2024), Disp: 30 tablet, Rfl: 2    Review of patient's allergies indicates:   Allergen Reactions    Benazepril Other (See Comments)     cough    Chlorthalidone     Iodinated contrast media     Iodine and iodide containing products     Lipitor [atorvastatin]      States she is allergic to all statin medications    Sertraline      Medication caused patient to get sick.    Spironolactone      Family History   Problem Relation Name Age of Onset    Hypertension Mother      Diabetes Brother      Diabetes Sister      Colon cancer Neg Hx      Esophageal cancer Neg Hx       Social History     Socioeconomic History    Marital status: Single   Occupational History    Occupation: retired student loan assistance    Tobacco Use    Smoking status: Never    Smokeless tobacco: Never   Substance and Sexual Activity    Alcohol use: No    Drug use: No    Sexual activity: Not Currently   Social History Narrative    N/A PER THE PATIENT.      Social Drivers of Health     Financial Resource Strain: Low Risk  (8/12/2024)    Overall Financial Resource Strain (CARDIA)     Difficulty of Paying Living  Expenses: Not hard at all   Food Insecurity: No Food Insecurity (8/12/2024)    Hunger Vital Sign     Worried About Running Out of Food in the Last Year: Never true     Ran Out of Food in the Last Year: Never true   Transportation Needs: No Transportation Needs (8/12/2024)    PRAPARE - Transportation     Lack of Transportation (Medical): No     Lack of Transportation (Non-Medical): No   Physical Activity: Patient Unable To Answer (8/12/2024)    Exercise Vital Sign     Days of Exercise per Week: Patient unable to answer     Minutes of Exercise per Session: Patient unable to answer   Stress: No Stress Concern Present (8/12/2024)    [employer] - Occupational Stress Questionnaire     Feeling of Stress : Not at all   Housing Stability: Low Risk  (8/12/2024)    Housing Stability Vital Sign     Unable to Pay for Housing in the Last Year: No     Homeless in the Last Year: No     ROS:  GENERAL: No weight changes. No swelling. No fatigue. No fever.  BREASTS: No pain. No lumps. No discharge.  ABDOMEN: No pain. No nausea. No vomiting. No diarrhea. No constipation.  REPRODUCTIVE: Postmenopausal bleeding  VULVA: No pain. No lesions. No itching.  VAGINA: No relaxation. No itching. No odor. No discharge. No lesions.  URINARY: No incontinence. No nocturia. No frequency. No dysuria.    BP (!) 151/79 (BP Location: Left arm, Patient Position: Sitting)   Wt 51.5 kg (113 lb 6.8 oz)   BMI 20.09 kg/m²     PE:  APPEARANCE: Well nourished, well developed, in no acute distress.  AFFECT: WNL, alert and oriented x 3.  SKIN: Warm and dry  PELVIC: External female genitalia without lesions.  Female hair distribution. Adequate perineal body, Normal urethral meatus. Vaginal atrophy noted without lesions or discharge.  No significant cystocele or rectocele present. Cervix stenosis noted pink without lesions, discharge or tenderness. Uterus is normal in size, mobile and nontender. Adnexa without masses or  tenderness.  EXTREMITIES: No edema    DIAGNOSIS:  1. PMB (postmenopausal bleeding)    2. Cervical os stenosis    3. Vaginal atrophy    4. Uterine leiomyoma, unspecified location      PLAN:  -PMB: sent order for pelvic U/S  -Discussed endometrial biopsy to r/o cancerous cells; cervix stenotic- discussed outpatient procedure with one of the MDs; pt verbalized understanding    Orders Placed This Encounter    US Pelvis Comp with Transvag NON-OB (xpd     FOLLOW-UP with me as needed or if symptoms persist or worsen    Time spent over 20 minutes   This includes face to face time and non-face to face time preparing to see the patient (eg, review of tests), obtaining and/or reviewing separately obtained history, documenting clinical information in the electronic or other health record, independently interpreting results and communicating results to the patient/family/caregiver, or care coordinator.     Marissa Parikh, DNP, RN, APRN, WHNP-BC  [facility]

## 2024-10-30 DIAGNOSIS — F41.9 ANXIETY: Primary | ICD-10-CM

## 2024-10-30 RX ORDER — CLORAZEPATE DIPOTASSIUM 7.5 MG/1
TABLET ORAL
Qty: 90 TABLET | Refills: 3 | Status: SHIPPED | OUTPATIENT
Start: 2024-10-30

## 2024-11-01 ENCOUNTER — TELEPHONE (OUTPATIENT)
Dept: GASTROENTEROLOGY | Facility: CLINIC | Age: 84
End: 2024-11-01
Payer: MEDICARE

## 2024-11-04 ENCOUNTER — TELEPHONE (OUTPATIENT)
Dept: OBSTETRICS AND GYNECOLOGY | Facility: HOSPITAL | Age: 84
End: 2024-11-04
Payer: MEDICARE

## 2024-12-12 ENCOUNTER — PATIENT OUTREACH (OUTPATIENT)
Dept: EMERGENCY MEDICINE | Facility: HOSPITAL | Age: 84
End: 2024-12-12
Payer: MEDICARE

## 2024-12-12 NOTE — PROGRESS NOTES
Successful follow up message to patient:    Ilya Ms. Warner,    I just wanted to follow up with you to see how you are doing. If you need assistance with anything you can reply to this message. My email is [email or web address]. My number is [phone]. I hope all is well with you and your family.    Sincerely,  YON Morrison  Ed Navigator      Patient last er visit was 8/11/2024. Navigator to close encounter.

## 2024-12-16 ENCOUNTER — TELEPHONE (OUTPATIENT)
Dept: OBSTETRICS AND GYNECOLOGY | Facility: CLINIC | Age: 84
End: 2024-12-16
Payer: MEDICARE

## 2024-12-16 NOTE — TELEPHONE ENCOUNTER
----- Message from Calista sent at 12/16/2024 12:44 PM CST -----  Contact: pt  Type:  Orders     Who Called:pt     Does the patient know what this is regarding?:Mammo Orders   Would the patient rather a call back or a response via [facility]? Call   Best Call Back Number: [phone]  Additional Information:     Pt would like her mammo orders sent to [facility]

## 2025-01-02 DIAGNOSIS — Z78.9 STATIN INTOLERANCE: Primary | ICD-10-CM

## 2025-01-02 RX ORDER — EVOLOCUMAB 140 MG/ML
140 INJECTION, SOLUTION SUBCUTANEOUS
Qty: 6 EACH | Refills: 6 | Status: SHIPPED | OUTPATIENT
Start: 2025-01-02

## 2025-01-02 RX ORDER — BEMPEDOIC ACID 180 MG/1
1 TABLET, FILM COATED ORAL
Qty: 90 TABLET | Refills: 0 | Status: SHIPPED | OUTPATIENT
Start: 2025-01-02

## 2025-01-13 DIAGNOSIS — Z78.9 STATIN INTOLERANCE: ICD-10-CM

## 2025-01-13 RX ORDER — ALIROCUMAB 150 MG/ML
150 INJECTION, SOLUTION SUBCUTANEOUS
Qty: 10 ML | Refills: 10 | Status: ACTIVE | OUTPATIENT
Start: 2025-01-13

## 2025-01-13 RX ORDER — EZETIMIBE 10 MG/1
10 TABLET ORAL NIGHTLY
Qty: 90 TABLET | Refills: 2 | Status: SHIPPED | OUTPATIENT
Start: 2025-01-13

## 2025-01-13 NOTE — TELEPHONE ENCOUNTER
No care due was identified.  [facility] Embedded Care Due Messages. Reference number: [id number].   1/13/2025 7:04:22 AM CST

## 2025-01-13 NOTE — TELEPHONE ENCOUNTER
Refill Decision Note   Alana Beth  is requesting a refill authorization.  Brief Assessment and Rationale for Refill:  Approve     Medication Therapy Plan:         Comments:     Note composed:11:43 AM 01/13/2025

## 2025-02-18 ENCOUNTER — OFFICE VISIT (OUTPATIENT)
Dept: OBSTETRICS AND GYNECOLOGY | Facility: CLINIC | Age: 85
End: 2025-02-18
Payer: MEDICARE

## 2025-02-18 DIAGNOSIS — Z01.419 WELL WOMAN EXAM WITH ROUTINE GYNECOLOGICAL EXAM: Primary | ICD-10-CM

## 2025-02-18 NOTE — PROGRESS NOTES
HPI:   84 y.o.   OB History          2    Para        Term                AB   2    Living             SAB   2    IAB        Ectopic        Multiple        Live Births                  No LMP recorded. Patient is postmenopausal.    Patient is  here for her annual gynecologic exam.  She has no complaints.     ROS:  GENERAL: No fever, chills, fatigability or weight loss.  SKIN: No rashes, itching or changes in color or texture of skin.  HEAD: No headaches or recent head trauma.  EYES: Visual acuity fine. No photophobia, ocular pain or diplopia.  EARS: Denies ear pain, discharge or vertigo.  NOSE: No loss of smell, no epistaxis or postnasal drip.  MOUTH & THROAT: No hoarseness or change in voice. No excessive gum bleeding.  NODES: Denies swollen glands.  CHEST: Denies HUSTON, cyanosis, wheezing, cough and sputum production.  CARDIOVASCULAR: Denies chest pain, PND, orthopnea or reduced exercise tolerance.  ABDOMEN: Appetite fine. No weight loss. Denies diarrhea, abdominal pain, hematemesis or blood in stool.  URINARY: No flank pain, dysuria or hematuria.  PERIPHERAL VASCULAR: No claudication or cyanosis.  MUSCULOSKELETAL: No joint stiffness or swelling. Denies back pain.  NEUROLOGIC: No history of seizures, paralysis, alteration of gait or coordination.    PE:   There were no vitals taken for this visit.  APPEARANCE: Well nourished, well developed, in no acute distress.  NECK: Neck symmetric without masses or thyromegaly.  BREASTS: Symmetrical, no skin changes or visible lesions. No palpable masses, nipple discharge or adenopathy bilaterally.  ABDOMEN: Flat. Soft. No tenderness or masses. No hepatosplenomegaly. No hernias. No CVA tenderness.  VULVA: No lesions. Normal female genitalia.  URETHRAL MEATUS: Normal size and location, no lesions, no prolapse.  URETHRA: No masses, tenderness, prolapse or scarring.  VAGINA: Moist and well rugated, no discharge, no significant cystocele or rectocele.  CERVIX: No  lesions and discharge. PAP done.  UTERUS: Normal size, regular shape, mobile, non-tender, bladder base nontender.  ADNEXA: No masses, tenderness or CDS nodularity.  ANUS PERINEUM: Normal.    PROCEDURES:  Pap smear    Assessment:  Normal Gynecologic Exam    Plan:  Mammogram and Colonoscopy if indicated by current recommendations.  Return to clinic in one year or for any problems or complaints.  Pt denies and vag bleeding  After asked if any pain has occ low abd discomfort, but on and off  States had Ct scan  Rev chart

## 2025-02-20 ENCOUNTER — LAB VISIT (OUTPATIENT)
Dept: LAB | Facility: HOSPITAL | Age: 85
End: 2025-02-20
Attending: OTOLARYNGOLOGY
Payer: MEDICARE

## 2025-02-20 DIAGNOSIS — Z01.812 PRE-OPERATIVE LABORATORY EXAMINATION: ICD-10-CM

## 2025-02-20 LAB
BUN SERPL-MCNC: 16 MG/DL (ref 8–23)
CREAT SERPL-MCNC: 1 MG/DL (ref 0.5–1.4)
EST. GFR  (NO RACE VARIABLE): 56.2 ML/MIN/1.73 M^2

## 2025-02-20 PROCEDURE — 82565 ASSAY OF CREATININE: CPT | Performed by: OTOLARYNGOLOGY

## 2025-02-20 PROCEDURE — 36415 COLL VENOUS BLD VENIPUNCTURE: CPT | Mod: PN | Performed by: OTOLARYNGOLOGY

## 2025-02-20 PROCEDURE — 84520 ASSAY OF UREA NITROGEN: CPT | Performed by: OTOLARYNGOLOGY

## 2025-02-21 ENCOUNTER — HOSPITAL ENCOUNTER (OUTPATIENT)
Dept: RADIOLOGY | Facility: HOSPITAL | Age: 85
Discharge: HOME | End: 2025-02-21
Attending: OTOLARYNGOLOGY
Payer: MEDICARE

## 2025-02-21 DIAGNOSIS — R09.A2 FOREIGN BODY SENSATION, THROAT: ICD-10-CM

## 2025-02-21 PROCEDURE — 74220 X-RAY XM ESOPHAGUS 1CNTRST: CPT | Mod: 26,,, | Performed by: RADIOLOGY

## 2025-02-21 PROCEDURE — 25500020 PHARM REV CODE 255: Performed by: OTOLARYNGOLOGY

## 2025-02-21 PROCEDURE — A9698 NON-RAD CONTRAST MATERIALNOC: HCPCS | Performed by: OTOLARYNGOLOGY

## 2025-02-21 PROCEDURE — 74220 X-RAY XM ESOPHAGUS 1CNTRST: CPT | Mod: TC

## 2025-02-21 RX ADMIN — BARIUM SULFATE 50 ML: 0.6 SUSPENSION ORAL at 10:02

## 2025-02-22 DIAGNOSIS — Z00.00 ENCOUNTER FOR MEDICARE ANNUAL WELLNESS EXAM: ICD-10-CM

## 2025-02-24 DIAGNOSIS — R73.01 IMPAIRED FASTING BLOOD SUGAR: ICD-10-CM

## 2025-02-24 DIAGNOSIS — E55.9 HYPOVITAMINOSIS D: ICD-10-CM

## 2025-02-24 DIAGNOSIS — I10 ESSENTIAL HYPERTENSION: ICD-10-CM

## 2025-02-24 DIAGNOSIS — I70.0 AORTIC ATHEROSCLEROSIS: ICD-10-CM

## 2025-02-24 DIAGNOSIS — F41.9 ANXIETY: Primary | ICD-10-CM

## 2025-02-24 DIAGNOSIS — E78.2 MIXED HYPERLIPIDEMIA: ICD-10-CM

## 2025-02-24 DIAGNOSIS — Z00.00 PHYSICAL EXAM, ANNUAL: ICD-10-CM

## 2025-02-24 RX ORDER — CARVEDILOL 6.25 MG/1
6.25 TABLET ORAL 2 TIMES DAILY WITH MEALS
Qty: 180 TABLET | Refills: 1 | Status: SHIPPED | OUTPATIENT
Start: 2025-02-24

## 2025-02-24 NOTE — TELEPHONE ENCOUNTER
No care due was identified.  [facility] Embedded Care Due Messages. Reference number: [id number].   2/24/2025 7:06:16 AM CST

## 2025-02-24 NOTE — TELEPHONE ENCOUNTER
Refill Routing Note   Medication(s) are not appropriate for processing by [facility] for the following reason(s):        Required vitals abnormal    [facility] action(s):  Defer             Appointments  past 12m or future 3m with PCP    Date Provider   Last Visit   9/16/2024 Nurys Bearden MD   Next Visit   Visit date not found Nurys Bearden MD   ED visits in past 90 days: 0        Note composed:7:58 AM 02/24/2025

## 2025-02-25 ENCOUNTER — RESULTS FOLLOW-UP (OUTPATIENT)
Dept: OBSTETRICS AND GYNECOLOGY | Facility: CLINIC | Age: 85
End: 2025-02-25

## 2025-02-25 ENCOUNTER — NURSE TRIAGE (OUTPATIENT)
Dept: ADMINISTRATIVE | Facility: CLINIC | Age: 85
End: 2025-02-25
Payer: MEDICARE

## 2025-02-25 NOTE — TELEPHONE ENCOUNTER
Approved Prescriptions     carvediloL (COREG) 6.25 MG tablet         Sig: TAKE 1 TABLET BY MOUTH TWICE DAILY WITH MEALS    Disp: 180 tablet    Refills: 1 (Pharmacy requested: 0)    Start: 2/24/2025    Class: Normal    Authorized by: Nurys Bearden MD    To pharmacy: .        To be filled at: [facility] [address] [place] - [address]      Annual 1-18-24  Fasting labs ordered    I spoke to pt, she id due for annual. Pt is on another call with someone from [facility].  I will give her a call tomorrow to schedule.  She verbalized understanding

## 2025-02-25 NOTE — TELEPHONE ENCOUNTER
[facility] who she states scheduled her for a ct scan. Pt is allergic to iodine and when she went for her CT she was told she needed a prep ordered to take prior to the CT in order to be able to complete it due to her allergy. Pt calling to check if any new orders have been put in by her provider, Dr. Henriquez- either for the prep or a new order for a CT without contrast. Pt states she has checked with Dr. Henriquez's nurse and was told they'd get the message to him but he is currently out of town. She wants to make sure the new orders have been placed prior to re-scheduling her procedure. Advised pt I do not see any new orders in her chart, but recommended she confirm that with Dr. Henriquez's office since he is not an [facility] provider and I may not have access to see his orders. She VU.   Reason for Disposition   Question about upcoming scheduled surgery, procedure or test, no triage required, and triager able to answer question    Protocols used: Information Only Call - No Triage-A-[place]

## 2025-02-28 DIAGNOSIS — R09.A2 FOREIGN BODY SENSATION, THROAT: Primary | ICD-10-CM

## 2025-03-05 ENCOUNTER — HOSPITAL ENCOUNTER (OUTPATIENT)
Dept: RADIOLOGY | Facility: HOSPITAL | Age: 85
Discharge: HOME | End: 2025-03-05
Attending: OTOLARYNGOLOGY
Payer: MEDICARE

## 2025-03-05 DIAGNOSIS — R09.A2 FOREIGN BODY SENSATION, THROAT: ICD-10-CM

## 2025-03-05 PROCEDURE — 70490 CT SOFT TISSUE NECK W/O DYE: CPT | Mod: TC

## 2025-03-05 PROCEDURE — 70490 CT SOFT TISSUE NECK W/O DYE: CPT | Mod: 26,,, | Performed by: RADIOLOGY

## 2025-03-12 RX ORDER — CLOPIDOGREL BISULFATE 75 MG/1
75 TABLET ORAL
Qty: 90 TABLET | Refills: 0 | Status: SHIPPED | OUTPATIENT
Start: 2025-03-12

## 2025-03-13 ENCOUNTER — LAB VISIT (OUTPATIENT)
Dept: LAB | Facility: HOSPITAL | Age: 85
End: 2025-03-13
Attending: INTERNAL MEDICINE
Payer: MEDICARE

## 2025-03-13 DIAGNOSIS — E78.00 HYPERCHOLESTEREMIA: ICD-10-CM

## 2025-03-13 LAB
ALBUMIN SERPL BCP-MCNC: 3.6 G/DL (ref 3.5–5.2)
ALP SERPL-CCNC: 29 U/L (ref 40–150)
ALT SERPL W/O P-5'-P-CCNC: 9 U/L (ref 10–44)
ANION GAP SERPL CALC-SCNC: 9 MMOL/L (ref 8–16)
AST SERPL-CCNC: 21 U/L (ref 10–40)
BILIRUB SERPL-MCNC: 0.3 MG/DL (ref 0.1–1)
BUN SERPL-MCNC: 19 MG/DL (ref 8–23)
CALCIUM SERPL-MCNC: 9.3 MG/DL (ref 8.7–10.5)
CHLORIDE SERPL-SCNC: 106 MMOL/L (ref 95–110)
CHOLEST SERPL-MCNC: 171 MG/DL (ref 120–199)
CHOLEST/HDLC SERPL: 1.9 {RATIO} (ref 2–5)
CO2 SERPL-SCNC: 27 MMOL/L (ref 23–29)
CREAT SERPL-MCNC: 1.2 MG/DL (ref 0.5–1.4)
EST. GFR  (NO RACE VARIABLE): 45.2 ML/MIN/1.73 M^2
GLUCOSE SERPL-MCNC: 81 MG/DL (ref 70–110)
HDLC SERPL-MCNC: 89 MG/DL (ref 40–75)
HDLC SERPL: 52 % (ref 20–50)
LDLC SERPL CALC-MCNC: 77.2 MG/DL (ref 63–159)
NONHDLC SERPL-MCNC: 82 MG/DL
POTASSIUM SERPL-SCNC: 4.7 MMOL/L (ref 3.5–5.1)
PROT SERPL-MCNC: 7.3 G/DL (ref 6–8.4)
SODIUM SERPL-SCNC: 142 MMOL/L (ref 136–145)
TRIGL SERPL-MCNC: 24 MG/DL (ref 30–150)

## 2025-03-13 PROCEDURE — 80053 COMPREHEN METABOLIC PANEL: CPT | Performed by: INTERNAL MEDICINE

## 2025-03-13 PROCEDURE — 36415 COLL VENOUS BLD VENIPUNCTURE: CPT | Mod: PN | Performed by: INTERNAL MEDICINE

## 2025-03-13 PROCEDURE — 80061 LIPID PANEL: CPT | Performed by: INTERNAL MEDICINE

## 2025-03-17 ENCOUNTER — OFFICE VISIT (OUTPATIENT)
Dept: CARDIOLOGY | Facility: CLINIC | Age: 85
End: 2025-03-17
Payer: MEDICARE

## 2025-03-17 VITALS
HEIGHT: 63 IN | DIASTOLIC BLOOD PRESSURE: 72 MMHG | WEIGHT: 116.88 LBS | HEART RATE: 76 BPM | SYSTOLIC BLOOD PRESSURE: 138 MMHG | BODY MASS INDEX: 20.71 KG/M2

## 2025-03-17 DIAGNOSIS — I70.0 ATHEROSCLEROSIS OF AORTIC ARCH: ICD-10-CM

## 2025-03-17 DIAGNOSIS — I73.9 PAD (PERIPHERAL ARTERY DISEASE): ICD-10-CM

## 2025-03-17 DIAGNOSIS — I10 ESSENTIAL HYPERTENSION: ICD-10-CM

## 2025-03-17 DIAGNOSIS — E78.00 HYPERCHOLESTEREMIA: ICD-10-CM

## 2025-03-17 DIAGNOSIS — I70.8 CELIAC ARTERY ATHEROSCLEROSIS: Primary | ICD-10-CM

## 2025-03-17 DIAGNOSIS — I70.219 ATHEROSCLEROTIC PERIPHERAL VASCULAR DISEASE WITH INTERMITTENT CLAUDICATION: ICD-10-CM

## 2025-03-17 DIAGNOSIS — I65.29 CAROTID ATHEROSCLEROSIS, UNSPECIFIED LATERALITY: ICD-10-CM

## 2025-03-17 DIAGNOSIS — N18.31 CHRONIC KIDNEY DISEASE, STAGE 3A: ICD-10-CM

## 2025-03-17 DIAGNOSIS — E78.2 MIXED HYPERLIPIDEMIA: ICD-10-CM

## 2025-03-17 DIAGNOSIS — I70.0 AORTIC ATHEROSCLEROSIS: ICD-10-CM

## 2025-03-17 DIAGNOSIS — Z78.9 STATIN INTOLERANCE: ICD-10-CM

## 2025-03-17 PROCEDURE — 1126F AMNT PAIN NOTED NONE PRSNT: CPT | Mod: CPTII,S$GLB,, | Performed by: INTERNAL MEDICINE

## 2025-03-17 PROCEDURE — 99213 OFFICE O/P EST LOW 20 MIN: CPT | Mod: S$GLB,,, | Performed by: INTERNAL MEDICINE

## 2025-03-17 PROCEDURE — 3075F SYST BP GE 130 - 139MM HG: CPT | Mod: CPTII,S$GLB,, | Performed by: INTERNAL MEDICINE

## 2025-03-17 PROCEDURE — 1159F MED LIST DOCD IN RCRD: CPT | Mod: CPTII,S$GLB,, | Performed by: INTERNAL MEDICINE

## 2025-03-17 PROCEDURE — 99999 PR PBB SHADOW E&M-EST. PATIENT-LVL IV: CPT | Mod: PBBFAC,,, | Performed by: INTERNAL MEDICINE

## 2025-03-17 PROCEDURE — 3288F FALL RISK ASSESSMENT DOCD: CPT | Mod: CPTII,S$GLB,, | Performed by: INTERNAL MEDICINE

## 2025-03-17 PROCEDURE — 3078F DIAST BP <80 MM HG: CPT | Mod: CPTII,S$GLB,, | Performed by: INTERNAL MEDICINE

## 2025-03-17 PROCEDURE — 1157F ADVNC CARE PLAN IN RCRD: CPT | Mod: CPTII,S$GLB,, | Performed by: INTERNAL MEDICINE

## 2025-03-17 PROCEDURE — 1101F PT FALLS ASSESS-DOCD LE1/YR: CPT | Mod: CPTII,S$GLB,, | Performed by: INTERNAL MEDICINE

## 2025-03-17 RX ORDER — AMOXICILLIN 875 MG/1
875 TABLET, FILM COATED ORAL 2 TIMES DAILY
COMMUNITY
Start: 2025-03-10

## 2025-03-17 RX ORDER — NEOMYCIN SULFATE, POLYMYXIN B SULFATE, AND DEXAMETHASONE 3.5; 10000; 1 MG/G; [USP'U]/G; MG/G
OINTMENT OPHTHALMIC NIGHTLY
COMMUNITY
Start: 2025-03-11

## 2025-03-17 NOTE — PATIENT INSTRUCTIONS
Assessment/Plan:  Alana Beth is a 84 y.o. female with PAD, celiac artery stenosis s/p PTAS, HTN, HLD, palpitations, who presents for a follow up appointment.     1. 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 Beth continues to do well with no abdominal pain, weight loss, claudication or tissue loss.  Continue ASA/Plavix/Praluent/Bempedoic acid.    2. PAD- Pt with known BLE PAD.  She has no claudication, rest pain or tissue loss to suggest CLI.  Continue ASA/Plavix/Praluent/Bempedoic acid, and cilostazol 100mg BID.       3. HLD- LDL 77 on 3/13/2025. Continue Praluent 150 mg every 14 days, bempedoic acid 180 mg daily, and zetia 10 mg daily.          4. HTN- Controlled. Continue current medications.      Follow up in 6 months with lipids and cmp prior

## 2025-03-17 NOTE — PROGRESS NOTES
"[facility]      CC: Celiac Roy Stenosis      Patient ID: Alana Beth is a 84 y.o. female with PAD, celiac artery stenosis s/p PTAS, HTN, HLD, palpitations, who presents for a follow up appointment.  Pertinent history/events are as follows:     -Pt presents for evaluation of PAD/weight loss.    -At our initial clinic visit on 9/4/2020, Mrs. Beth reported  "not feeling well since 11/2019".  Main complaint is weakness, fatigue and lack of appetite.  She has lost 25 pounds since 11/2019.  No definite abdominal pain.  No chest pain, SOB, or LE edema.  Exam with audible abdominal bruit.  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    Plan:   Celiac Axis Stenosis- Mrs. 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  Pertinent findings include 25 pound weight loss and abdominal bruit.  No definite abdominal pain, although mild abdominal tenderness noted on exam.  Chronic mesenteric ischemia is also a possibility in this pt with known PAD.  Given these findings, will refer to Vascular Surgery for evaluation.  Pt has several risk factors for CAD, hence, will check nuclear stress test and echo to evaluate further.  PAD- Pt with known BLE PAD.  Continue ASA.  Pt states she's not taking a statin due to issues with liver disease in the past.  Will check outside records before starting statin.  Check updated lipid panel.      -At follow up clinic visit on 9/25/2020, Mrs. Beth reported no new symptoms.  States she still does not feel well.  Pt evaluated by Vascular Surgery (Enrrique Preciado) on 9/16/2020, who recommend psych evaluation and continued GI workup.  Mesenteric Utrasound on 9/10/2020 revealed a velocity elevation of 378 cm/s is visualized near the Celiac artery origin within a region of focal narrowing, suggestive of a greater than 70% stenosis.  Nuclear Stress Test on " 9/23/2020 revealed no evidence from myocardial ischemia or injury.  The EKG portion of this study is abnormal but not diagnostic.  Echo on 9/10/2020 revealed normal left ventricular systolic function with EF of 60%; concentric left ventricular remodeling; normal LV diastolic function; normal right ventricular systolic function; mild left atrial enlargement; mild tricuspid regurgitation; estimated PA systolic pressure is 30 mmHg; normal central venous pressure (3 mmHg).  Labs from 9/10/2020 show total cholesterol of 348 with LDL of 234.   Plan:   Celiac Axis Stenosis- Mrs. 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  Pertinent findings include 25 pound weight loss and abdominal bruit.  No definite abdominal pain, although mild abdominal tenderness noted on exam.  Chronic mesenteric ischemia is also a possibility in this pt with known PAD.  Pt evaluated by Vascular Surgery (Enrrique Preciado) on Recommend psych and continued GI workup.  Mesenteric Utrasound on 9/10/2020 revealed a velocity elevation of 378 cm/s is visualized near the Celiac artery origin within a region of focal narrowing, suggestive of a greater than 70% stenosis.  Nuclear Stress Test on 9/23/2020 revealed no evidence from myocardial ischemia or injury.  The EKG portion of this study is abnormal but not diagnostic.  Echo on 9/10/2020 revealed normal left ventricular systolic function with EF of 60%; concentric left ventricular remodeling; normal LV diastolic function; normal right ventricular systolic function; mild left atrial enlargement; mild tricuspid regurgitation; estimated PA systolic pressure is 30 mmHg; normal central venous pressure (3 mmHg).  Given these findings, will refer to Dr. Junior and GI for evaluation.       PAD- Pt with known BLE PAD.  She has no claudication, rest pain or tissue loss to suggest CLI.  Start rosuvastatin 40 mg daily.  Continue ASA 81 mg daily.  Check carotid ultrasound  prior to next visit.   HLD-  Labs from 9/10/2020 show total cholesterol of 348 with LDL of 234.  Start rosuvastatin 40 mg daily. Monitor LFT's.      -At clinic visit on 10/26/2020, Mrs. Cole reports that she had not starting statin therapy due to insurance issues. She was prescribed alirocumab 75 mg injections and she has received one dose so far. She would prefer oral therapy for her hypercholesterolemia. She notes much improvement in her symptoms of sputum production after starting omeprazole. Patient was evaluated by GI for abdominal pain associated with celiac axis syndrome and increased amounts of sputum. EGD done in 8/2020 she was noted to have a submucosal esophageal nodule that path revealed chronic esophagitis. She was started on Omeprazole 20 mg BID and barium esophagram requested. Barium study was consistent with mild esophageal dysmotility.  Patient has not seen Dr. Junior yet for possible angiogram, she did not have an appointment set up yet.   Plan:  Plan:  -- Schedule pt to see Dr. Junior   -- continue omeprazole, GI follow up PRN for symptomatic care   --Start bempedoic acid 180 mg daily due to statin intolerance    -On 12/8/2020, pt underwent celiac artery intervention with Dr. Junior:  S/p aortogram with selective celiac and SMA angiogram                Initially started R radial then switched to L radial               Patent SMA and 80% celiac stenosis              IVUS guided celiac PTAS              6.0 x 18 and 6 x 14 mm Express SD dilated to 18 noemí     -On 12/16/2020, pt admitted at [facility] with abdominal pain.  Abd ultrasound revealed  patent stent in celiac artery and no significant changes with respiration to suggest extrinsic compression by median arcuate ligament.  Velocities suggest only moderate stenosis of the stent.   Symptoms improved and pt was discharged home on ASA/Plavix/Praluent.       -At clinic visit on 1/27/2021, Mrs. Beth reported feeling well with no abdominal  pain, nausea, vomiting, or anorexia.  Taking all medications as prescribed.  Labs from 12/19/2020 shows  vs 234 on 9/10/2020.    Plan:   Celiac Axis Stenosis-  Now s/p IVUS guided celiac PTAS with 6.0 x 18 and 6 x 14 mm Express SD on 12/18/2020.  Mrs. Beth reports feeling much better with no abdominal pain, n/v, or anorexia.  Abd ultrasound revealed  patent stent in celiac artery and no significant changes with respiration to suggest extrinsic compression by median arcuate ligament.  Velocities suggest only moderate stenosis of the stent.  Continue ASA/Plavix/Praluent.     PAD- Pt with known BLE PAD.  She has no claudication, rest pain or tissue loss to suggest CLI.  Continue ASA/Plavix/Praluent.      HLD-  Labs from 12/19/2020 shows  vs 234 on 9/10/2020.  Continue Praluent.       -At clinic visit on 4/28/2021, Mrs. Cole reported no abdominal pain, nausea, or anorexia.  Labs from 4/21/2021 show  vs 150 on 12/19/2021.  Plan:  Celiac Axis Stenosis-  Now s/p IVUS guided celiac artery PTAS with 6.0 x 18 and 6 x 14 mm Express SD on 12/18/2020.  Mrs. Beth continues to do well with no abdominal pain, n/v, or anorexia.  Abd ultrasound on 12/18/2020 revealed  patent stent in celiac artery and no significant changes with respiration to suggest extrinsic compression by median arcuate ligament.  Velocities suggest only moderate stenosis of the stent.  Continue ASA/Plavix/Praluent.     PAD- Pt with known BLE PAD.  She has no claudication, rest pain or tissue loss to suggest CLI.  Continue ASA/Plavix/Praluent.      HLD-  Labs from 4/21/2021 show  vs 150 on 12/19/2021.  Increase Praluent to 150 mg every 14 days.     -At clinic visit on 7/28/2021, Mrs. Cole reported no chest pain, SOB, weight loss, nausea, TIA symptoms or syncope.  States she's been injecting Praluent in her thigh instead of abdomen.  Labs from 7/19/2021 show LDL now 124 vs 145 on 4/21/2021.  Of note, pt states she did not fast  prior to the labs being drawn.   Plan:   Celiac Axis Stenosis-  Now s/p IVUS guided celiac PTAS with 6.0 x 18 and 6 x 14 mm Express SD on 12/18/2020.  Mrs. Beth has no abdominal pain, n/v, or anorexia.  Abd ultrasound on 12/18/2020 revealed  patent stent in celiac artery and no significant changes with respiration to suggest extrinsic compression by median arcuate ligament.  Velocities suggest only moderate stenosis of the stent.  Continue ASA/Plavix/Praluent.   Surveillance imaging scheduled by Dr. RENÉE Aguilar.   PAD- Pt with known BLE PAD.  She has no claudication, rest pain or tissue loss to suggest CLI.  Continue ASA/Plavix/Praluent.      HLD-  Labs from 7/19/2021 show LDL now 124 vs 145 on 4/21/2021.  The modest reduction in LDL despite increasing Praluent to 150 mg every 14 days may be due to the pt injecting Praluent in the thigh instead of abdomen.  Continue zetia and Praluent.  Before adding bempedoic acid, will have pt inject Praluent in the abdomen, and repeat lipids in 3 months.         HTN- Continue current medications.    History of Pulmonary Nodules- Check CT chest without contrast to evaluate further.     -At clinic visit on 11/8/2021, Mrs. Cole reported no chest pain, abdominal pain, SOB, LE edema, TIA symptoms or syncope.  States she's still injecting praluent in the thigh and not the abdomen, as discussed at our previous clinic visit.  Labs from 11/2/2021 shows LDL   142 vs 124 on 7/19/2021.  CT Chest w/o Contrast on 8/4/2021 revealed no pulmonary abnormalities requiring continued surveillance.   There is a Ill-defined hypoattenuating hepatic lesion, suboptimally evaluated in the absence of intravenous contrast.     Plan:  Celiac Axis Stenosis-  Now s/p IVUS guided celiac PTAS with 6.0 x 18 and 6 x 14 mm Express SD on 12/18/2020.  Mrs. Beth has no abdominal pain, n/v, or anorexia.  Abd ultrasound on 12/18/2020 revealed  patent stent in celiac artery and no significant changes with respiration  to suggest extrinsic compression by median arcuate ligament.  Velocities suggest only moderate stenosis of the stent.  Continue ASA/Plavix/Praluent.   Surveillance imaging scheduled by Dr. RENÉE Aguilar.   PAD- Pt with known BLE PAD.  She has no claudication, rest pain or tissue loss to suggest CLI.  Continue ASA/Plavix/Praluent.      HLD- Mrs. Beth states she's still injecting praluent in the thigh and not the abdomen, as discussed at clinic visit on 7/28/2021.  Labs from 11/2/2021 shows LDL   142 vs 124 on 7/19/2021.  The increase in LDL despite increasing Praluent to 150 mg every 14 days may be due to the pt injecting Praluent in the thigh instead of abdomen.  Pt will try to do injection in abdomen.  Continue zetia and Praluent.  Before adding bempedoic acid, will have pt inject Praluent in the abdomen, and repeat lipids in 3 months.  HTN- Continue current medications.    History of Pulmonary Nodules-  CT Chest w/o Contrast on 8/4/2021 revealed no pulmonary abnormalities requiring continued surveillance.    Liver Lesion- Evaluate further with contrast enhanced abdominal MRI, and refer to Hepatology.    -At clinic visit on 2/9/2022, Mrs. Beth reported feeling better. Her appetite has been okay but not as good as before. She denies abdominal pain. She lost about 2 pounds since November. She denies claudications but she does have pain in her legs when she starts walking which is relieved by continued walking.  Plan:   Celiac Axis Stenosis- s/p IVUS guided celiac PTA on 12/18/2020.  Mrs. Beth has no abdominal pain, n/v, or anorexia. Continue ASA/Plavix/Praluent. Repeat mesenteric artery US.  PAD- Pt with known BLE PAD.  She has no claudication, rest pain or tissue loss to suggest CLI.  Continue ASA/Plavix. Start cilostazol 100mg BID.     HLD- LDL remains not at goal despite using praluent. Will attempt switching to bempedoic acid and evaluate response with repeat lipid panel in 3 months.  HTN- Controlled.  Continue current medications.      -At clinic visit on 5/11/2022, Mrs. Beth reports no abdominal pain, weight loss, claudication or tissue loss.  Pt states she did not start bempedoic acid as prescribed.  Mesenteric Ultrasound on 5/5/2022 revealed the celiac trunk has greater than 70% stenosis. In-stent restenosis noted.  The proximal superior mesenteric artery has greater than 70% stenosis.  The proximal inferior mesenteric artery has greater than 70% stenosis.  Plan:   Celiac Axis Stenosis s/p IVUS guided celiac PTA on 12/18/2020- Mrs. Beth reports no abdominal pain, weight loss, claudication or tissue loss.  Mesenteric Ultrasound on 5/5/2022 revealed the celiac trunk has greater than 70% stenosis. In-stent restenosis noted.  The proximal superior mesenteric artery has greater than 70% stenosis.  The proximal inferior mesenteric artery has greater than 70% stenosis.  Continue ASA/Plavix/Praluent.  PAD- Pt with known BLE PAD.  She has no claudication, rest pain or tissue loss to suggest CLI.  Continue ASA/Plavix and cilostazol 100mg BID.     HLD- LDL remains not at goal despite using max dose praluent. Pt states she did not start bempedoic acid as prescribed.  Unclear if pt is compliant with these medications.  Check Lipo (a).  Continue current medications.    HTN- Controlled. Continue current medications.      -At clinic visit on 8/15/2022, Mrs. Beth reports doing well with no abdominal pain, weight loss, claudication or tissue loss.  Pt started taking bempedoic acid as prescribed.  LDL 78 on 8/8/2022 vs 123 on 5/5/2022.  Lipoprotein (a) is elevated at 324.  Plan:   Celiac Axis Stenosis s/p IVUS guided celiac PTA on 12/18/2020- Mrs. Beth reports no abdominal pain, weight loss, claudication or tissue loss.  Mesenteric Ultrasound on 5/5/2022 revealed the celiac trunk has greater than 70% stenosis. In-stent restenosis noted.  The proximal superior mesenteric artery has greater than 70% stenosis.  The  proximal inferior mesenteric artery has greater than 70% stenosis.  Continue ASA/Plavix/Praluent/Bempedoic acid.  PAD- Pt with known BLE PAD.  She has no claudication, rest pain or tissue loss to suggest CLI.  Continue ASA/Plavix/Praluent/Bempedoic acid, and cilostazol 100mg BID.     HLD- LDL 78 on 8/8/2022 vs 123 on 5/5/2022.  Lipoprotein (a) is elevated at 324.  Continue Praluent 150 mg every 14 days and bempedoic acid 180 mg daily.      HTN- Controlled. Continue current medications.      -At clinic visit on 2/15/2023, Mrs. Beth reports no abdominal pain, claudication or tissue loss. Weight is stable at 112 pounds.  LDL 91 on 12/29/2022.   Plan:   Celiac Axis Stenosis s/p IVUS guided celiac PTA on 12/18/2020- Mrs. Beth reports no abdominal pain, weight loss, claudication or tissue loss.  Mesenteric Ultrasound on 5/5/2022 revealed the celiac trunk has greater than 70% stenosis. In-stent restenosis noted.  The proximal superior mesenteric artery has greater than 70% stenosis.  The proximal inferior mesenteric artery has greater than 70% stenosis.  Continue ASA/Plavix/Praluent/Bempedoic acid.  PAD- Pt with known BLE PAD.  She has no claudication, rest pain or tissue loss to suggest CLI.  Continue ASA/Plavix/Praluent/Bempedoic acid, and cilostazol 100mg BID.     HLD- LDL 91 on 12/29/2022.  Continue Praluent 150 mg every 14 days, bempedoic acid 180 mg daily, and zetia 10 mg daily.        HTN- Controlled. Continue current medications.      -At clinic visit on 8/14/2023, Mrs. Beth reported doing well with no chest pain, abdominal pain, SOB, TIA symptoms or syncope.  LDL 79 on 8/7/2023.  Plan:   Celiac Axis Stenosis s/p IVUS guided celiac PTA on 12/18/2020- Mrs. Beth is doing well with no abdominal pain, weight loss, claudication or tissue loss.  Continue ASA/Plavix/Praluent/Bempedoic acid.  PAD- Pt with known BLE PAD.  She has no claudication, rest pain or tissue loss to suggest CLI.  Continue  ASA/Plavix/Praluent/Bempedoic acid, and cilostazol 100mg BID.     HLD- LDL 79 on 8/7/2023.  Continue Praluent 150 mg every 14 days, bempedoic acid 180 mg daily, and zetia 10 mg daily.        HTN- Controlled. Continue current medications.      -At clinic visit on 3/6/2024, Mrs. Beth reported no chest pain, no heart failure symptoms, no abdominal pain, and no arrhythmia.  She can perform greater than 4 METS without difficulty.  She is scheduled to undergo balloon sinuplasty procedure with Dr. Billy Burger at outside facility on 3/20/2024.  LDL 90 on 1/11/2024.   Plan:  Celiac Axis Stenosis s/p IVUS guided celiac PTA on 12/18/2020- Mrs. Beth continues to do well with no abdominal pain, weight loss, claudication or tissue loss.  Continue ASA/Plavix/Praluent/Bempedoic acid.  PAD- Pt with known BLE PAD.  She has no claudication, rest pain or tissue loss to suggest CLI.  Continue ASA/Plavix/Praluent/Bempedoic acid, and cilostazol 100mg BID.     HLD- LDL 90 on 1/11/2024.  Continue Praluent 150 mg every 14 days, bempedoic acid 180 mg daily, and zetia 10 mg daily.        HTN- Controlled. Continue current medications.    Preoperative Cardiac Risk Stratification Prior to Planned Balloon Sinuplasty Procedure- Mrs. Beth has no chest pain, no heart failure symptoms, no abdominal pain, and no arrhythmia.  She can perform greater than 4 METS without difficulty.  She is at low risk for a perioperative major adverse cardiac event during this moderate risk procedure.  Revised Cardiac Risk Index of 3.9%.  No further testing indicated.  Proceed with surgery.     -At clinic visit on 9/16/2024, Mrs. Beth reports doing well with no chest pain, SOB, no abdominal pain, no TIA symptoms or syncope.  LDL 87 on 9/9/2024.   Plan:  Celiac Axis Stenosis s/p IVUS guided celiac PTA on 12/18/2020- Mrs. Beth continues to do well with no abdominal pain, weight loss, claudication or tissue loss.  Continue ASA/Plavix/Praluent/Bempedoic  acid.  PAD- Pt with known BLE PAD.  She has no claudication, rest pain or tissue loss to suggest CLI.  Continue ASA/Plavix/Praluent/Bempedoic acid, and cilostazol 100mg BID.     HLD- LDL 87 on 9/9/2024. Continue Praluent 150 mg every 14 days, bempedoic acid 180 mg daily, and zetia 10 mg daily.        HTN- Controlled. Continue current medications.      HPI:  Mrs. Beth doing well with no chest pain, SOB, no abdominal pain, no TIA symptoms or syncope.  She is tolerating Praluent 140 mg subq every 14 days, empedoic acid 180 mg daily, and zetia 10 mg daily with no issues. LDL 77 on 3/13/2025.        Past Medical History:   Diagnosis Date    Atherosclerotic peripheral vascular disease     Chronic cystic mastitis     Essential hypertension     Hypercholesterolemia     Osteopenia     Shingles      Past Surgical History:   Procedure Laterality Date    BREAST BIOPSY      biopsies benign    CELIAC ARTERY STENT Bilateral 12/08/2020    COLONOSCOPY N/A 10/10/2024    Procedure: COLONOSCOPY;  Surgeon: Billy Sibley MD;  Location: [facility] ([address] [place] [address]);  Service: Gastroenterology;  Laterality: N/A;    ESOPHAGOGASTRODUODENOSCOPY  08/20/2020    ESOPHAGOGASTRODUODENOSCOPY N/A 10/10/2024    Procedure: EGD (ESOPHAGOGASTRODUODENOSCOPY);  Surgeon: Billy Sibley MD;  Location: [facility] ([address] [place] [address]);  Service: Gastroenterology;  Laterality: N/A;  ok to hold Plavix 5 days per Dr Pink-see E consult dated 9/16-GT  10/3 precall complete-st    PERCUTANEOUS TRANSLUMINAL ANGIOPLASTY N/A 12/08/2020    Procedure: Pta (Angioplasty, Percutaneous, Transluminal);  Surgeon: Hernando Junior MD;  Location: [facility] CATH LAB/EP;  Service: Cardiology;  Laterality: N/A;     Social History     Socioeconomic History    Marital status: Single   Occupational History    Occupation: retired student loan assistance    Tobacco Use    Smoking status: Never    Smokeless tobacco: Never   Substance and Sexual Activity    Alcohol use: No    Drug use: No     Sexual activity: Not Currently   Social History Narrative    N/A PER THE PATIENT.      Social Drivers of Health     Financial Resource Strain: Low Risk  (8/12/2024)    Overall Financial Resource Strain (CARDIA)     Difficulty of Paying Living Expenses: Not hard at all   Food Insecurity: No Food Insecurity (8/12/2024)    Hunger Vital Sign     Worried About Running Out of Food in the Last Year: Never true     Ran Out of Food in the Last Year: Never true   Transportation Needs: No Transportation Needs (8/12/2024)    PRAPARE - Transportation     Lack of Transportation (Medical): No     Lack of Transportation (Non-Medical): No   Physical Activity: Patient Unable To Answer (8/12/2024)    Exercise Vital Sign     Days of Exercise per Week: Patient unable to answer     Minutes of Exercise per Session: Patient unable to answer   Stress: No Stress Concern Present (8/12/2024)    [employer] - Occupational Stress Questionnaire     Feeling of Stress : Not at all   Housing Stability: Unknown (8/12/2024)    Housing Stability Vital Sign     Unable to Pay for Housing in the Last Year: No     Homeless in the Last Year: No     Family History   Problem Relation Name Age of Onset    Hypertension Mother      Diabetes Brother      Diabetes Sister      Colon cancer Neg Hx      Esophageal cancer Neg Hx         Review of patient's allergies indicates:   Allergen Reactions    Benazepril Other (See Comments)     cough    Chlorthalidone     Iodinated contrast media     Iodine and iodide containing products     Lipitor [atorvastatin]      States she is allergic to all statin medications    Sertraline      Medication caused patient to get sick.    Spironolactone        Medication List with Changes/Refills   Current Medications    ALIROCUMAB (PRALUENT PEN) 150 MG/ML PNMOOSE    Inject 1 mL (150 mg total) into the skin every 14 (fourteen) days.    AMOXICILLIN (AMOXIL) 875 MG TABLET    Take 875 mg by mouth 2 (two) times  daily.    ASCORBIC ACID, VITAMIN C, (VITAMIN C) 1000 MG TABLET    Take 1,000 mg by mouth once daily.    ASPIRIN 81 MG CHEW    Take 81 mg by mouth once daily.    AZELASTINE (ASTELIN) 137 MCG (0.1 %) NASAL SPRAY    1 spray by Nasal route as needed.    BUDESONIDE 1 MG/2 ML NBSP    EMPTY CONTENTS OF 1 RESPULE INTO NASAL IRRIGATION SYSTEM, ADD DISTILLED WATER, SALT PACK, MIX & IRRIGATE. PERFORM TWICE DAILY    CARVEDILOL (COREG) 6.25 MG TABLET    TAKE 1 TABLET BY MOUTH TWICE DAILY WITH MEALS    CLOPIDOGREL (PLAVIX) 75 MG TABLET    Take 1 tablet by mouth once daily    CLORAZEPATE (TRANXENE) 7.5 MG TAB    TAKE 1 TABLET BY MOUTH ONCE DAILY AS NEEDED FOR ANXIETY    CO-ENZYME Q-10 50 MG CAPSULE    Take 50 mg by mouth once daily.    DIPHENHYDRAMINE (BENADRYL) 25 MG CAPSULE    Take 25 mg by mouth every 6 (six) hours as needed for Itching.    EZETIMIBE (ZETIA) 10 MG TABLET    TAKE 1 TABLET BY MOUTH ONCE DAILY IN THE EVENING    FERROUS SULFATE (FEOSOL) 325 MG (65 MG IRON) TAB TABLET    Take 1 tablet (325 mg total) by mouth 3 (three) times a week.    FLUTICASONE PROPIONATE (FLONASE) 50 MCG/ACTUATION NASAL SPRAY    1 spray by Each Nostril route as needed.    IPRATROPIUM (ATROVENT) 42 MCG (0.06 %) NASAL SPRAY    2 sprays by Each Nostril route 2 (two) times daily.    MEN/EUC/THY/CAMP/SHANITA/NACL/POT (ALKALOL NASAL WASH NASL)    by Nasal route as needed.    MULTIVITAMIN (THERAGRAN) PER TABLET    Take 1 tablet by mouth once daily.    NEOMYCIN-POLYMYXIN-DEXAMETHASONE (DEXACINE) 3.5 MG/G-10,000 UNIT/G-0.1 % OINT    Place into the right eye every evening.    NEXLETOL 180 MG TAB    Take 1 tablet by mouth once daily    OMEGA-3 FATTY ACIDS/FISH OIL (FISH OIL-OMEGA-3 FATTY ACIDS) 300-1,000 MG CAPSULE    Take 1 capsule by mouth once daily.    TURMERIC ORAL    Take 1 packet by mouth once daily.   Discontinued Medications    CETIRIZINE (ZYRTEC) 10 MG TABLET    Take 1 tablet (10 mg total) by mouth once daily.    LEVOCETIRIZINE (XYZAL) 5 MG TABLET     "1 tablet Orally Once a day for 30 days    MEN/EUC/THY/CAMP/SHANITA/NACL/POT (ALKALOL NASAL WASH NASL)    by Nasal route.    NEILMED SINUS RINSE REFILL PACK    use as directed    PANTOPRAZOLE (PROTONIX) 40 MG TABLET    Take 40 mg by mouth once daily.       Review of Systems  Constitution: Denies chills, fever, and sweats.  HENT: Denies headaches or blurry vision.  Cardiovascular: Denies chest pain or irregular heart beat.  Respiratory: Denies cough or shortness of breath.  Gastrointestinal: Negative for abdominal pain and weight loss.  Musculoskeletal: Denies muscle cramps.  Neurological: Denies dizziness or focal weakness.  Psychiatric/Behavioral: Normal mental status.  Hematologic/Lymphatic: Denies bleeding problem or easy bruising/bleeding.  Skin: Denies rash or suspicious lesions    Physical Examination  /72   Pulse 76   Ht 5' 3" (1.6 m)   Wt 53 kg (116 lb 13.5 oz)   BMI 20.70 kg/m²     Constitutional: No acute distress, conversant  HEENT: Sclera anicteric, Pupils equal, round and reactive to light, extraocular motions intact, Oropharynx clear  Neck: No JVD, no carotid bruits  Cardiovascular: regular rate and rhythm, no murmur, rubs or gallops, normal S1/S2  Pulmonary: Clear to auscultation bilaterally  Abdominal: Abdomen soft with no TTP, positive bowel sounds  Extremities: No lower extremity edema   Pulses:  Carotid pulses are 2+ on the right side, and 2+ on the left side.  Radial pulses are 2+ on the right side, and 2+ on the left side.   Femoral pulses are 2+ on the right side, and 2+ on the left side.  Popliteal pulses are 2+ on the right side, and 2+ on the left side.   Dorsalis pedis pulses are 2+ on the right side, and 2+ on the left side.   Posterior tibial pulses are 2+ on the right side, and 2+ on the left side.    Skin: No ecchymosis, erythema, or ulcers  Psych: Alert and oriented x 3, appropriate affect  Neuro: CNII-XII intact, no focal deficits    Labs:  Most Recent Data  CBC:   Lab Results " "  Component Value Date    WBC 3.03 (L) 08/11/2024    HGB 10.8 (L) 08/11/2024    HCT 33.1 (L) 08/11/2024     08/11/2024    MCV 84 08/11/2024    RDW 14.4 08/11/2024     BMP:   Lab Results   Component Value Date     03/13/2025    K 4.7 03/13/2025     03/13/2025    CO2 27 03/13/2025    BUN 19 03/13/2025    CREATININE 1.2 03/13/2025    GLU 81 03/13/2025    CALCIUM 9.3 03/13/2025    MG 2.3 12/16/2020    PHOS 2.9 12/16/2020     LFTS;   Lab Results   Component Value Date    PROT 7.3 03/13/2025    ALBUMIN 3.6 03/13/2025    BILITOT 0.3 03/13/2025    AST 21 03/13/2025    ALKPHOS 29 (L) 03/13/2025    ALT 9 (L) 03/13/2025     COAGS: No results found for: "INR", "PROTIME", "PTT"  FLP:   Lab Results   Component Value Date    CHOL 171 03/13/2025    HDL 89 (H) 03/13/2025    LDLCALC 77.2 03/13/2025    TRIG 24 (L) 03/13/2025    CHOLHDL 52.0 (H) 03/13/2025     CARDIAC:   Lab Results   Component Value Date    TROPONINI <0.006 08/11/2024    BNP <10 08/15/2020       EKG 8/15/2020:  Normal sinus rhythm  Low voltage QRS  Anteroseptal infarct (cited on or before 15-AUG-2020)    Carotid Ultrasound 8/8/2024:  Less than 50% carotid stenosis bilaterally.     Mesenteric Ultrasound 5/5/2022:  The celiac trunk has greater than 70% stenosis. In-stent restenosis noted.  The proximal superior mesenteric artery has greater than 70% stenosis.  The proximal inferior mesenteric artery has greater than 70% stenosis.  There is no evidence of an Abdominal Aortic Aneurysm.  Aortic atherosclerosis.    CT Chest w/o Contrast 8/4/2021:  No pulmonary abnormalities requiring continued surveillance.   Multi-vessel coronary artery atherosclerosis.   Ill-defined hypoattenuating hepatic lesion, suboptimally evaluated in the absence of intravenous contrast.  Consider further characterization with contrast enhanced abdominal MRI.     Mesenteric Ultrasound 12/18/2020:  Color flow duplex imaging reveals patent superior mesenteric and inferior mesenteric " arteries with no evidence of a hemodynamically   significant stenosis. The Celiac artery stent is patent with and velocities are 168 cm/s at rest; 157 cm/s with inspiration; 153 cm/s   with expiration. No suggestion of MALS. An accelerated flow velocity of 282 cm/s is visualized at the distal edge on the Celiac stent.   However, this region appear widely patent.     Nuclear Stress Test 9/23/2020:  Normal myocardial perfusion scan.    The perfusion scan is free of evidence from myocardial ischemia or injury.    Visually estimated ejection fraction is normal at rest and normal at stress.    There is normal wall motion at rest and post stress.    LV cavity size is normal at rest and normal at stress.    The EKG portion of this study is abnormal but not diagnostic.    The patient reported no chest pain during the stress test.    There are no prior studies for comparison.       Echo 9/10/2020:  Normal left ventricular systolic function. The estimated ejection fraction is 60%.  Concentric left ventricular remodeling.  Normal LV diastolic function.  No wall motion abnormalities.  Normal right ventricular systolic function.  Mild left atrial enlargement.  Mild tricuspid regurgitation.  The estimated PA systolic pressure is 30 mmHg.  Normal central venous pressure (3 mmHg).    Mesenteric Utrasound 9/10/2020:  Color flow duplex exam reveals a patent abdominal aorta, celiac artery, superior mesenteric artery and inferior mesenteric artery. A   velocity elevation of 378 cm/s is visualized near the Celiac artery origin within a region of focal narrowing, suggestive of a greater than   70% stenosis.     CTA abd/pelvis with contrast (outside study from [facility]) 8/28/2020:  1. No evidence for gastrointestinal bleeding.    2. Scattered colonic diverticula without adjacent inflammatory changes.    3. Myomatous changes of the uterus with degenerating fibroids.     4. Hemangioma in segment VII.    5. Stenosis of the celiac access with  poststenotic dilatation.    BLE Arterial Ultrasound 7/31/2020:  Multilevel disease in the left lower extremity. Moderate stenosis in the left superficial femoral artery. Severe stenosis in the distal popliteal artery.    Left lower extremity:  Ankle-brachial index is 0.73. Triphasic signals are seen in the common femoral and profunda arteries. There are biphasic superficial femoral, popliteal, posterior tibial, and anterior tibial arteries. There is a velocity increase from the mid SFA to the distal SFA from 1 /sec 2 m/s. Consistent with a moderate stenosis. There is another focal velocity increase in the distal popliteal from 2.5 m/sec to 5.0 m/s. Consistent with a severe stenosis. Distal to this there is monophasic waveforms.         Right lower extremity:  Ankle-brachial index is 0.93. Triphasic signals are seen in the common femoral and profunda artery. There are biphasic signals in the superficial femoral, popliteal, posterior tibial, and anterior tibial arteries. No focal areas of elevated velocity are seen.    Assessment/Plan:  Alana Beth is a 84 y.o. female with PAD, celiac artery stenosis s/p PTAS, HTN, HLD, palpitations, who presents for a follow up appointment.     1. 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 Beth continues to do well with no abdominal pain, weight loss, claudication or tissue loss.  Continue ASA/Plavix/Praluent/Bempedoic acid.    2. PAD- Pt with known BLE PAD.  She has no claudication, rest pain or tissue loss to suggest CLI.  Continue ASA/Plavix/Praluent/Bempedoic acid, and cilostazol 100mg BID.       3. HLD- LDL 77 on 3/13/2025. Continue Praluent 150 mg every 14 days, bempedoic acid 180 mg daily, and zetia 10 mg daily.          4. HTN- Controlled. Continue current medications.      Follow up in 6 months with lipids and cmp prior    Total duration of face to face visit time 15 minutes.  Total time spent counseling greater than fifty percent of total  visit time.  Counseling included discussion regarding imaging findings, diagnosis, possibilities, treatment options, risks and benefits.  The patient had many questions regarding the options and long-term effects.

## 2025-04-01 RX ORDER — BEMPEDOIC ACID 180 MG/1
1 TABLET, FILM COATED ORAL
Qty: 90 TABLET | Refills: 0 | Status: SHIPPED | OUTPATIENT
Start: 2025-04-01

## 2025-04-22 RX ORDER — BEMPEDOIC ACID 180 MG/1
1 TABLET, FILM COATED ORAL DAILY
Qty: 90 TABLET | Refills: 3 | Status: SHIPPED | OUTPATIENT
Start: 2025-04-22

## 2025-04-22 NOTE — TELEPHONE ENCOUNTER
Refill Decision Note   Alana Beth  is requesting a refill authorization.  Brief Assessment and Rationale for Refill:  Approve     Medication Therapy Plan:         Comments:     Note composed:11:09 AM 04/22/2025

## 2025-05-16 NOTE — PROGRESS NOTES
Subjective:    The following note was written in combination with deep-scribe software and dictation (to which understanding and consent was verbally expressed); please excuse any transcription and/or dictation errors.      Chief Complaint: Annual Exam and Nasal Congestion (Right side of nose, thick clear mucus)    HPI  Ms. Alana Beth is an 83 yo F with Anxiety, benign recently identified solitary pulmonary nodule, severe PAD/celiac artery atherosclerosis (leading to gastroparesis) presenting for annual physical:     Excess Salivation:   -status post non substantive evaluation via ENT with seemingly failed balloon sinuplasty/turbinectomy (reports no improvement with subjectively increased mucus production)  - Seen via the ED for difficulty swallowing with reassuring evaluation; referred to GI and has impending EGD/colonoscopy   -outside CT soft tissue neck that did not show soft tissue abnormalities   -was seen by a colleague in internal medicine (Dr. Winter Chris) recommended starting generic Zyrtec and follow-up with your primary care doctor about switching your amlodipine; subjective swelling is notably focal (isolated to right posterior upper gums).  -dentist appears to believe her amlodipine may be causing gingival swelling, despite not documenting any gingival swelling on physical exam and having already been evaluated by ENT x 2  - seemingly perseverative regarding unilateral mandibular soft tissue irregularity on external xrays   - Now seeing a 3rd ENT (s/p recent repeat balloon sinuplasty), and is interested in discussing it further today     Family, social, surgical Hx reviewed     Health Maintenance:  Due for annual screening labs (ordered in preparation for this appointment but have yet to be gathered) and routine vaccinations    Past Medical History:   Diagnosis Date    Atherosclerotic peripheral vascular disease     Chronic cystic mastitis     Essential hypertension      Hypercholesterolemia     Osteopenia     Shingles      Past Surgical History:   Procedure Laterality Date    BREAST BIOPSY      biopsies benign    CELIAC ARTERY STENT Bilateral 12/08/2020    COLONOSCOPY N/A 10/10/2024    Procedure: COLONOSCOPY;  Surgeon: Billy Sibley MD;  Location: [facility] ENDO (4TH FLR);  Service: Gastroenterology;  Laterality: N/A;    ESOPHAGOGASTRODUODENOSCOPY  08/20/2020    ESOPHAGOGASTRODUODENOSCOPY N/A 10/10/2024    Procedure: EGD (ESOPHAGOGASTRODUODENOSCOPY);  Surgeon: Billy Sibley MD;  Location: [facility] ENDO (4TH FLR);  Service: Gastroenterology;  Laterality: N/A;  ok to hold Plavix 5 days per Dr Pink-see E consult dated 9/16-GT  10/3 precall complete-st    PERCUTANEOUS TRANSLUMINAL ANGIOPLASTY N/A 12/08/2020    Procedure: Pta (Angioplasty, Percutaneous, Transluminal);  Surgeon: Hernando Junior MD;  Location: [facility] CATH LAB/EP;  Service: Cardiology;  Laterality: N/A;     Family History   Problem Relation Name Age of Onset    Hypertension Mother      Diabetes Brother      Diabetes Sister      Colon cancer Neg Hx      Esophageal cancer Neg Hx       Social History[1]  Review of patient's allergies indicates:   Allergen Reactions    Benazepril Other (See Comments)     cough    Chlorthalidone     Iodinated contrast media     Iodine and iodide containing products     Lipitor [atorvastatin]      States she is allergic to all statin medications    Sertraline      Medication caused patient to get sick.    Spironolactone      Alana Beth had no medications administered during this visit.   Review of Systems      Objective:      Vitals:    05/20/25 1007   BP: 130/60   Pulse: 90   Resp: 18   Temp: 97.6 °F (36.4 °C)      Physical Exam  Medications Ordered Prior to Encounter[2]      Assessment:       1. Physical exam, annual    2. Granulomatous lung disease    3. Excessive salivation        Plan:       Physical exam, annual   - annual screening labs to be gathered   - vaccinations to be  pursued via pharmacy    Granulomatous lung disease   - Seen on CT of chest 08/21/2020- 0.5 cm calcified nodule in the medial segment of the right middle lobe.     Excessive salivation   - extensive conversation again had regarding idiopathic nature of subjective increase elevation.  Encouraged consolidating ENT within [facility] for better access records (as seemingly repetitive balloon sinuplasty has already occurred).  Otherwise, will follow along with patient's outpatient ENT and provide any documentation requested (last clinic note printed and provided for their collateral).        Return to clinic in one year for annual exam or sooner if dictated by labs or illness.            [1]   Social History  Socioeconomic History    Marital status: Single   Occupational History    Occupation: retired student loan assistance    Tobacco Use    Smoking status: Never    Smokeless tobacco: Never   Substance and Sexual Activity    Alcohol use: No    Drug use: No    Sexual activity: Not Currently   Social History Narrative    N/A PER THE PATIENT.      Social Drivers of Health     Financial Resource Strain: Low Risk  (8/12/2024)    Overall Financial Resource Strain (CARDIA)     Difficulty of Paying Living Expenses: Not hard at all   Food Insecurity: No Food Insecurity (8/12/2024)    Hunger Vital Sign     Worried About Running Out of Food in the Last Year: Never true     Ran Out of Food in the Last Year: Never true   Transportation Needs: No Transportation Needs (8/12/2024)    PRAPARE - Transportation     Lack of Transportation (Medical): No     Lack of Transportation (Non-Medical): No   Physical Activity: Patient Unable To Answer (8/12/2024)    Exercise Vital Sign     Days of Exercise per Week: Patient unable to answer     Minutes of Exercise per Session: Patient unable to answer   Stress: No Stress Concern Present (8/12/2024)    [employer] [place] [employer] - Occupational Stress Questionnaire     Feeling of Stress :  Not at all   Housing Stability: Unknown (8/12/2024)    Housing Stability Vital Sign     Unable to Pay for Housing in the Last Year: No     Homeless in the Last Year: No   [2]   Current Outpatient Medications on File Prior to Visit   Medication Sig Dispense Refill    alirocumab (PRALUENT PEN) 150 mg/mL PnIj Inject 1 mL (150 mg total) into the skin every 14 (fourteen) days. 10 mL 10    ascorbic acid, vitamin C, (VITAMIN C) 1000 MG tablet Take 1,000 mg by mouth once daily.      aspirin 81 MG Chew Take 81 mg by mouth once daily.      azelastine (ASTELIN) 137 mcg (0.1 %) nasal spray 1 spray by Nasal route as needed.      bempedoic acid (NEXLETOL) 180 mg Tab Take 1 tablet (180 mg total) by mouth once daily. 90 tablet 3    budesonide 1 mg/2 mL NbSp EMPTY CONTENTS OF 1 RESPULE INTO NASAL IRRIGATION SYSTEM, ADD DISTILLED WATER, SALT PACK, MIX & IRRIGATE. PERFORM TWICE DAILY      carvediloL (COREG) 6.25 MG tablet TAKE 1 TABLET BY MOUTH TWICE DAILY WITH MEALS 180 tablet 1    clopidogreL (PLAVIX) 75 mg tablet Take 1 tablet by mouth once daily 90 tablet 0    clorazepate (TRANXENE) 7.5 MG Tab TAKE 1 TABLET BY MOUTH ONCE DAILY AS NEEDED FOR ANXIETY 90 tablet 3    co-enzyme Q-10 50 mg capsule Take 50 mg by mouth once daily.      diphenhydrAMINE (BENADRYL) 25 mg capsule Take 25 mg by mouth every 6 (six) hours as needed for Itching.      ezetimibe (ZETIA) 10 mg tablet TAKE 1 TABLET BY MOUTH ONCE DAILY IN THE EVENING 90 tablet 2    fluticasone propionate (FLONASE) 50 mcg/actuation nasal spray 1 spray by Each Nostril route as needed.      ipratropium (ATROVENT) 42 mcg (0.06 %) nasal spray 2 sprays by Each Nostril route 2 (two) times daily.      men/euc/thy/camp/david/NaCl/pot (ALKALOL NASAL WASH NASL) by Nasal route as needed.      multivitamin (THERAGRAN) per tablet Take 1 tablet by mouth once daily.      omega-3 fatty acids/fish oil (FISH OIL-OMEGA-3 FATTY ACIDS) 300-1,000 mg capsule Take 1 capsule by mouth once daily.      TURMERIC  ORAL Take 1 packet by mouth once daily.      amoxicillin (AMOXIL) 875 MG tablet Take 875 mg by mouth 2 (two) times daily.      ferrous sulfate (FEOSOL) 325 mg (65 mg iron) Tab tablet Take 1 tablet (325 mg total) by mouth 3 (three) times a week. (Patient not taking: Reported on 5/20/2025) 36 tablet 3    neomycin-polymyxin-dexamethasone (DEXACINE) 3.5 mg/g-10,000 unit/g-0.1 % Oint Place into the right eye every evening. (Patient not taking: Reported on 5/20/2025)       No current facility-administered medications on file prior to visit.

## 2025-05-20 ENCOUNTER — OFFICE VISIT (OUTPATIENT)
Dept: INTERNAL MEDICINE | Facility: CLINIC | Age: 85
End: 2025-05-20
Payer: MEDICARE

## 2025-05-20 VITALS
HEART RATE: 90 BPM | HEIGHT: 63 IN | RESPIRATION RATE: 18 BRPM | WEIGHT: 113.56 LBS | TEMPERATURE: 98 F | DIASTOLIC BLOOD PRESSURE: 60 MMHG | OXYGEN SATURATION: 98 % | SYSTOLIC BLOOD PRESSURE: 130 MMHG | BODY MASS INDEX: 20.12 KG/M2

## 2025-05-20 DIAGNOSIS — J84.10 GRANULOMATOUS LUNG DISEASE: ICD-10-CM

## 2025-05-20 DIAGNOSIS — K11.7 EXCESSIVE SALIVATION: ICD-10-CM

## 2025-05-20 DIAGNOSIS — Z00.00 PHYSICAL EXAM, ANNUAL: Primary | ICD-10-CM

## 2025-05-20 PROCEDURE — 99213 OFFICE O/P EST LOW 20 MIN: CPT | Mod: 25,S$GLB,, | Performed by: STUDENT IN AN ORGANIZED HEALTH CARE EDUCATION/TRAINING PROGRAM

## 2025-05-20 PROCEDURE — 1126F AMNT PAIN NOTED NONE PRSNT: CPT | Mod: CPTII,S$GLB,, | Performed by: STUDENT IN AN ORGANIZED HEALTH CARE EDUCATION/TRAINING PROGRAM

## 2025-05-20 PROCEDURE — 3078F DIAST BP <80 MM HG: CPT | Mod: CPTII,S$GLB,, | Performed by: STUDENT IN AN ORGANIZED HEALTH CARE EDUCATION/TRAINING PROGRAM

## 2025-05-20 PROCEDURE — 99999 PR PBB SHADOW E&M-EST. PATIENT-LVL IV: CPT | Mod: PBBFAC,,, | Performed by: STUDENT IN AN ORGANIZED HEALTH CARE EDUCATION/TRAINING PROGRAM

## 2025-05-20 PROCEDURE — 99397 PER PM REEVAL EST PAT 65+ YR: CPT | Mod: S$GLB,,, | Performed by: STUDENT IN AN ORGANIZED HEALTH CARE EDUCATION/TRAINING PROGRAM

## 2025-05-20 PROCEDURE — 1157F ADVNC CARE PLAN IN RCRD: CPT | Mod: CPTII,S$GLB,, | Performed by: STUDENT IN AN ORGANIZED HEALTH CARE EDUCATION/TRAINING PROGRAM

## 2025-05-20 PROCEDURE — 3075F SYST BP GE 130 - 139MM HG: CPT | Mod: CPTII,S$GLB,, | Performed by: STUDENT IN AN ORGANIZED HEALTH CARE EDUCATION/TRAINING PROGRAM

## 2025-05-20 PROCEDURE — 3288F FALL RISK ASSESSMENT DOCD: CPT | Mod: CPTII,S$GLB,, | Performed by: STUDENT IN AN ORGANIZED HEALTH CARE EDUCATION/TRAINING PROGRAM

## 2025-05-20 PROCEDURE — 1101F PT FALLS ASSESS-DOCD LE1/YR: CPT | Mod: CPTII,S$GLB,, | Performed by: STUDENT IN AN ORGANIZED HEALTH CARE EDUCATION/TRAINING PROGRAM

## 2025-05-20 PROCEDURE — 1159F MED LIST DOCD IN RCRD: CPT | Mod: CPTII,S$GLB,, | Performed by: STUDENT IN AN ORGANIZED HEALTH CARE EDUCATION/TRAINING PROGRAM

## 2025-05-22 ENCOUNTER — RESULTS FOLLOW-UP (OUTPATIENT)
Dept: INTERNAL MEDICINE | Facility: CLINIC | Age: 85
End: 2025-05-22

## 2025-05-22 ENCOUNTER — LAB VISIT (OUTPATIENT)
Dept: LAB | Facility: HOSPITAL | Age: 85
End: 2025-05-22
Attending: INTERNAL MEDICINE
Payer: MEDICARE

## 2025-05-22 DIAGNOSIS — Z00.00 PHYSICAL EXAM, ANNUAL: ICD-10-CM

## 2025-05-22 DIAGNOSIS — I70.0 AORTIC ATHEROSCLEROSIS: ICD-10-CM

## 2025-05-22 DIAGNOSIS — R73.01 IMPAIRED FASTING BLOOD SUGAR: ICD-10-CM

## 2025-05-22 DIAGNOSIS — E78.2 MIXED HYPERLIPIDEMIA: ICD-10-CM

## 2025-05-22 DIAGNOSIS — E55.9 HYPOVITAMINOSIS D: ICD-10-CM

## 2025-05-22 DIAGNOSIS — I10 ESSENTIAL HYPERTENSION: ICD-10-CM

## 2025-05-22 DIAGNOSIS — F41.9 ANXIETY: ICD-10-CM

## 2025-05-22 LAB
25(OH)D3+25(OH)D2 SERPL-MCNC: 55 NG/ML (ref 30–96)
ABSOLUTE EOSINOPHIL (OHS): 0.03 K/UL
ABSOLUTE MONOCYTE (OHS): 0.27 K/UL (ref 0.3–1)
ABSOLUTE NEUTROPHIL COUNT (OHS): 1.18 K/UL (ref 1.8–7.7)
ALBUMIN SERPL BCP-MCNC: 3.7 G/DL (ref 3.5–5.2)
ALBUMIN/CREAT UR: 14.7 UG/MG
ALP SERPL-CCNC: 30 UNIT/L (ref 40–150)
ALT SERPL W/O P-5'-P-CCNC: 10 UNIT/L (ref 10–44)
ANION GAP (OHS): 11 MMOL/L (ref 8–16)
AST SERPL-CCNC: 21 UNIT/L (ref 11–45)
BASOPHILS # BLD AUTO: 0.02 K/UL
BASOPHILS NFR BLD AUTO: 0.7 %
BILIRUB SERPL-MCNC: 0.4 MG/DL (ref 0.1–1)
BUN SERPL-MCNC: 28 MG/DL (ref 8–23)
CALCIUM SERPL-MCNC: 9.4 MG/DL (ref 8.7–10.5)
CHLORIDE SERPL-SCNC: 101 MMOL/L (ref 95–110)
CHOLEST SERPL-MCNC: 178 MG/DL (ref 120–199)
CHOLEST/HDLC SERPL: 2 {RATIO} (ref 2–5)
CO2 SERPL-SCNC: 26 MMOL/L (ref 23–29)
CREAT SERPL-MCNC: 1.1 MG/DL (ref 0.5–1.4)
CREAT UR-MCNC: 68 MG/DL (ref 15–325)
EAG (OHS): 123 MG/DL (ref 68–131)
ERYTHROCYTE [DISTWIDTH] IN BLOOD BY AUTOMATED COUNT: 14.6 % (ref 11.5–14.5)
GFR SERPLBLD CREATININE-BSD FMLA CKD-EPI: 50 ML/MIN/1.73/M2
GLUCOSE SERPL-MCNC: 91 MG/DL (ref 70–110)
HBA1C MFR BLD: 5.9 % (ref 4–5.6)
HCT VFR BLD AUTO: 31.2 % (ref 37–48.5)
HDLC SERPL-MCNC: 89 MG/DL (ref 40–75)
HDLC SERPL: 50 % (ref 20–50)
HGB BLD-MCNC: 9.7 GM/DL (ref 12–16)
IMM GRANULOCYTES # BLD AUTO: 0.01 K/UL (ref 0–0.04)
IMM GRANULOCYTES NFR BLD AUTO: 0.4 % (ref 0–0.5)
LDLC SERPL CALC-MCNC: 82.8 MG/DL (ref 63–159)
LYMPHOCYTES # BLD AUTO: 1.16 K/UL (ref 1–4.8)
MCH RBC QN AUTO: 25.9 PG (ref 27–31)
MCHC RBC AUTO-ENTMCNC: 31.1 G/DL (ref 32–36)
MCV RBC AUTO: 83 FL (ref 82–98)
MICROALBUMIN UR-MCNC: 10 UG/ML (ref ?–5000)
NONHDLC SERPL-MCNC: 89 MG/DL
NUCLEATED RBC (/100WBC) (OHS): 0 /100 WBC
PLATELET # BLD AUTO: 174 K/UL (ref 150–450)
PMV BLD AUTO: 10.7 FL (ref 9.2–12.9)
POTASSIUM SERPL-SCNC: 4 MMOL/L (ref 3.5–5.1)
PROT SERPL-MCNC: 7.3 GM/DL (ref 6–8.4)
RBC # BLD AUTO: 3.75 M/UL (ref 4–5.4)
RELATIVE EOSINOPHIL (OHS): 1.1 %
RELATIVE LYMPHOCYTE (OHS): 43.4 % (ref 18–48)
RELATIVE MONOCYTE (OHS): 10.1 % (ref 4–15)
RELATIVE NEUTROPHIL (OHS): 44.3 % (ref 38–73)
SODIUM SERPL-SCNC: 138 MMOL/L (ref 136–145)
TRIGL SERPL-MCNC: 31 MG/DL (ref 30–150)
TSH SERPL-ACNC: 0.85 UIU/ML (ref 0.4–4)
WBC # BLD AUTO: 2.67 K/UL (ref 3.9–12.7)

## 2025-05-22 PROCEDURE — 83036 HEMOGLOBIN GLYCOSYLATED A1C: CPT

## 2025-05-22 PROCEDURE — 82306 VITAMIN D 25 HYDROXY: CPT

## 2025-05-22 PROCEDURE — 85025 COMPLETE CBC W/AUTO DIFF WBC: CPT

## 2025-05-22 PROCEDURE — 82040 ASSAY OF SERUM ALBUMIN: CPT

## 2025-05-22 PROCEDURE — 84443 ASSAY THYROID STIM HORMONE: CPT

## 2025-05-22 PROCEDURE — 82570 ASSAY OF URINE CREATININE: CPT

## 2025-05-22 PROCEDURE — 82465 ASSAY BLD/SERUM CHOLESTEROL: CPT

## 2025-05-22 PROCEDURE — 36415 COLL VENOUS BLD VENIPUNCTURE: CPT | Mod: PN

## 2025-06-07 NOTE — TELEPHONE ENCOUNTER
Refill Routing Note   Medication(s) are not appropriate for processing by [facility] for the following reason(s):        Required labs abnormal    [facility] action(s):  Defer               Appointments  past 12m or future 3m with PCP    Date Provider   Last Visit   3/17/2025 Ramon Pink MD PhD   Next Visit   9/3/2025 Ramon Pink MD PhD   ED visits in past 90 days: 0        Note composed:11:46 AM 06/07/2025

## 2025-06-09 RX ORDER — CLOPIDOGREL BISULFATE 75 MG/1
75 TABLET ORAL
Qty: 90 TABLET | Refills: 0 | Status: SHIPPED | OUTPATIENT
Start: 2025-06-09

## 2025-08-11 DIAGNOSIS — F41.9 ANXIETY: ICD-10-CM

## 2025-08-12 RX ORDER — CLORAZEPATE DIPOTASSIUM 7.5 MG/1
TABLET ORAL
Qty: 90 TABLET | Refills: 0 | Status: SHIPPED | OUTPATIENT
Start: 2025-08-12

## 2025-08-20 RX ORDER — CARVEDILOL 6.25 MG/1
6.25 TABLET ORAL 2 TIMES DAILY WITH MEALS
Qty: 180 TABLET | Refills: 2 | Status: SHIPPED | OUTPATIENT
Start: 2025-08-20

## 2025-09-03 ENCOUNTER — OFFICE VISIT (OUTPATIENT)
Dept: CARDIOLOGY | Facility: CLINIC | Age: 85
End: 2025-09-03
Payer: MEDICARE

## 2025-09-03 VITALS
HEIGHT: 63 IN | DIASTOLIC BLOOD PRESSURE: 60 MMHG | BODY MASS INDEX: 20.54 KG/M2 | WEIGHT: 115.94 LBS | HEART RATE: 78 BPM | SYSTOLIC BLOOD PRESSURE: 123 MMHG

## 2025-09-03 DIAGNOSIS — K55.9 MESENTERIC ISCHEMIA: ICD-10-CM

## 2025-09-03 DIAGNOSIS — I70.0 AORTIC ATHEROSCLEROSIS: ICD-10-CM

## 2025-09-03 DIAGNOSIS — I70.8 CELIAC ARTERY ATHEROSCLEROSIS: ICD-10-CM

## 2025-09-03 DIAGNOSIS — I70.0 ATHEROSCLEROSIS OF AORTIC ARCH: ICD-10-CM

## 2025-09-03 DIAGNOSIS — I73.9 PAD (PERIPHERAL ARTERY DISEASE): Primary | ICD-10-CM

## 2025-09-03 DIAGNOSIS — Z78.9 STATIN INTOLERANCE: ICD-10-CM

## 2025-09-03 DIAGNOSIS — I67.2 CEREBRAL ATHEROSCLEROSIS: ICD-10-CM

## 2025-09-03 DIAGNOSIS — I70.219 ATHEROSCLEROTIC PERIPHERAL VASCULAR DISEASE WITH INTERMITTENT CLAUDICATION: ICD-10-CM

## 2025-09-03 DIAGNOSIS — I10 ESSENTIAL HYPERTENSION: ICD-10-CM

## 2025-09-03 DIAGNOSIS — E78.00 HYPERCHOLESTEREMIA: ICD-10-CM

## 2025-09-03 DIAGNOSIS — E78.2 MIXED HYPERLIPIDEMIA: ICD-10-CM

## 2025-09-03 DIAGNOSIS — I65.29 CAROTID ATHEROSCLEROSIS, UNSPECIFIED LATERALITY: ICD-10-CM

## 2025-09-03 PROCEDURE — 99999 PR PBB SHADOW E&M-EST. PATIENT-LVL IV: CPT | Mod: PBBFAC,,, | Performed by: INTERNAL MEDICINE

## 2025-09-03 RX ORDER — ESOMEPRAZOLE MAGNESIUM 40 MG/1
40 CAPSULE, DELAYED RELEASE ORAL DAILY
COMMUNITY
Start: 2025-07-31

## (undated) DEVICE — CONTRAST VISIPAQUE 150ML

## (undated) DEVICE — PAD DEFIB CADENCE ADULT R2

## (undated) DEVICE — GUIDEWIRE AMPLATZ STIFF 260X7

## (undated) DEVICE — SEE MEDLINE ITEM 157187

## (undated) DEVICE — BLLN VIA TRAC 6.0X20X135

## (undated) DEVICE — GUIDEWIRE ADVNTG 035X260CM ANG

## (undated) DEVICE — CATH ANG PIGTAIL 4FR INFINITY

## (undated) DEVICE — KIT GLIDESHEATH SLEND 6FR 10CM

## (undated) DEVICE — CATH EAGLE EYE ST .014X20X150

## (undated) DEVICE — GUIDEWIRE EMERALD .035IN 260CM

## (undated) DEVICE — CATH 5FR MP 125CM 5/BX

## (undated) DEVICE — GUIDEWIRE COUGAR XT 300 ST HY

## (undated) DEVICE — COVER PROBE US 5.5X58L NON LTX

## (undated) DEVICE — HEMOSTAT VASC BAND SHORT 21CM

## (undated) DEVICE — Device

## (undated) DEVICE — VISE RADIFOCUS MULTI TORQUE

## (undated) DEVICE — CATH IMPULSE 5FR PIGTAIL 125CM

## (undated) DEVICE — CATH GUIDE LAUNCH MB1 6F 100CM

## (undated) DEVICE — KIT LEFT HEART MANIFOLD CUSTOM

## (undated) DEVICE — TUBING HPCIL ROT M/F ADPT 48IN

## (undated) DEVICE — CATH IMA INFINITI 4FRX100CM

## (undated) DEVICE — KIT INTRODUCER W/GUIDEWIRE

## (undated) DEVICE — VALVE CONTROL COPILOT

## (undated) DEVICE — BLLN MUSTANG 6 X 40 X 135

## (undated) DEVICE — CATH STERLING MR 5X20X135

## (undated) DEVICE — GUIDEWIRE STD .035X260CM ANG

## (undated) DEVICE — COVER BAND BAG 40 X 40